# Patient Record
Sex: MALE | Race: WHITE | Employment: FULL TIME | URBAN - METROPOLITAN AREA
[De-identification: names, ages, dates, MRNs, and addresses within clinical notes are randomized per-mention and may not be internally consistent; named-entity substitution may affect disease eponyms.]

---

## 2018-10-14 ENCOUNTER — HOSPITAL ENCOUNTER (EMERGENCY)
Age: 38
Discharge: HOME | End: 2018-10-14
Attending: EMERGENCY MEDICINE
Payer: COMMERCIAL

## 2018-10-14 VITALS
SYSTOLIC BLOOD PRESSURE: 174 MMHG | OXYGEN SATURATION: 97 % | WEIGHT: 200 LBS | TEMPERATURE: 97.5 F | BODY MASS INDEX: 27.09 KG/M2 | RESPIRATION RATE: 16 BRPM | DIASTOLIC BLOOD PRESSURE: 118 MMHG | HEIGHT: 72 IN | HEART RATE: 92 BPM

## 2018-10-14 DIAGNOSIS — S61.213A LACERATION OF LEFT MIDDLE FINGER WITHOUT FOREIGN BODY WITHOUT DAMAGE TO NAIL, INITIAL ENCOUNTER: Primary | ICD-10-CM

## 2018-10-14 PROCEDURE — 99282 EMERGENCY DEPT VISIT SF MDM: CPT

## 2018-10-14 PROCEDURE — 2500000003 HC RX 250 WO HCPCS: Performed by: PHYSICIAN ASSISTANT

## 2018-10-14 PROCEDURE — 12002 RPR S/N/AX/GEN/TRNK2.6-7.5CM: CPT

## 2018-10-14 RX ORDER — LIDOCAINE HYDROCHLORIDE 10 MG/ML
20 INJECTION, SOLUTION INFILTRATION; PERINEURAL ONCE
Status: DISCONTINUED | OUTPATIENT
Start: 2018-10-14 | End: 2018-10-14

## 2018-10-14 RX ORDER — LIDOCAINE HYDROCHLORIDE 20 MG/ML
20 INJECTION, SOLUTION EPIDURAL; INFILTRATION; INTRACAUDAL; PERINEURAL ONCE
Status: DISCONTINUED | OUTPATIENT
Start: 2018-10-14 | End: 2018-10-14

## 2018-10-14 RX ORDER — LIDOCAINE HYDROCHLORIDE 10 MG/ML
20 INJECTION, SOLUTION EPIDURAL; INFILTRATION; INTRACAUDAL; PERINEURAL ONCE
Status: COMPLETED | OUTPATIENT
Start: 2018-10-14 | End: 2018-10-14

## 2018-10-14 RX ADMIN — LIDOCAINE HYDROCHLORIDE 20 ML: 10 INJECTION, SOLUTION EPIDURAL; INFILTRATION; INTRACAUDAL; PERINEURAL at 17:00

## 2018-10-14 NOTE — ED PROVIDER NOTES
(90.7 kg)    Height: 6' (1.829 m)            PROCEDURES:  Laceration Repair Procedure Note    Indication: Laceration    Procedure: The patient was placed in the appropriate position and anesthesia around the laceration was obtained by digit block on finger using 1% Lidocaine without epinephrine. The area was then cleansed with betadine and draped in a sterile fashion. The laceration was closed with 5-0 ethilon using interrupted sutures. There were no additional lacerations requiring repair. The wound area was then dressed with bacitracin and a sterile dressing. Total repaired wound length: 3 cm. Other Items: None    The patient tolerated the procedure well. Complications: None        Pt non compliant with bp meds. Will take when he gets home. Pt doesn't want workup or medication here for bp. Wound care instructions given. Pt instructed to have sutures removed in 7-10 days by pcp. In unable to f/u, return for suture removal. Pt agrees. Instructed to return for signs of infection including redness, swelling, fevers chills, increased pain, red streaking, pus drainage. ED MEDS:  Orders Placed This Encounter   Medications    DISCONTD: lidocaine 1 % injection 20 mL    DISCONTD: lidocaine PF 2 % injection 20 mL    lidocaine PF 1 % injection 20 mL         CONSULTS:  None          FINAL IMPRESSION      1.  Laceration of left middle finger without foreign body without damage to nail, initial encounter          DISPOSITION/PLAN   DISPOSITION Decision To Discharge    PATIENT REFERRED TO:  Brandon Spears [address]  [phone]    Schedule an appointment as soon as possible for a visit in 1 week  For suture removal, For wound re-check    [facility] ED  Critical access hospital 469 [phone]    For worsening symptoms, or any other concern      DISCHARGE MEDICATIONS:  New Prescriptions    No medications on file       (Please note that portions of this note were completed with a voice recognition program.  Efforts were made to edit the dictations but occasionally words are mis-transcribed.)    Clara Gaona, [address] [facility] [place]  10/14/18 8396

## 2023-01-26 ENCOUNTER — HOSPITAL ENCOUNTER (INPATIENT)
Age: 43
LOS: 7 days | Discharge: NURSING, REHAB OR LONG-TERM CARE | DRG: 065 | End: 2023-02-02
Attending: EMERGENCY MEDICINE | Admitting: PSYCHIATRY & NEUROLOGY
Payer: COMMERCIAL

## 2023-01-26 ENCOUNTER — APPOINTMENT (OUTPATIENT)
Dept: CT IMAGING | Age: 43
DRG: 065 | End: 2023-01-26
Payer: COMMERCIAL

## 2023-01-26 DIAGNOSIS — I62.9 INTRACRANIAL HEMORRHAGE (HCC): Primary | ICD-10-CM

## 2023-01-26 DIAGNOSIS — I61.3 PONTINE HEMORRHAGE (HCC): ICD-10-CM

## 2023-01-26 LAB
ABSOLUTE EOS #: 0.13 K/UL (ref 0–0.44)
ABSOLUTE EOS #: <0.03 K/UL (ref 0–0.44)
ABSOLUTE IMMATURE GRANULOCYTE: 0.05 K/UL (ref 0–0.3)
ABSOLUTE IMMATURE GRANULOCYTE: <0.03 K/UL (ref 0–0.3)
ABSOLUTE LYMPH #: 1.27 K/UL (ref 1.1–3.7)
ABSOLUTE LYMPH #: 1.96 K/UL (ref 1.1–3.7)
ABSOLUTE MONO #: 0.74 K/UL (ref 0.1–1.2)
ABSOLUTE MONO #: 0.8 K/UL (ref 0.1–1.2)
ALBUMIN SERPL-MCNC: 4.5 G/DL (ref 3.5–5.2)
ALT SERPL-CCNC: 60 U/L (ref 5–41)
ANION GAP SERPL CALCULATED.3IONS-SCNC: 13 MMOL/L (ref 9–17)
ANION GAP SERPL CALCULATED.3IONS-SCNC: ABNORMAL MMOL/L
ANION GAP: 9 MMOL/L (ref 7–16)
AST SERPL-CCNC: 54 U/L
BASOPHILS # BLD: 1 % (ref 0–2)
BASOPHILS # BLD: 1 % (ref 0–2)
BASOPHILS ABSOLUTE: 0.05 K/UL (ref 0–0.2)
BASOPHILS ABSOLUTE: 0.07 K/UL (ref 0–0.2)
BILIRUB SERPL-MCNC: 1.9 MG/DL (ref 0.3–1.2)
BUN BLDV-MCNC: 11 MG/DL (ref 6–20)
BUN BLDV-MCNC: 9 MG/DL (ref 6–20)
BUN BLDV-MCNC: ABNORMAL MG/DL
BUN/CREAT BLD: ABNORMAL (ref 9–20)
CALCIUM SERPL-MCNC: 10 MG/DL (ref 8.6–10.4)
CALCIUM SERPL-MCNC: 10.8 MG/DL (ref 8.6–10.4)
CALCIUM SERPL-MCNC: ABNORMAL MG/DL
CHLORIDE BLD-SCNC: 99 MMOL/L (ref 98–107)
CHLORIDE BLD-SCNC: ABNORMAL MMOL/L
CO2: 22 MMOL/L (ref 20–31)
CO2: ABNORMAL MMOL/L
CREAT SERPL-MCNC: 0.51 MG/DL (ref 0.7–1.2)
CREAT SERPL-MCNC: 0.53 MG/DL (ref 0.7–1.2)
CREAT SERPL-MCNC: ABNORMAL MG/DL
EGFR, POC: >60 ML/MIN/1.73M2
EOSINOPHILS RELATIVE PERCENT: 0 % (ref 1–4)
EOSINOPHILS RELATIVE PERCENT: 2 % (ref 1–4)
GFR SERPL CREATININE-BSD FRML MDRD: >60 ML/MIN/1.73M2
GFR SERPL CREATININE-BSD FRML MDRD: >60 ML/MIN/1.73M2
GFR SERPL CREATININE-BSD FRML MDRD: ABNORMAL ML/MIN/1.73M2
GLUCOSE BLD-MCNC: 114 MG/DL (ref 70–99)
GLUCOSE BLD-MCNC: 116 MG/DL (ref 74–100)
GLUCOSE BLD-MCNC: 136 MG/DL (ref 70–99)
GLUCOSE BLD-MCNC: ABNORMAL MG/DL
HCO3 VENOUS: 25.8 MMOL/L (ref 22–29)
HCT VFR BLD CALC: 50.9 % (ref 40.7–50.3)
HCT VFR BLD CALC: 52.3 % (ref 40.7–50.3)
HEMOGLOBIN: 18.4 G/DL (ref 13–17)
HEMOGLOBIN: 18.5 G/DL (ref 13–17)
IMMATURE GRANULOCYTES: 0 %
IMMATURE GRANULOCYTES: 1 %
INR BLD: 1.1
LACTIC ACID, WHOLE BLOOD: 1.5 MMOL/L (ref 0.7–2.1)
LYMPHOCYTES # BLD: 16 % (ref 24–43)
LYMPHOCYTES # BLD: 35 % (ref 24–43)
MCH RBC QN AUTO: 32.6 PG (ref 25.2–33.5)
MCH RBC QN AUTO: 32.9 PG (ref 25.2–33.5)
MCHC RBC AUTO-ENTMCNC: 35.4 G/DL (ref 28.4–34.8)
MCHC RBC AUTO-ENTMCNC: 36.1 G/DL (ref 28.4–34.8)
MCV RBC AUTO: 90.9 FL (ref 82.6–102.9)
MCV RBC AUTO: 92.2 FL (ref 82.6–102.9)
MONOCYTES # BLD: 14 % (ref 3–12)
MONOCYTES # BLD: 9 % (ref 3–12)
MYOGLOBIN: 54 NG/ML (ref 28–72)
NRBC AUTOMATED: 0 PER 100 WBC
NRBC AUTOMATED: 0 PER 100 WBC
O2 SAT, VEN: 76 % (ref 60–85)
PARTIAL THROMBOPLASTIN TIME: 22.4 SEC (ref 20.5–30.5)
PCO2, VEN: 40 MM HG (ref 41–51)
PDW BLD-RTO: 11.5 % (ref 11.8–14.4)
PDW BLD-RTO: 11.7 % (ref 11.8–14.4)
PH VENOUS: 7.42 (ref 7.32–7.43)
PLATELET # BLD: 214 K/UL (ref 138–453)
PLATELET # BLD: ABNORMAL K/UL (ref 138–453)
PLATELET, FLUORESCENCE: NORMAL K/UL (ref 138–453)
PMV BLD AUTO: 10.6 FL (ref 8.1–13.5)
PO2, VEN: 40.5 MM HG (ref 30–50)
POC BUN: 8 MG/DL (ref 8–26)
POC CHLORIDE: 105 MMOL/L (ref 98–107)
POC CREATININE: 0.73 MG/DL (ref 0.51–1.19)
POC HEMATOCRIT: 54 % (ref 41–53)
POC HEMOGLOBIN: 18.5 G/DL (ref 13.5–17.5)
POC IONIZED CALCIUM: 1.24 MMOL/L (ref 1.15–1.33)
POC LACTIC ACID: 1.91 MMOL/L (ref 0.56–1.39)
POC POTASSIUM: 4.9 MMOL/L (ref 3.5–4.5)
POC SODIUM: 138 MMOL/L (ref 138–146)
POC TCO2: 25 MMOL/L (ref 22–30)
POSITIVE BASE EXCESS, VEN: 1 (ref 0–3)
POTASSIUM SERPL-SCNC: 3.7 MMOL/L (ref 3.7–5.3)
POTASSIUM SERPL-SCNC: 4 MMOL/L (ref 3.7–5.3)
POTASSIUM SERPL-SCNC: ABNORMAL MMOL/L
PROTHROMBIN TIME: 12.1 SEC (ref 9.1–12.3)
RBC # BLD: 5.6 M/UL (ref 4.21–5.77)
RBC # BLD: 5.67 M/UL (ref 4.21–5.77)
REASON FOR REJECTION: NORMAL
SEG NEUTROPHILS: 47 % (ref 36–65)
SEG NEUTROPHILS: 73 % (ref 36–65)
SEGMENTED NEUTROPHILS ABSOLUTE COUNT: 2.69 K/UL (ref 1.5–8.1)
SEGMENTED NEUTROPHILS ABSOLUTE COUNT: 5.81 K/UL (ref 1.5–8.1)
SODIUM BLD-SCNC: 132 MMOL/L (ref 135–144)
SODIUM BLD-SCNC: 134 MMOL/L (ref 135–144)
SODIUM BLD-SCNC: ABNORMAL MMOL/L
TOTAL CK: 149 U/L (ref 39–308)
TROPONIN, HIGH SENSITIVITY: 17 NG/L (ref 0–22)
WBC # BLD: 5.7 K/UL (ref 3.5–11.3)
WBC # BLD: 7.9 K/UL (ref 3.5–11.3)
ZZ NTE CLEAN UP: ORDERED TEST: NORMAL
ZZ NTE WITH NAME CLEAN UP: SPECIMEN SOURCE: NORMAL

## 2023-01-26 PROCEDURE — 82803 BLOOD GASES ANY COMBINATION: CPT

## 2023-01-26 PROCEDURE — 4A03X5D MEASUREMENT OF ARTERIAL FLOW, INTRACRANIAL, EXTERNAL APPROACH: ICD-10-PCS | Performed by: RADIOLOGY

## 2023-01-26 PROCEDURE — 97535 SELF CARE MNGMENT TRAINING: CPT

## 2023-01-26 PROCEDURE — 6360000002 HC RX W HCPCS: Performed by: STUDENT IN AN ORGANIZED HEALTH CARE EDUCATION/TRAINING PROGRAM

## 2023-01-26 PROCEDURE — 36620 INSERTION CATHETER ARTERY: CPT

## 2023-01-26 PROCEDURE — 84484 ASSAY OF TROPONIN QUANT: CPT

## 2023-01-26 PROCEDURE — 82247 BILIRUBIN TOTAL: CPT

## 2023-01-26 PROCEDURE — 80051 ELECTROLYTE PANEL: CPT

## 2023-01-26 PROCEDURE — 93005 ELECTROCARDIOGRAM TRACING: CPT | Performed by: STUDENT IN AN ORGANIZED HEALTH CARE EDUCATION/TRAINING PROGRAM

## 2023-01-26 PROCEDURE — 2500000003 HC RX 250 WO HCPCS

## 2023-01-26 PROCEDURE — 82565 ASSAY OF CREATININE: CPT

## 2023-01-26 PROCEDURE — 2500000003 HC RX 250 WO HCPCS: Performed by: STUDENT IN AN ORGANIZED HEALTH CARE EDUCATION/TRAINING PROGRAM

## 2023-01-26 PROCEDURE — 85025 COMPLETE CBC W/AUTO DIFF WBC: CPT

## 2023-01-26 PROCEDURE — 85055 RETICULATED PLATELET ASSAY: CPT

## 2023-01-26 PROCEDURE — 82330 ASSAY OF CALCIUM: CPT

## 2023-01-26 PROCEDURE — 83874 ASSAY OF MYOGLOBIN: CPT

## 2023-01-26 PROCEDURE — 6360000002 HC RX W HCPCS

## 2023-01-26 PROCEDURE — 84520 ASSAY OF UREA NITROGEN: CPT

## 2023-01-26 PROCEDURE — 85014 HEMATOCRIT: CPT

## 2023-01-26 PROCEDURE — 85730 THROMBOPLASTIN TIME PARTIAL: CPT

## 2023-01-26 PROCEDURE — APPSS30 APP SPLIT SHARED TIME 16-30 MINUTES: Performed by: PHYSICIAN ASSISTANT

## 2023-01-26 PROCEDURE — 84460 ALANINE AMINO (ALT) (SGPT): CPT

## 2023-01-26 PROCEDURE — 82550 ASSAY OF CK (CPK): CPT

## 2023-01-26 PROCEDURE — 82310 ASSAY OF CALCIUM: CPT

## 2023-01-26 PROCEDURE — 97530 THERAPEUTIC ACTIVITIES: CPT

## 2023-01-26 PROCEDURE — 82947 ASSAY GLUCOSE BLOOD QUANT: CPT

## 2023-01-26 PROCEDURE — 83605 ASSAY OF LACTIC ACID: CPT

## 2023-01-26 PROCEDURE — 92523 SPEECH SOUND LANG COMPREHEN: CPT

## 2023-01-26 PROCEDURE — 80048 BASIC METABOLIC PNL TOTAL CA: CPT

## 2023-01-26 PROCEDURE — 6370000000 HC RX 637 (ALT 250 FOR IP): Performed by: STUDENT IN AN ORGANIZED HEALTH CARE EDUCATION/TRAINING PROGRAM

## 2023-01-26 PROCEDURE — 82040 ASSAY OF SERUM ALBUMIN: CPT

## 2023-01-26 PROCEDURE — 84295 ASSAY OF SERUM SODIUM: CPT

## 2023-01-26 PROCEDURE — 97166 OT EVAL MOD COMPLEX 45 MIN: CPT

## 2023-01-26 PROCEDURE — 70450 CT HEAD/BRAIN W/O DYE: CPT

## 2023-01-26 PROCEDURE — 51701 INSERT BLADDER CATHETER: CPT

## 2023-01-26 PROCEDURE — 85610 PROTHROMBIN TIME: CPT

## 2023-01-26 PROCEDURE — 51798 US URINE CAPACITY MEASURE: CPT

## 2023-01-26 PROCEDURE — 99285 EMERGENCY DEPT VISIT HI MDM: CPT

## 2023-01-26 PROCEDURE — 96374 THER/PROPH/DIAG INJ IV PUSH: CPT

## 2023-01-26 PROCEDURE — 70498 CT ANGIOGRAPHY NECK: CPT

## 2023-01-26 PROCEDURE — 97162 PT EVAL MOD COMPLEX 30 MIN: CPT

## 2023-01-26 PROCEDURE — 99291 CRITICAL CARE FIRST HOUR: CPT | Performed by: PSYCHIATRY & NEUROLOGY

## 2023-01-26 PROCEDURE — 6360000004 HC RX CONTRAST MEDICATION: Performed by: STUDENT IN AN ORGANIZED HEALTH CARE EDUCATION/TRAINING PROGRAM

## 2023-01-26 PROCEDURE — 84132 ASSAY OF SERUM POTASSIUM: CPT

## 2023-01-26 PROCEDURE — 82553 CREATINE MB FRACTION: CPT

## 2023-01-26 PROCEDURE — 96375 TX/PRO/DX INJ NEW DRUG ADDON: CPT

## 2023-01-26 PROCEDURE — 2000000000 HC ICU R&B

## 2023-01-26 PROCEDURE — 84450 TRANSFERASE (AST) (SGOT): CPT

## 2023-01-26 RX ORDER — ONDANSETRON 2 MG/ML
INJECTION INTRAMUSCULAR; INTRAVENOUS
Status: COMPLETED
Start: 2023-01-26 | End: 2023-01-26

## 2023-01-26 RX ORDER — NICARDIPINE HYDROCHLORIDE 0.1 MG/ML
INJECTION INTRAVENOUS
Status: COMPLETED
Start: 2023-01-26 | End: 2023-01-26

## 2023-01-26 RX ORDER — LIDOCAINE HYDROCHLORIDE 10 MG/ML
20 INJECTION, SOLUTION INFILTRATION; PERINEURAL ONCE
Status: COMPLETED | OUTPATIENT
Start: 2023-01-26 | End: 2023-01-26

## 2023-01-26 RX ORDER — ONDANSETRON 2 MG/ML
4 INJECTION INTRAMUSCULAR; INTRAVENOUS EVERY 6 HOURS PRN
Status: DISCONTINUED | OUTPATIENT
Start: 2023-01-26 | End: 2023-02-02 | Stop reason: HOSPADM

## 2023-01-26 RX ORDER — NICARDIPINE HYDROCHLORIDE 0.1 MG/ML
2.5-15 INJECTION INTRAVENOUS CONTINUOUS
Status: DISCONTINUED | OUTPATIENT
Start: 2023-01-26 | End: 2023-01-28

## 2023-01-26 RX ORDER — LIDOCAINE HYDROCHLORIDE 10 MG/ML
INJECTION, SOLUTION INFILTRATION; PERINEURAL
Status: COMPLETED
Start: 2023-01-26 | End: 2023-01-26

## 2023-01-26 RX ORDER — ACETAMINOPHEN 325 MG/1
650 TABLET ORAL EVERY 4 HOURS PRN
Status: DISCONTINUED | OUTPATIENT
Start: 2023-01-26 | End: 2023-02-02 | Stop reason: HOSPADM

## 2023-01-26 RX ORDER — ONDANSETRON 4 MG/1
4 TABLET, ORALLY DISINTEGRATING ORAL EVERY 8 HOURS PRN
Status: DISCONTINUED | OUTPATIENT
Start: 2023-01-26 | End: 2023-02-02 | Stop reason: HOSPADM

## 2023-01-26 RX ORDER — LABETALOL HYDROCHLORIDE 5 MG/ML
10 INJECTION, SOLUTION INTRAVENOUS EVERY 4 HOURS PRN
Status: DISCONTINUED | OUTPATIENT
Start: 2023-01-26 | End: 2023-01-29

## 2023-01-26 RX ORDER — DIPHENHYDRAMINE HYDROCHLORIDE 50 MG/ML
12.5 INJECTION INTRAMUSCULAR; INTRAVENOUS ONCE
Status: COMPLETED | OUTPATIENT
Start: 2023-01-26 | End: 2023-01-26

## 2023-01-26 RX ORDER — NICOTINE 21 MG/24HR
1 PATCH, TRANSDERMAL 24 HOURS TRANSDERMAL DAILY
Status: DISCONTINUED | OUTPATIENT
Start: 2023-01-26 | End: 2023-02-02 | Stop reason: HOSPADM

## 2023-01-26 RX ORDER — MAGNESIUM SULFATE 1 G/100ML
1000 INJECTION INTRAVENOUS ONCE
Status: COMPLETED | OUTPATIENT
Start: 2023-01-26 | End: 2023-01-27

## 2023-01-26 RX ORDER — FENTANYL CITRATE 50 UG/ML
50 INJECTION, SOLUTION INTRAMUSCULAR; INTRAVENOUS
Status: DISCONTINUED | OUTPATIENT
Start: 2023-01-26 | End: 2023-01-30

## 2023-01-26 RX ORDER — ONDANSETRON 2 MG/ML
4 INJECTION INTRAMUSCULAR; INTRAVENOUS ONCE
Status: COMPLETED | OUTPATIENT
Start: 2023-01-26 | End: 2023-01-26

## 2023-01-26 RX ADMIN — ONDANSETRON 4 MG: 2 INJECTION INTRAMUSCULAR; INTRAVENOUS at 06:36

## 2023-01-26 RX ADMIN — Medication 10 MG: at 15:03

## 2023-01-26 RX ADMIN — NICARDIPINE HYDROCHLORIDE 5 MG/HR: 0.1 INJECTION INTRAVENOUS at 06:07

## 2023-01-26 RX ADMIN — NICARDIPINE HYDROCHLORIDE 2.5 MG/HR: 0.1 INJECTION INTRAVENOUS at 21:00

## 2023-01-26 RX ADMIN — LIDOCAINE HYDROCHLORIDE: 10 INJECTION, SOLUTION INFILTRATION; PERINEURAL at 10:00

## 2023-01-26 RX ADMIN — ONDANSETRON 4 MG: 2 INJECTION INTRAMUSCULAR; INTRAVENOUS at 06:07

## 2023-01-26 RX ADMIN — ACETAMINOPHEN 650 MG: 325 TABLET ORAL at 16:59

## 2023-01-26 RX ADMIN — Medication 10 MG: at 20:33

## 2023-01-26 RX ADMIN — DIPHENHYDRAMINE HYDROCHLORIDE 12.5 MG: 50 INJECTION, SOLUTION INTRAMUSCULAR; INTRAVENOUS at 22:48

## 2023-01-26 RX ADMIN — ACETAMINOPHEN 650 MG: 325 TABLET ORAL at 11:58

## 2023-01-26 RX ADMIN — FENTANYL CITRATE 50 MCG: 50 INJECTION, SOLUTION INTRAMUSCULAR; INTRAVENOUS at 17:59

## 2023-01-26 RX ADMIN — NICARDIPINE HYDROCHLORIDE 5 MG/HR: 0.1 INJECTION INTRAVENOUS at 15:04

## 2023-01-26 RX ADMIN — NICARDIPINE HYDROCHLORIDE 15 MG/HR: 0.1 INJECTION INTRAVENOUS at 07:33

## 2023-01-26 RX ADMIN — ACETAMINOPHEN 650 MG: 325 TABLET ORAL at 20:47

## 2023-01-26 RX ADMIN — NICARDIPINE HYDROCHLORIDE 5 MG/HR: 0.1 INJECTION INTRAVENOUS at 12:00

## 2023-01-26 RX ADMIN — MAGNESIUM SULFATE HEPTAHYDRATE 1000 MG: 1 INJECTION, SOLUTION INTRAVENOUS at 22:45

## 2023-01-26 RX ADMIN — IOPAMIDOL 90 ML: 755 INJECTION, SOLUTION INTRAVENOUS at 06:23

## 2023-01-26 ASSESSMENT — PAIN DESCRIPTION - ORIENTATION: ORIENTATION: RIGHT

## 2023-01-26 ASSESSMENT — PAIN DESCRIPTION - LOCATION: LOCATION: HEAD

## 2023-01-26 ASSESSMENT — PAIN SCALES - GENERAL: PAINLEVEL_OUTOF10: 5

## 2023-01-26 ASSESSMENT — PAIN DESCRIPTION - DESCRIPTORS: DESCRIPTORS: ACHING

## 2023-01-26 NOTE — PROGRESS NOTES
Occupational Therapy  Facility/Department: [address] NEURO ICU  Occupational Therapy Initial Assessment    Name: Arsenio Cheung  : 1980  MRN: [id number]  Date of Service: 2023    Discharge Recommendations:   Further therapy recommended at discharge. Patient Diagnosis(es): The primary encounter diagnosis was Intracranial hemorrhage ([facility] [address] A diagnosis of Pontine hemorrhage ([facility] [address] was also pertinent to this visit. Past Medical History:  has a past medical history of History of heartburn, Neoplasm of unspecified nature of bone, soft tissue, and skin, and Research study patient. Past Surgical History:  has a past surgical history that includes Appendectomy (in 20's) and pre-malignant / benign skin lesion excision (Right, 2014). Assessment   Performance deficits / Impairments: Decreased functional mobility ; Decreased ADL status; Decreased endurance;Decreased high-level IADLs;Decreased fine motor control;Decreased strength;Decreased coordination;Decreased balance  Assessment: pt would benefit from further therapy at discharge in order to increase safety and independence with ADLS and functional transfers/functional mobility. Prognosis: Good  Decision Making: Medium Complexity  REQUIRES OT FOLLOW-UP: Yes  Activity Tolerance  Activity Tolerance: Patient Tolerated treatment well        Plan   Occupational Therapy Plan  Times Per Week: 3-4x/wk     Restrictions  Restrictions/Precautions  Restrictions/Precautions: General Precautions, Seizure (Simultaneous filing. User may not have seen previous data.)  Required Braces or Orthoses?: No (Simultaneous filing. User may not have seen previous data.)  Position Activity Restriction  Other position/activity restrictions: Up with assist, SBP <160 mmHg (Simultaneous filing.  User may not have seen previous data.)    Subjective   General  Patient assessed for rehabilitation services?: Yes  Family / Caregiver Present: Yes (pt wife present for duration of session)  Diagnosis: pontine hemorrhage  General Comment  Comments: RN ok'd for therapy this afternoon. pt agreeable to participate in session and  cooperative/pleasant throughout. pt reported discomfort to entire R side of body     Social/Functional History  Social/Functional History  Lives With: Spouse (13-8 year old children occasionally  Simultaneous filing. User may not have seen previous data.)  Type of Home: House  Home Layout: Two level, Bed/Bath upstairs, 1/2 bath on main level  Home Access: Stairs to enter with rails  Entrance Stairs - Number of Steps: 1-2  Entrance Stairs - Rails: Right  Bathroom Shower/Tub: Tub/Shower unit  Bathroom Toilet: Standard  Home Equipment: Cane (pt reported no use of DME at baseline)  ADL Assistance: [address]: Independent  Homemaking Responsibilities: Yes  Meal Prep Responsibility: Primary  Laundry Responsibility: Primary  Cleaning Responsibility: Primary  Ambulation Assistance: Independent  Transfer Assistance: Independent  Active : Yes  Mode of Transportation: Truck  Occupation: Full time employment  Type of Occupation: operations  Leisure & Hobbies: watch history channel, [employer]  Additional Comments: pt reported wife able to provide 24/7 assist       Objective                Safety Devices  Type of Devices: Call light within reach; Left in bed;Gait belt;Patient at risk for falls;Nurse notified; All fall risk precautions in place  Restraints  Restraints Initially in Place: No    Bed Mobility Training  Bed Mobility Training: Yes  Overall Level of Assistance: Stand-by assistance  Supine to Sit: Stand-by assistance  Sit to Supine: Stand-by assistance  Scooting: Stand-by assistance  Balance  Sitting: High guard (~8 minutes on EOB with SBA for static and CGA for dynamic sitting)  Standing: With support (~2 minutes.  pt completed static standing at bedside for sheet management and side steps toward [facility] with min A and min verbal cues for sequencing and continuation.)  Transfer Training  Transfer Training: Yes  Overall Level of Assistance: Contact-guard assistance (with RW)  Sit to Stand: Contact-guard assistance  Stand to Sit: Contact-guard assistance  Gait  Overall Level of Assistance: Contact-guard assistance;Minimum assistance       AROM: Within functional limits  Strength: Generally decreased, functional (R UE 4/5, L UE 4+/5)  Coordination: Generally decreased, functional (pt exhibited decreased coordination to R UE for gross and fine motor movements. pt required increased time to  complete finger to nose test and opposition with R hand and demonstrated decreased accuracy)  Tone: Normal  Sensation: Impaired (pt reported decreased sensation to R UE and R LE)    ADL  Feeding: Modified independent   Grooming: Stand by assistance  UE Bathing: Stand by assistance  LE Bathing: Contact guard assistance  UE Dressing: Stand by assistance  LE Dressing: Contact guard assistance  Toileting: Contact guard assistance  Additional Comments: OT facilitated pt in donning B socks while sitting on EOB. pt required increased time to complete d/t decreased coordination. pt provided with CGA d/t slight unsteadiness with dynamic sitting while bringing foot to knee             Vision  Vision: Impaired (pt reported having glasses but doesn't wear them. pt reported slightly blurry vision since this morning .)  Hearing  Hearing: Within functional limits    Cognition  Overall Cognitive Status: WFL  Orientation  Overall Orientation Status: Within Functional Limits                  Education Given To: Patient  Education Provided: Role of Therapy;Plan of Care;Transfer Training; Fall Prevention Strategies  Education Provided Comments: importance of incorporating R hand into functional tasks  Education Method: Verbal  Barriers to Learning: None  Education Outcome: Verbalized understanding    LUE AROM (degrees)  LUE AROM : WFL  Left Hand AROM (degrees)  Left Hand AROM: WFL  RUE AROM (degrees)  RUE AROM : WFL  Right Hand AROM (degrees)  Right Hand AROM: WFL        Hand Dominance  Hand Dominance: Right                                                        AM-PAC Score        AM-PAC Inpatient Daily Activity Raw Score: 20 (01/26/23 1515)  AM-PAC Inpatient ADL T-Scale Score : 42.03 (01/26/23 1515)  ADL Inpatient CMS 0-100% Score: 38.32 (01/26/23 1515)  ADL Inpatient CMS G-Code Modifier : CJ (01/26/23 1515)           Goals  Short Term Goals  Time Frame for Short Term Goals: pt will, by discharge  Short Term Goal 1: complete LB ADLs and toileting tasks with SBA and set up  Short Term Goal 2: complete UB ADLs with mod I  Short Term Goal 3: participate in meaningful activity for 15+ minutes in order to increase R UE coordination  Short Term Goal 4: dem SBA during functional transfers/functional mobility with LRD, as needed  Short Term Goal 5: dem ~8 minutes dynamic standing tolerance with SBA in order to complete functional tasks       Therapy Time   Individual Concurrent Group Co-treatment   Time In 1436         Time Out 1457         Minutes 21      Co-eval with PT    Timed Code Treatment Minutes: Lauren 0565, OTR/L

## 2023-01-26 NOTE — ED NOTES
Report given to [facility]; all questions addressed.       Christina Gage RN  01/26/23 7484

## 2023-01-26 NOTE — PROGRESS NOTES
Physical Therapy  Facility/Department: [address] NEURO ICU  Physical Therapy Initial Assessment    Name: Hipolito Smith  : 1980  MRN: [id number]  Date of Service: 2023  Chief Complaint   Patient presents with    Extremity Weakness     Left sided weakness    Facial Droop       Discharge Recommendations:    Further therapy recommended at discharge. The patient should be able to tolerate at least 3 hours of therapy per day over 5 days or 15 hours over 7 days. This patient may benefit from a Physical Medicine and Rehab consult. PT Equipment Recommendations  Equipment Needed: No      Patient Diagnosis(es): The primary encounter diagnosis was Intracranial hemorrhage ([facility] [address] A diagnosis of Pontine hemorrhage ([facility] [address] was also pertinent to this visit. Past Medical History:  has a past medical history of History of heartburn, Neoplasm of unspecified nature of bone, soft tissue, and skin, and Research study patient. Past Surgical History:  has a past surgical history that includes Appendectomy (in 20's) and pre-malignant / benign skin lesion excision (Right, 2014). Assessment   Body Structures, Functions, Activity Limitations Requiring Skilled Therapeutic Intervention: Decreased functional mobility ; Decreased ADL status; Decreased tolerance to work activity; Decreased strength;Decreased endurance;Decreased sensation;Decreased balance;Decreased coordination  Assessment: Pt amb 2' R lateral Scar for RW sequencing and verbal cueing of RLE placement in mobility. Pt would benefit from continued acute PT to address noted deficits. Therapy Prognosis: Good  Decision Making: Medium Complexity  Requires PT Follow-Up: Yes  Activity Tolerance  Activity Tolerance: Other (comment)  Activity Tolerance Comments: Greatest limitation to activity tolerance is fear of movement with recent loss of coordination and impaired sensation on R side of body.      Plan   Physcial Therapy Plan  General Plan:  (6-7x/wk)  Current Treatment Recommendations: Strengthening, Balance training, Functional mobility training, Transfer training, Endurance training, Gait training, Stair training, Neuromuscular re-education, Home exercise program, Patient/Caregiver education & training, Safety education & training, Equipment evaluation, education, & procurement, Therapeutic activities  Safety Devices  Type of Devices: Call light within reach, Left in bed, Gait belt, Patient at risk for falls, Nurse notified, All fall risk precautions in place  Restraints  Restraints Initially in Place: No     Restrictions  Restrictions/Precautions  Restrictions/Precautions: General Precautions, Seizure   Required Braces or Orthoses?: No   Position Activity Restriction  Other position/activity restrictions: Up with assist, SBP <160 mmHg     Subjective   General  Chart Reviewed: Yes  Patient assessed for rehabilitation services?: Yes  Response To Previous Treatment: Not applicable  Family / Caregiver Present: Yes (Wife)  Follows Commands: Within Functional Limits  General Comment  Comments: RN and pt agreeable to PT. pt alert in bed upon arrival. Co-Eval w/ OT  Subjective  Subjective: Pt did not verbalize pain, but stated that the RUE and RLE are a \"3-4/10 discomfort\". N/t in R side of body.          Social/Functional History  Social/Functional History  Lives With: Spouse (13-8 year old children occasionally)  Type of Home: House  Home Layout: Two level, Bed/Bath upstairs, 1/2 bath on main level  Home Access: Stairs to enter with rails  Entrance Stairs - Number of Steps: 1-2  Entrance Stairs - Rails: Right  Bathroom Shower/Tub: Tub/Shower unit  Bathroom Toilet: Standard  Home Equipment: Cane (pt reported no use of DME at baseline)  ADL Assistance: [address]: Independent  Homemaking Responsibilities: Yes  Meal Prep Responsibility: Primary  Laundry Responsibility: Primary  Cleaning Responsibility: Primary  Ambulation Assistance: Independent  Transfer Assistance: Independent  Active : Yes  Mode of Transportation: Truck  Occupation: Full time employment  Type of Occupation: operations  Leisure & Hobbies: watch history channel, fishing  Additional Comments: pt reported wife able to provide 24/7 assist  Vision/Hearing  Hearing  Hearing: Within functional limits    Cognition   Orientation  Overall Orientation Status: Within Functional Limits  Cognition  Overall Cognitive Status: WFL     Objective   AROM RLE (degrees)  RLE AROM: WFL  AROM LLE (degrees)  LLE AROM : WFL  AROM RUE (degrees)  RUE General AROM: See OT  AROM LUE (degrees)  LUE General AROM: See OT  Strength RLE  Strength RLE: WFL  Comment: 5/5 strength grossly  Strength LLE  Strength LLE: WFL  Comment: 5/5 strength grossly  Strength RUE  Comment: See OT  Strength LUE  Comment: See OT     Bed mobility  Supine to Sit: Contact guard assistance  Sit to Supine: Contact guard assistance  Bed Mobility Comments: performed to R side to make functional w/ home setup  Transfers  Sit to Stand: Contact guard assistance  Stand to Sit: Contact guard assistance  Comment: Performed w/ RW  Ambulation  Surface: Level tile  Device: Rolling Walker  Assistance: Minimal assistance  Quality of Gait: Pt ambulates with discoordination of RLE in foot placement secondary to dysmetria. Pt required verbal cues for lateral RW sequencing/navigation, as well as cueing for RLE foot placement. Pt ambulates with decreased tianna, gait speed, step width. Distance: 2' R lateral  More Ambulation?: No  Stairs/Curb  Stairs?: No     Balance  Posture: Good  Sitting - Static: Fair;+  Sitting - Dynamic: Fair;+  Standing - Static: Fair  Standing - Dynamic: Fair;-  Comments: Standing Balance assessed w/ RW  Exercise Treatment: Heel-Shin slide test performed on RLE reveales decreased coordination of movement.       AM-PAC Score  AM-PAC Inpatient Mobility Raw Score : 16 (01/26/23 1747)  AM-PAC Inpatient T-Scale Score : 40.78 (01/26/23 1517)  Mobility Inpatient CMS 0-100% Score: 54.16 (01/26/23 1517)  Mobility Inpatient CMS G-Code Modifier : CK (01/26/23 1517)     Goals  Short Term Goals  Time Frame for Short Term Goals: 14 visits  Short Term Goal 1: Pt amb 300' IND  Short Term Goal 2: Pt will be IND in all bed mobility tasks  Short Term Goal 3: Pt will be IND in all transfers  Short Term Goal 4: Pt will be IND 1 flight of stair navigation. Education  Patient Education  Education Given To: Patient; Family (Wife)  Education Provided: Role of Therapy;Plan of Care  Education Method: Demonstration  Barriers to Learning: None  Education Outcome: Verbalized understanding;Demonstrated understanding      Therapy Time   Individual Concurrent Group Co-treatment   Time In 1436         Time Out 1458         Minutes 22         Timed Code Treatment Minutes: 8 Minutes       Dyana Mchugh, SPT   This treatment/evaluation completed by signing SPT. Signing PT agrees with treatment and documentation.

## 2023-01-26 NOTE — ED NOTES
Pt report given to [facility], [facility].       Aroldo Good RN  01/26/23 7283

## 2023-01-26 NOTE — ED NOTES
Pt brought by EMS with c/o of left sided weakness and facial drooping. Pt woke up at 4 am, wife noticed the symptoms. Pt's last known well was 2200 01/25. Pt is a known hypertensive and has no history of stroke in the past.   Pt is alert and oriented x 4.       Donald Landry RN  01/26/23 9732

## 2023-01-26 NOTE — ED NOTES
Neuro critical care at bedside to evaluate pt. Pt to remain NPO per Neuro crit care.     Cam Rene, RN  01/26/23 7621 Roger Dinh, RN  01/26/23 1369

## 2023-01-26 NOTE — H&P
Neuro ICU History & Physical    Patient Name: Flako Demarco  Patient : 1980  Room/Bed: 0125/0125-01  Code Status: Full Code  Allergies: No Known Allergies    CHIEF COMPLAINT     Dizziness, Right sided weakness, numbness    HPI    History Obtained From: patient, wife, ED     The patient is a 43 y.o. male presented with dizziness, right-sided facial droop, right-sided weakness and right-sided numbness. Patient felt well when he went to bed last night, last known well 10 PM.  Medical history notable for hypertension, takes Coreg for this. Patient does not know his dose. EMS was called when patient woke up with these symptoms this morning. Systolic blood pressure was 200s on arrival to the ED. Stroke alert was called on arrival, CT of the head demonstrated 13 x 8 mm intraparenchymal hemorrhage of the posterior ramses. CTA of the head and neck showed focal mild narrowing of the right P1 PCA, no other significant stenosis, aneurysm, or arterial injury. Cardene was started for blood pressure control with goal blood pressure under 160. Patient's metabolic work-up unremarkable overall. Patient remains alert, oriented, able to provide history. He does describe double vision, sensation changes on the right and weakness. He was unable to walk when this happened. No other medical history. No known allergies. Patient does smoke. ICH score: 1    Admitted to ICU From: Emergency Department   Reason for ICU Admission: Pontine Hemorrhage       PATIENT HISTORY   Past Medical History:        Diagnosis Date    History of heartburn     takes omeprazole    Neoplasm of unspecified nature of bone, soft tissue, and skin -present    lesions of right cheek x 2       Past Surgical History:        Procedure Laterality Date    APPENDECTOMY  in     PRE-MALIGNANT / BENIGN SKIN LESION EXCISION Right 2014    Excision lesion of right cheek. Dr. Salazar Servsamantha.        Social History:   Social History Socioeconomic History    Marital status:      Spouse name: Not on file    Number of children: Not on file    Years of education: Not on file    Highest education level: Not on file   Occupational History    Not on file   Tobacco Use    Smoking status: Every Day     Packs/day: 1.00     Years: 10.00     Pack years: 10.00     Types: Cigarettes     Last attempt to quit: 2013     Years since quittin.5    Smokeless tobacco: Never   Substance and Sexual Activity    Alcohol use: Yes     Alcohol/week: 24.0 standard drinks     Types: 24 Cans of beer per week    Drug use: No    Sexual activity: Not on file   Other Topics Concern    Not on file   Social History Narrative    Not on file     Social Determinants of Health     Financial Resource Strain: Not on file   Food Insecurity: Not on file   Transportation Needs: Not on file   Physical Activity: Not on file   Stress: Not on file   Social Connections: Not on file   Intimate Partner Violence: Not on file   Housing Stability: Not on file       Family History:   No family history on file. Allergies:    Patient has no known allergies. Medications Prior to Admission:    Medications Prior to Admission: omeprazole (PRILOSEC) 10 MG capsule, Take 10 mg by mouth daily.     Current Medications:  Current Facility-Administered Medications: niCARdipine (CARDENE) 20 mg in 0.9 % sodium chloride 200 mL solution, 2.5-15 mg/hr, IntraVENous, Continuous  acetaminophen (TYLENOL) tablet 650 mg, 650 mg, Oral, Q4H PRN  ondansetron (ZOFRAN-ODT) disintegrating tablet 4 mg, 4 mg, Oral, Q8H PRN **OR** ondansetron (ZOFRAN) injection 4 mg, 4 mg, IntraVENous, Q6H PRN  labetalol (NORMODYNE;TRANDATE) injection 10 mg, 10 mg, IntraVENous, Q4H PRN  lidocaine 1 % injection 20 mL, 20 mL, IntraDERmal, Once  lidocaine 1 % injection, , ,   nicotine (NICODERM CQ) 14 MG/24HR 1 patch, 1 patch, TransDERmal, Daily    REVIEW OF SYSTEMS     CONSTITUTIONAL: negative for fatigue   EYES: Positive for double vision, blurry vision   HEENT: negative for tinnitus and sore throat   RESPIRATORY: negative for cough, shortness of breath   CARDIOVASCULAR: negative for chest pain, palpitations, or syncope   GASTROINTESTINAL: negative for abdominal pain, nausea, vomiting   GENITOURINARY: negative for incontinence or retention    MUSCULOSKELETAL: negative for neck or back pain, negative for extremity pain   NEUROLOGICAL: Negative for seizures, headaches, Positive for weakness, numbness, slurred speech, negative for confusion, aphasia, dysarthria    PSYCHIATRIC: negative for agitation, hallucination, SI/HI   SKIN Negative for spontaneous contusions, rashes, or lesions      PHYSICAL EXAM:     /87   Pulse 81   Temp 97.5 °F (36.4 °C)   Resp 18   Ht 6' (1.829 m)   Wt 200 lb (90.7 kg)   SpO2 94%   BMI 27.12 kg/m²     PHYSICAL EXAM:  CONSTITUTIONAL:  Well developed, well nourished, alert and oriented x 3, in no acute distress. GCS 15. Nontoxic. Mild  dysarthria. No aphasia.    HEAD:  normocephalic, atraumatic    EYES:  Right-sided ptosis, pupils equal and reactive, right gaze preference, unable to cross midline to the left Visual acuity intact, persistent nystagmus on the right   ENT:  moist mucous membranes   NECK:  supple, symmetric   LUNGS:  Equal air entry bilaterally   CARDIOVASCULAR:  normal s1 / s2, RRR, distal pulses intact   ABDOMEN:  Soft, no rigidity   NEUROLOGIC:  Mental Status:  A & O x3,awake             Cranial Nerves:    II: Visual acuity:  normal  II: Visual fields:  normal -   III: Pupils:  equal, round, reactive to light  III,IV,VI: Extra Ocular Movements: abnormal unable to cross midline when looking to the left, right eye with fixed lateral gaze  V: Facial sensation:  intact  VII: Facial strength: abnormal right facial droop  VIII: Hearing:  intact  IX: Palate:  intact  XI: Shoulder shrug:  intact  XII: Tongue movement:  normal    Motor Exam:    Drift:  absent  Tone:  normal    MOTOR:  RUE:   LUE:   RLE:   LLE:     Sensory:    Touch:    Right Upper Extremity:  abnormal - \"prickly sensation\"  Left Upper Extremity:  normal  Right Lower Extremity:  normal  Left Lower Extremity:  abnormal - absent sensation    Coordination/Dysmetria:    Finger to Nose:   Right:  abnormal - dysmetria  Left:  normal        NIH Stroke Scale Total (if not done complete detailed one below):    1a.  Level of consciousness:  0 - alert; keenly responsive  1b. Level of consciousness questions:  0 - answers both questions correctly  1c. Level of consciousness questions:  0 - performs both tasks correctly  2. Best Gaze:  2 - forced deviation, or total gaze paresis not overcome by oculocephalic maneuver  3. Visual:  0 - no visual loss  4. Facial Palsy:  1 - minor paralysis (flattened nasolabial fold, asymmetric on smiling)  5a. Motor left arm:  0 - no drift, limb holds 90 (or 45) degrees for full 10 seconds  5b. Motor right arm:  0 - no drift, limb holds 90 (or 45) degrees for full 10 seconds  6a. Motor left le - no drift; leg holds 30 degree position for full 5 seconds  6b. Motor right le - no drift; leg holds 30 degree position for full 5 seconds  7. Limb Ataxia:  1 - present in one limb  8. Sensory:  1 - mild to moderate sensory loss; patient feels pinprick is less sharp or is dull on the affected side; there is a loss of superficial pain with pinprick but patient is aware of being touched   9. Best Language:  0 - no aphasia, normal  10. Dysarthria:  0 - normal  11.   Extinction and Inattention:  0 - no abnormality    LABS AND IMAGING:     RECENT LABS:  CBC with Differential:    Lab Results   Component Value Date/Time    WBC 5.7 2023 06:18 AM    RBC 5.67 2023 06:18 AM    HGB 18.5 2023 06:18 AM    HCT 52.3 2023 06:18 AM    PLT See Reflexed IPF Result 2023 06:18 AM    MCV 92.2 2023 06:18 AM    MCH 32.6 2023 06:18 AM    MCHC 35.4 2023 06:18 AM    RDW 11.7 01/26/2023 06:18 AM    LYMPHOPCT 35 01/26/2023 06:18 AM    MONOPCT 14 01/26/2023 06:18 AM    BASOPCT 1 01/26/2023 06:18 AM    MONOSABS 0.80 01/26/2023 06:18 AM    LYMPHSABS 1.96 01/26/2023 06:18 AM    EOSABS 0.13 01/26/2023 06:18 AM    BASOSABS 0.07 01/26/2023 06:18 AM     BMP:    Lab Results   Component Value Date/Time     01/26/2023 07:24 AM    K 3.7 01/26/2023 07:24 AM    CL 99 01/26/2023 07:24 AM    CO2 22 01/26/2023 07:24 AM    BUN 9 01/26/2023 07:24 AM    CREATININE 0.53 01/26/2023 07:24 AM    CREATININE 0.73 01/26/2023 06:04 AM    CALCIUM 10.0 01/26/2023 07:24 AM    LABGLOM >60 01/26/2023 07:24 AM    GLUCOSE 136 01/26/2023 07:24 AM       RADIOLOGY:  CT HEAD WO CONTRAST   Final Result   13 x 8 mm intraparenchymal hemorrhage posterior ramses. Critical results were called by Dr. Porfirio Desai to Dr. Caty Cruz on 1/26/2023 at   06:37. CTA HEAD NECK W CONTRAST   Final Result   Focal mild narrowing of the right P1 PCA. Otherwise no significant stenosis,   aneurysm, or arterial injury in the CTA of the head and neck. Pontine intraparenchymal hemorrhage is redemonstrated. CT head without contrast    (Results Pending)             Labs and Images reviewed with:    [] Nyasia Noland MD    [x] Addison Hernandez MD  [] Alan Denis MD  --[] there are no new interval images to review. ASSESSMENT AND PLAN:         ASSESSMENT:     This is a 43 y.o. male presenting after waking up with right-sided weakness and numbness in addition to dizziness. 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 Past medical history notable for hypertension, on Coreg at home. Patient's blood pressure was around 335 systolic on arrival, Cardene initiated in the ER. No other metabolic abnormalities. Patient care will be discussed with attending, will reevaluate patient along with attending.      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   - Imaging: CT head with 13 x 8 mm posterior pontine hemorrhage, CTA of the head and neck with focal mild narrowing in the right P1 PTA  - AEDs not indicated   - Initial BP ~129 systolic  - Goal SBP <568   - Transition to lisinopril when weaning Cardene  - NSG, endovascular neuro following   - ICH score: 1  - Repeat CTH     CARDIOVASCULAR:  BP Range: Systolic (75IDX), TFY:627 , Min:88 , QGU:178     Diastolic (38BMC), JOSE E:789, Min:54, Max:159    Pulse Range: Pulse  Av.3  Min: 66  Max: 101  - Goal SBP <160  - History of HTN    PULMONARY:  Respiration Range: Resp  Av.2  Min: 11  Max: 27  Current Pulse Ox: SpO2: 94 %  24HR Pulse Ox Range: SpO2  Av.9 %  Min: 91 %  Max: 96 %  - No active problems    RENAL/FLUID/ELECTROLYTE:  - BUN 9/ Creatinine 0.53  - I/O: No intake/output data recorded. - IVF: N/A, normal diet ordered    GI/NUTRITION:  NUTRITION:  ADULT DIET; Regular  - Bowel regimen: Advance as needed  - GI prophylaxis: Not indicated    ID:  Tmax: Temp (24hrs), Av.5 °F (36.4 °C), Min:97.5 °F (36.4 °C), Max:97.5 °F (36.4 °C)    Temperature Range: Temp: 97.5 °F (36.4 °C) Temp  Av.5 °F (36.4 °C)  Min: 97.5 °F (36.4 °C)  Max: 97.5 °F (36.4 °C)  - WBC   Lab Results   Component Value Date    WBC 5.7 2023     - Antimicrobials:  not indicated     HEME:   Recent Labs     23  0618   HGB 18.5*    stable  - Platelets adequate count    ENDOCRINE:  - Continue to monitor blood glucose, goal <180  - glucose controlled - most recent BGL is   Recent Labs     23  0618 23  0724   GLUCOSE Unable to perform testing: Specimen hemolyzed.  136*       OTHER:  - PT/OT/ST   - Code Status: Full Code    PROPHYLAXIS:  Stress ulcer: not indicated     DVT PROPHYLAXIS:  - SCD sleeves - Thigh High   - Hold AC/AP d/t ICH      Fadumo Armstrong, DO  Neuro Critical Care Service   2023     11:13 AM

## 2023-01-26 NOTE — PROGRESS NOTES
[facility]     Emergency/Trauma Note    PATIENT NAME: Erlin Rincon    Shift date: 1/25/2023  Shift day: Wednesday   Shift # 3    Room # 24/24   Name: Erlin Rincon            Age: 43 y.o. Gender: male          Taoist: None   Place of Sikh: Unknown    Trauma/Incident type: Stroke Alert  Admit Date & Time: 1/26/2023  5:56 AM  TRAUMA NAME: N/A    ADVANCE DIRECTIVES IN CHART? No    NAME OF DECISION MAKER: Unknown    RELATIONSHIP OF DECISION MAKER TO PATIENT: Unknown    PATIENT/EVENT DESCRIPTION:  Erlin Rincon is a 43 y.o. male who arrived via [facility] to [phone] and was paged out as a \"Stroke Alert. \" Per report, patient was \"last known well at 2200. \" Patient \"woke up experiencing dizziness and weakness, along with facial droop. \" Patient arrived to ED24 and was taken to CT Scan soon after. Pt to be admitted to 24/24. SPIRITUAL ASSESSMENT-INTERVENTION-OUTCOME:   responded to page and was present when patient arrived to ED24.  gathered patient's information from transport team and learned that patient's spouse was en route to the hospital. [facility] Guardian met with patient's spouse in the waiting room and informed her that patient had been taken to the CT Scan. ED Triage escorted spouse back to room when he returned to room.  provided support and hospitality to patient's spouse. PATIENT BELONGINGS:  No belongings noted    ANY BELONGINGS OF SIGNIFICANT VALUE NOTED:  N/A    REGISTRATION STAFF NOTIFIED? Yes      WHAT IS YOUR SPIRITUAL CARE PLAN FOR THIS PATIENT?:  [facility] can make follow-up visit, per request. Carleene Osgood can be reached 24/7 via [email or web address].      01/26/23 0600   Encounter Summary   Service Provided For: Patient and family together   Referral/Consult From: Multi-disciplinary team  (Stroke Alert)   Support System Spouse   Last Encounter  01/25/23   Complexity of Encounter High   Begin Time 0600   End Time  0632   Total Time Calculated 32 min   Encounter    Type Initial Screen/Assessment   Crisis   Type Code Stroke   Spiritual/Emotional needs   Type Spiritual Support   Assessment/Intervention/Outcome   Assessment Anxious; Fearful;Coping   Intervention Active listening;Discussed illness injury and its impact; Explored Coping Skills/Resources;Sustaining Presence/Ministry of presence   Outcome Comfort;Coping;Receptive   Plan and Referrals   Plan/Referrals Continue to visit, (comment)  (as needed)     Electronically signed by Bimal Montoya on 1/26/2023 at 6:34 AM.  [facility]  [phone]

## 2023-01-26 NOTE — ED PROVIDER NOTES
[address] [facility]  ED  Emergency Department Encounter  Emergency Medicine Resident     Pt Name:Jus Sol  MRN: [id number]  [place] 1980  Date of evaluation: 23  PCP:  Rajani Peraza DO  Note Started: 6:14 AM EST    CHIEF COMPLAINT       Dizziness, L facial droop    HISTORY OF PRESENT ILLNESS  (Location/Symptom, Timing/Onset, Context/Setting, Quality, Duration, Modifying Factors, Severity.)      Myranda Camejo is a 43 y.o. male who presents with last known well of 10 PM at bedtime, patient woke up this morning with dizziness, left facial droop and left-sided weakness, woke his wife up who called EMS. On EMS arrival, patient systolic blood pressure was in the 200s. Did not receive any medications in route to the emergency department, however on arrival patient vomited. Patient is alert and oriented x4, however intermittently drowsy. No chemical AP or AC. Patient is Coreg for hypertension. No history of CVA. PAST MEDICAL / SURGICAL / SOCIAL / FAMILY HISTORY      has a past medical history of History of heartburn and Neoplasm of unspecified nature of bone, soft tissue, and skin. has a past surgical history that includes Appendectomy (in 20's) and pre-malignant / benign skin lesion excision (Right, 2014). Social History     Socioeconomic History    Marital status:      Spouse name: Not on file    Number of children: Not on file    Years of education: Not on file    Highest education level: Not on file   Occupational History    Not on file   Tobacco Use    Smoking status: Every Day     Packs/day: 1.00     Years: 10.00     Pack years: 10.00     Types: Cigarettes     Last attempt to quit: 2013     Years since quittin.5    Smokeless tobacco: Never   Substance and Sexual Activity    Alcohol use:  Yes     Alcohol/week: 24.0 standard drinks     Types: 24 Cans of beer per week    Drug use: No    Sexual activity: Not on file   Other Topics Concern    Not on file   Social History Narrative    Not on file     Social Determinants of Health     Financial Resource Strain: Not on file   Food Insecurity: Not on file   Transportation Needs: Not on file   Physical Activity: Not on file   Stress: Not on file   Social Connections: Not on file   Intimate Partner Violence: Not on file   Housing Stability: Not on file       No family history on file. Allergies:  Patient has no known allergies. Home Medications:  Prior to Admission medications    Medication Sig Start Date End Date Taking? Authorizing Provider   omeprazole (PRILOSEC) 10 MG capsule Take 10 mg by mouth daily. Historical Provider, MD     REVIEW OF SYSTEMS       Review of Systems   Unable to perform ROS: Acuity of condition     PHYSICAL EXAM      INITIAL VITALS:   BP (!) 161/109   Pulse 77   Temp 97.5 °F (36.4 °C)   Resp 15   Ht 6' (1.829 m)   Wt 200 lb (90.7 kg)   SpO2 94%   BMI 27.12 kg/m²     Physical Exam  HENT:      Head: Normocephalic. Nose: Nose normal.      Mouth/Throat:      Mouth: Mucous membranes are moist.      Pharynx: Oropharynx is clear. Eyes:      Extraocular Movements: Extraocular movements intact. Pupils: Pupils are equal, round, and reactive to light. Comments: Left facial droop, left periorbital muscle weakness  Able to antigravity bilateral upper and lower extremities, however weakness on left upper and lower  Drift to L extremities without drift  Mild dysarthria  Alert with mild stimulation  NIH 6     Cardiovascular:      Rate and Rhythm: Normal rate and regular rhythm. Pulses: Normal pulses. Heart sounds: Normal heart sounds. Pulmonary:      Effort: Pulmonary effort is normal.      Breath sounds: Normal breath sounds. Abdominal:      Palpations: Abdomen is soft. Tenderness: There is no abdominal tenderness. There is no right CVA tenderness or left CVA tenderness. Musculoskeletal:      Cervical back: Neck supple. Skin:     General: Skin is warm. Capillary Refill: Capillary refill takes less than 2 seconds. Neurological:      Mental Status: He is alert. DDX/DIAGNOSTIC RESULTS / EMERGENCY DEPARTMENT COURSE / MDM     Medical Decision Making  Intracranial bleed, hypertensive bleed, ischemic stroke, vertigo, hypoglycemia    49-year-old gentleman presents to the emergency department with left facial droop, left upper and lower extremity weakness and dizziness, last known well at bedtime yesterday 10 PM.  On arrival, patient had an episode of emesis. GCS is 14, eyes opening to voice. Following commands otherwise, ANO x4 and interactive when aroused. Systolic blood pressure 876, afebrile, nontachycardic. Physical exam does show an NIH of 6 with left facial deficits, mild dysarthria, left upper and lower extremity weakness with drift and for alertness. Cardene drip initiated. Stroke team at bedside. CT head showing hypertensive intracranial bleed. Neurosurgery and neuro critical care contacted. Systolic blood pressure 941 on a Cardene drip at this time. Neuro critical care at bedside, patient will be admitted to their service for further care and management. Amount and/or Complexity of Data Reviewed  Labs: ordered. Radiology: ordered. ECG/medicine tests: ordered. Risk  Prescription drug management. Decision regarding hospitalization. EMERGENCY DEPARTMENT COURSE:  ED Course as of 01/26/23 0700   Thu Jan 26, 2023   0642 CTH  13 x 8 mm intraparenchymal hemorrhage posterior ramses. [EM]   [id number] CTA h/n  Focal mild narrowing of the right P1 PCA. Otherwise no significant stenosis,  aneurysm, or arterial injury in the CTA of the head and neck. Pontine intraparenchymal hemorrhage is redemonstrated. [EM]      ED Course User Index  [EM] Harry Pereira MD     PROCEDURES:  None    CONSULTS:  IP CONSULT TO NEUROSURGERY  IP CONSULT TO NEUROCRITICAL CARE    FINAL IMPRESSION      1.  Intracranial hemorrhage ([facility] [address]        DISPOSITION / PLAN DISPOSITION Decision To Admit 01/26/2023 06:13:33 AM      PATIENT REFERRED TO:  No follow-up provider specified.     DISCHARGE MEDICATIONS:  New Prescriptions    No medications on file       Aishwarya Jaramillo MD  Emergency Medicine Resident    (Please note that portions of thisnote were completed with a voice recognition program.  Efforts were made to edit the dictations but occasionally words are mis-transcribed.)        Aishwarya Jaramillo MD  Resident  01/26/23 0700

## 2023-01-26 NOTE — CONSULTS
Department of Neurosurgery                                            Nurse Practitioner Consult Note      Reason for Consult:  Brainstem bleed   Requesting Physician:  Jessica Donaldson  Neurosurgeon:   [] Dr. Sarah Laird  [x] Dr. Elzbieta Troy  [] Dr. Stormy Salazar  [] Dr. Tracy Natarajan      History Obtained From:  patient, spouse    CHIEF COMPLAINT:         Chief Complaint   Patient presents with    Extremity Weakness     Left sided weakness    Facial Droop       HISTORY OF PRESENT ILLNESS:       The patient is a 43 y.o. male who presents via EMS with complaints of left sided numbness and double vision. History of HTN. Per wife, patient complaining of new onset dizziness, nausea, and vomiting. CTH revealed posterior pontine hemorrhage. PAST MEDICAL HISTORY :       Past Medical History:        Diagnosis Date    History of heartburn     takes omeprazole    Neoplasm of unspecified nature of bone, soft tissue, and skin -present    lesions of right cheek x 2       Past Surgical History:        Procedure Laterality Date    APPENDECTOMY  in     PRE-MALIGNANT / BENIGN SKIN LESION EXCISION Right 2014    Excision lesion of right cheek. Dr. Jeremy Patel. Social History:   Social History     Socioeconomic History    Marital status:      Spouse name: Not on file    Number of children: Not on file    Years of education: Not on file    Highest education level: Not on file   Occupational History    Not on file   Tobacco Use    Smoking status: Every Day     Packs/day: 1.00     Years: 10.00     Pack years: 10.00     Types: Cigarettes     Last attempt to quit: 2013     Years since quittin.5    Smokeless tobacco: Never   Substance and Sexual Activity    Alcohol use:  Yes     Alcohol/week: 24.0 standard drinks     Types: 24 Cans of beer per week    Drug use: No    Sexual activity: Not on file   Other Topics Concern    Not on file   Social History Narrative    Not on file     Social Determinants of Health     Financial Resource Strain: Not on file   Food Insecurity: Not on file   Transportation Needs: Not on file   Physical Activity: Not on file   Stress: Not on file   Social Connections: Not on file   Intimate Partner Violence: Not on file   Housing Stability: Not on file       Family History:   No family history on file. Allergies:  Patient has no known allergies. Home Medications:  Prior to Admission medications    Medication Sig Start Date End Date Taking? Authorizing Provider   omeprazole (PRILOSEC) 10 MG capsule Take 10 mg by mouth daily. Historical Provider, MD       Current Medications:   Current Facility-Administered Medications: niCARdipine (CARDENE) 20 mg in 0.9 % sodium chloride 200 mL solution, 2.5-15 mg/hr, IntraVENous, Continuous  acetaminophen (TYLENOL) tablet 650 mg, 650 mg, Oral, Q4H PRN  ondansetron (ZOFRAN-ODT) disintegrating tablet 4 mg, 4 mg, Oral, Q8H PRN **OR** ondansetron (ZOFRAN) injection 4 mg, 4 mg, IntraVENous, Q6H PRN  labetalol (NORMODYNE;TRANDATE) injection 10 mg, 10 mg, IntraVENous, Q4H PRN    REVIEW OF SYSTEMS:       CONSTITUTIONAL: negative for fatigue and malaise   EYES: Positive for double vision and photophobia    HEENT: negative for tinnitus and sore throat   RESPIRATORY: negative for cough, shortness of breath   CARDIOVASCULAR: negative for chest pain, palpitations   GASTROINTESTINAL: positive for nausea, vomiting   GENITOURINARY: negative for incontinence   MUSCULOSKELETAL: negative for neck or back pain   NEUROLOGICAL: negative for seizures   PSYCHIATRIC: negative for agitated     Review of systems otherwise negative.     PHYSICAL EXAM:       BP (!) 145/89   Pulse 77   Temp 97.5 °F (36.4 °C)   Resp 13   Ht 6' (1.829 m)   Wt 200 lb (90.7 kg)   SpO2 94%   BMI 27.12 kg/m²       CONSTITUTIONAL: no apparent distress, appears stated age   HEAD: normocephalic, atraumatic   ENT: moist mucous membranes, uvula midline   NECK: supple, symmetric, no midline tenderness to palpation   BACK: without midline tenderness, step-offs or deformities   NEUROLOGIC:  EYE OPENING     Spontaneous - 4 [x]       To voice - 3 []       To pain - 2 []       None - 1 []    VERBAL RESPONSE     Appropriate, oriented - 5 [x]       Dazed or confused - 4 []       Syllables, expletives - 3 []       Grunts - 2 []       None - 1 []    MOTOR RESPONSE     Spontaneous, command - 6 [x]       Localizes pain - 5 []       Withdraws pain - 4 []       Abnormal flexion - 3 []       Abnormal extension - 2 []       None - 1 []            Total GCS: 15    Right eye exotropia     Motor Exam:    Motor exam is symmetrical 5 out of 5 all extremities bilaterally    Sensory:    Right Upper Extremity:  abnormal - altered throughout   Left Upper Extremity:  normal  Right Lower Extremity:  abnormal - altered throughout   Left Lower Extremity:  normal    Deep Tendon Reflexes:    Right Bicep:  2+  Left Bicep:  2+  Right Knee:  2+  Left Knee:  2+         LABS AND IMAGING:     CBC with Differential:    Lab Results   Component Value Date/Time    WBC 5.7 01/26/2023 06:18 AM    RBC 5.67 01/26/2023 06:18 AM    HGB 18.5 01/26/2023 06:18 AM    HCT 52.3 01/26/2023 06:18 AM    PLT See Reflexed IPF Result 01/26/2023 06:18 AM    MCV 92.2 01/26/2023 06:18 AM    MCH 32.6 01/26/2023 06:18 AM    MCHC 35.4 01/26/2023 06:18 AM    RDW 11.7 01/26/2023 06:18 AM    LYMPHOPCT 35 01/26/2023 06:18 AM    MONOPCT 14 01/26/2023 06:18 AM    BASOPCT 1 01/26/2023 06:18 AM    MONOSABS 0.80 01/26/2023 06:18 AM    LYMPHSABS 1.96 01/26/2023 06:18 AM    EOSABS 0.13 01/26/2023 06:18 AM    BASOSABS 0.07 01/26/2023 06:18 AM     BMP:    Lab Results   Component Value Date/Time    NA Unable to perform testing: Specimen hemolyzed. 01/26/2023 06:18 AM    K Unable to perform testing: Specimen hemolyzed. 01/26/2023 06:18 AM    CL Unable to perform testing: Specimen hemolyzed. 01/26/2023 06:18 AM    CO2 Unable to perform testing: Specimen hemolyzed.  01/26/2023 06:18 AM    BUN Unable to perform testing: Specimen hemolyzed. 01/26/2023 06:18 AM    CREATININE Unable to perform testing: Specimen hemolyzed. 01/26/2023 06:18 AM    CREATININE 0.73 01/26/2023 06:04 AM    CALCIUM Unable to perform testing: Specimen hemolyzed. 01/26/2023 06:18 AM    LABGLOM Unable to perform testing: Specimen hemolyzed. 01/26/2023 06:18 AM    GLUCOSE Unable to perform testing: Specimen hemolyzed. 01/26/2023 06:18 AM       Radiology Review:  CT HEAD WO CONTRAST    Result Date: 1/26/2023  EXAMINATION: CT OF THE HEAD WITHOUT CONTRAST  1/26/2023 6:09 am TECHNIQUE: CT of the head was performed without the administration of intravenous contrast. Automated exposure control, iterative reconstruction, and/or weight based adjustment of the mA/kV was utilized to reduce the radiation dose to as low as reasonably achievable. COMPARISON: None. HISTORY: ORDERING SYSTEM PROVIDED HISTORY: L facial droop, L weakness, N/V, htn TECHNOLOGIST PROVIDED HISTORY: L facial droop, L weakness, N/V, htn Decision Support Exception - unselect if not a suspected or confirmed emergency medical condition->Emergency Medical Condition (MA) Reason for Exam: L facial droop, L weakness, N/V, htn FINDINGS: BRAIN/VENTRICLES: 13 x 8 mm intraparenchymal hemorrhage posterior ramses (2:23). There is no midline shift. No abnormal extra-axial fluid collection. The gray-white differentiation is maintained without evidence of an acute infarct. There is no evidence of hydrocephalus. ORBITS: The visualized portion of the orbits demonstrate no acute abnormality. SINUSES: The visualized paranasal sinuses and mastoid air cells demonstrate no acute abnormality. SOFT TISSUES/SKULL:  No acute abnormality of the visualized skull or soft tissues. 13 x 8 mm intraparenchymal hemorrhage posterior ramses. Critical results were called by Dr. Mary Lou Mcgowan to Dr. Candee Romberg on 1/26/2023 at 06:37.      CTA HEAD NECK W CONTRAST    Result Date: 1/26/2023  EXAMINATION: CTA OF THE HEAD AND NECK WITH CONTRAST 1/26/2023 6:09 am: TECHNIQUE: CTA of the head and neck was performed with the administration of intravenous contrast. Multiplanar reformatted images are provided for review. MIP images are provided for review. Stenosis of the internal carotid arteries measured using NASCET criteria. Automated exposure control, iterative reconstruction, and/or weight based adjustment of the mA/kV was utilized to reduce the radiation dose to as low as reasonably achievable. COMPARISON: None. HISTORY: ORDERING SYSTEM PROVIDED HISTORY: L facial droop, L weakness, N/V, htn TECHNOLOGIST PROVIDED HISTORY: L facial droop, L weakness, N/V, htn Decision Support Exception - unselect if not a suspected or confirmed emergency medical condition->Emergency Medical Condition (MA) Reason for Exam: L facial droop, L weakness, N/V, htn FINDINGS: CTA NECK: AORTIC ARCH/ARCH VESSELS: No dissection or arterial injury. No significant stenosis of the brachiocephalic or subclavian arteries. CAROTID ARTERIES: No dissection, arterial injury, or hemodynamically significant stenosis by NASCET criteria. VERTEBRAL ARTERIES: No dissection, arterial injury, or significant stenosis. Left vertebral artery dominant. SOFT TISSUES: The lung apices are clear. No cervical or superior mediastinal lymphadenopathy. The larynx and pharynx are unremarkable. No acute abnormality of the salivary and thyroid glands. BONES: No acute osseous abnormality. CTA HEAD: ANTERIOR CIRCULATION: No significant stenosis of the intracranial internal carotid, anterior cerebral, or middle cerebral arteries. No aneurysm. POSTERIOR CIRCULATION: Focal mild narrowing right P1 (series 4, image 182) no significant stenosis of the vertebral, basilar, or posterior cerebral arteries. No aneurysm. The right vertebral artery terminates in the right PICA, a normal variant. OTHER: No dural venous sinus thrombosis on this non-dedicated study.  BRAIN: Pontine intraparenchymal hemorrhage is redemonstrated. No mass effect or midline shift. No extra-axial fluid collection. The gray-white differentiation is maintained. Focal mild narrowing of the right P1 PCA. Otherwise no significant stenosis, aneurysm, or arterial injury in the CTA of the head and neck. Pontine intraparenchymal hemorrhage is redemonstrated. ASSESSMENT AND PLAN:       Patient Active Problem List   Diagnosis    Neoplasm of unspecified nature of bone, soft tissue, and skin    Hemangioma of skin    Pontine hemorrhage (HCC)         A/P:  This is a 43 y.o. male with posterior pontine hemorrhage     Patient care discussed with attending, patient evaluated along with attending.      - Obtain MRI brain   - No NSG intervention needed at this time       Please contact neurosurgery with any changes in patients neurologic status. Thank you for your consult.        ANDRE Miles pager [phone]  1/26/2023  7:52 AM

## 2023-01-26 NOTE — CONSULTS
Department of Endovascular Neurosurgery                                                                                                                      Resident Consult Note  Stroke Alert paged @ 5:46 AM (patient arrived around 6:10 AM)  ER Room # 24  Arrival to patient bedside @ 6:15 AM         Reason for Consult:    Requesting Physician:    Endovascular Neurosurgeon:   []Dr. Brianna Faith  []Dr. Clover Corley  []Dr. Kami Campbell   [x] Dr. Yan Hollins     History Obtained From:  patient, electronic medical record    CHIEF COMPLAINT:       Dizziness     HISTORY OF PRESENT ILLNESS:       The patient is a 43 y.o. male with Hx of HTN on Coreg presenting for acute onset dizziness. He also had nausea and vomited once upon arrival to ED. Patient also stated he had double vision, and sense of imbalance. He was unable to exit the bed when he woke up. He stated the room was spinning. Wife called EMS. CT head w/o contrast revealed posterior pontine hemorrhage. Cardene started with goal SBP less than 160. Neurosurgery and Neurocritical care consulted. Last know well: 4 AM on 1/26/23    On presentation:  BP: 237/159  BSL: 116    Prior to arrival patient was on  Antiplatelets/anticoagulants: None   Statins: None      Smoking history: smoker  (1 ppd x 20 yrs)       PAST MEDICAL HISTORY :       Past Medical History:        Diagnosis Date    History of heartburn     takes omeprazole    Neoplasm of unspecified nature of bone, soft tissue, and skin 2009-present    lesions of right cheek x 2       Past Surgical History:        Procedure Laterality Date    APPENDECTOMY  in 20's    PRE-MALIGNANT / BENIGN SKIN LESION EXCISION Right 9/29/2014    Excision lesion of right cheek. Dr. Blaise Osler.        Social History:   Social History     Socioeconomic History    Marital status:      Spouse name: Not on file    Number of children: Not on file Years of education: Not on file    Highest education level: Not on file   Occupational History    Not on file   Tobacco Use    Smoking status: Every Day     Packs/day: 1.00     Years: 10.00     Pack years: 10.00     Types: Cigarettes     Last attempt to quit: 2013     Years since quittin.5    Smokeless tobacco: Never   Substance and Sexual Activity    Alcohol use: Yes     Alcohol/week: 24.0 standard drinks     Types: 24 Cans of beer per week    Drug use: No    Sexual activity: Not on file   Other Topics Concern    Not on file   Social History Narrative    Not on file     Social Determinants of Health     Financial Resource Strain: Not on file   Food Insecurity: Not on file   Transportation Needs: Not on file   Physical Activity: Not on file   Stress: Not on file   Social Connections: Not on file   Intimate Partner Violence: Not on file   Housing Stability: Not on file       Family History:   No family history on file. Allergies:  Patient has no known allergies. Home Medications:  Prior to Admission medications    Medication Sig Start Date End Date Taking? Authorizing Provider   omeprazole (PRILOSEC) 10 MG capsule Take 10 mg by mouth daily. Historical Provider, MD       Current Medications:   Current Facility-Administered Medications: niCARdipine (CARDENE) 20 mg in 0.9 % sodium chloride 200 mL solution, 2.5-15 mg/hr, IntraVENous, Continuous    REVIEW OF SYSTEMS:       CONSTITUTIONAL: negative for fatigue and malaise. Nausea and Vomiting. EYES: negative for double vision and photophobia    HEENT: negative for tinnitus and sore throat   RESPIRATORY: negative for cough, shortness of breath   CARDIOVASCULAR: negative for chest pain, palpitations   GASTROINTESTINAL: negative for nausea, vomiting   GENITOURINARY: negative for incontinence   MUSCULOSKELETAL: negative for neck or back pain   NEUROLOGICAL: Paresthesia of right arm and leg. Double vision. Ataxia. Vertigo.     PSYCHIATRIC: negative for fatigue     Review of systems otherwise negative. PHYSICAL EXAM:       BP (!) 161/109   Pulse 77   Temp 97.5 °F (36.4 °C)   Resp 15   Ht 6' (1.829 m)   Wt 200 lb (90.7 kg)   SpO2 94%   BMI 27.12 kg/m²     CONSTITUTIONAL:  Well developed, well nourished, alert and oriented x 3, in mild distress. GCS 15, nontoxic. No dysarthria, no aphasia. HEAD:  normocephalic, atraumatic    EYES:  PERRLA, EOMI.   ENT:  moist mucous membranes   NECK:  supple, symmetric, no midline tenderness to palpation    BACK:  without midline tenderness, step-offs or deformities    LUNGS:  Equal air entry bilaterally   CARDIOVASCULAR:  normal s1 / s2   ABDOMEN:  Soft, no rigidity   NEUROLOGIC:  Mental Status:  A & O x3,awake. No Dysarthria or Aphasia. Cranial Nerves:    PERRL. Right Gaze deviation. Intact visual field testing. Rightward nystagmus (fast beat)  Left hemifacial weakness. Loss of left nasolabial fold. No changes in voice. Remaining cranial nerves intact. Motor Exam:    Drift:  absent  Tone:  normal    Motor exam is symmetrical 5 out of 5 all extremities bilaterally    Sensory:    Touch:    Right Upper Extremity:  abnormal - numbness, pins and needles. Left Upper Extremity:  normal  Right Lower Extremity:  abnormal - Numbness, Pins and Needles  Left Lower Extremity:  normal    Deep Tendon Reflexes:    Right Bicep:  2+  Left Bicep:  2+  Right Knee:  2+  Left Knee:  2+    Plantar Response:  Right:  downgoing  Left:  downgoing    Clonus:  absent  Kong's:  absent    Coordination/Dysmetria:    Finger to Nose:   Right:  abnormal - ataxic. Misses finger or overshoots. Left:  normal     Gait:  Deferred. INITIAL NIH STROKE SCALE:    Time Performed:      1a.  Level of consciousness:  0 - alert; keenly responsive  1b. Level of consciousness questions:  0 - answers both questions correctly  1c. Level of consciousness questions:  0 - performs both tasks correctly  2.     Best Gaze:  2 - forced deviation, or total gaze paresis not overcome by oculocephalic maneuver  3. Visual:  0 - no visual loss  4. Facial Palsy:  1 - minor paralysis (flattened nasolabial fold, asymmetric on smiling)  5a. Motor left arm:  0 - no drift, limb holds 90 (or 45) degrees for full 10 seconds  5b. Motor right arm:  0 - no drift, limb holds 90 (or 45) degrees for full 10 seconds  6a. Motor left le - no drift; leg holds 30 degree position for full 5 seconds  6b. Motor right le - no drift; leg holds 30 degree position for full 5 seconds  7. Limb Ataxia:  1 - present in one limb  8. Sensory:  1 - mild to moderate sensory loss; patient feels pinprick is less sharp or is dull on the affected side; there is a loss of superficial pain with pinprick but patient is aware of being touched   9. Best Language:  0 - no aphasia, normal  10. Dysarthria:  0 - normal  11.   Extinction and Inattention:  0 - no abnormality    TOTAL:  4     SKIN:  no rash      Modified [place] Score Scale:     [x] Zero: No symptoms at all   [] 1: No significant disability despite symptoms; able to carry out all usual duties and activities   [] 2: Slight disability; unable to carry out all previous activities, but able to look after own affairs without assistance   [] 3:Moderate disability; requiring some help, but able to walk without assistance   [] 4: Moderately severe disability; unable to walk and attend to bodily needs without assistance   [] 5:Severe disability; bedridden, incontinent and requiring constant nursing care and attention    LABS AND IMAGING:     CBC with Differential:    Lab Results   Component Value Date/Time    WBC 5.7 2023 06:18 AM    RBC 5.67 2023 06:18 AM    HGB 18.5 2023 06:18 AM    HCT 52.3 2023 06:18 AM    PLT See Reflexed IPF Result 2023 06:18 AM    MCV 92.2 2023 06:18 AM    MCH 32.6 2023 06:18 AM    MCHC 35.4 2023 06:18 AM    RDW 11.7 2023 06:18 AM LYMPHOPCT 35 01/26/2023 06:18 AM    MONOPCT 14 01/26/2023 06:18 AM    BASOPCT 1 01/26/2023 06:18 AM    MONOSABS 0.80 01/26/2023 06:18 AM    LYMPHSABS 1.96 01/26/2023 06:18 AM    EOSABS 0.13 01/26/2023 06:18 AM    BASOSABS 0.07 01/26/2023 06:18 AM         BMP:    Lab Results   Component Value Date/Time    NA PENDING 01/26/2023 06:18 AM    K PENDING 01/26/2023 06:18 AM    CL PENDING 01/26/2023 06:18 AM    CO2 PENDING 01/26/2023 06:18 AM    BUN PENDING 01/26/2023 06:18 AM    CREATININE PENDING 01/26/2023 06:18 AM    CREATININE 0.73 01/26/2023 06:04 AM    CALCIUM PENDING 01/26/2023 06:18 AM    LABGLOM PENDING 01/26/2023 06:18 AM    GLUCOSE PENDING 01/26/2023 06:18 AM       Radiology Review:    CT HEAD WO CONTRAST    Result Date: 1/26/2023  13 x 8 mm intraparenchymal hemorrhage posterior ramses. Critical results were called by Dr. Ceferino Gonzales to Dr. Young Michel on 1/26/2023 at 06:37. ASSESSMENT AND PLAN:       Patient Active Problem List   Diagnosis    Neoplasm of unspecified nature of bone, soft tissue, and skin    Hemangioma of skin       Assessment                 43 y.o. male with past medical history of HTN on Coreg presenting for acute onset dizziness. Vomited once on arrival to [facility] ED. Patient was found to have left pontine hemorrhage on CT head w/o contrast. CTA done to evaluate vascular stenosis, aneurysm. NIH 4 as documented above. Last Known Well (date and time): 4 AM on 1/26/23    2. Candidate for IV Tenecteplase therapy     Yes []     No  [x] due to the following exclusion criteria: Hemorrhage    3.  Candidate for Thrombectomy    Yes []      No [x] due to the following exclusion criteria:     - Discussed with Dr. Young Michel     Recommendations:    [] General Neurology Care Status - prefer 5th floor (5A/5C)   [] Internal Medicine General Care Status   [x] NICU Status - (5B)     [] MICU Status   [] Observation Status    Please use the following admission order set for stroke admission:   [x] [id number] - JOON Intercerebral Hemorrhage Admission   [] [id number] - JOON Sub Arachnoid Hemorrhage Admission   [] [id number] - JOON Ischemic Stroke TPA Treatment Focused   [] [id number] - IP Ischemic Stroke ICU Post Alteplase (TPA) Admission    [] [id number] - GEN Ischemic Stroke Non-Thrombolytic Focussed      Imaging   - CT Head  WO : Posterior Pontine Hemorrhage measuring 13 x 8 mm. - CTA Head and Neck : done     Medications   -  Hold Antithrombotics. - Cardene for SBP goal less than 140-160.   - Continue Coreg.   - Can add Labetalol q 4 hours PRN if cardene is maxed. Labs  - Fasting Lipid panel  - HgbA1c lab  - PT, OT, Speech eval   - Telemetry   - Neuro checks per protocol  - We recommend SBP less than 160. - Blood glucose goal less than 180  - Please avoid dextrose containing solutions      Additional recommendations may follow    Please contact EV NSG with any changes in patients neurologic status. Thank you for your consult.        Rina Leon,    PGY 2 Neurology Resident  1/26/2023 at 6:47 AM

## 2023-01-26 NOTE — ED NOTES
Lab calls writer RN and orders redraw for green tube.      The following labs were labeled with appropriate pt sticker and tubed to lab:     [] Blue     [] Lavender   [] on ice  [x] Green/yellow  [] Green/black [] on ice  [] Berneda Olivia  [] on ice  [] Yellow  [] Red  [] Type/ Screen  [] ABG  [] VBG    [] COVID-19 swab    [] Rapid  [] PCR  [] Flu swab  [] Peds Viral Panel     [] Urine Sample  [] Fecal Sample  [] Pelvic Cultures  [] Blood Cultures  [] X 2  [] STREP Cultures         Mamadou Stanley RN  01/26/23 0399

## 2023-01-26 NOTE — RESEARCH
Clinical Research Services  January 26, 2023  12:19 PM    Asked by Dr. Manpreet Zapata to evaluate the patient for protocol [employer] [id number]: A Multicenter, Prospective, Emlvpbyxv-zbvgjf-jpddtoc Observational Study Evaluating Immunoassay Measurements of Pancreatic Stone Protein Performed on [employer] abioSCOPE Device with the PSP Assay on ICU Patients at Risk of Sepsis as an Aid in Identifying Sepsis. IRB #  [id number]                     NCT # [id number]  [x] Patient met inclusion/exclusion criteria  [x] Patient/family given consent for review  [x] Patient/family read study consent. Questions answered. Consent signed by Pt's Spouse  [x] A copy of the signed consents given to the patient/family  [x] Patient number:   [x] Baseline labs obtained  [x] Patient/ family educated on purpose of the abioSCOPE device and the PSP assay for use as an aid in identifying sepsis in those patients at risk for sepsis. Patient/family verbalizes understanding. Additional Comments: Spouse signed consent. Labs drawn from art line. Avani Osborne RN  Clinical Research Nurse     Dania Sutherland MD    Patient's wife provided informed consent for inclusion into study. Blood draw obtained for testing. Patient did not experience any local adverse events secondary to blood draw:  No hematoma or ecchymosis  No phlebitis  No cellulitis  No local bleeding.     Dania Sutherland MD

## 2023-01-26 NOTE — ED PROVIDER NOTES
[facility] ED  eMERGENCY dEPARTMENT eNCOUnter   Attending Attestation     Pt Name: Junior Dean  MRN: [id number]  [place] 1980  Date of evaluation: 1/26/23       Junior Dean is a 43 y.o. male who presents with No chief complaint on file. History: Patient presents from home via EMS. Patient has left facial droop. Patient started vomiting on arrival.  Patient is diaphoretic. Patient is hypertensive. Patient did get some nitro on the way here. That did improve pressure. Initial his pressure was over 114 systolic. Apparently patient awoken at 4 AM complaining of dizziness. Patient's wife called EMS and EMS arrived and transported the patient here. Exam: Patient is diaphoretic, uncomfortable appearing. Lungs are clear. Patient does have some left-sided facial droop. Difficult to get neuro exam based on patient's discomfort and vomiting. Patient is moving all extremities. Concern for intracranial hemorrhage. Patient will go to the scanner immediately. Patient hypertensive. Cardene drip started in concert with getting patient to the CT. Stroke alert had been called prior to arrival.  Stroke team saw the patient in the scanner. Intracranial hemorrhage noted on CT, neurosurgery consulted, neuro critical care consulted. Patient kept head of bed 30 degrees after CT. Plan for admission. Critical care time 30 minutes due to use of vasoactive medicine, risk of loss of life or limb, stroke alert. I performed a history and physical examination of the patient and discussed management with the resident. I reviewed the residents note and agree with the documented findings and plan of care. Any areas of disagreement are noted on the chart. I was personally present for the key portions of any procedures. I have documented in the chart those procedures where I was not present during the key portions.  I have personally reviewed all images and agree with the resident's interpretation. I have reviewed the emergency nurses triage note. I agree with the chief complaint, past medical history, past surgical history, allergies, medications, social and family history as documented unless otherwise noted below. Documentation of the HPI, Physical Exam and Medical Decision Making performed by medical students or scribes is based on my personal performance of the HPI, PE and MDM. For Phys Assistant/ Nurse Practitioner cases/documentation I have had a face to face evaluation of this patient and have completed at least one if not all key elements of the E/M (history, physical exam, and MDM). Additional findings are as noted. For APC cases I have personally evaluated and examined the patient in conjunction with the APC and agree with the treatment plan and disposition of the patient as recorded by the APC.     Johan Andrade MD  Attending Emergency  Physician       Fannie Squires MD  01/26/23 2266

## 2023-01-26 NOTE — PROCEDURES
Insert Arterial Line    Date/Time: 1/26/2023 10:45 AM  Performed by: ABNER Vickers CNP  Authorized by: ABNER Vickers CNP   Consent: Verbal consent obtained. Risks and benefits: risks, benefits and alternatives were discussed  Consent given by: patient  Patient understanding: patient states understanding of the procedure being performed  Patient identity confirmed: verbally with patient  Preparation: Patient was prepped and draped in the usual sterile fashion.   Indications: hemodynamic monitoring  Location: left radial    Anesthesia:  Local Anesthetic: lidocaine 1% without epinephrine    Sedation:  Patient sedated: no    Fernando's test normal: yes  Needle gauge: 18  Number of attempts: 1  Post-procedure: line sutured and dressing applied  Post-procedure CMS: normal       ABNER Vickers CNP

## 2023-01-27 ENCOUNTER — APPOINTMENT (OUTPATIENT)
Dept: CT IMAGING | Age: 43
DRG: 065 | End: 2023-01-27
Payer: COMMERCIAL

## 2023-01-27 LAB
ANION GAP SERPL CALCULATED.3IONS-SCNC: 12 MMOL/L (ref 9–17)
BILIRUBIN URINE: NEGATIVE
BUN BLDV-MCNC: 7 MG/DL (ref 6–20)
CALCIUM SERPL-MCNC: 10 MG/DL (ref 8.6–10.4)
CASTS UA: NORMAL /LPF (ref 0–8)
CHLORIDE BLD-SCNC: 100 MMOL/L (ref 98–107)
CO2: 22 MMOL/L (ref 20–31)
COLOR: YELLOW
CREAT SERPL-MCNC: 0.5 MG/DL (ref 0.7–1.2)
EKG ATRIAL RATE: 81 BPM
EKG P AXIS: 45 DEGREES
EKG P-R INTERVAL: 154 MS
EKG Q-T INTERVAL: 422 MS
EKG QRS DURATION: 88 MS
EKG QTC CALCULATION (BAZETT): 490 MS
EKG R AXIS: -13 DEGREES
EKG T AXIS: 15 DEGREES
EKG VENTRICULAR RATE: 81 BPM
EPITHELIAL CELLS UA: NORMAL /HPF (ref 0–5)
ESTIMATED AVERAGE GLUCOSE: 94 MG/DL
GFR SERPL CREATININE-BSD FRML MDRD: >60 ML/MIN/1.73M2
GLUCOSE BLD-MCNC: 99 MG/DL (ref 70–99)
GLUCOSE URINE: NEGATIVE
HBA1C MFR BLD: 4.9 % (ref 4–6)
HCT VFR BLD CALC: 47.5 % (ref 40.7–50.3)
HEMOGLOBIN: 17 G/DL (ref 13–17)
KETONES, URINE: ABNORMAL
LEUKOCYTE ESTERASE, URINE: ABNORMAL
MAGNESIUM: 1.6 MG/DL (ref 1.6–2.6)
MCH RBC QN AUTO: 32.7 PG (ref 25.2–33.5)
MCHC RBC AUTO-ENTMCNC: 35.8 G/DL (ref 28.4–34.8)
MCV RBC AUTO: 91.3 FL (ref 82.6–102.9)
NITRITE, URINE: NEGATIVE
NRBC AUTOMATED: 0 PER 100 WBC
PDW BLD-RTO: 11.9 % (ref 11.8–14.4)
PH UA: 7 (ref 5–8)
PLATELET # BLD: 183 K/UL (ref 138–453)
PMV BLD AUTO: 10.2 FL (ref 8.1–13.5)
POTASSIUM SERPL-SCNC: 3.5 MMOL/L (ref 3.7–5.3)
PROTEIN UA: NEGATIVE
RBC # BLD: 5.2 M/UL (ref 4.21–5.77)
RBC UA: NORMAL /HPF (ref 0–4)
SODIUM BLD-SCNC: 134 MMOL/L (ref 135–144)
SPECIFIC GRAVITY UA: 1.01 (ref 1–1.03)
TURBIDITY: CLEAR
URINE HGB: ABNORMAL
UROBILINOGEN, URINE: NORMAL
WBC # BLD: 8.8 K/UL (ref 3.5–11.3)
WBC UA: NORMAL /HPF (ref 0–5)

## 2023-01-27 PROCEDURE — 6360000002 HC RX W HCPCS: Performed by: PSYCHIATRY & NEUROLOGY

## 2023-01-27 PROCEDURE — 2500000003 HC RX 250 WO HCPCS: Performed by: STUDENT IN AN ORGANIZED HEALTH CARE EDUCATION/TRAINING PROGRAM

## 2023-01-27 PROCEDURE — 81001 URINALYSIS AUTO W/SCOPE: CPT

## 2023-01-27 PROCEDURE — 97530 THERAPEUTIC ACTIVITIES: CPT

## 2023-01-27 PROCEDURE — APPSS30 APP SPLIT SHARED TIME 16-30 MINUTES: Performed by: REGISTERED NURSE

## 2023-01-27 PROCEDURE — 80048 BASIC METABOLIC PNL TOTAL CA: CPT

## 2023-01-27 PROCEDURE — 36415 COLL VENOUS BLD VENIPUNCTURE: CPT

## 2023-01-27 PROCEDURE — 83036 HEMOGLOBIN GLYCOSYLATED A1C: CPT

## 2023-01-27 PROCEDURE — 6370000000 HC RX 637 (ALT 250 FOR IP): Performed by: STUDENT IN AN ORGANIZED HEALTH CARE EDUCATION/TRAINING PROGRAM

## 2023-01-27 PROCEDURE — 93010 ELECTROCARDIOGRAM REPORT: CPT | Performed by: INTERNAL MEDICINE

## 2023-01-27 PROCEDURE — 2000000000 HC ICU R&B

## 2023-01-27 PROCEDURE — 51798 US URINE CAPACITY MEASURE: CPT

## 2023-01-27 PROCEDURE — 97129 THER IVNTJ 1ST 15 MIN: CPT

## 2023-01-27 PROCEDURE — 6370000000 HC RX 637 (ALT 250 FOR IP): Performed by: PSYCHIATRY & NEUROLOGY

## 2023-01-27 PROCEDURE — 51701 INSERT BLADDER CATHETER: CPT

## 2023-01-27 PROCEDURE — 97110 THERAPEUTIC EXERCISES: CPT

## 2023-01-27 PROCEDURE — 70450 CT HEAD/BRAIN W/O DYE: CPT

## 2023-01-27 PROCEDURE — 85027 COMPLETE CBC AUTOMATED: CPT

## 2023-01-27 PROCEDURE — 4A03X5D MEASUREMENT OF ARTERIAL FLOW, INTRACRANIAL, EXTERNAL APPROACH: ICD-10-PCS | Performed by: RADIOLOGY

## 2023-01-27 PROCEDURE — 99291 CRITICAL CARE FIRST HOUR: CPT | Performed by: PSYCHIATRY & NEUROLOGY

## 2023-01-27 PROCEDURE — 83735 ASSAY OF MAGNESIUM: CPT

## 2023-01-27 PROCEDURE — 99232 SBSQ HOSP IP/OBS MODERATE 35: CPT | Performed by: NEUROLOGICAL SURGERY

## 2023-01-27 RX ORDER — HYDRALAZINE HYDROCHLORIDE 20 MG/ML
10 INJECTION INTRAMUSCULAR; INTRAVENOUS
Status: DISCONTINUED | OUTPATIENT
Start: 2023-01-27 | End: 2023-02-02 | Stop reason: HOSPADM

## 2023-01-27 RX ORDER — LISINOPRIL 10 MG/1
10 TABLET ORAL DAILY
Status: DISCONTINUED | OUTPATIENT
Start: 2023-01-27 | End: 2023-01-28

## 2023-01-27 RX ORDER — ENOXAPARIN SODIUM 100 MG/ML
40 INJECTION SUBCUTANEOUS DAILY
Status: DISCONTINUED | OUTPATIENT
Start: 2023-01-27 | End: 2023-02-02 | Stop reason: HOSPADM

## 2023-01-27 RX ADMIN — NICARDIPINE HYDROCHLORIDE 5 MG/HR: 0.1 INJECTION INTRAVENOUS at 00:37

## 2023-01-27 RX ADMIN — NICARDIPINE HYDROCHLORIDE 2.5 MG/HR: 0.1 INJECTION INTRAVENOUS at 06:04

## 2023-01-27 RX ADMIN — ENOXAPARIN SODIUM 40 MG: 100 INJECTION SUBCUTANEOUS at 08:11

## 2023-01-27 RX ADMIN — ACETAMINOPHEN 650 MG: 325 TABLET ORAL at 06:42

## 2023-01-27 RX ADMIN — POTASSIUM BICARBONATE 40 MEQ: 782 TABLET, EFFERVESCENT ORAL at 11:15

## 2023-01-27 RX ADMIN — LISINOPRIL 10 MG: 10 TABLET ORAL at 08:10

## 2023-01-27 ASSESSMENT — PAIN SCALES - GENERAL: PAINLEVEL_OUTOF10: 4

## 2023-01-27 ASSESSMENT — PAIN DESCRIPTION - DESCRIPTORS: DESCRIPTORS: ACHING

## 2023-01-27 ASSESSMENT — PAIN DESCRIPTION - ORIENTATION: ORIENTATION: RIGHT

## 2023-01-27 ASSESSMENT — PAIN DESCRIPTION - LOCATION: LOCATION: HEAD

## 2023-01-27 NOTE — PLAN OF CARE
ICH score: 1   - Ovoid Posterior Pontine Hemorrhage   - ICH volume: 0.8 mL   - Conferring 13% mortality

## 2023-01-27 NOTE — PROGRESS NOTES
Neurosurgery KAREN/Resident    Daily Progress Note   Chief Complaint   Patient presents with    Extremity Weakness     Left sided weakness    Facial Droop     1/27/2023  10:16 AM    Chart reviewed. No acute events overnight. No new complaints. Vitals:    01/27/23 0900 01/27/23 0915 01/27/23 0930 01/27/23 0945   BP: 120/67      Pulse: 81 91 88 89   Resp: 15 14 22 14   Temp:       TempSrc:       SpO2: 97% 98% 96% 97%   Weight:       Height:             PE: AOx3   PERRL, right exotropia   Motor   L deltoid 5/5; R deltoid 5/5  L biceps 5/5; R biceps 5/5  L triceps 5/5; R triceps 5/5  L intrinsics 5/5; R intrinsics 5/5      L iliopsoas 5/5 , R iliopsoas 5/5  L quadriceps 5/5; R quadriceps 5/5  L Dorsiflexion 5/5; R dorsiflexion 5/5  L Plantarflexion 5/5; R plantarflexion 5/5    Sensation decreased to light touch throughout RUE and RLE        Lab Results   Component Value Date    WBC 8.8 01/27/2023    HGB 17.0 01/27/2023    HCT 47.5 01/27/2023     01/27/2023    ALT 60 (H) 01/26/2023    AST 54 (H) 01/26/2023     (L) 01/27/2023    K 3.5 (L) 01/27/2023     01/27/2023    CREATININE 0.50 (L) 01/27/2023    BUN 7 01/27/2023    CO2 22 01/27/2023    INR 1.1 01/26/2023    LABA1C 4.9 01/27/2023       Radiology   CT head without contrast    Result Date: 1/27/2023  EXAMINATION: CT OF THE HEAD WITHOUT CONTRAST  1/27/2023 5:37 am TECHNIQUE: CT of the head was performed without the administration of intravenous contrast. Automated exposure control, iterative reconstruction, and/or weight based adjustment of the mA/kV was utilized to reduce the radiation dose to as low as reasonably achievable.  COMPARISON: CT head, CTA head 01/26/2023 HISTORY: ORDERING SYSTEM PROVIDED HISTORY: Intracerebral Hemorrhage TECHNOLOGIST PROVIDED HISTORY: Intracerebral Hemorrhage Decision Support Exception - unselect if not a suspected or confirmed emergency medical condition->Emergency Medical Condition (MA) Reason for Exam: Intracerebral Hemorrhage FINDINGS: BRAIN/VENTRICLES: Stable intraparenchymal hemorrhage centered in the ramses, 13 x 8 mm (2:18). The gray-white differentiation is maintained without evidence of an acute infarct. There is no evidence of hydrocephalus. ORBITS: The visualized portion of the orbits demonstrate no acute abnormality. SINUSES: The visualized paranasal sinuses and mastoid air cells demonstrate no acute abnormality. SOFT TISSUES/SKULL:  No acute abnormality of the visualized skull or soft tissues. Stable intraparenchymal hemorrhage. No midline shift. CT HEAD WO CONTRAST    Result Date: 1/26/2023  EXAMINATION: CT OF THE HEAD WITHOUT CONTRAST  1/26/2023 6:09 am TECHNIQUE: CT of the head was performed without the administration of intravenous contrast. Automated exposure control, iterative reconstruction, and/or weight based adjustment of the mA/kV was utilized to reduce the radiation dose to as low as reasonably achievable. COMPARISON: None. HISTORY: ORDERING SYSTEM PROVIDED HISTORY: L facial droop, L weakness, N/V, htn TECHNOLOGIST PROVIDED HISTORY: L facial droop, L weakness, N/V, htn Decision Support Exception - unselect if not a suspected or confirmed emergency medical condition->Emergency Medical Condition (MA) Reason for Exam: L facial droop, L weakness, N/V, htn FINDINGS: BRAIN/VENTRICLES: 13 x 8 mm intraparenchymal hemorrhage posterior ramses (2:23). There is no midline shift. No abnormal extra-axial fluid collection. The gray-white differentiation is maintained without evidence of an acute infarct. There is no evidence of hydrocephalus. ORBITS: The visualized portion of the orbits demonstrate no acute abnormality. SINUSES: The visualized paranasal sinuses and mastoid air cells demonstrate no acute abnormality. SOFT TISSUES/SKULL:  No acute abnormality of the visualized skull or soft tissues. 13 x 8 mm intraparenchymal hemorrhage posterior ramses.  Critical results were called by Dr. Angela Duckworth Clint Coreas to Dr. Khoa Medina on 1/26/2023 at 06:37. CTA HEAD NECK W CONTRAST    Result Date: 1/26/2023  EXAMINATION: CTA OF THE HEAD AND NECK WITH CONTRAST 1/26/2023 6:09 am: TECHNIQUE: CTA of the head and neck was performed with the administration of intravenous contrast. Multiplanar reformatted images are provided for review. MIP images are provided for review. Stenosis of the internal carotid arteries measured using NASCET criteria. Automated exposure control, iterative reconstruction, and/or weight based adjustment of the mA/kV was utilized to reduce the radiation dose to as low as reasonably achievable. COMPARISON: None. HISTORY: ORDERING SYSTEM PROVIDED HISTORY: L facial droop, L weakness, N/V, htn TECHNOLOGIST PROVIDED HISTORY: L facial droop, L weakness, N/V, htn Decision Support Exception - unselect if not a suspected or confirmed emergency medical condition->Emergency Medical Condition (MA) Reason for Exam: L facial droop, L weakness, N/V, htn FINDINGS: CTA NECK: AORTIC ARCH/ARCH VESSELS: No dissection or arterial injury. No significant stenosis of the brachiocephalic or subclavian arteries. CAROTID ARTERIES: No dissection, arterial injury, or hemodynamically significant stenosis by NASCET criteria. VERTEBRAL ARTERIES: No dissection, arterial injury, or significant stenosis. Left vertebral artery dominant. SOFT TISSUES: The lung apices are clear. No cervical or superior mediastinal lymphadenopathy. The larynx and pharynx are unremarkable. No acute abnormality of the salivary and thyroid glands. BONES: No acute osseous abnormality. CTA HEAD: ANTERIOR CIRCULATION: No significant stenosis of the intracranial internal carotid, anterior cerebral, or middle cerebral arteries. No aneurysm. POSTERIOR CIRCULATION: Focal mild narrowing right P1 (series 4, image 182) no significant stenosis of the vertebral, basilar, or posterior cerebral arteries. No aneurysm.   The right vertebral artery terminates in the right PICA, a normal variant. OTHER: No dural venous sinus thrombosis on this non-dedicated study. BRAIN: Pontine intraparenchymal hemorrhage is redemonstrated. No mass effect or midline shift. No extra-axial fluid collection. The gray-white differentiation is maintained. Focal mild narrowing of the right P1 PCA. Otherwise no significant stenosis, aneurysm, or arterial injury in the CTA of the head and neck. Pontine intraparenchymal hemorrhage is redemonstrated. A/P  43 y.o. male who presents with posterior pontine hemorrhage      - No neurosurgical interventions needed   - recommend MRI brain eval for cavernoma   - will sign off, please call with any questions       Please contact neurosurgery with any changes in patients neurologic status.        Long Andrews CNP  1/27/23  10:16 AM

## 2023-01-27 NOTE — RESEARCH
Clinical Research Services  1/27/2023  8:06 AM    Subject: 01A- [address] evaluated via chart review follow up for Avanzit AB-PSP-002 A Multicenter, Prospective, Lhqbpkrvy-hkyjev-vcvyraq Observational Study Evaluating Immunoassay Measurements of Pancreatic Stone Protein Performed on [employer]'s abioSCOPE Device with the PSP Assay on ICU Patients at Risk of Sepsis as an Aid in Identifying Sepsis. IRB #  [id number]                     NCT # [id number]    Day 1 & Day 2 assessments completed as applicable for SB-DUQ-415    SOFA score: 3 for date 1/26/23     Protocol appendix 1 Marthenia Moritz 2016) clinical definition met? No .      Protocol defined events to report? No  will continue to monitor. Protocol defined concomitant medications reviewed and updated as appropriate. Applicable laboratory results available for this timepoint transmitted to PI for review, clinical research will follow up as directed by PI. Please reach out to the primary investigator Dr. Glenna Soto via perfect serve or clinical research at [phone] with any questions or concerns regarding this research or [address] [place] [address] participation.         Deniz Shin RN  Clinical Research Nurse  Infectious Disease Research  [address] [place] [address], [facility] Elliot Hinson  P: [phone]  F: [phone]

## 2023-01-27 NOTE — PROGRESS NOTES
Daily Progress Note  Neuro Critical Care    Patient Name: Corrie Leal  Patient : 1980  Room/Bed: 0125/0125-01  Code Status: Full Code  Allergies: No Known Allergies    CHIEF COMPLAINT:      Dizziness, right sided weakness, numbness     INTERVAL HISTORY    Initial Presentation (Admitted 23): The patient is a 43 y.o. male presented with dizziness, right-sided facial droop, right-sided weakness and right-sided numbness. Patient felt well when he went to bed last night, last known well 10 PM.  Medical history notable for hypertension, takes Coreg for this. Patient does not know his dose. EMS was called when patient woke up with these symptoms this morning. Systolic blood pressure was 200s on arrival to the ED. Stroke alert was called on arrival, CT of the head demonstrated 13 x 8 mm intraparenchymal hemorrhage of the posterior ramess. CTA of the head and neck showed focal mild narrowing of the right P1 PCA, no other significant stenosis, aneurysm, or arterial injury. Cardene was started for blood pressure control with goal blood pressure under 160. Patient's metabolic work-up unremarkable overall. Patient remains alert, oriented, able to provide history. He does describe double vision, sensation changes on the right and weakness. He was unable to walk when this happened. No other medical history. No known allergies. Patient does smoke. Hospital Course:   : Remained stable, requiring lower amounts of cardene    Last 24h:   Patient had headache overnight. Given 1 g of mag and Benadryl. Also had urinary retention and straight cath once x3. Mcmahan was placed. Remains on 2.5 mg/h of nicardipine this morning. Systolic pressures have been in the 130s to 150s, few in the 160s. Repeat CT this morning showed stable intraparenchymal hemorrhage without midline shift.   Plan to discontinue nicardipine today, continue lisinopril, use as needed labetalol and hydralazine as needed.     CURRENT MEDICATIONS:  SCHEDULED MEDICATIONS:   lisinopril  10 mg Oral Daily    enoxaparin  40 mg SubCUTAneous Daily    nicotine  1 patch TransDERmal Daily     CONTINUOUS INFUSIONS:   niCARdipine Stopped (23)     PRN MEDICATIONS:   hydrALAZINE, acetaminophen, ondansetron **OR** ondansetron, labetalol, fentanNYL    VITALS:  Temperature Range: Temp: 97.8 °F (36.6 °C) Temp  Av °F (36.7 °C)  Min: 97.3 °F (36.3 °C)  Max: 98.3 °F (36.8 °C)  BP Range: Systolic (46EWO), YXU:532 , Min:120 , FMT:400     Diastolic (51PHF), KQL:15, Min:60, Max:97    Pulse Range: Pulse  Av.7  Min: 72  Max: 99  Respiration Range: Resp  Avg: 15.6  Min: 11  Max: 24  Current Pulse Ox: SpO2: 97 %  24HR Pulse Ox Range: SpO2  Av.4 %  Min: 93 %  Max: 98 %  Patient Vitals for the past 12 hrs:   BP Temp Temp src Pulse Resp SpO2   23 0945 -- -- -- 89 14 97 %   23 0930 -- -- -- 88 22 96 %   23 0915 -- -- -- 91 14 98 %   23 0900 120/67 -- -- 81 15 97 %   23 0845 -- -- -- 83 14 95 %   23 0830 -- -- -- 82 14 94 %   23 0815 -- -- -- 90 12 95 %   23 0800 (!) 145/97 97.8 °F (36.6 °C) Oral 89 12 96 %   23 0700 128/72 -- -- 75 14 96 %   23 0600 (!) 155/95 -- -- 81 22 97 %   23 0500 (!) 164/90 -- -- 78 13 96 %   23 0400 (!) 165/93 98.2 °F (36.8 °C) -- 79 16 95 %   23 0300 (!) 154/87 -- -- 77 11 94 %   23 0200 (!) (P) 149/81 -- -- 74 14 95 %   23 0100 (P) 139/88 -- -- 74 12 --   23 0000 (!) 152/86 98.1 °F (36.7 °C) -- 78 14 95 %   23 2300 (!) 149/84 -- -- 80 19 94 %         RECENT LABS:   Lab Results   Component Value Date    WBC 8.8 2023    HGB 17.0 2023    HCT 47.5 2023     2023    ALT 60 (H) 2023    AST 54 (H) 2023     (L) 2023    K 3.5 (L) 2023     2023    CREATININE 0.50 (L) 2023    BUN 7 2023    CO2 22 2023    INR 1.1 2023 LABA1C 4.9 01/27/2023     24 HOUR INTAKE/OUTPUT:  Intake/Output Summary (Last 24 hours) at 1/27/2023 1012  Last data filed at 1/27/2023 0943  Gross per 24 hour   Intake 1523.21 ml   Output 1800 ml   Net -276.79 ml       Labs and Images reviewed with:  [] Dr. Marvin Pandya. Kimmie    [x] Dr. Romina Davis  [] Dr. Nella Pisano  [] There are no new interval images to review. PHYSICAL EXAM       CONSTITUTIONAL:  Well developed, well nourished, alert and oriented x 3, in no acute distress. GCS 15. Nontoxic. Mild dysarthria. No aphasia. HEAD:  normocephalic, atraumatic    EYES:  Right-sided ptosis, pupils equal and reactive, right gaze preference, unable to cross midline to the left Visual acuity intact, persistent nystagmus on the right   ENT:  moist mucous membranes   NECK:  supple, symmetric   LUNGS:  Equal air entry bilaterally   CARDIOVASCULAR:  normal s1 / s2, RRR, distal pulses intact   ABDOMEN:  Soft, no rigidity   NEUROLOGIC:  Mental Status:  A & O x3,awake             Cranial Nerves:    II: Visual acuity:  normal  II: Visual fields:  normal -   III: Pupils:  equal, round, reactive to light  III,IV,VI: Extra Ocular Movements: abnormal unable to cross midline when looking to the left, right eye with fixed lateral gaze  V: Facial sensation:  intact  VII: Facial strength: abnormal right facial droop  VIII: Hearing:  intact  IX: Palate:  intact  XI: Shoulder shrug:  intact  XII: Tongue movement:  normal     Motor Exam:    Drift:  absent  Tone:  Spastic intermittently RUE     MOTOR:  RUE: 5/5  LUE: 5/5  RLE: 5/5  LLE: 5/5     Sensory:    Touch:    Right Upper Extremity:  abnormal - \"prickly sensation\"  Left Upper Extremity:  normal  Right Lower Extremity:  normal  Left Lower Extremity:  abnormal - absent sensation     Coordination/Dysmetria:     Finger to Nose:   Right:  abnormal - dysmetria  Left:  normal        DRAINS:  [x] There are no drains for Neuro Critical Care to monitor at this time.      ASSESSMENT AND PLAN: ASSESSMENT:      This is a 43 y.o. male presenting after waking up with right-sided weakness and numbness in addition to dizziness. 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 Past medical history notable for hypertension, on Coreg at home. Patient's blood pressure was around 121 systolic on arrival, Cardene initiated in the ER. No other metabolic abnormalities. Patient care will be discussed with attending, will reevaluate patient along with attending. PLAN/MEDICAL DECISION MAKING:     NEUROLOGIC:  Posterior pontine hemorrhage, likely hypertensive   - Imaging: CT head with 13 x 8 mm posterior pontine hemorrhage, CTA of the head and neck with focal mild narrowing in the right P1 PTA  - AEDs not indicated   - Initial BP ~381 systolic  - Goal SBP <812   - Transition to lisinopril  - Use PRN labetalol and hydralazine  - NSG, endovascular neuro following   - ICH score: 1  - Stability CT stable. CARDIOVASCULAR:  BP Range: Systolic (82FZN), ICT:183 , Min:120 , LCD:496     Diastolic (12QEK), FZF:16, Min:60, Max:97    Pulse Range: Pulse  Av.7  Min: 72  Max: 99  - Goal SBP <160  - History of HTN  - Wean cardene  - Lisinopril started, increase dose as needed  - use PRN labetalol and hydralazine to bridge from cardene    PULMONARY:  Respiration Range: Resp  Avg: 15.6  Min: 11  Max: 24  Current Pulse Ox: SpO2: 97 %  24HR Pulse Ox Range: SpO2  Av.4 %  Min: 93 %  Max: 98 %  - No active problems    RENAL/FLUID/ELECTROLYTE:  - I/O: In: 1523.2 [I.V.:1523.2]  Out: 2600 [Urine:2600]  - BUN 7/ Creatinine 0.50  - IVF: N/A, normal diet ordered    GI/NUTRITION:  NUTRITION:  ADULT DIET;  Regular  - Bowel regimen: Advance as needed  - GI prophylaxis: Not indicated    ID:  Tmax: Temp (24hrs), Av °F (36.7 °C), Min:97.3 °F (36.3 °C), Max:98.3 °F (36.8 °C)    Temperature Range: Temp: 97.8 °F (36.6 °C) Temp  Av °F (36.7 °C)  Min: 97.3 °F (36.3 °C)  Max: 98.3 °F (36.8 °C)  - WBC   Lab Results   Component Value Date    WBC 8.8 01/27/2023     - Antimicrobials:  not indicated     HEME:   Recent Labs     01/26/23  0618 01/26/23  1300 01/27/23  0611   HGB 18.5* 18.4* 17.0   - Hgb stable  - Platelets 572    ENDOCRINE:  - Continue to monitor blood glucose, goal <180  - glucose controlled - most recent BGL is   Recent Labs     01/26/23  0724 01/26/23  1300 01/27/23  0611   GLUCOSE 136* 114* 99       OTHER:  - PT/OT/ST   - Code Status: Full Code    PROPHYLAXIS:  Stress ulcer: not indicated     DVT PROPHYLAXIS:  - SCD sleeves - Thigh High   - Hold AC/AP d/t ICH    DISPOSITION:  [x] To remain ICU: yes  [] OK for out of ICU from Neuro Critical Care standpoint    We will continue to follow along. For any changes in exam or patient status please contact Neuro Critical Care.       Aurora Davenport DO  Neuro Critical Care  Pager [phone]  1/27/2023     10:12 AM

## 2023-01-27 NOTE — PROGRESS NOTES
Physical Therapy  Facility/Department: [address] NEURO ICU  Daily Treatment Note  NAME: Ry Miller  : 1980  MRN: [id number]    Date of Service: 2023    Discharge Recommendations: In my professional opinion, this patient could tolerate a total of 3 hours of combined therapies post-acute care. PT Equipment Recommendations  Equipment Needed: No (defer equipment recommendations to rehab facility)    Patient Diagnosis(es): The primary encounter diagnosis was Intracranial hemorrhage ([facility] [address] A diagnosis of Pontine hemorrhage ([facility] [address] was also pertinent to this visit. Assessment   Pt cooperative, motivated, discouraged with level of disability but wants to overcome! Ataxic movement RU/LEs, decreased sensation/proprioception/kinesthesia RU/LEs; he needs to look at his R hand/foot to know for sure where they are and what they are doing--his hand slipped off the rwalker x 1 without his realizing it. Diplopia, R gaze preference, L visual field cut--able to turn his head to the L to see to his L, unable to look beyond midline to the L with either eye. Gait 40' gray lift walker, 4 pound wt RLE to increase feedback, pt still did better looking at his foot to improve control during swing. Activity Tolerance: Treatment limited secondary to medical complications (BP elevated after ambulating (within acceptable parameters); RN notified and reassessed BP via art line--it was within acceptable parameters)  Equipment Needed: No (defer equipment recommendations to rehab facility)     Plan    Physcial Therapy Plan  General Plan:  (5-6 visits weekly)  Current Treatment Recommendations: Strengthening;Balance training;Functional mobility training;Transfer training; Endurance training;Gait training;Stair training;Neuromuscular re-education;Home exercise program;Patient/Caregiver education & training; Safety education & training;Equipment evaluation, education, & procurement; Therapeutic activities;ROM;Wheelchair mobility training;Positioning  PT Plan of Care: Daily     Restrictions  Restrictions/Precautions  Restrictions/Precautions: General Precautions, Seizure  Required Braces or Orthoses?: No  Position Activity Restriction  Other position/activity restrictions: Up with assist, SBP <160 mmHg     Subjective    Subjective  Pain: no c/o pain throughout PT session  Orientation  Overall Orientation Status: Within Functional Limits  Cognition  Overall Cognitive Status: WFL     Objective   Vitals  Hypertensive with increased effort requiring rest breaks; RN aware; deferred 2nd walk with gray lift walker d/t increasing BP (still within recommended parameters)   O2 Device: None (Room air)  Bed Mobility Training  Bed Mobility Training: Yes  Overall Level of Assistance: Stand-by assistance (verbal cues)  Interventions: Verbal cues;Demonstration;Visual cues; Safety awareness training; Tactile cues (pt has visual deficits)  Rolling: Stand-by assistance (RLE tends to drag/delay)  Supine to Sit: Stand-by assistance (RLE delays)  Scooting: Stand-by assistance  Balance  Sitting: Without support  Standing: With support  Transfer Training  Transfer Training: Yes  Overall Level of Assistance: Minimum assistance  Interventions: Demonstration;Manual cues; Visual cues; Verbal cues; Safety awareness training;Weight shifting training/pressure relief  Sit to Stand: Minimum assistance  Stand to Sit: Minimum assistance  Stand Pivot Transfers: Moderate assistance  Bed to Chair: Moderate assistance  Gait Training  Gait Training: Yes  Gait  Overall Level of Assistance: Moderate assistance  Interventions: Demonstration;Manual cues; Safety awareness training; Tactile cues; Verbal cues; Visual cues; Weight shifting training/pressure relief  Base of Support: Center of gravity altered; Widened  Speed/Tess: Fluctuations  Step Length: Right lengthened  Swing Pattern: Right asymmetrical  Stance: Left increased;Right decreased  Gait Abnormalities: Ataxic;Lurching;Path deviations;Scissoring  Distance (ft): 20 Feet (20'x1 with rwalker; 40'x1 with gray lift walker)  Assistive Device: Gait belt; Other (comment) (gray lift walker, 4 pound wt on R ankle d/t decreased sensation, increased ataxia)     PT Exercises  A/AROM Exercises: AROM RLE seated: 15 reps, 4 pounds: LAQs, KTC; 1 rest break d/t increasing BP  Dynamic Standing Balance Exercises: standing ex BUE support via PT (no device) mod to max A+1: up on toes, partial squats; seated rest break; KTC with RLE only x 5 reps, max A+1 no wt for any standing ex  Breathing Techniques: pursed lip blow out while lifting 4 pound weight RLE     Safety Devices  Type of Devices: Call light within reach;Gait belt;Patient at risk for falls; Left in chair;Nurse notified (discussed use of walker and A+2 or use of rupert stedy for return to bed)  Restraints  Restraints Initially in Place: No       Goals  Short Term Goals  Time Frame for Short Term Goals: 14 visits  Short Term Goal 1: Pt amb 300' IND  Short Term Goal 2: Pt will be IND in all bed mobility tasks  Short Term Goal 3: Pt will be IND in all transfers  Short Term Goal 4: Pt will be IND 1 flight of stair navigation. Education  Patient Education  Education Given To: Patient; Family  Education Provided: Role of Therapy;Plan of Care;Home Exercise Program;Precautions;Transfer Training;Energy Conservation; Family Education;Equipment; Low Vision Education; Fall Prevention Strategies  Education Method: Demonstration;Verbal;Teach Back  Barriers to Learning: None  Education Outcome: Continued education needed;Verbalized understanding;Demonstrated understanding    Therapy Time   Individual Concurrent Group Co-treatment   Time In 0938         Time Out 1039         Minutes 61         Timed Code Treatment Minutes: 62 Minutes       Stacie Last, PT

## 2023-01-27 NOTE — PROGRESS NOTES
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    Intracranial hemorrhage ([address] I62.9       Pain: 0/10    Cognitive Treatment    Treatment time: 9:27 - 9:42      Subjective: [x] Alert [x] Cooperative     [] Confused     [] Agitated    [] Lethargic      Objective/Assessment:    Orientation: Oriented x3: answered \"9174\" for place 2/3 times    Recall: Word List Retention: Category Inclusion: 4/7 increased to 7/7 with 1 repetition  Mental Manipulation: Word Progression: 7/7    Problem Solving/Reasoning: Opposites: 14/16 increased to 15/16 with self correction  Word Deduction: 15/16 increased to 16/16 with min verbal cues    Other:     Pt. Seen for O/M treatment program for dysarthria. Pt. Completed O/M exercises X 10 X 1 sets with min verbal cues. Education provided re: compensatory strategies to increase speech intelligibility/clarity. Pt. Verbalized understanding. Exercise program left at bedside. Plan:  [x] Continue ST services    [] Discharge from ST:      Discharge recommendations: []  Further therapy recommended at discharge. The patient should be able to tolerate at least 3 hours of therapy per day over 5 days or 15 hours over 7 days. [x] Further therapy recommended at discharge. [] No therapy recommended at discharge. Treatment completed by: Completed by:  Deb Greenwood  Clinician    Cosigned By: Cruz Godwin S.CCC/SLP

## 2023-01-27 NOTE — CARE COORDINATION
Case Management Assessment  Initial Evaluation    Date/Time of Evaluation: 1/27/2023 11:06 AM  Assessment Completed by: Yassine Escobedo RN    If patient is discharged prior to next notation, then this note serves as note for discharge by case management. Patient Name: Myranda Camejo                   YOB: 1980  Diagnosis: Pontine hemorrhage ([facility] [address] [I61.3]  Intracranial hemorrhage ([facility] [address] [I62.9]                   Date / Time: 1/26/2023  5:56 AM    Patient Admission Status: Inpatient   Readmission Risk (Low < 19, Mod (19-27), High > 27): Readmission Risk Score: 8.5    Current PCP: Rajani Peraza, DO  PCP verified by CM? (P) Yes (Dr. Naldo Dubose)    Chart Reviewed: Yes      History Provided by: (P) Patient  Patient Orientation: (P) Alert and Oriented, Person, Place    Patient Cognition: (P) Alert    Hospitalization in the last 30 days (Readmission):  No    If yes, Readmission Assessment in  Navigator will be completed.     Advance Directives:      Code Status: Full Code   Patient's Primary Decision Maker is: (P) Legal Next of Kin      Discharge Planning:    Patient lives with: (P) Spouse/Significant Other Type of Home: (P) House  Primary Care Giver: (P) Self  Patient Support Systems include: (P) Spouse/Significant Other, Parent, Family Members   Current Financial resources: (P) Other (Comment) ([employer])  Current community resources:    Current services prior to admission: (P) None            Current DME:              Type of Home Care services:  (P) None    ADLS  Prior functional level: (P) Independent in ADLs/IADLs  Current functional level: (P) Assistance with the following:, Bathing, Dressing, Toileting, Mobility    PT AM-PAC: 16 /24  OT AM-PAC: 20 /24    Family can provide assistance at DC: (P) Yes  Would you like Case Management to discuss the discharge plan with any other family members/significant others, and if so, who? (P) No  Plans to Return to Present Housing: (P) Unknown at present  Other Identified Issues/Barriers to RETURNING to current housing: na  Potential Assistance needed at discharge: (P) Other (Comment) (TBD)            Potential DME:    Patient expects to discharge to: (P) Rehabilitation facility  Plan for transportation at discharge: (P) Family    Financial    Payor: Mickey Mcdaniel / Plan: Mickey JEFFRIES CHOICE POS II / Product Type: *No Product type* /     Does insurance require precert for SNF: Yes    Potential assistance Purchasing Medications: (P) No  Meds-to-Beds request: Yes      [facility] [id number] [place] [address] [address] [facility] [phone] [address] [address]  Phone: [phone] Fax: [address] [address]  [address] [place] [address]  Phone: [phone] Fax: [phone]      Notes:    Factors facilitating achievement of predicted outcomes: Family support, Motivated, Cooperative, and Pleasant    Barriers to discharge: Upper extremity weakness and Lower extremity weakness    Additional Case Management Notes: Spoke with patient and spouse at bedside, role explained. Goal is to return home, PT/OT to work with patient to assist with plan. Address, PCP, telephone numbers, ER contacts and insurance reviewed. The Plan for Transition of Care is related to the following treatment goals of Pontine hemorrhage ([facility] [address] [I61.3]  Intracranial hemorrhage ([facility] [address] [Z63.1]    IF APPLICABLE: The Patient and/or patient representative Alec Daniels and his family were provided with a choice of provider and agrees with the discharge plan. Freedom of choice list with basic dialogue that supports the patient's individualized plan of care/goals and shares the quality data associated with the providers was provided to: (P) Patient   Patient Representative Name:       The Patient and/or Patient Representative Agree with the Discharge Plan?       Nyasia Veliz RN  Case Management Department                     Post Acute Facility/Agency List     Provided patient with the following list, the list includes the overall star ratings obtained from CMS per the Medicare Web site (www.Medicare.gov):     [] [address]  [x] Acute Inpatient Rehabilitation Facilities  [] Skilled Nursing Facilities  [] Home Care    Provided verbal instructions on how to utilize the QR Code to obtain additional detailed star ratings from www. Medicare. gov     offered to print and provide the detailed list:    []Accepted   [x]Declined    The following printed detailed lists were provided: choices to follow

## 2023-01-28 ENCOUNTER — APPOINTMENT (OUTPATIENT)
Dept: MRI IMAGING | Age: 43
DRG: 065 | End: 2023-01-28
Payer: COMMERCIAL

## 2023-01-28 PROCEDURE — 6360000002 HC RX W HCPCS: Performed by: STUDENT IN AN ORGANIZED HEALTH CARE EDUCATION/TRAINING PROGRAM

## 2023-01-28 PROCEDURE — 6370000000 HC RX 637 (ALT 250 FOR IP): Performed by: FAMILY MEDICINE

## 2023-01-28 PROCEDURE — 70553 MRI BRAIN STEM W/O & W/DYE: CPT

## 2023-01-28 PROCEDURE — 6360000004 HC RX CONTRAST MEDICATION: Performed by: PHYSICIAN ASSISTANT

## 2023-01-28 PROCEDURE — 6370000000 HC RX 637 (ALT 250 FOR IP): Performed by: STUDENT IN AN ORGANIZED HEALTH CARE EDUCATION/TRAINING PROGRAM

## 2023-01-28 PROCEDURE — 6370000000 HC RX 637 (ALT 250 FOR IP): Performed by: PSYCHIATRY & NEUROLOGY

## 2023-01-28 PROCEDURE — 2500000003 HC RX 250 WO HCPCS

## 2023-01-28 PROCEDURE — 2580000003 HC RX 258: Performed by: PHYSICIAN ASSISTANT

## 2023-01-28 PROCEDURE — 2060000000 HC ICU INTERMEDIATE R&B

## 2023-01-28 PROCEDURE — A9576 INJ PROHANCE MULTIPACK: HCPCS | Performed by: PHYSICIAN ASSISTANT

## 2023-01-28 PROCEDURE — 6360000002 HC RX W HCPCS: Performed by: PSYCHIATRY & NEUROLOGY

## 2023-01-28 PROCEDURE — 99231 SBSQ HOSP IP/OBS SF/LOW 25: CPT | Performed by: PSYCHIATRY & NEUROLOGY

## 2023-01-28 RX ORDER — SODIUM CHLORIDE 0.9 % (FLUSH) 0.9 %
10 SYRINGE (ML) INJECTION PRN
Status: DISCONTINUED | OUTPATIENT
Start: 2023-01-28 | End: 2023-02-02 | Stop reason: HOSPADM

## 2023-01-28 RX ORDER — LISINOPRIL 20 MG/1
20 TABLET ORAL DAILY
Status: DISCONTINUED | OUTPATIENT
Start: 2023-01-28 | End: 2023-01-29

## 2023-01-28 RX ORDER — TAMSULOSIN HYDROCHLORIDE 0.4 MG/1
0.4 CAPSULE ORAL DAILY
Status: DISCONTINUED | OUTPATIENT
Start: 2023-01-28 | End: 2023-02-02 | Stop reason: HOSPADM

## 2023-01-28 RX ADMIN — SODIUM CHLORIDE, PRESERVATIVE FREE 10 ML: 5 INJECTION INTRAVENOUS at 12:18

## 2023-01-28 RX ADMIN — Medication 10 MG: at 20:17

## 2023-01-28 RX ADMIN — HYDRALAZINE HYDROCHLORIDE 10 MG: 20 INJECTION INTRAMUSCULAR; INTRAVENOUS at 07:17

## 2023-01-28 RX ADMIN — ENOXAPARIN SODIUM 40 MG: 100 INJECTION SUBCUTANEOUS at 08:34

## 2023-01-28 RX ADMIN — TAMSULOSIN HYDROCHLORIDE 0.4 MG: 0.4 CAPSULE ORAL at 14:19

## 2023-01-28 RX ADMIN — LISINOPRIL 20 MG: 20 TABLET ORAL at 08:33

## 2023-01-28 RX ADMIN — GADOTERIDOL 18 ML: 279.3 INJECTION, SOLUTION INTRAVENOUS at 12:17

## 2023-01-28 NOTE — PROGRESS NOTES
Daily Progress Note  Neuro Critical Care    Patient Name: Lorene Heck  Patient : 1980  Room/Bed: 0125/0125-01  Code Status: Full Code  Allergies: No Known Allergies    CHIEF COMPLAINT:      Dizziness, right sided weakness, numbness     INTERVAL HISTORY    Initial Presentation (Admitted 23): The patient is a 43 y.o. male presented with dizziness, right-sided facial droop, right-sided weakness and right-sided numbness. Patient felt well when he went to bed last night, last known well 10 PM.  Medical history notable for hypertension, takes Coreg for this. Patient does not know his dose. EMS was called when patient woke up with these symptoms this morning. Systolic blood pressure was 200s on arrival to the ED. Stroke alert was called on arrival, CT of the head demonstrated 13 x 8 mm intraparenchymal hemorrhage of the posterior ramses. CTA of the head and neck showed focal mild narrowing of the right P1 PCA, no other significant stenosis, aneurysm, or arterial injury. Cardene was started for blood pressure control with goal blood pressure under 160. Patient's metabolic work-up unremarkable overall. Patient remains alert, oriented, able to provide history. He does describe double vision, sensation changes on the right and weakness. He was unable to walk when this happened. No other medical history. No known allergies. Patient does smoke. Hospital Course:   : Remained stable, requiring lower amounts of cardene    : Patient had headache overnight. Given 1 g of mag and Benadryl. Also had urinary retention and straight cath once x3. Mcmahan was placed. Remains on 2.5 mg/h of nicardipine this morning. Systolic pressures have been in the 130s to 150s, few in the 160s. Repeat CT this morning showed stable intraparenchymal hemorrhage without midline shift.   Plan to discontinue nicardipine today, continue lisinopril, use as needed labetalol and hydralazine as needed. Last 24h:   Had urinary retention and straight cath once. On lisinopril 10mg but BP still elevated, will increase. Pending MRI. Still unsteady without support.     CURRENT MEDICATIONS:  SCHEDULED MEDICATIONS:   lisinopril  10 mg Oral Daily    enoxaparin  40 mg SubCUTAneous Daily    nicotine  1 patch TransDERmal Daily     CONTINUOUS INFUSIONS:   niCARdipine Stopped (23 09)     PRN MEDICATIONS:   hydrALAZINE, acetaminophen, ondansetron **OR** ondansetron, labetalol, fentanNYL    VITALS:  Temperature Range: Temp: 97.7 °F (36.5 °C) Temp  Av °F (36.7 °C)  Min: 97.5 °F (36.4 °C)  Max: 98.7 °F (37.1 °C)  BP Range: Systolic (55URI), KXU:629 , Min:105 , BRO:253     Diastolic (74YML), OWI:95, Min:67, Max:128    Pulse Range: Pulse  Av.4  Min: 60  Max: 94  Respiration Range: Resp  Avg: 15.8  Min: 12  Max: 27  Current Pulse Ox: SpO2: 96 %  24HR Pulse Ox Range: SpO2  Av.8 %  Min: 91 %  Max: 99 %  Patient Vitals for the past 12 hrs:   BP Temp Pulse Resp SpO2   23 0717 (!) 163/101 -- -- -- --   23 0600 (!) 158/98 -- 61 15 96 %   23 0500 -- -- -- -- 97 %   23 0400 (!) 142/102 97.7 °F (36.5 °C) 64 -- 96 %   23 0300 (!) 157/91 -- 74 -- 96 %   23 0200 (!) 154/96 -- 64 -- 97 %   23 0100 138/86 -- 66 -- 98 %   23 0000 (!) 148/128 (P) 97.8 °F (36.6 °C) 67 27 --   23 2300 136/77 -- 60 15 --   23 2200 (!) 141/105 -- 66 13 97 %   23 2100 138/84 -- 65 15 97 %   23 (!) 144/93 97.5 °F (36.4 °C) 67 14 92 %           RECENT LABS:   Lab Results   Component Value Date    WBC 8.8 2023    HGB 17.0 2023    HCT 47.5 2023     2023    ALT 60 (H) 2023    AST 54 (H) 2023     (L) 2023    K 3.5 (L) 2023     2023    CREATININE 0.50 (L) 2023    BUN 7 2023    CO2 22 2023    INR 1.1 2023    LABA1C 4.9 2023     24 HOUR INTAKE/OUTPUT:  Intake/Output Summary (Last 24 hours) at 1/28/2023 0718  Last data filed at 1/28/2023 0510  Gross per 24 hour   Intake 189.83 ml   Output 500 ml   Net -310.17 ml         Labs and Images reviewed with:  [] Dr. So Perera. Kimmie    [x] Dr. Maria C Cornelius  [] Dr. Clayton Bryant  [] There are no new interval images to review. PHYSICAL EXAM       CONSTITUTIONAL:  Well developed, well nourished, alert and oriented x 3, in no acute distress. GCS 15. Nontoxic. Mild dysarthria. No aphasia. HEAD:  normocephalic, atraumatic    EYES:  Right-sided ptosis, pupils equal and reactive, right gaze preference, unable to cross midline to the left Visual acuity intact, persistent nystagmus on the right   ENT:  moist mucous membranes   NECK:  supple, symmetric   LUNGS:  Equal air entry bilaterally   CARDIOVASCULAR:  normal s1 / s2, RRR, distal pulses intact   ABDOMEN:  Soft, no rigidity   NEUROLOGIC:  Mental Status:  A & O x3,awake             Cranial Nerves:    II: Visual acuity:  normal  II: Visual fields:  normal -   III: Pupils:  equal, round, reactive to light  III,IV,VI: Extra Ocular Movements: abnormal unable to cross midline when looking to the left, right eye with fixed lateral gaze  V: Facial sensation:  intact  VII: Facial strength: abnormal right facial droop  VIII: Hearing:  intact  IX: Palate:  intact  XI: Shoulder shrug:  intact  XII: Tongue movement:  normal     Motor Exam:    Drift:  absent  Tone:  Spastic intermittently RUE     MOTOR:  RUE: 5/5  LUE: 5/5  RLE: 5/5  LLE: 5/5     Sensory:    Touch:    Right Upper Extremity:  abnormal - \"prickly sensation\"  Left Upper Extremity:  normal  Right Lower Extremity:  normal  Left Lower Extremity:  normal     Coordination/Dysmetria:     Finger to Nose:   Right:  abnormal - dysmetria  Left:  normal        DRAINS:  [x] There are no drains for Neuro Critical Care to monitor at this time.      ASSESSMENT AND PLAN:       ASSESSMENT:      This is a 43 y.o. male presenting after waking up with right-sided weakness and numbness in addition to dizziness. 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 Past medical history notable for hypertension, on Coreg at home. Patient's blood pressure was around 650 systolic on arrival, Cardene initiated in the ER. No other metabolic abnormalities. Patient care will be discussed with attending, will reevaluate patient along with attending. PLAN/MEDICAL DECISION MAKING:     NEUROLOGIC:  Posterior pontine hemorrhage, likely hypertensive   - Imaging: CT head with 13 x 8 mm posterior pontine hemorrhage, CTA of the head and neck with focal mild narrowing in the right P1 PTA  - AEDs not indicated   - Initial BP ~286 systolic  - Goal SBP <196   - Lisinopril increased from 10mg to 20mg  - Use PRN labetalol and hydralazine  - NSG, endovascular neuro following   - ICH score: 1  - Stability CT stable. - MRI brain pending    CARDIOVASCULAR:  BP Range: Systolic (37BAH), YTP:583 , Min:105 , WWC:899     Diastolic (85GQU), NRR:20, Min:67, Max:128    Pulse Range: Pulse  Av.4  Min: 60  Max: 94  - Goal SBP <160  - History of HTN  - Wean cardene  - Lisinopril started at 10, increased to 20mg today  - use PRN labetalol and hydralazine    PULMONARY:  Respiration Range: Resp  Avg: 15.8  Min: 12  Max: 27  Current Pulse Ox: SpO2: 96 %  24HR Pulse Ox Range: SpO2  Av.8 %  Min: 91 %  Max: 99 %  - No active problems    RENAL/FLUID/ELECTROLYTE:  - I/O: In: 189.8 [I.V.:189.8]  Out: 500 [Urine:500]  - IVF: N/A, normal diet ordered  - start flomax 0.4mg daily    GI/NUTRITION:  NUTRITION:  ADULT DIET;  Regular  - Bowel regimen: Advance as needed  - GI prophylaxis: Not indicated    ID:  Tmax: Temp (24hrs), Av °F (36.7 °C), Min:97.5 °F (36.4 °C), Max:98.7 °F (37.1 °C)    Temperature Range: Temp: 97.7 °F (36.5 °C) Temp  Av °F (36.7 °C)  Min: 97.5 °F (36.4 °C)  Max: 98.7 °F (37.1 °C)  - WBC   Lab Results   Component Value Date    WBC 8.8 2023     - Antimicrobials:  not indicated     HEME:   Recent Labs     01/26/23  0618 01/26/23  1300 01/27/23  0611   HGB 18.5* 18.4* 17.0     - Hgb stable  - No CBC today    ENDOCRINE:  - Continue to monitor blood glucose, goal <180  - glucose controlled - most recent BGL is   Recent Labs     01/26/23  0724 01/26/23  1300 01/27/23  0611   GLUCOSE 136* 114* 99         OTHER:  - PT/OT/ST   - Code Status: Full Code  - PMR consult for rehab needs    PROPHYLAXIS:  Stress ulcer: not indicated     DVT PROPHYLAXIS:  - SCD sleeves - Thigh High   - Hold AC/AP d/t ICH    DISPOSITION:  [] To remain ICU:  [x] OK for out of ICU from Neuro Critical Care standpoint: stepdown status    We will continue to follow along. For any changes in exam or patient status please contact Neuro Critical Care.       Seema Ledesma MD  Neurology PGY-4  Neuro Critical Care  Pager [phone]  1/28/2023     7:18 AM

## 2023-01-28 NOTE — PLAN OF CARE
Problem: Discharge Planning  Goal: Discharge to home or other facility with appropriate resources  1/28/2023 2204 by Taylor Mir RN  Outcome: Progressing  1/27/2023 1746 by Scot Bateman RN  Outcome: Progressing     Problem: Safety - Adult  Goal: Free from fall injury  1/28/2023 0652 by Taylor Mir RN  Outcome: Progressing  1/27/2023 1746 by Scot Bateman RN  Outcome: Progressing

## 2023-01-29 ENCOUNTER — APPOINTMENT (OUTPATIENT)
Dept: CT IMAGING | Age: 43
DRG: 065 | End: 2023-01-29
Payer: COMMERCIAL

## 2023-01-29 LAB
ANION GAP SERPL CALCULATED.3IONS-SCNC: 13 MMOL/L (ref 9–17)
BUN BLDV-MCNC: 12 MG/DL (ref 6–20)
CALCIUM SERPL-MCNC: 11.6 MG/DL (ref 8.6–10.4)
CHLORIDE BLD-SCNC: 102 MMOL/L (ref 98–107)
CO2: 21 MMOL/L (ref 20–31)
CREAT SERPL-MCNC: 0.57 MG/DL (ref 0.7–1.2)
GFR SERPL CREATININE-BSD FRML MDRD: >60 ML/MIN/1.73M2
GLUCOSE BLD-MCNC: 109 MG/DL (ref 70–99)
HCT VFR BLD CALC: 48.6 % (ref 40.7–50.3)
HEMOGLOBIN: 16.3 G/DL (ref 13–17)
MCH RBC QN AUTO: 32.1 PG (ref 25.2–33.5)
MCHC RBC AUTO-ENTMCNC: 33.5 G/DL (ref 28.4–34.8)
MCV RBC AUTO: 95.9 FL (ref 82.6–102.9)
NRBC AUTOMATED: 0 PER 100 WBC
PDW BLD-RTO: 12.1 % (ref 11.8–14.4)
PLATELET # BLD: 155 K/UL (ref 138–453)
PMV BLD AUTO: 10.8 FL (ref 8.1–13.5)
POTASSIUM SERPL-SCNC: 3.5 MMOL/L (ref 3.7–5.3)
RBC # BLD: 5.07 M/UL (ref 4.21–5.77)
SODIUM BLD-SCNC: 136 MMOL/L (ref 135–144)
WBC # BLD: 7.9 K/UL (ref 3.5–11.3)

## 2023-01-29 PROCEDURE — 6370000000 HC RX 637 (ALT 250 FOR IP): Performed by: FAMILY MEDICINE

## 2023-01-29 PROCEDURE — 6360000002 HC RX W HCPCS

## 2023-01-29 PROCEDURE — 6370000000 HC RX 637 (ALT 250 FOR IP): Performed by: STUDENT IN AN ORGANIZED HEALTH CARE EDUCATION/TRAINING PROGRAM

## 2023-01-29 PROCEDURE — 80048 BASIC METABOLIC PNL TOTAL CA: CPT

## 2023-01-29 PROCEDURE — 6360000002 HC RX W HCPCS: Performed by: PSYCHIATRY & NEUROLOGY

## 2023-01-29 PROCEDURE — 6360000002 HC RX W HCPCS: Performed by: STUDENT IN AN ORGANIZED HEALTH CARE EDUCATION/TRAINING PROGRAM

## 2023-01-29 PROCEDURE — 2000000000 HC ICU R&B

## 2023-01-29 PROCEDURE — 2580000003 HC RX 258

## 2023-01-29 PROCEDURE — 99232 SBSQ HOSP IP/OBS MODERATE 35: CPT | Performed by: PSYCHIATRY & NEUROLOGY

## 2023-01-29 PROCEDURE — 6370000000 HC RX 637 (ALT 250 FOR IP)

## 2023-01-29 PROCEDURE — 6370000000 HC RX 637 (ALT 250 FOR IP): Performed by: PSYCHIATRY & NEUROLOGY

## 2023-01-29 PROCEDURE — 2500000003 HC RX 250 WO HCPCS

## 2023-01-29 PROCEDURE — 36415 COLL VENOUS BLD VENIPUNCTURE: CPT

## 2023-01-29 PROCEDURE — 2580000003 HC RX 258: Performed by: PSYCHIATRY & NEUROLOGY

## 2023-01-29 PROCEDURE — 51701 INSERT BLADDER CATHETER: CPT

## 2023-01-29 PROCEDURE — 51798 US URINE CAPACITY MEASURE: CPT

## 2023-01-29 PROCEDURE — 70450 CT HEAD/BRAIN W/O DYE: CPT

## 2023-01-29 PROCEDURE — 2500000003 HC RX 250 WO HCPCS: Performed by: PSYCHIATRY & NEUROLOGY

## 2023-01-29 PROCEDURE — 85027 COMPLETE CBC AUTOMATED: CPT

## 2023-01-29 RX ORDER — LANOLIN ALCOHOL/MO/W.PET/CERES
100 CREAM (GRAM) TOPICAL DAILY
Status: DISCONTINUED | OUTPATIENT
Start: 2023-02-02 | End: 2023-02-02 | Stop reason: HOSPADM

## 2023-01-29 RX ORDER — AMLODIPINE BESYLATE 5 MG/1
5 TABLET ORAL DAILY
Status: DISCONTINUED | OUTPATIENT
Start: 2023-01-29 | End: 2023-01-29

## 2023-01-29 RX ORDER — LANOLIN ALCOHOL/MO/W.PET/CERES
100 CREAM (GRAM) TOPICAL DAILY
Status: DISCONTINUED | OUTPATIENT
Start: 2023-02-02 | End: 2023-01-29

## 2023-01-29 RX ORDER — THIAMINE HYDROCHLORIDE 100 MG/ML
500 INJECTION, SOLUTION INTRAMUSCULAR; INTRAVENOUS 3 TIMES DAILY
Status: DISCONTINUED | OUTPATIENT
Start: 2023-01-29 | End: 2023-01-29

## 2023-01-29 RX ORDER — LORAZEPAM 2 MG/ML
2 INJECTION INTRAMUSCULAR
Status: DISCONTINUED | OUTPATIENT
Start: 2023-01-29 | End: 2023-02-01

## 2023-01-29 RX ORDER — LORAZEPAM 2 MG/ML
4 INJECTION INTRAMUSCULAR
Status: DISCONTINUED | OUTPATIENT
Start: 2023-01-29 | End: 2023-02-01

## 2023-01-29 RX ORDER — LORAZEPAM 2 MG/1
2 TABLET ORAL
Status: DISCONTINUED | OUTPATIENT
Start: 2023-01-29 | End: 2023-02-01

## 2023-01-29 RX ORDER — LORAZEPAM 2 MG/ML
3 INJECTION INTRAMUSCULAR
Status: DISCONTINUED | OUTPATIENT
Start: 2023-01-29 | End: 2023-02-01

## 2023-01-29 RX ORDER — FOLIC ACID 1 MG/1
1 TABLET ORAL DAILY
Status: DISCONTINUED | OUTPATIENT
Start: 2023-01-29 | End: 2023-02-02 | Stop reason: HOSPADM

## 2023-01-29 RX ORDER — PHENOBARBITAL SODIUM 130 MG/ML
260 INJECTION INTRAMUSCULAR ONCE
Status: COMPLETED | OUTPATIENT
Start: 2023-01-29 | End: 2023-01-29

## 2023-01-29 RX ORDER — LANOLIN ALCOHOL/MO/W.PET/CERES
100 CREAM (GRAM) TOPICAL DAILY
Status: DISCONTINUED | OUTPATIENT
Start: 2023-01-29 | End: 2023-01-29

## 2023-01-29 RX ORDER — QUETIAPINE FUMARATE 25 MG/1
25 TABLET, FILM COATED ORAL NIGHTLY PRN
Status: DISCONTINUED | OUTPATIENT
Start: 2023-01-29 | End: 2023-02-02 | Stop reason: HOSPADM

## 2023-01-29 RX ORDER — CHOLECALCIFEROL (VITAMIN D3) 125 MCG
5 CAPSULE ORAL NIGHTLY
Status: DISCONTINUED | OUTPATIENT
Start: 2023-01-29 | End: 2023-02-02 | Stop reason: HOSPADM

## 2023-01-29 RX ORDER — LISINOPRIL 20 MG/1
40 TABLET ORAL DAILY
Status: DISCONTINUED | OUTPATIENT
Start: 2023-01-30 | End: 2023-02-02 | Stop reason: HOSPADM

## 2023-01-29 RX ORDER — LORAZEPAM 2 MG/1
4 TABLET ORAL
Status: DISCONTINUED | OUTPATIENT
Start: 2023-01-29 | End: 2023-02-02 | Stop reason: HOSPADM

## 2023-01-29 RX ORDER — PHENOBARBITAL SODIUM 130 MG/ML
130 INJECTION INTRAMUSCULAR 2 TIMES DAILY
Status: DISCONTINUED | OUTPATIENT
Start: 2023-01-29 | End: 2023-01-31

## 2023-01-29 RX ORDER — DIPHENHYDRAMINE HYDROCHLORIDE 50 MG/ML
25 INJECTION INTRAMUSCULAR; INTRAVENOUS NIGHTLY PRN
Status: DISCONTINUED | OUTPATIENT
Start: 2023-01-29 | End: 2023-01-30

## 2023-01-29 RX ORDER — LISINOPRIL 20 MG/1
20 TABLET ORAL ONCE
Status: DISCONTINUED | OUTPATIENT
Start: 2023-01-29 | End: 2023-01-31

## 2023-01-29 RX ORDER — LABETALOL HYDROCHLORIDE 5 MG/ML
20 INJECTION, SOLUTION INTRAVENOUS EVERY 4 HOURS PRN
Status: DISCONTINUED | OUTPATIENT
Start: 2023-01-29 | End: 2023-02-02 | Stop reason: HOSPADM

## 2023-01-29 RX ORDER — SODIUM CHLORIDE 9 MG/ML
INJECTION, SOLUTION INTRAVENOUS PRN
Status: DISCONTINUED | OUTPATIENT
Start: 2023-01-29 | End: 2023-02-02 | Stop reason: HOSPADM

## 2023-01-29 RX ORDER — CALCIUM CARBONATE 200(500)MG
500 TABLET,CHEWABLE ORAL 3 TIMES DAILY PRN
Status: DISCONTINUED | OUTPATIENT
Start: 2023-01-29 | End: 2023-02-02 | Stop reason: HOSPADM

## 2023-01-29 RX ORDER — LORAZEPAM 1 MG/1
1 TABLET ORAL
Status: DISCONTINUED | OUTPATIENT
Start: 2023-01-29 | End: 2023-02-01

## 2023-01-29 RX ORDER — SODIUM CHLORIDE 0.9 % (FLUSH) 0.9 %
5-40 SYRINGE (ML) INJECTION EVERY 12 HOURS SCHEDULED
Status: DISCONTINUED | OUTPATIENT
Start: 2023-01-29 | End: 2023-02-02 | Stop reason: HOSPADM

## 2023-01-29 RX ORDER — CHLORDIAZEPOXIDE HYDROCHLORIDE 25 MG/1
50 CAPSULE, GELATIN COATED ORAL 4 TIMES DAILY
Status: DISCONTINUED | OUTPATIENT
Start: 2023-01-29 | End: 2023-01-29

## 2023-01-29 RX ORDER — LORAZEPAM 2 MG/ML
1 INJECTION INTRAMUSCULAR
Status: DISCONTINUED | OUTPATIENT
Start: 2023-01-29 | End: 2023-02-01

## 2023-01-29 RX ORDER — SODIUM CHLORIDE 0.9 % (FLUSH) 0.9 %
5-40 SYRINGE (ML) INJECTION PRN
Status: DISCONTINUED | OUTPATIENT
Start: 2023-01-29 | End: 2023-02-02 | Stop reason: HOSPADM

## 2023-01-29 RX ORDER — DEXMEDETOMIDINE HYDROCHLORIDE 4 UG/ML
.1-.6 INJECTION, SOLUTION INTRAVENOUS CONTINUOUS
Status: DISCONTINUED | OUTPATIENT
Start: 2023-01-29 | End: 2023-01-30

## 2023-01-29 RX ADMIN — SODIUM CHLORIDE, PRESERVATIVE FREE 10 ML: 5 INJECTION INTRAVENOUS at 08:36

## 2023-01-29 RX ADMIN — THIAMINE HYDROCHLORIDE 500 MG: 100 INJECTION, SOLUTION INTRAMUSCULAR; INTRAVENOUS at 17:15

## 2023-01-29 RX ADMIN — Medication 100 MG: at 09:31

## 2023-01-29 RX ADMIN — LORAZEPAM 1 MG: 2 INJECTION INTRAMUSCULAR; INTRAVENOUS at 23:46

## 2023-01-29 RX ADMIN — FOLIC ACID 1 MG: 1 TABLET ORAL at 11:34

## 2023-01-29 RX ADMIN — ENOXAPARIN SODIUM 40 MG: 100 INJECTION SUBCUTANEOUS at 07:46

## 2023-01-29 RX ADMIN — HYDRALAZINE HYDROCHLORIDE 10 MG: 20 INJECTION INTRAMUSCULAR; INTRAVENOUS at 07:47

## 2023-01-29 RX ADMIN — LORAZEPAM 3 MG: 2 TABLET ORAL at 09:33

## 2023-01-29 RX ADMIN — DEXMEDETOMIDINE HYDROCHLORIDE 0.6 MCG/KG/HR: 4 INJECTION, SOLUTION INTRAVENOUS at 20:50

## 2023-01-29 RX ADMIN — TAMSULOSIN HYDROCHLORIDE 0.4 MG: 0.4 CAPSULE ORAL at 07:46

## 2023-01-29 RX ADMIN — HYDRALAZINE HYDROCHLORIDE 10 MG: 20 INJECTION INTRAMUSCULAR; INTRAVENOUS at 11:39

## 2023-01-29 RX ADMIN — LORAZEPAM 1 MG: 1 TABLET ORAL at 04:44

## 2023-01-29 RX ADMIN — PHENOBARBITAL SODIUM 260 MG: 130 INJECTION INTRAMUSCULAR; INTRAVENOUS at 13:46

## 2023-01-29 RX ADMIN — PHENOBARBITAL SODIUM 130 MG: 130 INJECTION INTRAMUSCULAR; INTRAVENOUS at 19:20

## 2023-01-29 RX ADMIN — ANTACID TABLETS 500 MG: 500 TABLET, CHEWABLE ORAL at 10:03

## 2023-01-29 RX ADMIN — CHLORDIAZEPOXIDE HYDROCHLORIDE 50 MG: 25 CAPSULE ORAL at 10:03

## 2023-01-29 RX ADMIN — Medication 10 MG: at 02:18

## 2023-01-29 RX ADMIN — LORAZEPAM 4 MG: 2 INJECTION INTRAMUSCULAR at 13:54

## 2023-01-29 RX ADMIN — DEXMEDETOMIDINE HYDROCHLORIDE 0.4 MCG/KG/HR: 4 INJECTION, SOLUTION INTRAVENOUS at 13:27

## 2023-01-29 RX ADMIN — LISINOPRIL 20 MG: 20 TABLET ORAL at 07:46

## 2023-01-29 RX ADMIN — LORAZEPAM 4 MG: 2 INJECTION INTRAMUSCULAR at 12:44

## 2023-01-29 RX ADMIN — THIAMINE HYDROCHLORIDE 500 MG: 100 INJECTION, SOLUTION INTRAMUSCULAR; INTRAVENOUS at 20:45

## 2023-01-29 RX ADMIN — LABETALOL HYDROCHLORIDE 20 MG: 5 INJECTION, SOLUTION INTRAVENOUS at 11:39

## 2023-01-29 RX ADMIN — HYDRALAZINE HYDROCHLORIDE 10 MG: 20 INJECTION INTRAMUSCULAR; INTRAVENOUS at 00:32

## 2023-01-29 RX ADMIN — SODIUM CHLORIDE, PRESERVATIVE FREE 10 ML: 5 INJECTION INTRAVENOUS at 20:42

## 2023-01-29 RX ADMIN — LORAZEPAM 2 MG: 2 TABLET ORAL at 07:46

## 2023-01-29 ASSESSMENT — PAIN SCALES - GENERAL
PAINLEVEL_OUTOF10: 0

## 2023-01-29 NOTE — PROGRESS NOTES
Pt agitation increasing, pt also more confused. Stating he was in senior living, unaware of date, place, person. Calling his wife mother, etc. Dr Jim Ye notified, in room pt ordered to icu for precedex drip. And ct ordered. Charge nurse notified, pt transferred to icu.

## 2023-01-29 NOTE — PLAN OF CARE
-- MRI brain reviewed with Dr Raya Guaman and patient and family   NO ACUTE NEUROSURGICAL INTERVENTION AT THIS TIME    NEUROSURGERY TO SIGN OFF     Please contact Neurosurgery with any questions. PATIENT TO FOLLOW UP IN CLINIC: 2 weeks repeat [facility]  ---  Follow-up with Neurosurgery  [address]/[facility] (Medical Office Building [address])  [address]  Call [phone] for an appointment.   --

## 2023-01-29 NOTE — FLOWSHEET NOTE
01/29/23 0344   Treatment Team Notification   Reason for Communication Abnormal vitals   Team Member Name Dr Maria Isabel Yepez Team Role Resident   Method of Communication Secure Message   Response See orders   Notification Time 9408

## 2023-01-29 NOTE — ED NOTES
Patient is agitated and confused. Patient unable to complete PHQ-9 at this time. Social work following.      Daksha , [facility]  01/29/23 9718

## 2023-01-29 NOTE — DISCHARGE INSTRUCTIONS
Head Injury Discharge Instructions     Thank you for choosing [facility] and [facility] for your recovery needs. The following instructions will help to ensure your comfort and that you are well prepared for your recovery. Follow-up Visit:   The office is located at:    [address], [address]    [facility] [place] [facility] [address] [facility], main floor    [facility], [address] [place] [address]    [phone]      [x]  Please have a CT scan of your head done prior to this appointment. Please also call your primary care physician to schedule an appointment for further evaluation and care. Diet:   You may resume your regular diet as tolerated. Activity:   You should not be left alone. Have a relative or friend stay with you until they think you are back to normal.   Do not drive or operate machinery until you are seen in the office. Avoid strenuous activities. No lifting or straining. Pain Management:   Do not take aspirin, NSAID medications (Ibuprofen, Naprosyn, etc.) or Finnegan-2 inhibitors (Celebrex, etc.). You may be given a prescription for pain medication. Try not to take the pain medicine unless you need to. If you feel that you do not need something that strong, you may use regular or extra-strength Tylenol instead. DO NOT drink alcohol, drive or operate heavy machinery while taking your pain medications. YOU SHOULD CALL THE OFFICE -[phone] IF YOU HAVE ANY OF THE FOLLOWING:   Worsening headaches or headaches that feel different. Persistent nausea and/or vomiting. Changes in mental status such as confusion, slurred speech, increased sleepiness. Any new neurologic sensory or motor deficits (weakness, numbness)   Seizures   Loss of memory. Dizziness or fainting. Trouble walking or staggering. Blurry vision, double vision or other problems with your eyesight. Bleeding or clear liquid drainage from your ears or nose.    Very sleepy (more than expected) or hard to wake up. Unusual sounds in the ear. Any new or increased symptoms. *If you are unable to contact someone at the office and your symptoms persist or increase, call 911 or go to the emergency department.

## 2023-01-29 NOTE — PROGRESS NOTES
Patients wife approached writer at nurses station, reporting that patient called her. Wife disclosed that the patient reported to her that he was \"hearing and seeing\" people in his room, pts wife went on to disclose that patient may have underlying PTSD [facility] [place] was also informed that the patient is a daily drinker , pts wife described him as \" functioning \" with his last drink on 1/25/23. Writer to bedside to assess patient. Patient appears diaphoretic and restless. Neuro assessment is unchanged at this assessment. Patient reports to writer that he is receiving to many medications and is unable to sleep because of the frequent vital sign checks. Writer explained to patient that due to his uncontrolled hypertension PRN medications are required followed by BP rechecks to evaluate the effectiveness of the medications given. Patient verbalizes understanding. Writer spoke with Dr Lawayne Kanner regarding new information. Orders received and writer informed that Dr Lawayne Kanner will be down to evaluate patient. Will continue to monitor.

## 2023-01-29 NOTE — PROGRESS NOTES
Daily Progress Note  Neuro Critical Care    Patient Name: Marie Montes De Oca  Patient : 1980  Room/Bed: 0130/0130-01  Code Status: Full Code  Allergies: No Known Allergies    CHIEF COMPLAINT:      Dizziness, right sided weakness, numbness     INTERVAL HISTORY    Initial Presentation (Admitted 23): The patient is a 43 y.o. male presented with dizziness, right-sided facial droop, right-sided weakness and right-sided numbness. Patient felt well when he went to bed last night, last known well 10 PM.  Medical history notable for hypertension, takes Coreg for this. Patient does not know his dose. EMS was called when patient woke up with these symptoms this morning. Systolic blood pressure was 200s on arrival to the ED. Stroke alert was called on arrival, CT of the head demonstrated 13 x 8 mm intraparenchymal hemorrhage of the posterior ramses. CTA of the head and neck showed focal mild narrowing of the right P1 PCA, no other significant stenosis, aneurysm, or arterial injury. Cardene was started for blood pressure control with goal blood pressure under 160. Patient's metabolic work-up unremarkable overall. Patient remains alert, oriented, able to provide history. He does describe double vision, sensation changes on the right and weakness. He was unable to walk when this happened. No other medical history. No known allergies. Patient does smoke. Hospital Course:   : Remained stable, requiring lower amounts of cardene    : Patient had headache overnight. Given 1 g of mag and Benadryl. Also had urinary retention and straight cath once x3. Mcmahan was placed. Remains on 2.5 mg/h of nicardipine this morning. Systolic pressures have been in the 130s to 150s, few in the 160s. Repeat CT this morning showed stable intraparenchymal hemorrhage without midline shift.   Plan to discontinue nicardipine today, continue lisinopril, use as needed labetalol and hydralazine as needed. : Had urinary retention and straight cath once. On lisinopril 10mg but BP still elevated, will increase. Pending MRI. Still unsteady without support. Last 24 hours:   Patient continues to be agitated, confused/disoriented, and hypertensive due to alcohol withdrawal. Lisinopril increased from 20 to 40 mg. Librium stopped and patient started on Luminal 260 mg once, then 130 mg bid, IV Thiamine 597 mg tid, folic acid, and Seroquel 25 mg nightly PRN. Transferred back to ICU for precedex drip.      CURRENT MEDICATIONS:  SCHEDULED MEDICATIONS:   sodium chloride flush  5-40 mL IntraVENous 2 times per day    [START ON 2023] lisinopril  40 mg Oral Daily    lisinopril  20 mg Oral Once    folic acid  1 mg Oral Daily    melatonin  5 mg Oral Nightly    thiamine (VITAMIN B1) IVPB  500 mg IntraVENous TID    Followed by    Abbie Amaya ON 2023] thiamine  100 mg Oral Daily    PHENobarbital  130 mg IntraVENous BID    tamsulosin  0.4 mg Oral Daily    enoxaparin  40 mg SubCUTAneous Daily    nicotine  1 patch TransDERmal Daily     CONTINUOUS INFUSIONS:   sodium chloride      dexmedetomidine 0.4 mcg/kg/hr (23 1327)     PRN MEDICATIONS:   labetalol, sodium chloride flush, sodium chloride, LORazepam **OR** LORazepam **OR** LORazepam **OR** LORazepam **OR** LORazepam **OR** LORazepam **OR** LORazepam **OR** LORazepam, diphenhydrAMINE, QUEtiapine, calcium carbonate, sodium chloride flush, hydrALAZINE, acetaminophen, ondansetron **OR** ondansetron, fentanNYL    VITALS:  Temperature Range: Temp: 97.9 °F (36.6 °C) Temp  Av.7 °F (36.5 °C)  Min: 97.4 °F (36.3 °C)  Max: 98.3 °F (36.8 °C)  BP Range: Systolic (01VYK), UXZ:009 , Min:132 , FXX:361     Diastolic (29HTS), EZB:412, Min:96, Max:208    Pulse Range: Pulse  Av.1  Min: 75  Max: 119  Respiration Range: Resp  Av.9  Min: 12  Max: 21  Current Pulse Ox: SpO2: 98 %  24HR Pulse Ox Range: SpO2  Av.5 %  Min: 95 %  Max: 98 %  Patient Vitals for the past 12 hrs:   BP Temp Temp src Pulse Resp SpO2   01/29/23 1316 (!) 132/96 97.9 °F (36.6 °C) Axillary (!) 108 21 --   01/29/23 1248 (!) 181/97 -- -- (!) 112 16 --   01/29/23 1218 (!) 151/107 -- -- (!) 101 18 --   01/29/23 1216 (!) 150/100 -- -- (!) 101 15 --   01/29/23 1215 (!) 237/208 -- -- (!) 105 18 --   01/29/23 1200 (!) 217/203 -- -- (!) 112 16 --   01/29/23 1145 (!) 171/110 97.6 °F (36.4 °C) Axillary (!) 115 17 98 %   01/29/23 1100 -- -- -- (!) 119 21 --   01/29/23 0933 -- -- -- (!) 113 -- --   01/29/23 0746 (!) 172/106 -- -- -- -- --   01/29/23 0705 (!) 158/101 98.3 °F (36.8 °C) Oral 83 16 98 %   01/29/23 0440 (!) 180/127 -- -- -- -- --   01/29/23 0309 (!) 182/123 97.6 °F (36.4 °C) Oral 94 18 95 %           RECENT LABS:   Lab Results   Component Value Date    WBC 7.9 01/29/2023    HGB 16.3 01/29/2023    HCT 48.6 01/29/2023     01/29/2023    ALT 60 (H) 01/26/2023    AST 54 (H) 01/26/2023     01/29/2023    K 3.5 (L) 01/29/2023     01/29/2023    CREATININE 0.57 (L) 01/29/2023    BUN 12 01/29/2023    CO2 21 01/29/2023    INR 1.1 01/26/2023    LABA1C 4.9 01/27/2023     24 HOUR INTAKE/OUTPUT:No intake or output data in the 24 hours ending 01/29/23 1453      Labs and Images reviewed with:  [] Dr. Juan Theodore. Kimmie    [x] Dr. Filippo Weems  [] Dr. Leon Zacarias  [] There are no new interval images to review. PHYSICAL EXAM       CONSTITUTIONAL:  Well developed, well nourished, alert and oriented x 3, in no acute distress. GCS 15. Nontoxic. Mild dysarthria. No aphasia.    HEAD:  normocephalic, atraumatic    EYES:  Right-sided ptosis, pupils equal and reactive, right gaze preference, unable to cross midline to the left Visual acuity intact, persistent nystagmus on the right   ENT:  moist mucous membranes   NECK:  supple, symmetric   LUNGS:  Equal air entry bilaterally   CARDIOVASCULAR:  normal s1 / s2, RRR, distal pulses intact   ABDOMEN:  Soft, no rigidity   NEUROLOGIC:  Mental Status:  A & O x3,awake Cranial Nerves:    II: Visual acuity:  normal  II: Visual fields:  normal -   III: Pupils:  equal, round, reactive to light  III,IV,VI: Extra Ocular Movements: abnormal unable to cross midline when looking to the left, right eye with fixed lateral gaze  V: Facial sensation:  intact  VII: Facial strength: abnormal right facial droop  VIII: Hearing:  intact  IX: Palate:  intact  XI: Shoulder shrug:  intact  XII: Tongue movement:  normal     Motor Exam:    Drift:  absent  Tone:  Spastic intermittently RUE     MOTOR:  RUE: 5/5  LUE: 5/5  RLE: 5/5  LLE: 5/5     Sensory:    Touch:    Right Upper Extremity:  abnormal - \"prickly sensation\"  Left Upper Extremity:  normal  Right Lower Extremity:  normal  Left Lower Extremity:  normal     Coordination/Dysmetria:     Finger to Nose:   Right:  abnormal - dysmetria  Left:  normal        DRAINS:  [x] There are no drains for Neuro Critical Care to monitor at this time. ASSESSMENT AND PLAN:       ASSESSMENT:      This is a 43 y.o. male presenting after waking up with right-sided weakness and numbness in addition to dizziness. 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 Past medical history notable for hypertension, on Coreg at home. Patient's blood pressure was around 393 systolic on arrival, Cardene initiated in the ER. No other metabolic abnormalities. Patient care will be discussed with attending, will reevaluate patient along with attending. 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   - Lisinopril increased from 20 to 40 mg.   - Librium stopped and patient started on Luminal 260 mg once, then 130 mg bid for alcohol withdrawal.   - Precedex drip for agitation   - start  IV Thiamine 500 mg tid for 9 doses, then 763 mg daily, folic acid 1 mg daily  - Seroquel 25 mg nightly PRN      CARDIOVASCULAR:  BP Range: Systolic (99LYT), TUL:749 , Min:132 , HSQ:889     Diastolic (41ONM), BEP:628, Min:96, Max:208    Pulse Range: Pulse  Av.1  Min: 75  Max: 119  - Goal SBP <160  - History of HTN  - Lisinopril started at 10, increased to 40 mg today   - use PRN labetalol and hydralazine    PULMONARY:  Respiration Range: Resp  Av.9  Min: 12  Max: 21  Current Pulse Ox: SpO2: 98 %  24HR Pulse Ox Range: SpO2  Av.5 %  Min: 95 %  Max: 98 %  - No active problems    RENAL/FLUID/ELECTROLYTE:  - I/O: In: -   Out: 1 [Urine:1]  - IVF: N/A, normal diet ordered  - start flomax 0.4mg daily    GI/NUTRITION:  NUTRITION:  ADULT DIET;  Regular  - Bowel regimen: Advance as needed  - GI prophylaxis: Not indicated    ID:  Tmax: Temp (24hrs), Av.7 °F (36.5 °C), Min:97.4 °F (36.3 °C), Max:98.3 °F (36.8 °C)    Temperature Range: Temp: 97.9 °F (36.6 °C) Temp  Av.7 °F (36.5 °C)  Min: 97.4 °F (36.3 °C)  Max: 98.3 °F (36.8 °C)  - WBC   Lab Results   Component Value Date    WBC 7.9 2023     - Antimicrobials:  not indicated     HEME:   Recent Labs     23  0611 23  0909   HGB 17.0 16.3     - Hgb stable  - No CBC today    ENDOCRINE:  - Continue to monitor blood glucose, goal <180  - glucose controlled - most recent BGL is   Recent Labs     23  0611 23  0909   GLUCOSE 99 109*         OTHER:  - PT/OT/ST   - Code Status: Full Code  - PMR consult for rehab needs    PROPHYLAXIS:  Stress ulcer: not indicated     DVT PROPHYLAXIS:  - SCD sleeves - Thigh High   - Hold AC/AP d/t ICH    DISPOSITION:  [] To remain ICU:  [x] OK for out of ICU from Neuro Critical Care standpoint: stepdown status    We will continue to follow along.     For any changes in exam or patient status please contact Neuro Critical Care.       Valente Khan MD  Neurology PGY-4  Neuro Critical Care  Pager [phone]  1/29/2023     2:53 PM

## 2023-01-30 LAB
ABSOLUTE EOS #: 0.06 K/UL (ref 0–0.44)
ABSOLUTE IMMATURE GRANULOCYTE: 0.06 K/UL (ref 0–0.3)
ABSOLUTE LYMPH #: 1.51 K/UL (ref 1.1–3.7)
ABSOLUTE MONO #: 0.97 K/UL (ref 0.1–1.2)
ANION GAP SERPL CALCULATED.3IONS-SCNC: 10 MMOL/L (ref 9–17)
BASOPHILS # BLD: 0 % (ref 0–2)
BASOPHILS ABSOLUTE: 0.05 K/UL (ref 0–0.2)
BUN BLDV-MCNC: 13 MG/DL (ref 6–20)
CALCIUM SERPL-MCNC: 10.8 MG/DL (ref 8.6–10.4)
CHLORIDE BLD-SCNC: 107 MMOL/L (ref 98–107)
CO2: 20 MMOL/L (ref 20–31)
CREAT SERPL-MCNC: 0.66 MG/DL (ref 0.7–1.2)
EOSINOPHILS RELATIVE PERCENT: 1 % (ref 1–4)
GFR SERPL CREATININE-BSD FRML MDRD: >60 ML/MIN/1.73M2
GLUCOSE BLD-MCNC: 91 MG/DL (ref 70–99)
HCT VFR BLD CALC: 46.2 % (ref 40.7–50.3)
HEMOGLOBIN: 15.9 G/DL (ref 13–17)
IMMATURE GRANULOCYTES: 1 %
LYMPHOCYTES # BLD: 13 % (ref 24–43)
MAGNESIUM: 1.8 MG/DL (ref 1.6–2.6)
MCH RBC QN AUTO: 32.3 PG (ref 25.2–33.5)
MCHC RBC AUTO-ENTMCNC: 34.4 G/DL (ref 28.4–34.8)
MCV RBC AUTO: 93.9 FL (ref 82.6–102.9)
MONOCYTES # BLD: 8 % (ref 3–12)
NRBC AUTOMATED: 0 PER 100 WBC
PDW BLD-RTO: 12 % (ref 11.8–14.4)
PLATELET # BLD: ABNORMAL K/UL (ref 138–453)
PLATELET, FLUORESCENCE: NORMAL K/UL (ref 138–453)
POTASSIUM SERPL-SCNC: 4 MMOL/L (ref 3.7–5.3)
RBC # BLD: 4.92 M/UL (ref 4.21–5.77)
REASON FOR REJECTION: NORMAL
SEG NEUTROPHILS: 77 % (ref 36–65)
SEGMENTED NEUTROPHILS ABSOLUTE COUNT: 8.89 K/UL (ref 1.5–8.1)
SODIUM BLD-SCNC: 137 MMOL/L (ref 135–144)
WBC # BLD: 11.5 K/UL (ref 3.5–11.3)
ZZ NTE CLEAN UP: ORDERED TEST: NORMAL
ZZ NTE WITH NAME CLEAN UP: SPECIMEN SOURCE: NORMAL

## 2023-01-30 PROCEDURE — 2580000003 HC RX 258: Performed by: PSYCHIATRY & NEUROLOGY

## 2023-01-30 PROCEDURE — 97116 GAIT TRAINING THERAPY: CPT

## 2023-01-30 PROCEDURE — 83735 ASSAY OF MAGNESIUM: CPT

## 2023-01-30 PROCEDURE — 2580000003 HC RX 258

## 2023-01-30 PROCEDURE — 6370000000 HC RX 637 (ALT 250 FOR IP): Performed by: STUDENT IN AN ORGANIZED HEALTH CARE EDUCATION/TRAINING PROGRAM

## 2023-01-30 PROCEDURE — 99254 IP/OBS CNSLTJ NEW/EST MOD 60: CPT | Performed by: PHYSICAL MEDICINE & REHABILITATION

## 2023-01-30 PROCEDURE — 85025 COMPLETE CBC W/AUTO DIFF WBC: CPT

## 2023-01-30 PROCEDURE — 6360000002 HC RX W HCPCS: Performed by: PSYCHIATRY & NEUROLOGY

## 2023-01-30 PROCEDURE — 99232 SBSQ HOSP IP/OBS MODERATE 35: CPT | Performed by: PSYCHIATRY & NEUROLOGY

## 2023-01-30 PROCEDURE — 92507 TX SP LANG VOICE COMM INDIV: CPT

## 2023-01-30 PROCEDURE — 2500000003 HC RX 250 WO HCPCS

## 2023-01-30 PROCEDURE — 2580000003 HC RX 258: Performed by: NURSE PRACTITIONER

## 2023-01-30 PROCEDURE — 97535 SELF CARE MNGMENT TRAINING: CPT

## 2023-01-30 PROCEDURE — 2500000003 HC RX 250 WO HCPCS: Performed by: PSYCHIATRY & NEUROLOGY

## 2023-01-30 PROCEDURE — 6360000002 HC RX W HCPCS: Performed by: STUDENT IN AN ORGANIZED HEALTH CARE EDUCATION/TRAINING PROGRAM

## 2023-01-30 PROCEDURE — 97530 THERAPEUTIC ACTIVITIES: CPT

## 2023-01-30 PROCEDURE — 36415 COLL VENOUS BLD VENIPUNCTURE: CPT

## 2023-01-30 PROCEDURE — 93005 ELECTROCARDIOGRAM TRACING: CPT | Performed by: NURSE PRACTITIONER

## 2023-01-30 PROCEDURE — 2000000000 HC ICU R&B

## 2023-01-30 PROCEDURE — 97129 THER IVNTJ 1ST 15 MIN: CPT

## 2023-01-30 PROCEDURE — 6370000000 HC RX 637 (ALT 250 FOR IP): Performed by: FAMILY MEDICINE

## 2023-01-30 PROCEDURE — 6370000000 HC RX 637 (ALT 250 FOR IP): Performed by: PSYCHIATRY & NEUROLOGY

## 2023-01-30 PROCEDURE — 80048 BASIC METABOLIC PNL TOTAL CA: CPT

## 2023-01-30 PROCEDURE — 85055 RETICULATED PLATELET ASSAY: CPT

## 2023-01-30 PROCEDURE — APPNB60 APP NON BILLABLE TIME 46-60 MINS: Performed by: NURSE PRACTITIONER

## 2023-01-30 PROCEDURE — 51798 US URINE CAPACITY MEASURE: CPT

## 2023-01-30 RX ORDER — 0.9 % SODIUM CHLORIDE 0.9 %
250 INTRAVENOUS SOLUTION INTRAVENOUS ONCE
Status: COMPLETED | OUTPATIENT
Start: 2023-01-30 | End: 2023-01-30

## 2023-01-30 RX ADMIN — ENOXAPARIN SODIUM 40 MG: 100 INJECTION SUBCUTANEOUS at 08:11

## 2023-01-30 RX ADMIN — LABETALOL HYDROCHLORIDE 20 MG: 5 INJECTION, SOLUTION INTRAVENOUS at 04:05

## 2023-01-30 RX ADMIN — THIAMINE HYDROCHLORIDE 500 MG: 100 INJECTION, SOLUTION INTRAMUSCULAR; INTRAVENOUS at 21:09

## 2023-01-30 RX ADMIN — SODIUM CHLORIDE: 9 INJECTION, SOLUTION INTRAVENOUS at 10:55

## 2023-01-30 RX ADMIN — DEXMEDETOMIDINE HYDROCHLORIDE 0.8 MCG/KG/HR: 4 INJECTION, SOLUTION INTRAVENOUS at 03:35

## 2023-01-30 RX ADMIN — THIAMINE HYDROCHLORIDE 500 MG: 100 INJECTION, SOLUTION INTRAMUSCULAR; INTRAVENOUS at 08:02

## 2023-01-30 RX ADMIN — HYDRALAZINE HYDROCHLORIDE 10 MG: 20 INJECTION INTRAMUSCULAR; INTRAVENOUS at 07:09

## 2023-01-30 RX ADMIN — TAMSULOSIN HYDROCHLORIDE 0.4 MG: 0.4 CAPSULE ORAL at 09:24

## 2023-01-30 RX ADMIN — FOLIC ACID 1 MG: 1 TABLET ORAL at 09:24

## 2023-01-30 RX ADMIN — THIAMINE HYDROCHLORIDE 500 MG: 100 INJECTION, SOLUTION INTRAMUSCULAR; INTRAVENOUS at 17:22

## 2023-01-30 RX ADMIN — ANTACID TABLETS 500 MG: 500 TABLET, CHEWABLE ORAL at 19:26

## 2023-01-30 RX ADMIN — ANTACID TABLETS 500 MG: 500 TABLET, CHEWABLE ORAL at 22:02

## 2023-01-30 RX ADMIN — HYDRALAZINE HYDROCHLORIDE 10 MG: 20 INJECTION INTRAMUSCULAR; INTRAVENOUS at 05:03

## 2023-01-30 RX ADMIN — LISINOPRIL 40 MG: 20 TABLET ORAL at 09:24

## 2023-01-30 RX ADMIN — SODIUM CHLORIDE 250 ML: 0.9 INJECTION, SOLUTION INTRAVENOUS at 18:30

## 2023-01-30 RX ADMIN — SODIUM CHLORIDE, PRESERVATIVE FREE 10 ML: 5 INJECTION INTRAVENOUS at 21:06

## 2023-01-30 RX ADMIN — SODIUM CHLORIDE, PRESERVATIVE FREE 10 ML: 5 INJECTION INTRAVENOUS at 08:06

## 2023-01-30 RX ADMIN — PHENOBARBITAL SODIUM 130 MG: 130 INJECTION INTRAMUSCULAR; INTRAVENOUS at 21:06

## 2023-01-30 RX ADMIN — Medication 5 MG: at 21:06

## 2023-01-30 RX ADMIN — HYDRALAZINE HYDROCHLORIDE 10 MG: 20 INJECTION INTRAMUSCULAR; INTRAVENOUS at 22:01

## 2023-01-30 RX ADMIN — PHENOBARBITAL SODIUM 130 MG: 130 INJECTION INTRAMUSCULAR; INTRAVENOUS at 08:06

## 2023-01-30 ASSESSMENT — PAIN SCALES - GENERAL: PAINLEVEL_OUTOF10: 0

## 2023-01-30 NOTE — PLAN OF CARE
Problem: Discharge Planning  Goal: Discharge to home or other facility with appropriate resources  1/30/2023 1703 by Papi Hamm RN  Outcome: Progressing  1/30/2023 1703 by Papi Hamm RN  Outcome: Progressing  1/30/2023 0328 by Nereida Toure RN  Outcome: Progressing  Flowsheets (Taken 1/29/2023 1600 by Idalia Alvarez RN)  Discharge to home or other facility with appropriate resources:   Identify barriers to discharge with patient and caregiver   Arrange for needed discharge resources and transportation as appropriate   Identify discharge learning needs (meds, wound care, etc)     Problem: Safety - Adult  Goal: Free from fall injury  1/30/2023 1703 by Papi Hamm RN  Outcome: Progressing  1/30/2023 1703 by Papi Hamm RN  Outcome: Progressing  1/30/2023 0328 by Nereida Toure RN  Outcome: Progressing     Problem: ABCDS Injury Assessment  Goal: Absence of physical injury  1/30/2023 1703 by Papi Hamm RN  Outcome: Progressing  1/30/2023 1703 by Papi Hamm RN  Outcome: Progressing  1/30/2023 0328 by Nereida Toure RN  Outcome: Progressing

## 2023-01-30 NOTE — PROGRESS NOTES
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 Seen for O/M treatment program for dysarthria. Pt. Completed O/M exercises X 10 X 3 sets with min verbal and visual cues. Education provided re: compensatory strategies to increase speech intelligibility/clarity. Pt. Verbalized understanding. Exercise program left at bedside. Plan:  [x] Continue  services    [] Discharge from :      Discharge recommendations: []  Further therapy recommended at discharge. The patient should be able to tolerate at least 3 hours of therapy per day over 5 days or 15 hours over 7 days. [x] Further therapy recommended at discharge. [] No therapy recommended at discharge. Treatment completed by: Completed by:  Lea Cardona  Clinician    Cosigned By: Pato NICHOLSON CCC/SLP

## 2023-01-30 NOTE — CARE COORDINATION
JUAN M spoke with patient and his wife Ryan Chavira to discuss transitional planning. IPR and SNF options discussed. Patient is requesting that IPR referral be sent to [facility]. Referral sent and CM left message requesting call back for [facility] with [facility].

## 2023-01-30 NOTE — PLAN OF CARE
Problem: Discharge Planning  Goal: Discharge to home or other facility with appropriate resources  Outcome: Progressing  Flowsheets (Taken 1/29/2023 1600 by Bushra Cardenas RN)  Discharge to home or other facility with appropriate resources:   Identify barriers to discharge with patient and caregiver   Arrange for needed discharge resources and transportation as appropriate   Identify discharge learning needs (meds, wound care, etc)     Problem: Safety - Adult  Goal: Free from fall injury  Outcome: Progressing     Problem: ABCDS Injury Assessment  Goal: Absence of physical injury  Outcome: Progressing

## 2023-01-30 NOTE — CARE COORDINATION
Consult for consideration of rehab/PHQ-9  Spoke with RN, pt is still agitated and confused. When pt is appropriate SW will complete assessments. Following.

## 2023-01-30 NOTE — CONSULTS
Physical Medicine & Rehabilitation  Consult Note      Admitting Physician:   Ghada Rios MD    Primary Care Provider:   Mindy Redman DO     Reason for Consult:  Acute Inpatient Rehabilitation    Chief Complaint: Double vision, R sided weakness    History of Present Illness:  Referring Provider is requesting an evaluation for appropriate placement upon discharge from acute care. History from chart review and patient's wife. Saintclair Michaels is a 43 y.o. RHD male admitted to [facility] on 1/26/2023. Patient with R weakness, numbness and facial droop who fell when he went to bed then awoke with the symptoms. SBP was in 200s upon arrival to ED. CT confirmed hemorrhagic CVA posterior ramses and narrowing R P1 PCA. He was treated with Cardene for BP control. Neurosurgery managing conservatively. Neuro ICU starting CIWA protocol with Precedex and phenobarbital. Has prn seroquel for agitation/sleep. Review of Systems:  NICOLE due to drowsiness       Premorbid function:  Independent    Current function:  Restrictions/ Precautions-  Restrictions/Precautions  Restrictions/Precautions: General Precautions, Seizure  Required Braces or Orthoses?: No    PT:    Bed mobility  Bed Mobility Comments: Pt began today's session seated, retired sitting at the EOB at the conclusion of today's session. Transfers  Sit to Stand: Moderate Assistance  Stand to Sit: Minimal Assistance  Comment: Transfers performed 4x this date. verbal cues for hand placement. Ambulation  Surface: Level tile  Device: Rolling Walker  Assistance: Moderate assistance  Quality of Gait: Very unsteady, R lateral lean, inaccurate step placement, 5x LOB requiring mod-A to prevent a fall. Gait Deviations: Slow Tess;Deviated path;Staggers  Distance: 8 feet, seated rest break, 3 feet R/L at the EOB. 5 feet forward/retro.   More Ambulation?: No  Stairs/Curb  Stairs?: No  Balance  Posture: Fair  Sitting - Static: Fair;+  Sitting - Dynamic: Fair  Standing - Static: Poor  Standing - Dynamic: Poor  Comments: Standing Balance assessed w/ RW    OT:   ADLS-   ADL  Feeding: Modified independent   Grooming: Stand by assistance  UE Bathing: Stand by assistance  LE Bathing: Contact guard assistance  UE Dressing: Stand by assistance  LE Dressing: Contact guard assistance  Toileting: Contact guard assistance  Additional Comments: OT facilitated pt in donning B socks while sitting on EOB. pt required increased time to complete d/t decreased coordination. pt provided with CGA d/t slight unsteadiness with dynamic sitting while bringing foot to knee     SLP:  Subjective: [x] Alert     [x] Cooperative     [] Confused     [] Agitated    [] Lethargic        Objective/Assessment:     Recall: Functional Memory: Paragraph Facts:    Mental Manipulation: Rankin/6 increased to 6/6 with 2 repetitions and min verbal cues  Word List Retention: Recall by Attribute: 3/5 increased to 4/5 with two repetitions   Chaining Word Lists: Varied: 2/3 increased to 3/3 with min verbal cues        Problem Solving/Reasoning: Add to the Category: Abstract:  increased to  with min to mod verbal cues  Word Category: [place]: 6/7  Word Deduction: 2/6 increased to 4/6 with min verbal cues     Other:      Pt. Seen for O/M treatment program for dysarthria. Pt. Completed O/M exercises X 10 X 3 sets with min visual and verbal cues. Education provided re: compensatory strategies to increase speech intelligibility/clarity. Pt. Verbalized understanding. Exercise program left at bedside. Past Medical History:        Diagnosis Date    History of heartburn     takes omeprazole    Neoplasm of unspecified nature of bone, soft tissue, and skin -present    lesions of right cheek x 2    Research study patient 2023    [id number]-002.  Date of completion 23       Past Surgical History:        Procedure Laterality Date    APPENDECTOMY  in 's    PRE-MALIGNANT / BENIGN SKIN LESION EXCISION Right 9/29/2014    Excision lesion of right cheek. Dr. Magalis Frias.        Allergies:    No Known Allergies     Current Medications:   Current Facility-Administered Medications: labetalol (NORMODYNE;TRANDATE) injection 20 mg, 20 mg, IntraVENous, Q4H PRN  sodium chloride flush 0.9 % injection 5-40 mL, 5-40 mL, IntraVENous, 2 times per day  sodium chloride flush 0.9 % injection 5-40 mL, 5-40 mL, IntraVENous, PRN  0.9 % sodium chloride infusion, , IntraVENous, PRN  LORazepam (ATIVAN) tablet 1 mg, 1 mg, Oral, Q1H PRN **OR** LORazepam (ATIVAN) injection 1 mg, 1 mg, IntraVENous, Q1H PRN **OR** LORazepam (ATIVAN) tablet 2 mg, 2 mg, Oral, Q1H PRN **OR** LORazepam (ATIVAN) injection 2 mg, 2 mg, IntraVENous, Q1H PRN **OR** LORazepam (ATIVAN) tablet 3 mg, 3 mg, Oral, Q1H PRN **OR** LORazepam (ATIVAN) injection 3 mg, 3 mg, IntraVENous, Q1H PRN **OR** LORazepam (ATIVAN) tablet 4 mg, 4 mg, Oral, Q1H PRN **OR** LORazepam (ATIVAN) injection 4 mg, 4 mg, IntraVENous, Q1H PRN  lisinopril (PRINIVIL;ZESTRIL) tablet 40 mg, 40 mg, Oral, Daily  lisinopril (PRINIVIL;ZESTRIL) tablet 20 mg, 20 mg, Oral, Once  QUEtiapine (SEROQUEL) tablet 25 mg, 25 mg, Oral, Nightly PRN  folic acid (FOLVITE) tablet 1 mg, 1 mg, Oral, Daily  calcium carbonate (TUMS) chewable tablet 500 mg, 500 mg, Oral, TID PRN  dexmedetomidine (PRECEDEX) 400 mcg in sodium chloride 0.9 % 100 mL infusion, 0.1-0.6 mcg/kg/hr, IntraVENous, Continuous  melatonin tablet 5 mg, 5 mg, Oral, Nightly  thiamine (B-1) 500 mg in sodium chloride 0.9 % 100 mL IVPB, 500 mg, IntraVENous, TID **FOLLOWED BY** [START ON 2/2/2023] thiamine tablet 100 mg, 100 mg, Oral, Daily  [COMPLETED] PHENobarbital (LUMINAL) injection 260 mg, 260 mg, IntraVENous, Once **FOLLOWED BY** PHENobarbital (LUMINAL) injection 130 mg, 130 mg, IntraVENous, BID  tamsulosin (FLOMAX) capsule 0.4 mg, 0.4 mg, Oral, Daily  sodium chloride flush 0.9 % injection 10 mL, 10 mL, IntraVENous, PRN  enoxaparin (LOVENOX) injection 40 mg, 40 mg, SubCUTAneous, Daily  hydrALAZINE (APRESOLINE) injection 10 mg, 10 mg, IntraVENous, Q1H PRN  acetaminophen (TYLENOL) tablet 650 mg, 650 mg, Oral, Q4H PRN  ondansetron (ZOFRAN-ODT) disintegrating tablet 4 mg, 4 mg, Oral, Q8H PRN **OR** ondansetron (ZOFRAN) injection 4 mg, 4 mg, IntraVENous, Q6H PRN  nicotine (NICODERM CQ) 14 MG/24HR 1 patch, 1 patch, TransDERmal, Daily    Social History:  Lives With: Spouse (14-10 year old children occasionally  Simultaneous filing. User may not have seen previous data.)  Type of Home: House  Home Layout: Two level, Bed/Bath upstairs, 1/2 bath on main level  Home Access: Stairs to enter with rails  Entrance Stairs - Number of Steps: 1-2  Entrance Stairs - Rails: Right  Bathroom Shower/Tub: Tub/Shower unit  Bathroom Toilet: Standard  Home Equipment: Cane (pt reported no use of DME at baseline)  ADL Assistance: [address] [place] [address] Avenue: Independent  Homemaking Responsibilities: Yes  Meal Prep Responsibility: Primary  Laundry Responsibility: Primary  Cleaning Responsibility: Primary  Ambulation Assistance: Independent  Transfer Assistance: Independent  Active : Yes  Mode of Transportation: Truck  Occupation: Full time employment  Type of Occupation: operations  Leisure & Hobbies: watch history channel, fishing  Additional Comments: pt reported wife able to provide 24/ assist  Social History     Socioeconomic History    Marital status:    Tobacco Use    Smoking status: Every Day     Packs/day: 1.00     Years: 10.00     Pack years: 10.00     Types: Cigarettes     Last attempt to quit: 2013     Years since quittin.5    Smokeless tobacco: Never   Substance and Sexual Activity    Alcohol use: Yes     Alcohol/week: 24.0 standard drinks     Types: 24 Cans of beer per week    Drug use: No       Family History:   No family history on file.     Diagnostics:    CBC:   Recent Labs     23  0909 23  0736   WBC 7.9 11.5*   RBC 5.07 4.92   HGB 16.3 15.9 HCT 48.6 46.2   MCV 95.9 93.9   RDW 12.1 12.0    See Reflexed IPF Result     BMP:    Recent Labs     01/29/23  0909 01/30/23  0908   GLUCOSE 109* 91   BUN 12 13   CREATININE 0.57* 0.66*   CALCIUM 11.6* 10.8*    137   K 3.5* 4.0    107   CO2 21 20   ANIONGAP 13 10   LABGLOM >60 >60     HbA1c:   Lab Results   Component Value Date    LABA1C 4.9 01/27/2023     BNP: No results for input(s): BNP in the last 72 hours. PT/INR: No results for input(s): PROTIME, INR in the last 72 hours. APTT: No results for input(s): APTT in the last 72 hours. CARDIAC ENZYMES: No results for input(s): CKMB, CKMBINDEX, TROPONINT, TROPHS, TROPII in the last 72 hours. Invalid input(s): CKTOTAL;3   FASTING LIPID PANEL:No results found for: CHOL, HDL, TRIG  LIVER PROFILE: No results for input(s): AST, ALT, ALB, BILIDIR, BILITOT, ALKPHOS in the last 72 hours. Radiology:    CT HEAD WO CONTRAST    Result Date: 1/30/2023  EXAMINATION: CT OF THE HEAD WITHOUT CONTRAST  1/29/2023 3:02 pm TECHNIQUE: CT of the head was performed without the administration of intravenous contrast. Automated exposure control, iterative reconstruction, and/or weight based adjustment of the mA/kV was utilized to reduce the radiation dose to as low as reasonably achievable. COMPARISON: 1/27/2023 HISTORY: ORDERING SYSTEM PROVIDED HISTORY: Assess for extension of bleed TECHNOLOGIST PROVIDED HISTORY: Assess for extension of bleed Reason for Exam: Assess for extension of bleed FINDINGS: BRAIN/VENTRICLES: Acute left dorsal pontine intraparenchymal hemorrhage is unchanged. No new hemorrhage is identified. There is no herniation or hydrocephalus. ORBITS: The visualized portion of the orbits demonstrate no acute abnormality. SINUSES: The visualized paranasal sinuses and mastoid air cells demonstrate no acute abnormality. SOFT TISSUES/SKULL:  No acute abnormality of the visualized skull or soft tissues. Unchanged pontine hemorrhage.  No intraventricular extension. CT head without contrast    Result Date: 1/27/2023  EXAMINATION: CT OF THE HEAD WITHOUT CONTRAST  1/27/2023 5:37 am TECHNIQUE: CT of the head was performed without the administration of intravenous contrast. Automated exposure control, iterative reconstruction, and/or weight based adjustment of the mA/kV was utilized to reduce the radiation dose to as low as reasonably achievable. COMPARISON: CT head, CTA head 01/26/2023 HISTORY: ORDERING SYSTEM PROVIDED HISTORY: Intracerebral Hemorrhage TECHNOLOGIST PROVIDED HISTORY: Intracerebral Hemorrhage Decision Support Exception - unselect if not a suspected or confirmed emergency medical condition->Emergency Medical Condition (MA) Reason for Exam: Intracerebral Hemorrhage FINDINGS: BRAIN/VENTRICLES: Stable intraparenchymal hemorrhage centered in the ramses, 13 x 8 mm (2:18). The gray-white differentiation is maintained without evidence of an acute infarct. There is no evidence of hydrocephalus. ORBITS: The visualized portion of the orbits demonstrate no acute abnormality. SINUSES: The visualized paranasal sinuses and mastoid air cells demonstrate no acute abnormality. SOFT TISSUES/SKULL:  No acute abnormality of the visualized skull or soft tissues. Stable intraparenchymal hemorrhage. No midline shift. CT HEAD WO CONTRAST    Result Date: 1/26/2023  EXAMINATION: CT OF THE HEAD WITHOUT CONTRAST  1/26/2023 6:09 am TECHNIQUE: CT of the head was performed without the administration of intravenous contrast. Automated exposure control, iterative reconstruction, and/or weight based adjustment of the mA/kV was utilized to reduce the radiation dose to as low as reasonably achievable. COMPARISON: None.  HISTORY: ORDERING SYSTEM PROVIDED HISTORY: L facial droop, L weakness, N/V, htn TECHNOLOGIST PROVIDED HISTORY: L facial droop, L weakness, N/V, htn Decision Support Exception - unselect if not a suspected or confirmed emergency medical condition->Emergency Medical Condition (MA) Reason for Exam: L facial droop, L weakness, N/V, htn FINDINGS: BRAIN/VENTRICLES: 13 x 8 mm intraparenchymal hemorrhage posterior ramses (2:23). There is no midline shift. No abnormal extra-axial fluid collection. The gray-white differentiation is maintained without evidence of an acute infarct. There is no evidence of hydrocephalus. ORBITS: The visualized portion of the orbits demonstrate no acute abnormality. SINUSES: The visualized paranasal sinuses and mastoid air cells demonstrate no acute abnormality. SOFT TISSUES/SKULL:  No acute abnormality of the visualized skull or soft tissues. 13 x 8 mm intraparenchymal hemorrhage posterior ramses. Critical results were called by Dr. Jayme Camacho to Dr. Jeremiah Figueroa on 1/26/2023 at 06:37. CTA HEAD NECK W CONTRAST    Result Date: 1/26/2023  EXAMINATION: CTA OF THE HEAD AND NECK WITH CONTRAST 1/26/2023 6:09 am: TECHNIQUE: CTA of the head and neck was performed with the administration of intravenous contrast. Multiplanar reformatted images are provided for review. MIP images are provided for review. Stenosis of the internal carotid arteries measured using NASCET criteria. Automated exposure control, iterative reconstruction, and/or weight based adjustment of the mA/kV was utilized to reduce the radiation dose to as low as reasonably achievable. COMPARISON: None. HISTORY: ORDERING SYSTEM PROVIDED HISTORY: L facial droop, L weakness, N/V, htn TECHNOLOGIST PROVIDED HISTORY: L facial droop, L weakness, N/V, htn Decision Support Exception - unselect if not a suspected or confirmed emergency medical condition->Emergency Medical Condition (MA) Reason for Exam: L facial droop, L weakness, N/V, htn FINDINGS: CTA NECK: AORTIC ARCH/ARCH VESSELS: No dissection or arterial injury. No significant stenosis of the brachiocephalic or subclavian arteries.  CAROTID ARTERIES: No dissection, arterial injury, or hemodynamically significant stenosis by NASCET criteria. VERTEBRAL ARTERIES: No dissection, arterial injury, or significant stenosis. Left vertebral artery dominant. SOFT TISSUES: The lung apices are clear. No cervical or superior mediastinal lymphadenopathy. The larynx and pharynx are unremarkable. No acute abnormality of the salivary and thyroid glands. BONES: No acute osseous abnormality. CTA HEAD: ANTERIOR CIRCULATION: No significant stenosis of the intracranial internal carotid, anterior cerebral, or middle cerebral arteries. No aneurysm. POSTERIOR CIRCULATION: Focal mild narrowing right P1 (series 4, image 182) no significant stenosis of the vertebral, basilar, or posterior cerebral arteries. No aneurysm. The right vertebral artery terminates in the right PICA, a normal variant. OTHER: No dural venous sinus thrombosis on this non-dedicated study. BRAIN: Pontine intraparenchymal hemorrhage is redemonstrated. No mass effect or midline shift. No extra-axial fluid collection. The gray-white differentiation is maintained. Focal mild narrowing of the right P1 PCA. Otherwise no significant stenosis, aneurysm, or arterial injury in the CTA of the head and neck. Pontine intraparenchymal hemorrhage is redemonstrated. MRI BRAIN W WO CONTRAST    Result Date: 1/30/2023  EXAMINATION: MRI OF THE BRAIN WITHOUT AND WITH CONTRAST  1/28/2023 11:42 am TECHNIQUE: Multiplanar multisequence MRI of the head/brain was performed without and with the administration of intravenous contrast. COMPARISON: CT head from 01/27/2023 HISTORY: ORDERING SYSTEM PROVIDED HISTORY: r/o underlying mass TECHNOLOGIST PROVIDED HISTORY: r/o underlying mass What is the sedation requirement?->None Reason for Exam: r/o underlying mass Additional signs and symptoms: Pontine hemorrhage FINDINGS: INTRACRANIAL STRUCTURES/VENTRICLES:  13 mm left dorsal pontine intraparenchymal hematoma is unchanged in size. There is mild mass effect on the fourth ventricle.   There is mild surrounding edema. No new acute hemorrhage is identified. No herniation or hydrocephalus. There are approximately 10 additional punctate foci of susceptibility artifact in the ventral ramses which may represent chronic hemosiderin deposition. There is a single punctate focus in the left putamen. There are no cortical or cerebral hemisphere foci. There are minimal periventricular T2 hyperintense white matter lesions. There is a lacune in the right frontal periventricular white matter and a possible lacune in the left thalamus. No abnormal intracranial enhancement. No brain mass. ORBITS: The visualized portion of the orbits demonstrate no acute abnormality. SINUSES: The visualized paranasal sinuses and mastoid air cells demonstrate no acute abnormality. BONES/SOFT TISSUES: The bone marrow signal intensity appears normal. The soft tissues demonstrate no acute abnormality. Chronic pontine microhemorrhages in addition to the acute hematoma suggest hypertensive hemorrhage. No brain mass. Physical Exam:  BP (!) 140/83   Pulse 88   Temp 98.7 °F (37.1 °C) (Axillary)   Resp 17   Ht 6' (1.829 m)   Wt 200 lb (90.7 kg)   SpO2 93%   BMI 27.12 kg/m²     GEN: Well developed, well nourished, no acute distress. HEENT: NCAT, PERRL, EOMI, mucous membranes pink and moist.  RESP: Lungs clear to auscultation. No rales or rhonchi. Respirations WNL and unlabored. CV: Regular rate rhythm. No murmurs, rubs, or gallops. ABD: soft, non-distended, non-tender. BS+ and equal.  NEURO: Drowsy but interactive and oriented to person. Sensation intact to light touch. R CN III palsy and L CN VI palsy. MSK: Functional ROM. Muscle tone and bulk are normal bilaterally. Strength  5/5 key muscles LUE and LLE. 4/5 key muscles RUE and RLE.   EXT: No calf tenderness to palpation or edema BLEs. SKIN: Warm dry and intact with good turgor. PSYCH: Mood WNL. Affect WNL. Appropriately interactive.     Impression:  Hemorrhagic CVA with R dominant hemiparesis  Alcohol withdrawal : on CIWA protocol (precedex, lorazepam, phenobarbital). Thiamine, folate  HTN  Insomnia  Nicotine dependence    Recommendations:    Diagnosis:  Hemorrhagic CVA with R dominant hemiparesis  Therapy: Has PT/OT/SLP needs  Medical Necessity: As above  Support: Supportive spouse  Rehab Recommendation: The patient will benefit from acute inpatient rehabilitation once medically stable per primary and consulting services. Anticipate he will be able to tolerate 3 hours of therapy per day or 900 minutes per week in rehabilitation. The patient requires multidisciplinary rehabilitation treatment including medical management by a PM&R physician, 24 hour rehabilitation nursing, Physical/Occupational therapy, speech therapy, rehabilitation social work, and nutrition services. Patient and family also require education in post-hospital precautions and home exercise routine, adaptive techniques and deficit compensation strategies, strengthening and conditioning, equipment prescription and instructions in use. Would need to be medically stable after CIWA protocol prior to admitting to acute rehab. DVT Prophylaxis: on Lovenox    It was my pleasure to evaluate [employer] today. Please call with questions. Elias Arguelles MD        This note is created with the assistance of a speech recognition program.  While intending to generate a document that actually reflects the content of the visit, the document can still have some errors including those of syntax and sound a like substitutions which may escape proof reading. In such instances, actual meaning can be extrapolated by contextual diversion.

## 2023-01-30 NOTE — RESEARCH
Clinical Research Services  1/28/2022    Subject: 01A- [address] evaluated via chart review follow up for [employer] AB-PSP-002 A Multicenter, Prospective, Tbmhrwflf-llszxz-desusye Observational Study Evaluating Immunoassay Measurements of Pancreatic Stone Protein Performed on [employer]'s abioSCOPE Device with the PSP Assay on ICU Patients at Risk of Sepsis as an Aid in Identifying Sepsis. IRB #  [id number]                     NCT # [id number]    Day 3 assessments completed as applicable for FT-FBD-750    SOFA score: 6     Protocol appendix 1 Chad Veliz 2016) clinical definition met? No .      Protocol defined events to report? No      Protocol defined concomitant medications reviewed and updated as appropriate. Applicable laboratory results available for this timepoint transmitted to PI for review, clinical research will follow up as directed by PI. Please reach out to the primary investigator Dr. Cristina Easley via perfect serve or clinical research at [phone] with any questions or concerns regarding this research or [facility] participation.         Michael Ruiz, RN  Clinical Research Nurse  Infectious Disease Research  [address] [place] [address], [facility] Elliot Hinson  P: [phone]  F: [phone]

## 2023-01-30 NOTE — PROGRESS NOTES
Daily Progress Note  Neuro Critical Care    Patient Name: Saintclair Michaels  Patient : 1980  Room/Bed: 0130/0130-01  Code Status: Full Code  Allergies: No Known Allergies    CHIEF COMPLAINT:     Confusion     INTERVAL HISTORY    Initial Presentation (Admitted 23): The patient is a 43 y.o. male presented with dizziness, right-sided facial droop, right-sided weakness and right-sided numbness. Patient felt well when he went to bed last night, last known well 10 PM.  Medical history notable for hypertension, takes Coreg for this. Patient does not know his dose. EMS was called when patient woke up with these symptoms this morning. Systolic blood pressure was 200s on arrival to the ED. Stroke alert was called on arrival, CT of the head demonstrated 13 x 8 mm intraparenchymal hemorrhage of the posterior ramses. CTA of the head and neck showed focal mild narrowing of the right P1 PCA, no other significant stenosis, aneurysm, or arterial injury. Cardene was started for blood pressure control with goal blood pressure under 160. Patient's metabolic work-up unremarkable overall. Patient remains alert, oriented, able to provide history. He does describe double vision, sensation changes on the right and weakness. He was unable to walk when this happened. No other medical history. No known allergies. Patient does smoke. Hospital Course:   : Remained stable, requiring lower amounts of cardene  : Patient had headache overnight. Given 1 g of mag and Benadryl. Also had urinary retention and straight cath once x3. Mcmahan was placed. Remains on 2.5 mg/h of nicardipine this morning. Systolic pressures have been in the 130s to 150s, few in the 160s. Repeat CT this morning showed stable intraparenchymal hemorrhage without midline shift. Plan to discontinue nicardipine today, continue lisinopril, use as needed labetalol and hydralazine as needed. :   Had urinary retention and straight cath once. On lisinopril 10mg but BP still elevated, will increase. Pending MRI. Still unsteady without support. 1/29: Patient became agitated, confused and started having hallucinations. His wife reported daily ETOH use; ~2 vodka drinks per night. Repeat CT Head stable. Started on Luminal 260 mg once, then 130 mg bid, IV Thiamine 094 mg tid, folic acid, and Seroquel 25 mg nightly PRN. Hypertensive; Lisinopril increased to 40mg daily. Transferred back to ICU for precedex drip. Last 24h:  Recurrent urinary retention overnight requiring straight cath x2. Patient is already on Flomax daily. Continues to be intermittently hypertensive, required a couple of doses of PRN Hydralazine/Labetalol overnight for SBP>160. Patient did not receive the additional dose of Lisinopril yesterday, will re-evaluated BP trend post 40mg daily this AM.  Consider adding Nifedipine in BP continues to be elevated. Patient on Precedex 0.8mg/kg/min this AM on initial exam this AM.  Precedex was able to be weaned off later this morning. Continue schedule Phenobarbital and PRN Ativan via CIWA scale. Tentative plan to start Phenobarb wean 1/31.       CURRENT MEDICATIONS:  SCHEDULED MEDICATIONS:   sodium chloride flush  5-40 mL IntraVENous 2 times per day    lisinopril  40 mg Oral Daily    lisinopril  20 mg Oral Once    folic acid  1 mg Oral Daily    melatonin  5 mg Oral Nightly    thiamine (VITAMIN B1) IVPB  500 mg IntraVENous TID    Followed by    Phil Bernal ON 2/2/2023] thiamine  100 mg Oral Daily    PHENobarbital  130 mg IntraVENous BID    tamsulosin  0.4 mg Oral Daily    enoxaparin  40 mg SubCUTAneous Daily    nicotine  1 patch TransDERmal Daily     CONTINUOUS INFUSIONS:   sodium chloride      dexmedetomidine 0.6 mcg/kg/hr (01/30/23 0654)     PRN MEDICATIONS:   labetalol, sodium chloride flush, sodium chloride, LORazepam **OR** LORazepam **OR** LORazepam **OR** LORazepam **OR** LORazepam **OR** LORazepam **OR** LORazepam **OR** LORazepam, QUEtiapine, calcium carbonate, sodium chloride flush, hydrALAZINE, acetaminophen, ondansetron **OR** ondansetron    VITALS:  Temperature Range: Temp: 98.4 °F (36.9 °C) Temp  Av.1 °F (36.7 °C)  Min: 97.6 °F (36.4 °C)  Max: 98.5 °F (36.9 °C)  BP Range: Systolic (76WTI), PHE:473 , Min:103 , HZB:703     Diastolic (34HPG), PAS:328, Min:69, Max:208    Pulse Range: Pulse  Av.4  Min: 79  Max: 119  Respiration Range: Resp  Av.2  Min: 15  Max: 21  Current Pulse Ox: SpO2: 92 %  24HR Pulse Ox Range: SpO2  Av.5 %  Min: 91 %  Max: 98 %  Patient Vitals for the past 12 hrs:   BP Temp Pulse Resp SpO2   23 0700 (!) 167/107 -- 89 17 92 %   23 0634 (!) 157/111 -- -- -- --   23 0600 (!) 163/104 98.4 °F (36.9 °C) 88 17 95 %   23 0550 (!) 156/118 -- 88 17 95 %   23 0503 (!) 177/117 -- -- -- --   23 0500 (!) 177/117 -- 88 17 94 %   23 0400 (!) 165/113 98.5 °F (36.9 °C) 79 16 96 %   23 0300 (!) 153/117 -- 80 17 96 %   23 0200 (!) 151/104 98.3 °F (36.8 °C) 84 18 91 %   23 0100 (!) 131/98 -- 81 16 94 %   23 0000 (!) 137/97 98.4 °F (36.9 °C) 80 18 94 %   23 2341 (!) 163/114 -- -- -- --   23 2300 (!) 143/106 -- 79 18 95 %   23 (!) 153/121 -- 82 16 95 %   230 (!) 153/121 98.3 °F (36.8 °C) 83 16 95 %   23 (!) 156/105 -- 82 17 96 %   23 (!) 148/102 98.1 °F (36.7 °C) 82 17 95 %       RECENT LABS:   Lab Results   Component Value Date    WBC 7.9 2023    HGB 16.3 2023    HCT 48.6 2023     2023    ALT 60 (H) 2023    AST 54 (H) 2023     2023    K 3.5 (L) 2023     2023    CREATININE 0.57 (L) 2023    BUN 12 2023    CO2 21 2023    INR 1.1 2023    LABA1C 4.9 2023     24 HOUR INTAKE/OUTPUT:  Intake/Output Summary (Last 24 hours) at 2023 0725  Last data filed at 2023 0654  Gross per 24 hour Intake 431.71 ml   Output 960 ml   Net -528.29 ml     CT HEAD WO CONTRAST  Result Date: 1/30/2023  Unchanged pontine hemorrhage. No intraventricular extension. CT head without contrast  Result Date: 1/27/2023  Stable intraparenchymal hemorrhage. No midline shift. CT HEAD WO CONTRAST  Result Date: 1/26/2023  13 x 8 mm intraparenchymal hemorrhage posterior ramses. Critical results were called by Dr. Michaelle Whitten to Dr. Star Dumont on 1/26/2023 at 06:37. CTA HEAD NECK W CONTRAST  Result Date: 1/26/2023  Focal mild narrowing of the right P1 PCA. Otherwise no significant stenosis, aneurysm, or arterial injury in the CTA of the head and neck. Pontine intraparenchymal hemorrhage is redemonstrated. MRI BRAIN W WO CONTRAST  Result Date: 1/30/2023  Chronic pontine microhemorrhages in addition to the acute hematoma suggest hypertensive hemorrhage. No brain mass. Labs and Images reviewed with:  [] Dr. Marvin Pandya. Kimmie    [x] Dr. Romina Davis  [] Dr. Nella Pisano  [] There are no new interval images to review. PHYSICAL EXAM       CONSTITUTIONAL:  On low dose Precedex infusion. Alert, oriented x3. Forgetful. In no acute distress. Mild dysarthria. No aphasia. HEAD:  normocephalic, atraumatic    EYES:  Left eyelid ptosis. PERRL. ? 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 ENT:  moist mucous membranes   NECK:  supple, symmetric   LUNGS:  Equal air entry bilaterally, clear   CARDIOVASCULAR:  normal s1 / s2, RRR, distal pulses intact   ABDOMEN:  Soft, no rigidity, normal bowel sounds   NEUROLOGIC:  Mental Status:  A & O x3 but forgetful. Following commands.              Cranial Nerves:    II: Visual acuity:  reports blurred vision  II: Visual fields:  intact  III: Pupils:  equal, round, reactive to light  III,IV,VI: Extra Ocular Movements: abnormal- as above  V: Facial sensation:  intact  VII: Facial strength: abnormal- left facial droop  VIII: Hearing: Venetie IRA at baseline     Motor Exam:       5/5 strength in all extremities. Sensory:    Touch:    Grossly intact. Denies numbness/tingling     Coordination/Dysmetria:     Finger to Nose:   Right:  abnormal - dysmetria  Left:  normal      NIH Stroke Scale Total (if not done complete detailed one below):    1a.  Level of consciousness:  0 - alert; keenly responsive  1b. Level of consciousness questions:  0 - answers both questions correctly  1c. Level of consciousness questions:  0 - performs both tasks correctly  2. Best Gaze:  1 - partial gaze palsy  3. Visual:  0 - no visual loss  4. Facial Palsy:  1 - minor paralysis (flattened nasolabial fold, asymmetric on smiling)  5a. Motor left arm:  0 - no drift, limb holds 90 (or 45) degrees for full 10 seconds  5b. Motor right arm:  0 - no drift, limb holds 90 (or 45) degrees for full 10 seconds  6a. Motor left le - no drift; leg holds 30 degree position for full 5 seconds  6b. Motor right le - no drift; leg holds 30 degree position for full 5 seconds  7. Limb Ataxia:  1 - present in one limb  8. Sensory:  0 - normal; no sensory loss  9. Best Language:  0 - no aphasia, normal  10. Dysarthria:  1 - mild to moderate, patient slurs at least some words and at worst, can be understood with some difficulty  11. Extinction and Inattention:  1 - visual, tactile, auditory, spatial or personal inattention or extinction to bilateral simultaneous stimulation in one of the sensory modalities     DRAINS:  [x] There are no drains for Neuro Critical Care to monitor at this time. ASSESSMENT AND PLAN:       The patient is a 43 y.o. male with a history of HTN and daily ETOH use who presented after waking up with right-sided weakness and numbness in addition to dizziness. 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 Hypertensive on arrival with initial SBP~ 200. Admitted to the Neuro ICU for further management.   Course complicated by presumed ETOH withdraw. Patient care will be discussed with attending, will reevaluate patient along with attending. 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  - CTA Head/Neck with no vascular abnormality to explain ICH  - MRI Brain with/without contrast 1/28 demonstrated stable pontine hemorrhage, chronic microhemorrhages to suggest hypertension  - Repeat CT Head 1/29 stable  - Neurosurgery signed off; F/U in clinic in 2 week with repeat CT Head without contrast  - Goal SBP <160  - ETOH withdraw  - Continue high dose Thiamine and folic acid supplementation  - Phenobarbital 160mg IV Q12h, consider starting to wean 1/31  - Weaned off Precedex infusion 1/30 in AM, Seroquel 25mg QHS PRN  - Neuro checks per protocol    CARDIOVASCULAR:  - Hypertensive emergency, resolved  - Goal SBP <160  - PRN Hydralazine/Labetalol  - Essential HTN; continue Lisinopril 40mg QD  - Consider adding PO Nifedipine if BP continues to be elevated  - Troponin 17, EKG NSR with prolonged QT  - Check Echocardiogram  - Continue telemetry     PULMONARY:  - Maintaining O2 sats on room air  - Incentive spirometry  - Smoking cessation encouraged; continue Nicotine patch    RENAL/FLUID/ELECTROLYTE:  - Normal renal functioning  - BUN 13/ Creatinine 0.66  - Monitor I&O; 431/960+ 3 occurrences  - Recurrent acute urinary retention requiring intermittent straight cath  - Continue Flomax 0.4mg daily  - Bladder scan Q6h and PRN, straigth cath for volume >400cc  - Consider Urology consult if persists  - No maintenance IVF, tolerating diet  - Replace electrolytes as needed  - BMP daily    GI/NUTRITION:  NUTRITION:  ADULT DIET;  Regular  - OK to resume diet this morning, patient more appropriate  - Last BM 1/27  - Bowel regimen: Glycolax PRN  - GI prophylaxis: Not indicated    ID:  - Afebrile, Tmax 36.9C  - Leukocytosis noted, WBC 11.35; unclear etiology  - UA 1/27 negative to date  - Monitor off antibiotics - Daily CBC    HEME:   - H&H 15.9/46.2  - Platelet count appears adequate  - CBC daily    ENDOCRINE:  - Continue to monitor blood glucose, goal <180  - Hemoglobin A1C 4.9    OTHER:  - PT/OT/ST   - PM&R following    PROPHYLAXIS:  Stress ulcer: N/A    DVT PROPHYLAXIS:  - SCD sleeves - Thigh High   - Lovenox 40mg subQ daily    DISPOSITION:  [x] To remain ICU for close neurological monitoring, off Precedex. We will continue to follow along. For any changes in exam or patient status please contact Neuro Critical Care.       ABNER Carrillo - CNP  Neurology PGY-4  Neuro Critical Care  Pager [phone]  1/30/2023     7:25 AM

## 2023-01-30 NOTE — PROGRESS NOTES
Physical Therapy  Facility/Department: [address] NEURO ICU  Physical Therapy Daily Treatment Note    Name: Ry Miller  : 1980  MRN: [id number]  Date of Service: 2023    Discharge Recommendations:    Further therapy recommended at discharge. The patient should be able to tolerate at least 3 hours of therapy per day over 5 days or 15 hours over 7 days. This patient may benefit from a Physical Medicine and Rehab consult. PT Equipment Recommendations  Equipment Needed: No (unsafe to ambulate without skilled assistance)      Patient Diagnosis(es): The primary encounter diagnosis was Intracranial hemorrhage ([facility] [address] A diagnosis of Pontine hemorrhage ([facility] [address] was also pertinent to this visit. Past Medical History:  has a past medical history of History of heartburn, Neoplasm of unspecified nature of bone, soft tissue, and skin, and Research study patient. Past Surgical History:  has a past surgical history that includes Appendectomy (in 20's) and pre-malignant / benign skin lesion excision (Right, 2014). Assessment   Body Structures, Functions, Activity Limitations Requiring Skilled Therapeutic Intervention: Decreased functional mobility ; Decreased ADL status; Decreased tolerance to work activity; Decreased strength;Decreased endurance;Decreased balance;Decreased coordination;Decreased cognition;Decreased safe awareness  Assessment: Pt with mobility deficits requiring mod-A for most aspects of mobility including to perform sit<>stand transfer and to ambulate 8 feet with a RW. Pt is very unsteady with ambulation this date, demonstrates poor trunk control, discoordinated gait pattern, and poor safety awareness which places the pt at a high fall risk. Pt would be unsafe to return to prior living arrangements upon discharge due to high fall risk. Pt would benefit from additional intense PT upon discharge to assist in return to independent PLOF.   Therapy Prognosis: Good  Requires PT Follow-Up: Yes  Activity Tolerance  Activity Tolerance: Patient limited by fatigue;Treatment limited secondary to decreased cognition     Plan   Physcial Therapy Plan  General Plan:  (5-6x/week)  Current Treatment Recommendations: Strengthening, Balance training, Functional mobility training, Transfer training, Endurance training, Gait training, Stair training, Neuromuscular re-education, Home exercise program, Patient/Caregiver education & training, Safety education & training, Equipment evaluation, education, & procurement, Therapeutic activities, ROM, Positioning  Safety Devices  Type of Devices: Call light within reach, Gait belt, Patient at risk for falls, Nurse notified, All fall risk precautions in place, Left in bed, Bed alarm in place  Restraints  Restraints Initially in Place: No     Restrictions  Restrictions/Precautions  Restrictions/Precautions: Up as Tolerated  Required Braces or Orthoses?: No  Position Activity Restriction  Other position/activity restrictions: Up with assist, SBP <160 mmHg     Subjective   General  Patient assessed for rehabilitation services?: Yes  Response To Previous Treatment: Patient with no complaints from previous session. Family / Caregiver Present: Yes (spouse)  Follows Commands: Impaired (simple commands with repetition.)  Subjective  Subjective: Pt seated on the toilet and agreeable to therapy, RN agreeable to therapy. Pt confused but pleasant and cooperative throughout. Pt denies pain at rest.           Cognition   Cognition  Overall Cognitive Status: Exceptions  Arousal/Alertness: Inconsistent responses to stimuli  Following Commands: Follows one step commands with increased time; Follows one step commands with repetition  Attention Span: Attends with cues to redirect  Safety Judgement: Decreased awareness of need for assistance;Decreased awareness of need for safety  Problem Solving: Decreased awareness of errors  Insights: Decreased awareness of deficits  Initiation: Requires cues for some  Sequencing: Requires cues for some     Objective                             Balance  Sitting: With support (seated EOB w/ SBA)  Standing: With support (Mod A w/ R lateral lean and narrow [place])  Gait  Overall Level of Assistance: Assist X1;Additional time; Moderate assistance (used RW and Mod A with R lateral lean and v/c to increase [place])  Interventions: Verbal cues; Weight shifting training/pressure relief; Safety awareness training  Bed mobility  Bed Mobility Comments: Pt began today's session seated, retired sitting at the EOB at the conclusion of today's session. Transfers  Sit to Stand: Moderate Assistance  Stand to Sit: Minimal Assistance  Comment: Transfers performed 4x this date. verbal cues for hand placement. Ambulation  Surface: Level tile  Device: Rolling Walker  Assistance: Moderate assistance  Quality of Gait: Very unsteady, R lateral lean, inaccurate step placement, 5x LOB requiring mod-A to prevent a fall. Gait Deviations: Slow Tess;Deviated path;Staggers  Distance: 8 feet, seated rest break, 3 feet R/L at the EOB. 5 feet forward/retro. More Ambulation?: No  Stairs/Curb  Stairs?: No     Balance  Posture: Fair  Sitting - Static: Fair;+  Sitting - Dynamic: Fair  Standing - Static: Poor  Standing - Dynamic: Poor  Comments: Standing Balance assessed w/ RW  Exercise Treatment: x8 in standing: hip flexion, heel raises, partial squats.                                                      AM-PAC Score  -PAC Inpatient Mobility Raw Score : 13 (01/30/23 1622)  AM-PAC Inpatient T-Scale Score : 36.74 (01/30/23 1622)  Mobility Inpatient CMS 0-100% Score: 64.91 (01/30/23 1622)  Mobility Inpatient CMS G-Code Modifier : CL (01/30/23 1622)            Goals  Short Term Goals  Time Frame for Short Term Goals: 14 visits  Short Term Goal 1: Pt amb 300' IND  Short Term Goal 2: Pt will be IND in all bed mobility tasks  Short Term Goal 3: Pt will be IND in all transfers  Short Term Goal 4: Pt will be IND 1 flight of stair navigation. Education  Patient Education  Education Given To: Patient; Family  Education Provided: Role of Therapy;Transfer Training;Equipment;Plan of Care; Fall Prevention Strategies  Education Method: Verbal  Barriers to Learning: Cognition  Education Outcome: Continued education needed      Therapy Time   Individual Concurrent Group Co-treatment   Time In 2277         Time Out 1406         Minutes 23         Timed Code Treatment Minutes: R Tho Calvo 20, PT

## 2023-01-30 NOTE — PROGRESS NOTES
Occupational Therapy  Facility/Department: [address] NEURO ICU  Occupational DailyTreatment Note    Name: Beryle Schimke  : 1980  MRN: [id number]  Date of Service: 2023    Discharge Recommendations: Further therapy recommended at discharge. Patient Diagnosis(es): The primary encounter diagnosis was Intracranial hemorrhage ([facility] [address] A diagnosis of Pontine hemorrhage ([facility] [address] was also pertinent to this visit. Past Medical History:  has a past medical history of History of heartburn, Neoplasm of unspecified nature of bone, soft tissue, and skin, and Research study patient. Past Surgical History:  has a past surgical history that includes Appendectomy (in 20's) and pre-malignant / benign skin lesion excision (Right, 2014). Assessment   Performance deficits / Impairments: Decreased functional mobility ; Decreased ADL status; Decreased endurance;Decreased high-level IADLs;Decreased fine motor control;Decreased strength;Decreased coordination;Decreased balance;Decreased vision/visual deficit; Decreased safe awareness  Prognosis: Good  Activity Tolerance  Activity Tolerance: Patient Tolerated treatment well        Plan   Occupational Therapy Plan  Times Per Week: 3-4x/wk     Restrictions  Restrictions/Precautions  Restrictions/Precautions: Up as Tolerated  Required Braces or Orthoses?: No  Position Activity Restriction  Other position/activity restrictions: Up with assist, SBP <160 mmHg  Pain assessment: Pt reported no pain this day    Subjective   General  Patient assessed for rehabilitation services?: Yes  Family / Caregiver Present: Yes (wife)  Vision:  R gaze preference, L visual field cut--able to turn his head to the L to see to his L, unable to look beyond midline to the L with either eye. Multiple verbal cue with poor return. Safety Devices  Type of Devices: Call light within reach;Gait belt;Patient at risk for falls; Left in chair;Nurse notified; Chair alarm in place; All fall risk precautions in place  Balance  Sitting: With support (seated EOB w/ SBA)  Standing: With support (Mod A w/ R lateral lean and narrow [place]. Total time x10 minutes)  Gait  Overall Level of Assistance: Assist X1;Additional time; Moderate assistance (used RW and Mod A with R lateral lean and v/c to increase [place] with fair return)  Assistive device: RW, gait belt  Shower Transfers  Shower - Transfer From: [place] Tse - Transfer Type: To and From  Shower - Transfer To: Shower seat with back  Shower - Technique: Ambulating  Shower Transfers: mod A ;Verbal cues (required max v/c for positioning in shower with fair return)     ADL  Grooming: Stand by assistance; Increased time to complete (oral care standing at sink using RW)  UE Bathing: Stand by assistance; Increased time to complete;Setup (seated in shower)  LE Bathing: Setup;Stand by assistance; Increased time to complete (seated in shower)  UE Dressing: Modified independent ; Increased time to complete;Setup;Minimal assistance (doffed gown w/ min A, d/t R lateral lean, seated in shower. Donned tshirt Mod I seated in chair.)  LE Dressing: Stand by assistance;Verbal cueing;Setup; Increased time to complete (Doffed socks seated in shower. Donned shorts seated/standing in shower, using grab bars)  Additional Comments: Pt supine in bed at beginning of session. Pt transferred supine/sit EOB, then EOB to standing using RW. Pt completed functional mobility to transfer stand/sit in shower. Pt doffed socks and doffed gown. Pt completed UE and LE bathing seated in shower w/ supervision. Pt able to don shorts to knees seated in shower, pull up over hips in standing using grab bars for support. Pt displayed right lateral lean in standing when pulling shorts up, leaning against wall. Pt exited shower to stand and sink to brush teeth. Pt able to support/balance self in standing at sink with one hand on edge of sink only requiring mod A with occassional min A for support.  Pt exited bathroom using RW to sit in chair. Pt donned shirt seated in chair. Pt displayed R lateral lean intermittently throughout session w/ v/c to correct with fair return. Pt left in chair at end of session. Bed mobility  Supine to Sit: Minimal assistance (supine to sit EOB, trunk support)  Transfers  Sit to stand: minimal assistance (EOB to stand using RW)  Stand to sit: Contact guard assistance (CGA to sitting in shower/chair)  Comment: verbal cues for hand placement with fair/good return. Cognition  Overall Cognitive Status: Exceptions  Arousal/Alertness: Inconsistent responses to stimuli  Following Commands: Follows one step commands with increased time; Follows one step commands with repetition  Attention Span: Attends with cues to redirect  Memory: Appears intact  Safety Judgement: Decreased awareness of need for assistance;Decreased awareness of need for safety  Problem Solving: Decreased awareness of errors  Insights: Decreased awareness of deficits  Initiation: Requires cues for some  Sequencing: Requires cues for some  Orientation  Overall Orientation Status: Within Functional Limits      Education Given To: Patient; Family (wife present for tx)  Education Provided Comments: OT POC, importance of participation in therapy, transfer/walker training, fall prevention with fair return  Education Method: Verbal  Barriers to Learning: Cognition;Vision  Education Outcome: Verbalized understanding     AM-PAC Score      AM-[facility] Inpatient Daily Activity Raw Score: 19 (01/30/23 1615)  AM-PAC Inpatient ADL T-Scale Score : 40.22 (01/30/23 1615)  ADL Inpatient CMS 0-100% Score: 42.8 (01/30/23 1615)  ADL Inpatient CMS G-Code Modifier : CK (01/30/23 1615)    Goals  Short Term Goals  Time Frame for Short Term Goals: pt will, by discharge  Short Term Goal 1: complete LB ADLs and toileting tasks with SBA and set up  Short Term Goal 2: complete UB ADLs with mod I  Short Term Goal 3: participate in meaningful activity for 15+ minutes in order to increase R UE coordination  Short Term Goal 4: dem SBA during functional transfers/functional mobility with LRD, as needed  Short Term Goal 5: dem ~8 minutes dynamic standing tolerance with SBA in order to complete functional tasks       Therapy Time   Individual Concurrent Group Co-treatment   Time In 1420         Time Out 1529         Minutes 69         Timed Code Treatment Minutes: 69 Minutes    Will collaborate with OTR to update L visual deficit goals.       Madelaine Fleischer S/EDGARDO ALONSO/L

## 2023-01-30 NOTE — PROGRESS NOTES
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    Intracranial hemorrhage ([address] I62.9       Pain: 0/10    Cognitive Treatment    Treatment time: 1:27 - 1:33 & 2:02 - 2:21      Subjective: [x] Alert [x] Cooperative     [] Confused     [] Agitated    [] Lethargic      Objective/Assessment:    Recall: Functional Memory: Paragraph Facts:    Mental Manipulation: Rankin/6 increased to 6/6 with 2 repetitions and min verbal cues  Word List Retention: Recall by Attribute: 3/5 increased to 4/5 with two repetitions   Chaining Word Lists: Varied: 2/3 increased to 3/3 with min verbal cues      Problem Solving/Reasoning: Add to the Category: Abstract: 13 increased to 13/13 with min to mod verbal cues  Word Category: [place]: 6/7  Word Deduction: 2/6 increased to 4/6 with min verbal cues    Other:     Pt. Seen for O/M treatment program for dysarthria. Pt. Completed O/M exercises X 10 X 3 sets with min visual and verbal cues. Education provided re: compensatory strategies to increase speech intelligibility/clarity. Pt. Verbalized understanding. Exercise program left at bedside. Plan:  [x] Continue ST services    [] Discharge from ST:      Discharge recommendations: []  Further therapy recommended at discharge. The patient should be able to tolerate at least 3 hours of therapy per day over 5 days or 15 hours over 7 days. [x] Further therapy recommended at discharge. [] No therapy recommended at discharge. Treatment completed by: Completed by:  Completed by:  Jordon Rivera  Clinician    Cosigned By: Ramiro Blue S.CCC/SLP

## 2023-01-31 LAB
ABSOLUTE EOS #: 0.11 K/UL (ref 0–0.44)
ABSOLUTE IMMATURE GRANULOCYTE: 0.05 K/UL (ref 0–0.3)
ABSOLUTE LYMPH #: 1.51 K/UL (ref 1.1–3.7)
ABSOLUTE MONO #: 0.84 K/UL (ref 0.1–1.2)
ANION GAP SERPL CALCULATED.3IONS-SCNC: 11 MMOL/L (ref 9–17)
BASOPHILS # BLD: 1 % (ref 0–2)
BASOPHILS ABSOLUTE: 0.05 K/UL (ref 0–0.2)
BUN BLDV-MCNC: 16 MG/DL (ref 6–20)
CALCIUM SERPL-MCNC: 11.2 MG/DL (ref 8.6–10.4)
CHLORIDE BLD-SCNC: 104 MMOL/L (ref 98–107)
CO2: 21 MMOL/L (ref 20–31)
CREAT SERPL-MCNC: 0.73 MG/DL (ref 0.7–1.2)
EKG ATRIAL RATE: 104 BPM
EKG P AXIS: 48 DEGREES
EKG P-R INTERVAL: 154 MS
EKG Q-T INTERVAL: 334 MS
EKG QRS DURATION: 86 MS
EKG QTC CALCULATION (BAZETT): 439 MS
EKG R AXIS: -6 DEGREES
EKG T AXIS: 22 DEGREES
EKG VENTRICULAR RATE: 104 BPM
EOSINOPHILS RELATIVE PERCENT: 2 % (ref 1–4)
GFR SERPL CREATININE-BSD FRML MDRD: >60 ML/MIN/1.73M2
GLUCOSE BLD-MCNC: 92 MG/DL (ref 70–99)
HCT VFR BLD CALC: 44.8 % (ref 40.7–50.3)
HEMOGLOBIN: 14.7 G/DL (ref 13–17)
IMMATURE GRANULOCYTES: 1 %
LV EF: 65 %
LVEF MODALITY: NORMAL
LYMPHOCYTES # BLD: 20 % (ref 24–43)
MCH RBC QN AUTO: 32.2 PG (ref 25.2–33.5)
MCHC RBC AUTO-ENTMCNC: 32.8 G/DL (ref 28.4–34.8)
MCV RBC AUTO: 98.2 FL (ref 82.6–102.9)
MONOCYTES # BLD: 11 % (ref 3–12)
NRBC AUTOMATED: 0 PER 100 WBC
PDW BLD-RTO: 12.1 % (ref 11.8–14.4)
PLATELET # BLD: ABNORMAL K/UL (ref 138–453)
PLATELET, FLUORESCENCE: 139 K/UL (ref 138–453)
PLATELET, IMMATURE FRACTION: 8.5 % (ref 1.1–10.3)
POTASSIUM SERPL-SCNC: 3.9 MMOL/L (ref 3.7–5.3)
RBC # BLD: 4.56 M/UL (ref 4.21–5.77)
SEG NEUTROPHILS: 66 % (ref 36–65)
SEGMENTED NEUTROPHILS ABSOLUTE COUNT: 5 K/UL (ref 1.5–8.1)
SODIUM BLD-SCNC: 136 MMOL/L (ref 135–144)
WBC # BLD: 7.6 K/UL (ref 3.5–11.3)

## 2023-01-31 PROCEDURE — 93306 TTE W/DOPPLER COMPLETE: CPT

## 2023-01-31 PROCEDURE — 97130 THER IVNTJ EA ADDL 15 MIN: CPT

## 2023-01-31 PROCEDURE — 85055 RETICULATED PLATELET ASSAY: CPT

## 2023-01-31 PROCEDURE — 6370000000 HC RX 637 (ALT 250 FOR IP): Performed by: STUDENT IN AN ORGANIZED HEALTH CARE EDUCATION/TRAINING PROGRAM

## 2023-01-31 PROCEDURE — 97530 THERAPEUTIC ACTIVITIES: CPT

## 2023-01-31 PROCEDURE — 2580000003 HC RX 258: Performed by: PSYCHIATRY & NEUROLOGY

## 2023-01-31 PROCEDURE — 6360000002 HC RX W HCPCS: Performed by: STUDENT IN AN ORGANIZED HEALTH CARE EDUCATION/TRAINING PROGRAM

## 2023-01-31 PROCEDURE — 80048 BASIC METABOLIC PNL TOTAL CA: CPT

## 2023-01-31 PROCEDURE — 6370000000 HC RX 637 (ALT 250 FOR IP): Performed by: FAMILY MEDICINE

## 2023-01-31 PROCEDURE — 93010 ELECTROCARDIOGRAM REPORT: CPT | Performed by: INTERNAL MEDICINE

## 2023-01-31 PROCEDURE — 6360000002 HC RX W HCPCS

## 2023-01-31 PROCEDURE — 6360000002 HC RX W HCPCS: Performed by: PSYCHIATRY & NEUROLOGY

## 2023-01-31 PROCEDURE — 92507 TX SP LANG VOICE COMM INDIV: CPT

## 2023-01-31 PROCEDURE — 6360000002 HC RX W HCPCS: Performed by: NURSE PRACTITIONER

## 2023-01-31 PROCEDURE — 2060000000 HC ICU INTERMEDIATE R&B

## 2023-01-31 PROCEDURE — 6370000000 HC RX 637 (ALT 250 FOR IP)

## 2023-01-31 PROCEDURE — 97110 THERAPEUTIC EXERCISES: CPT

## 2023-01-31 PROCEDURE — 2580000003 HC RX 258

## 2023-01-31 PROCEDURE — 36415 COLL VENOUS BLD VENIPUNCTURE: CPT

## 2023-01-31 PROCEDURE — 99232 SBSQ HOSP IP/OBS MODERATE 35: CPT | Performed by: PSYCHIATRY & NEUROLOGY

## 2023-01-31 PROCEDURE — 6370000000 HC RX 637 (ALT 250 FOR IP): Performed by: PSYCHIATRY & NEUROLOGY

## 2023-01-31 PROCEDURE — 99231 SBSQ HOSP IP/OBS SF/LOW 25: CPT | Performed by: PHYSICAL MEDICINE & REHABILITATION

## 2023-01-31 PROCEDURE — 97129 THER IVNTJ 1ST 15 MIN: CPT

## 2023-01-31 PROCEDURE — 85025 COMPLETE CBC W/AUTO DIFF WBC: CPT

## 2023-01-31 RX ORDER — CARVEDILOL 12.5 MG/1
12.5 TABLET ORAL 2 TIMES DAILY WITH MEALS
Status: DISCONTINUED | OUTPATIENT
Start: 2023-01-31 | End: 2023-02-02 | Stop reason: HOSPADM

## 2023-01-31 RX ORDER — FLUTICASONE PROPIONATE 50 MCG
2 SPRAY, SUSPENSION (ML) NASAL 2 TIMES DAILY PRN
Status: DISCONTINUED | OUTPATIENT
Start: 2023-01-31 | End: 2023-02-02 | Stop reason: HOSPADM

## 2023-01-31 RX ORDER — PANTOPRAZOLE SODIUM 40 MG/1
40 TABLET, DELAYED RELEASE ORAL
Status: DISCONTINUED | OUTPATIENT
Start: 2023-01-31 | End: 2023-02-02 | Stop reason: HOSPADM

## 2023-01-31 RX ORDER — PHENOBARBITAL SODIUM 130 MG/ML
65 INJECTION INTRAMUSCULAR 2 TIMES DAILY
Status: DISCONTINUED | OUTPATIENT
Start: 2023-01-31 | End: 2023-01-31

## 2023-01-31 RX ORDER — FLUTICASONE PROPIONATE 50 MCG
2 SPRAY, SUSPENSION (ML) NASAL 2 TIMES DAILY
Status: DISCONTINUED | OUTPATIENT
Start: 2023-01-31 | End: 2023-01-31

## 2023-01-31 RX ADMIN — TAMSULOSIN HYDROCHLORIDE 0.4 MG: 0.4 CAPSULE ORAL at 09:49

## 2023-01-31 RX ADMIN — LORAZEPAM 2 MG: 2 INJECTION INTRAMUSCULAR at 10:33

## 2023-01-31 RX ADMIN — LORAZEPAM 1 MG: 1 TABLET ORAL at 06:27

## 2023-01-31 RX ADMIN — ANTACID TABLETS 500 MG: 500 TABLET, CHEWABLE ORAL at 15:13

## 2023-01-31 RX ADMIN — SODIUM CHLORIDE, PRESERVATIVE FREE 10 ML: 5 INJECTION INTRAVENOUS at 21:05

## 2023-01-31 RX ADMIN — LISINOPRIL 40 MG: 20 TABLET ORAL at 09:49

## 2023-01-31 RX ADMIN — PHENOBARBITAL SODIUM 65 MG: 130 INJECTION INTRAMUSCULAR; INTRAVENOUS at 09:49

## 2023-01-31 RX ADMIN — HYDRALAZINE HYDROCHLORIDE 10 MG: 20 INJECTION INTRAMUSCULAR; INTRAVENOUS at 17:26

## 2023-01-31 RX ADMIN — ENOXAPARIN SODIUM 40 MG: 100 INJECTION SUBCUTANEOUS at 09:50

## 2023-01-31 RX ADMIN — HYDRALAZINE HYDROCHLORIDE 10 MG: 20 INJECTION INTRAMUSCULAR; INTRAVENOUS at 06:34

## 2023-01-31 RX ADMIN — PANTOPRAZOLE SODIUM 40 MG: 40 TABLET, DELAYED RELEASE ORAL at 12:52

## 2023-01-31 RX ADMIN — THIAMINE HYDROCHLORIDE 500 MG: 100 INJECTION, SOLUTION INTRAMUSCULAR; INTRAVENOUS at 21:04

## 2023-01-31 RX ADMIN — CARVEDILOL 12.5 MG: 12.5 TABLET, FILM COATED ORAL at 12:52

## 2023-01-31 RX ADMIN — CARVEDILOL 12.5 MG: 12.5 TABLET, FILM COATED ORAL at 17:08

## 2023-01-31 RX ADMIN — THIAMINE HYDROCHLORIDE 500 MG: 100 INJECTION, SOLUTION INTRAMUSCULAR; INTRAVENOUS at 09:50

## 2023-01-31 RX ADMIN — FOLIC ACID 1 MG: 1 TABLET ORAL at 09:49

## 2023-01-31 RX ADMIN — LORAZEPAM 2 MG: 2 INJECTION INTRAMUSCULAR at 17:05

## 2023-01-31 NOTE — CARE COORDINATION
Consult for consideration of rehab/PHQ-9  Case dicussed in IDR. Pt is more alert. Met with pt and wife this a.m with consent . Pt lives with wife . Has 6 boys. Family is supportive. Pt denies having any feeling of depression or suicidal thoughts. Pt states he has been feeling more tired. No problems with appetite,sleep or concentration. Pt admits to having 1-2 drinks practically everyday. Drinks of choice are: vodka and tonic, rum and coke or beer. No previous rehab only [place] [address] meetings in [facility] a few years ago. Pt declines rehab at this time, states he will probably start going back to [place] [address] meetings again. Treatment resources offered . Pt initially declined and later agreed. List of programs given to his wife. Patient Health Questionnaire-9 (PHQ-9)    Over the last 2 weeks, how often have you been bothered by any of the following problems? 1. Little Interest or pleasure in doing things? [x] Not at all  [] Several Days  [] More than half the day  []  Nearly every day    2. Feeling down, depressed or hopeless? [x] Not at all  [] Several Days  [] More than half the day  []  Nearly every day    3. Trouble falling or staying asleep, or sleeping too much? [x] Not at all  [] Several Days  [] More than half the day  []  Nearly every day    4. Feeling tired or having little energy? [] Not at all  [x] Several Days  [] More than half the day  []  Nearly every day    5. Poor apettite or overeating? [x] Not at all  [] Several Days  [] More than half the day  []  Nearly every day    6. Feeling bad about yourself-or that you are a failure or have let yourself or your family down? [x] Not at all  [] Several Days  [] More than half the day  []  Nearly every day    7. Trouble concentrating on things, such as reading the newspaper or watching television? [x] Not at all  [] Several Days  [] More than half the day  []  Nearly every day    8.  Moving or speaking so slowly that other people could have noticed? Or the opposite-being so fidgety or restless that you have been moving around a lot more than usual?   [x] Not at all  [] Several Days  [] More than half the day  []  Nearly every day    9. Thoughts that you would be better off dead or of hurting yourself in some way? [x] Not at all  [] Several Days  [] More than half the day  []  Nearly every day    Total Score: 1    If you checked off any problems, how difficult have these problems made it for you to do your work, take care of things at home, or get along with other people?    [x] Not difficult at all  [] Somewhat Difficult  [] Very Difficult  []  Extremely Difficult

## 2023-01-31 NOTE — PLAN OF CARE
Problem: Discharge Planning  Goal: Discharge to home or other facility with appropriate resources  1/31/2023 0656 by Ayesha Bernheim, RN  Outcome: Progressing  1/31/2023 0353 by Festus Brambila RN  Outcome: Progressing  1/30/2023 1703 by Marcela Torres RN  Outcome: Progressing  1/30/2023 1703 by Marcela Torres RN  Outcome: Progressing     Problem: Safety - Adult  Goal: Free from fall injury  1/31/2023 0656 by Ayesha Bernheim, RN  Outcome: Progressing  1/31/2023 0353 by Festus Brambila RN  Outcome: Progressing  1/30/2023 1703 by Marcela Torres RN  Outcome: Progressing  1/30/2023 1703 by Marcela Torres RN  Outcome: Progressing     Problem: ABCDS Injury Assessment  Goal: Absence of physical injury  1/31/2023 0656 by Ayesha Bernheim, RN  Outcome: Progressing  1/31/2023 0353 by Festus Brambila RN  Outcome: Progressing  1/30/2023 1703 by Marcela Torres RN  Outcome: Progressing  1/30/2023 1703 by Marcela Torres RN  Outcome: Progressing

## 2023-01-31 NOTE — CARE COORDINATION
ACUTE INPATIENT REHABILITATION  Financial Disclosure Statement: Eligibility and Benefit Verification    Patient Name: Meron Krause MRN: [id number]     Disclosure [facility] Acute Inpatient Rehabilitation at [facility] provides 24 hour individualized service to patients with functional limitations due to, but not limited to stroke, brain injury, spinal cord injury, major multiple trauma, hip fracture, amputation, and neurological disorders. Acute Inpatient Rehabilitation provides rehabilitative nursing, physician coverage, as well as physical therapy, occupational therapy, speech therapy, recreational therapy and other services as deemed necessary by our Board Certified Physical Medicine and [address] [facility], Dr. Jeremy Plasencia and Dr. Saurabh Sams and Dr. Nicki Raymundo. [facility] Acute Inpatient Rehabilitation at [facility] is fully accredited by the Commission on Accreditation of Rehabilitation Facilities (CARF) and The Joint Commission (TJC). At a minimum, you will receive 5 days of therapy services totaling at least 15 hours per week. Your treatment program will consist of physical therapy at least 7.5 hours per week; occupational therapy 7.5 hours per week; and speech therapy 1.5 hours per week, as applicable. Your estimated length of stay is currently:  7-10 days. Your insurance coverage has been verified as follows:   Date Verified: 1/31/2023   Method:  Phone Call: Call Ref# [id number], Representative: Latasha James Insurance: [employer]  Coverage: Per Benefit Period  Plan Effective Date: 9/30/2015 Status: Active  Deductible: $3000 (Amount Currently Met: $749.58)  Co-Pay: Not applicable  Co-Insurance: 80%  Maximum Out of Pocket: $6000 (Amount Currently Met: $749.58)      Secondary Insurance: Not Applicable  Secondary insurance policies often cover co-payments amounts. Please contact your insurance company to verify benefits.     [facility]: [phone] for all billing questions. Thank You for choosing Critical access hospital[facility] at [facility] Term Definitions   Deductible Amount you pay before insurance starts paying for your covered services. Co-Pay Flat fee for a service determined by your plan. Co-Insurance Partial out of pocket amount you owe once your deductible is fully met. Maximum Out of Pocket The most you have to pay for covered services in a benefit period. After you spend this amount on deductibles, copayments, and coinsurance for in-network care and services, your health plan pays 100% of the costs of covered benefits.                    Revised 01/25/2023

## 2023-01-31 NOTE — PROGRESS NOTES
Daily Progress Note  Neuro Critical Care    Patient Name: Courtney Florez  Patient : 1980  Room/Bed: 0130/0130-01  Code Status: Full Code  Allergies: No Known Allergies    CHIEF COMPLAINT:     Confusion     INTERVAL HISTORY    Initial Presentation (Admitted 23): The patient is a 43 y.o. male presented with dizziness, right-sided facial droop, right-sided weakness and right-sided numbness. Patient felt well when he went to bed , last known well 10 PM.  Medical history notable for hypertension, takes Coreg for this. Patient does not know his dose. EMS was called when patient woke up with these symptoms this morning. Systolic blood pressure was 200s on arrival to the ED. Stroke alert was called on arrival, CT of the head demonstrated 13 x 8 mm intraparenchymal hemorrhage of the posterior ramses. CTA of the head and neck showed focal mild narrowing of the right P1 PCA, no other significant stenosis, aneurysm, or arterial injury. Cardene was started for blood pressure control with goal blood pressure under 160. Patient's metabolic work-up unremarkable overall. Patient remains alert, oriented, able to provide history. He does describe double vision, sensation changes on the right and weakness. He was unable to walk when this happened. No other medical history. No known allergies. Patient does smoke. Hospital Course:   : Remained stable, requiring lower amounts of cardene  : Patient had headache overnight. Given 1 g of mag and Benadryl. Also had urinary retention and straight cath once x3. Mcmahan was placed. Remains on 2.5 mg/h of nicardipine this morning. Systolic pressures have been in the 130s to 150s, few in the 160s. Repeat CT this morning showed stable intraparenchymal hemorrhage without midline shift. Plan to discontinue nicardipine today, continue lisinopril, use as needed labetalol and hydralazine as needed. :   Had urinary retention and straight cath once.  On lisinopril 10mg but BP still elevated, will increase. Pending MRI. Still unsteady without support. 1/29: Patient became agitated, confused and started having hallucinations. His wife reported daily ETOH use; ~2 vodka drinks per night. Repeat CT Head stable. Started on Luminal 260 mg once, then 130 mg bid, IV Thiamine 795 mg tid, folic acid, and Seroquel 25 mg nightly PRN. Hypertensive; Lisinopril increased to 40mg daily. Transferred back to ICU for precedex drip. 1/30: urinary retention requiring straight cath x2, refusing moore. Remains hypertensive, Weaned off precedex. Receiving phenobarbital and ativan via CIWA. Last 24h:  Reportedly had a fall out of bed overnight. Patients states he moves around a lot in his sleep and rolled over and accidentally rolled out of bed. Denies striking his head, denies LOC. States he landed on his knees, denying any injuries. Will have PT/OT evaluate this morning to make sure patient is safe in and out of bed. Patient very eager to go home, very focused on this. Plan to wean phenobarbital today. Received 1mg oral ativan in past 24 hours. Still remains hypertensive. Has received 3 doses of hydralazine in last 24 hours. Will start coreg. States urinary retention is improving, states it is very difficult for him to urinate unless he is sitting on the toilet.      CURRENT MEDICATIONS:  SCHEDULED MEDICATIONS:   carvedilol  12.5 mg Oral BID WC    pantoprazole  40 mg Oral QAM AC    sodium chloride flush  5-40 mL IntraVENous 2 times per day    lisinopril  40 mg Oral Daily    folic acid  1 mg Oral Daily    melatonin  5 mg Oral Nightly    thiamine (VITAMIN B1) IVPB  500 mg IntraVENous TID    Followed by    Robert Helling ON 2/2/2023] thiamine  100 mg Oral Daily    tamsulosin  0.4 mg Oral Daily    enoxaparin  40 mg SubCUTAneous Daily    nicotine  1 patch TransDERmal Daily     CONTINUOUS INFUSIONS:   sodium chloride 50 mL/hr at 01/30/23 1055     PRN MEDICATIONS:   labetalol, sodium chloride flush, sodium chloride, LORazepam **OR** LORazepam **OR** LORazepam **OR** LORazepam **OR** LORazepam **OR** LORazepam **OR** LORazepam **OR** LORazepam, QUEtiapine, calcium carbonate, sodium chloride flush, hydrALAZINE, acetaminophen, ondansetron **OR** ondansetron    VITALS:  Temperature Range: Temp: 98.9 °F (37.2 °C) Temp  Av.7 °F (37.1 °C)  Min: 98.4 °F (36.9 °C)  Max: 98.9 °F (37.2 °C)  BP Range: Systolic (84YYL), LNL:801 , Min:106 , QDE:501     Diastolic (21RVO), QAU:56, Min:67, Max:136    Pulse Range: Pulse  Av.2  Min: 80  Max: 128  Respiration Range: Resp  Av.4  Min: 14  Max: 27  Current Pulse Ox: SpO2: 97 %  24HR Pulse Ox Range: SpO2  Av.4 %  Min: 90 %  Max: 99 %  Patient Vitals for the past 12 hrs:   BP Temp Temp src Pulse Resp SpO2   23 1000 137/67 -- -- (!) 113 19 97 %   23 0900 (!) 157/98 -- -- (!) 106 27 96 %   23 0800 (!) 150/105 -- -- 88 15 97 %   23 0710 (!) 162/108 -- -- 98 14 99 %   23 0608 (!) 174/118 -- -- 81 16 99 %   23 0500 (!) 156/97 -- -- 80 14 --   23 0410 (!) 139/110 98.9 °F (37.2 °C) Oral 83 19 --   23 0300 (!) 149/94 -- -- 96 14 97 %   23 0220 (!) 152/96 -- -- (!) 106 22 98 %   23 0200 (!) 139/97 98.8 °F (37.1 °C) -- 91 14 95 %   23 0100 (!) 151/103 -- -- 98 16 96 %   23 0000 (!) 140/104 98.9 °F (37.2 °C) -- (!) 110 26 95 %   23 2300 (!) 150/98 -- -- (!) 106 16 97 %     RECENT LABS:   Lab Results   Component Value Date    WBC 7.6 2023    HGB 14.7 2023    HCT 44.8 2023    PLT See Reflexed IPF Result 2023    ALT 60 (H) 2023    AST 54 (H) 2023     2023    K 3.9 2023     2023    CREATININE 0.73 2023    BUN 16 2023    CO2 21 2023    INR 1.1 2023    LABA1C 4.9 2023     24 HOUR INTAKE/OUTPUT:  Intake/Output Summary (Last 24 hours) at 2023 1056  Last data filed at 2023 0000  Gross per 24 hour   Intake --   Output 1000 ml   Net -1000 ml   CT HEAD WO CONTRAST  Result Date: 1/30/2023  Unchanged pontine hemorrhage. No intraventricular extension. CT head without contrast  Result Date: 1/27/2023  Stable intraparenchymal hemorrhage. No midline shift. CT HEAD WO CONTRAST  Result Date: 1/26/2023  13 x 8 mm intraparenchymal hemorrhage posterior ramses. Critical results were called by Dr. Jase Cox to Dr. Katie Tao on 1/26/2023 at 06:37. CTA HEAD NECK W CONTRAST  Result Date: 1/26/2023  Focal mild narrowing of the right P1 PCA. Otherwise no significant stenosis, aneurysm, or arterial injury in the CTA of the head and neck. Pontine intraparenchymal hemorrhage is redemonstrated. MRI BRAIN W WO CONTRAST  Result Date: 1/30/2023  Chronic pontine microhemorrhages in addition to the acute hematoma suggest hypertensive hemorrhage. No brain mass. Labs and Images reviewed with:  [] Dr. German Ahumada. Kimmie    [x] Dr. Varsha Tovar  [] Dr. Yenny Head  [] There are no new interval images to review. PHYSICAL EXAM       CONSTITUTIONAL:  Alert, oriented x3. Forgetful. In no acute distress. Mild dysarthria. No aphasia. HEAD:  normocephalic, atraumatic    EYES:  Left eyelid ptosis. PERRL. ? 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 ENT:  moist mucous membranes   NECK:  supple, symmetric   LUNGS:  Equal air entry bilaterally, clear   CARDIOVASCULAR:  normal s1 / s2, tachycardic, regular rhythm, distal pulses intact   ABDOMEN:  Soft, no rigidity, normal bowel sounds   NEUROLOGIC:  Mental Status:  A & O x3 but forgetful. Following commands.              Cranial Nerves:    II: Visual acuity:  reports blurred vision  II: Visual fields:  intact  III: Pupils:  equal, round, reactive to light  III,IV,VI: Extra Ocular Movements: abnormal- as above  V: Facial sensation:  intact  VII: Facial strength: abnormal- left facial droop  VIII: Hearing: Kiana at baseline     Motor Exam:       5/5 strength in all extremities. Sensory:    Touch:    Grossly intact. Denies numbness/tingling     Coordination/Dysmetria:     Finger to Nose:   Right:  abnormal - dysmetria  Left:  normal      NIH Stroke Scale Total (if not done complete detailed one below):    1a.  Level of consciousness:  0 - alert; keenly responsive  1b. Level of consciousness questions:  0 - answers both questions correctly  1c. Level of consciousness questions:  0 - performs both tasks correctly  2. Best Gaze:  1 - partial gaze palsy  3. Visual:  0 - no visual loss  4. Facial Palsy:  1 - minor paralysis (flattened nasolabial fold, asymmetric on smiling)  5a. Motor left arm:  0 - no drift, limb holds 90 (or 45) degrees for full 10 seconds  5b. Motor right arm:  0 - no drift, limb holds 90 (or 45) degrees for full 10 seconds  6a. Motor left le - no drift; leg holds 30 degree position for full 5 seconds  6b. Motor right le - no drift; leg holds 30 degree position for full 5 seconds  7. Limb Ataxia:  1 - present in one limb  8. Sensory:  0 - normal; no sensory loss  9. Best Language:  0 - no aphasia, normal  10. Dysarthria:  1 - mild to moderate, patient slurs at least some words and at worst, can be understood with some difficulty  11. Extinction and Inattention:  1 - visual, tactile, auditory, spatial or personal inattention or extinction to bilateral simultaneous stimulation in one of the sensory modalities     DRAINS:  [x] There are no drains for Neuro Critical Care to monitor at this time. ASSESSMENT AND PLAN:       The patient is a 43 y.o. male with a history of HTN and daily ETOH use who presented after waking up with right-sided weakness and numbness in addition to dizziness. 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 Hypertensive on arrival with initial SBP~ 200. Admitted to the Neuro ICU for further management.   Course complicated by presumed ETOH withdraw. Patient care will be discussed with attending, will reevaluate patient along with attending. 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  - CTA Head/Neck with no vascular abnormality to explain ICH  - MRI Brain with/without contrast 1/28 demonstrated stable pontine hemorrhage, chronic microhemorrhages to suggest hypertension  - Repeat CT Head 1/29 stable  - Neurosurgery signed off; F/U in clinic in 2 week with repeat CT Head without contrast  - Goal SBP <160  - ETOH withdraw  - Continue high dose Thiamine and folic acid supplementation  - Phenobarbital 160mg IV Q12h, weaned to 65mg this morning, d/c'ed for evening dose  - Weaned off Precedex infusion 1/30 in AM, Seroquel 25mg QHS PRN  - Neuro checks per protocol    CARDIOVASCULAR:  - Hypertensive emergency, resolved  - Goal SBP <160  - PRN Hydralazine/Labetalol  - Essential HTN; continue Lisinopril 40mg QD  - Added 12.5mg coreg  - Troponin 17, EKG NSR with prolonged QT  - Check Echocardiogram  - Continue telemetry   - Tachycardia, monitor, evaluate response to coreg    PULMONARY:  - Maintaining O2 sats on room air  - Incentive spirometry  - Smoking cessation encouraged; continue Nicotine patch    RENAL/FLUID/ELECTROLYTE:  - Normal renal functioning  - BUN 16/ Creatinine 0.73  - Monitor I&O  - Recurrent acute urinary retention, no straight caths required overnight, patient has refused moore, states he needs to be sitting on the toilet to adequately void  - Continue Flomax 0.4mg daily  - Bladder scan Q6h and PRN, straigth cath for volume >400cc  - Consider Urology consult if persists  - No maintenance IVF, tolerating diet  - Replace electrolytes as needed  - BMP daily    GI/NUTRITION:  NUTRITION:  ADULT DIET;  Regular  - Tolerating regular diet  - Last BM 1/31  - Bowel regimen: Glycolax PRN  - Complaining of heart burn, restart home protonix  - GI prophylaxis: protonix, home med    ID:  - Afebrile  - Leukocytosis resolved, 7.6 (11.5)  - UA 1/27 negative to date  - Monitor off antibiotics   - Daily CBC    HEME:   - H&H 14.7/44.8  - Platelets 838  - CBC daily    ENDOCRINE:  - Continue to monitor blood glucose, goal <180  - Hemoglobin A1C 4.9    OTHER:  - PT/OT/ST   - PM&R following, recommending ARU when medically stable    PROPHYLAXIS:  Stress ulcer: N/A    DVT PROPHYLAXIS:  - SCD sleeves - Thigh High   - Lovenox 40mg subQ daily    DISPOSITION:  [x] Transfer to stepdown    We will continue to follow along. For any changes in exam or patient status please contact Neuro Critical Care.       Jagdeep Mcrae DO  Neuro Critical Care  Pager [phone]  1/31/2023     10:56 AM

## 2023-01-31 NOTE — PROGRESS NOTES
Speech Language Pathology  Speech Language Pathology  [facility]    Cognitive Treatment Note    Date: 1/31/2023  Patients Name: Mumtaz Álvarez  MRN: [id number]  Diagnosis:   Patient Active Problem List   Diagnosis Code    Neoplasm of unspecified nature of bone, soft tissue, and skin D49.2    Hemangioma of skin D18.01    Pontine hemorrhage (HCC) I61.3    Intracranial hemorrhage ([address] I62.9       Pain: 0/10    Cognitive Treatment    Treatment time: 12:57 - 1:24      Subjective: [x] Alert [x] Cooperative     [] Confused     [] Agitated    [] Lethargic      Objective/Assessment:    Recall: Delayed Recall: 3/3, 3/3  Coding and Grouping Items for Recall: Pairing Items: 1/4 increased to 2/4 with 1-3 repetitions  Memory and Mental Manipulation: Scrambled Sentences: 4 Words: 8/8    Problem Solving/Reasoning: Analogies: 7/9 increased to 9/9 with min visual and min verbal cues  Multiple Definitions: 5/5    Other:     Pt. Seen for O/M treatment program for dysarthria. Pt. Completed O/M exercises X 10 X 2 sets with min verbal cues. Education provided re: compensatory strategies to increase speech intelligibility/clarity. Pt. Verbalized understanding. Plan:  [x] Continue ST services    [] Discharge from ST:      Discharge recommendations: []  Further therapy recommended at discharge. The patient should be able to tolerate at least 3 hours of therapy per day over 5 days or 15 hours over 7 days. [x] Further therapy recommended at discharge. [] No therapy recommended at discharge. Treatment completed by: Completed by:  Rakesh Hussein  Clinician    Cosigned By: Cam Rene S.CCC/SLP

## 2023-01-31 NOTE — PROGRESS NOTES
Physical Medicine & Rehabilitation  Progress Note        Admitting Physician: Arcelia Dakins, MD    Primary Care Provider: Yelena Smallwood DO     Chief Complaint: R sided weakness    Brief History: This is a follow up to the initial consult on MrElder Cook is a 43 y.o. right handed male who was admitted to [facility] on 1/26/2023 with Extremity Weakness (Left sided weakness) and Facial Droop    Patient with R weakness, numbness and facial droop who fell when he went to bed then awoke with the symptoms. SBP was in 200s upon arrival to ED. CT confirmed hemorrhagic CVA posterior ramses and narrowing R P1 PCA. He was treated with Cardene for BP control. Neurosurgery managing conservatively. Neuro ICU started CIWA protocol with Precedex and phenobarbital. Has prn seroquel for agitation/sleep. Subjective: The patient is resting comfortably. The patient's mobility is improved. Cognition and agitation improved today. ROS:  Constitutional: negative for anorexia, chills, fatigue, fevers, sweats and weight loss  Eyes: negative for redness and visual disturbance  Ears, nose, mouth, throat, and face: negative for earaches, epistaxis, sore throat and tinnitus  Respiratory: negative for cough and shortness of breath  Cardiovascular: negative for chest pain, dyspnea and palpitations  Gastrointestinal: negative for abdominal pain, change in bowel habits, constipation, nausea and vomiting  Genitourinary:negative for dysuria, frequency, hesitancy and urinary incontinence  Integument/breast: negative for pruritus and rash  Musculoskeletal:negative for stiff joints  Neurological: negative for dizziness, headaches   Behavioral/Psych: negative for decreased appetite, depression     Rehabilitation:   Progressing in therapies.     PT:      Treatment  Bed Mobility Training  Bed Mobility Training: Yes  Overall Level of Assistance: Stand-by assistance (verbal cues)  Interventions: Verbal cues, Demonstration, Visual cues, Safety awareness training, Tactile cues (pt has visual deficits)  Rolling: Stand-by assistance (RLE tends to drag/delay)  Supine to Sit: Stand-by assistance (RLE delays)  Sit to Supine: Stand-by assistance  Scooting: Stand-by assistance     Transfers-  Transfers  Sit to Stand: Moderate Assistance  Stand to Sit: Minimal Assistance  Comment: Transfers performed 4x this date. verbal cues for hand placement. Transfer Training  Transfer Training: Yes  Overall Level of Assistance: Minimum assistance  Interventions: Demonstration, Manual cues, Visual cues, Verbal cues, Safety awareness training, Weight shifting training/pressure relief  Sit to Stand: Minimum assistance  Stand to Sit: Minimum assistance  Stand Pivot Transfers: Moderate assistance  Bed to Chair: Moderate assistance     Ambulation-  Ambulation  Surface: Level tile  Device: Rolling Walker  Assistance: Moderate assistance  Quality of Gait: Very unsteady, R lateral lean, inaccurate step placement, 5x LOB requiring mod-A to prevent a fall. Gait Deviations: Slow Tess;Deviated path;Staggers  Distance: 8 feet, seated rest break, 3 feet R/L at the EOB. 5 feet forward/retro. More Ambulation?: No   Gait Training  Gait Training: Yes       OT:   ADLS-   ADL  Feeding: Modified independent   Grooming: Stand by assistance, Increased time to complete (oral care standing at sink using RW)  UE Bathing: Stand by assistance, Increased time to complete, Setup (seated in shower)  LE Bathing: Setup, Stand by assistance, Increased time to complete (seated in shower)  UE Dressing: Modified independent , Increased time to complete, Setup, Minimal assistance (doffed gown w/ min A seated in shower. Donned tshirt Mod I seated in chair.)  LE Dressing: Stand by assistance, Verbal cueing, Setup, Increased time to complete (Doffed socks seated in shower.  Donned shorts standing in shower, using grab bars)  Toileting: Contact guard assistance  Additional Comments: Pt supine in bed at beginning of session. Pt transferred supine/sit EOB, then EOB to standing using RW. Pt completed functional mobility to transfer stand/sit in shower. Pt doffed socks and doffed gown. Pt completed UE and LE bathing seated in shower w/ supervision. Pt able to don shorts to knees seated in shower, pull up over hips in standing using grab bars for support. Pt displayed right lateral lean in standing when pulling shorts up, leaning against wall. Pt completed functional mobility from shower to chair. Pt donned shirt seated in chair. Pt displayed R lateral lean intermittently throughout session w/ v/c to correct with fair return. Pt left in chair at end of session. SLP:      Objective:  BP (!) 150/105   Pulse 88   Temp 98.9 °F (37.2 °C) (Oral)   Resp 15   Ht 6' (1.829 m)   Wt 200 lb (90.7 kg)   SpO2 99%   BMI 27.12 kg/m²       GEN: well developed, well nourished, in NAD  HEENT: NCAT, PERRL, EOMI, mucous membranes pink and moist  CV: RRR, no murmurs, rubs or gallops  PULM: CTAB, no rales or rhonchi. Respirations WNL and unlabored  ABD: soft, NT, ND, BS+ and equal  NEURO: A&O x3 but with some intermittent confusion. Sensation intact to light touch. R CN III palsy, L CN VI palsy  MSK: Functional ROM 5/5 key muscles LUE and LLE . Strength 4/5 key muscles RUE and RLE. EXTREMITIES: No calf tenderness to palpation bilaterally. No edema BLEs  SKIN: warm dry and intact with good turgor  PSYCH: appropriately interactive.  Affect flat     Current Medications:   Current Facility-Administered Medications: [COMPLETED] PHENobarbital (LUMINAL) injection 260 mg, 260 mg, IntraVENous, Once **FOLLOWED BY** PHENobarbital (LUMINAL) injection 65 mg, 65 mg, IntraVENous, BID  labetalol (NORMODYNE;TRANDATE) injection 20 mg, 20 mg, IntraVENous, Q4H PRN  sodium chloride flush 0.9 % injection 5-40 mL, 5-40 mL, IntraVENous, 2 times per day  sodium chloride flush 0.9 % injection 5-40 mL, 5-40 mL, IntraVENous, PRN  0.9 % sodium chloride infusion, , IntraVENous, PRN  LORazepam (ATIVAN) tablet 1 mg, 1 mg, Oral, Q1H PRN **OR** LORazepam (ATIVAN) injection 1 mg, 1 mg, IntraVENous, Q1H PRN **OR** LORazepam (ATIVAN) tablet 2 mg, 2 mg, Oral, Q1H PRN **OR** LORazepam (ATIVAN) injection 2 mg, 2 mg, IntraVENous, Q1H PRN **OR** LORazepam (ATIVAN) tablet 3 mg, 3 mg, Oral, Q1H PRN **OR** LORazepam (ATIVAN) injection 3 mg, 3 mg, IntraVENous, Q1H PRN **OR** LORazepam (ATIVAN) tablet 4 mg, 4 mg, Oral, Q1H PRN **OR** LORazepam (ATIVAN) injection 4 mg, 4 mg, IntraVENous, Q1H PRN  lisinopril (PRINIVIL;ZESTRIL) tablet 40 mg, 40 mg, Oral, Daily  QUEtiapine (SEROQUEL) tablet 25 mg, 25 mg, Oral, Nightly PRN  folic acid (FOLVITE) tablet 1 mg, 1 mg, Oral, Daily  calcium carbonate (TUMS) chewable tablet 500 mg, 500 mg, Oral, TID PRN  melatonin tablet 5 mg, 5 mg, Oral, Nightly  thiamine (B-1) 500 mg in sodium chloride 0.9 % 100 mL IVPB, 500 mg, IntraVENous, TID **FOLLOWED BY** [START ON 2/2/2023] thiamine tablet 100 mg, 100 mg, Oral, Daily  tamsulosin (FLOMAX) capsule 0.4 mg, 0.4 mg, Oral, Daily  sodium chloride flush 0.9 % injection 10 mL, 10 mL, IntraVENous, PRN  enoxaparin (LOVENOX) injection 40 mg, 40 mg, SubCUTAneous, Daily  hydrALAZINE (APRESOLINE) injection 10 mg, 10 mg, IntraVENous, Q1H PRN  acetaminophen (TYLENOL) tablet 650 mg, 650 mg, Oral, Q4H PRN  ondansetron (ZOFRAN-ODT) disintegrating tablet 4 mg, 4 mg, Oral, Q8H PRN **OR** ondansetron (ZOFRAN) injection 4 mg, 4 mg, IntraVENous, Q6H PRN  nicotine (NICODERM CQ) 14 MG/24HR 1 patch, 1 patch, TransDERmal, Daily      Diagnostics:     CBC:   Recent Labs     01/29/23  0909 01/30/23  0736 01/31/23  0402   WBC 7.9 11.5* 7.6   RBC 5.07 4.92 4.56   HGB 16.3 15.9 14.7   HCT 48.6 46.2 44.8   MCV 95.9 93.9 98.2   RDW 12.1 12.0 12.1    See Reflexed IPF Result See Reflexed IPF Result     BMP:    Recent Labs     01/29/23  0909 01/30/23  0908 01/31/23  0402   GLUCOSE 109* 91 92   BUN 12 13 16   CREATININE 0.57* 0.66* 0. 73   CALCIUM 11.6* 10.8* 11.2*    137 136   K 3.5* 4.0 3.9    107 104   CO2 21 20 21   ANIONGAP 13 10 11   LABGLOM >60 >60 >60     HbA1c:   Lab Results   Component Value Date    LABA1C 4.9 01/27/2023     BNP: No results for input(s): BNP in the last 72 hours. PT/INR: No results for input(s): PROTIME, INR in the last 72 hours. APTT: No results for input(s): APTT in the last 72 hours. CARDIAC ENZYMES: No results for input(s): CKMB, CKMBINDEX, TROPONINT, TROPHS, TROPII in the last 72 hours. Invalid input(s): CKTOTAL;3   FASTING LIPID PANEL:No results found for: CHOL, HDL, TRIG  LIVER PROFILE: No results for input(s): AST, ALT, ALB, BILIDIR, BILITOT, ALKPHOS in the last 72 hours. Radiology:    TTE 1/31/2023  CONCLUSIONS     Summary   The Left ventricle appears normal in size. Mildly increased wall thickness. No obvious wall motion abnormality noted. Normal LV systolic function, Ejection Fraction > 65%   Normal RV size and function. The LA and RA appears normal in size. No obvious significant structural valvular abnormality noted. No significant valvular stenosis or regurgitation noted. Normal aortic root dimensions. No significant pericardial infusion seen. The IVC appears normal in size, normal respiratory variation suggestive of   normal right atrial filling pressure.      Impression/Plan:    Hemorrhagic CVA with R dominant hemiparesis  Alcohol withdrawal : on CIWA protocol , Thiamine, folate  HTN  Insomnia  Nicotine dependence    Recommendation:  Recommend acute inpatient rehabilitation  DVT Prophylaxis: on Lovenox        Electronically signed by Carlos Alberto Cruz MD on 1/31/2023 at 9:11 AM       This note is created with the assistance of a speech recognition program.  While intending to generate a document that actually reflects the content of the visit, the document can still have some errors including those of syntax and sound a like substitutions which may escape proof reading. In such instances, actual meaning can be extrapolated by contextual diversion.

## 2023-01-31 NOTE — PROGRESS NOTES
Physical Therapy  Facility/Department: [address] NEURO ICU  Daily Treatment Note  NAME: Suzi Dill  : 1980  MRN: [id number]    Date of Service: 2023    Discharge Recommendations: In my professional opinion, this patient could tolerate a total of 3 hours of combined therapies post-acute care. PT Equipment Recommendations  Equipment Needed: No (defer equipment recommendations to rehab facility)    Patient Diagnosis(es): The primary encounter diagnosis was Intracranial hemorrhage ([facility] [address] A diagnosis of Pontine hemorrhage ([facility] [address] was also pertinent to this visit. Assessment   Pt cooperative, R gaze preference but able to turn his head to look to his L; ataxia RU/LEs; able to ambulate 80' óscar (lift) walker 4 pound cuff wt on RLE with min A for straight, mod A for turns--pt does better when allowed to look at his RLE during gait. Improved tolerance to activity today--/98 upon PT arrival, 141/86 at end of PT session. Activity Tolerance: Patient tolerated treatment well  Equipment Needed: No (defer equipment recommendations to rehab facility)     Plan    Physcial Therapy Plan  General Plan: 6-7 times per week  Current Treatment Recommendations: Strengthening;Balance training;Functional mobility training;Transfer training; Endurance training;Gait training;Stair training;Neuromuscular re-education;Home exercise program;Patient/Caregiver education & training; Safety education & training;Equipment evaluation, education, & procurement; Therapeutic activities;ROM;Positioning; Wheelchair mobility training  PT Plan of Care: Daily     Restrictions  Restrictions/Precautions  Restrictions/Precautions: Up as Tolerated  Required Braces or Orthoses?: No  Position Activity Restriction  Other position/activity restrictions:  Up with assist, SBP <160 mmHg     Subjective    Subjective  Pain: no c/o pain throughout PT session  Orientation  Overall Orientation Status: Within Functional Limits  Cognition  Overall Cognitive Status: Exceptions  Arousal/Alertness: Appropriate responses to stimuli  Following Commands: Follows multistep commands with increased time; Follows multistep commands with repitition  Attention Span: Attends with cues to redirect  Safety Judgement: Decreased awareness of need for assistance;Decreased awareness of need for safety  Problem Solving: Assistance required to generate solutions;Assistance required to identify errors made;Assistance required to correct errors made;Decreased awareness of errors  Insights: Decreased awareness of deficits  Initiation: Requires cues for some  Sequencing: Requires cues for some     Objective   Vitals  BP: (!) 141/86 (at end of PT session; initial 139/94; RN notified)  BP Location: Left upper arm  BP Method: Automatic  MAP (Calculated): 104  O2 Device: None (Room air)  Bed Mobility Training  Bed Mobility Training: No (pt up in chair upon PT arrival and retired to chair at end of PT session)  Balance  Sitting: With support (pt has a tendency to lean towards his R in both sitting and standing)  Standing: With support  Transfer Training  Transfer Training: Yes  Overall Level of Assistance: Minimum assistance  Interventions: Demonstration;Manual cues; Visual cues; Verbal cues; Safety awareness training;Weight shifting training/pressure relief  Sit to Stand: Minimum assistance  Stand to Sit: Minimum assistance  Stand Pivot Transfers: Moderate assistance  Gait Training  Gait Training: Yes  Gait  Overall Level of Assistance: Minimum assistance; Moderate assistance (min for straight once he got his sequencing; mod A for turns; pt has to look at his R foot to control step length)  Interventions: Demonstration;Manual cues; Safety awareness training; Tactile cues; Verbal cues; Visual cues; Weight shifting training/pressure relief  Base of Support: Center of gravity altered;Shift to right;Widened (decreased control of RLE)  Speed/Tess: Pace decreased (< 100 feet/min); Slow (cues to decrease speed to increase RLE control)  Step Length: Right lengthened (able to correct with frequent verbal cues)  Gait Abnormalities: Ataxic;Decreased step clearance;Lurching;Path deviations  Distance (ft): 80 Feet (x2 with seated rest break in between)  Assistive Device: Gait belt; Other (comment) (gray lift walker; 4 pound wt on RLE to increase feedback)     PT Exercises  A/AROM Exercises: AROM RLE: LAQs x 20 reps with 4 pound cuff wt, verbal cues for control/decreased speed of movement  Dynamic Standing Balance Exercises: standing ex: up on toes and partial squats with BUE support/PT, mod A to correct LOB to R; single KTC with PT support RLE; pt unable to maintain balance while attempting to stabilize RLE and lift LLE, but able to do so with gray lift walker support     Safety Devices  Type of Devices: Call light within reach;Gait belt;Patient at risk for falls;Nurse notified; Chair alarm in place; Left in chair  Restraints  Restraints Initially in Place: No       Goals  Short Term Goals  Time Frame for Short Term Goals: 14 visits  Short Term Goal 1: Pt amb 300' IND  Short Term Goal 2: Pt will be IND in all bed mobility tasks  Short Term Goal 3: Pt will be IND in all transfers  Short Term Goal 4: Pt will be IND 1 flight of stair navigation. Education  Patient Education  Education Given To: Patient; Family  Education Provided: Role of Therapy;Transfer Training;Equipment;Plan of Care; Fall Prevention Strategies;Precautions  Education Method: Verbal;Demonstration; Teach Back  Barriers to Learning: Cognition  Education Outcome: Continued education needed    Therapy Time   Individual Concurrent Group Co-treatment   Time In 1115         Time Out 1215         Minutes Vic Alvarez [place], [place]

## 2023-01-31 NOTE — PROGRESS NOTES
Pt got out of bed and writer found patient on his knees at the bedside after leads disconnected. Writer assessed the pt and recorded stable vitals within Epic. Patient is alert and oriented x 4 and told writer that he \"swung his legs too far\" and that he didn't hit his head. Neuro crit care notified and came to bedside to assess the patient.

## 2023-01-31 NOTE — PLAN OF CARE
Problem: Discharge Planning  Goal: Discharge to home or other facility with appropriate resources  1/31/2023 0353 by Angy Gan RN  Outcome: Progressing     Problem: ABCDS Injury Assessment  Goal: Absence of physical injury  1/31/2023 0353 by Angy Gan, RN  Outcome: Progressing

## 2023-02-01 LAB
ABSOLUTE EOS #: 0.12 K/UL (ref 0–0.44)
ABSOLUTE IMMATURE GRANULOCYTE: 0.04 K/UL (ref 0–0.3)
ABSOLUTE LYMPH #: 1.89 K/UL (ref 1.1–3.7)
ABSOLUTE MONO #: 1.03 K/UL (ref 0.1–1.2)
ANION GAP SERPL CALCULATED.3IONS-SCNC: 11 MMOL/L (ref 9–17)
BASOPHILS # BLD: 1 % (ref 0–2)
BASOPHILS ABSOLUTE: 0.05 K/UL (ref 0–0.2)
BUN SERPL-MCNC: 12 MG/DL (ref 6–20)
CALCIUM SERPL-MCNC: 10.7 MG/DL (ref 8.6–10.4)
CHLORIDE SERPL-SCNC: 104 MMOL/L (ref 98–107)
CO2 SERPL-SCNC: 22 MMOL/L (ref 20–31)
CREAT SERPL-MCNC: 0.66 MG/DL (ref 0.7–1.2)
EOSINOPHILS RELATIVE PERCENT: 2 % (ref 1–4)
GFR SERPL CREATININE-BSD FRML MDRD: >60 ML/MIN/1.73M2
GLUCOSE SERPL-MCNC: 93 MG/DL (ref 70–99)
HCT VFR BLD AUTO: 45.6 % (ref 40.7–50.3)
HGB BLD-MCNC: 15.1 G/DL (ref 13–17)
IMMATURE GRANULOCYTES: 1 %
LYMPHOCYTES # BLD: 24 % (ref 24–43)
MCH RBC QN AUTO: 32.2 PG (ref 25.2–33.5)
MCHC RBC AUTO-ENTMCNC: 33.1 G/DL (ref 28.4–34.8)
MCV RBC AUTO: 97.2 FL (ref 82.6–102.9)
MONOCYTES # BLD: 13 % (ref 3–12)
NRBC AUTOMATED: 0 PER 100 WBC
PDW BLD-RTO: 12.1 % (ref 11.8–14.4)
PLATELET # BLD AUTO: 182 K/UL (ref 138–453)
PMV BLD AUTO: 11.3 FL (ref 8.1–13.5)
POTASSIUM SERPL-SCNC: 4 MMOL/L (ref 3.7–5.3)
RBC # BLD: 4.69 M/UL (ref 4.21–5.77)
SEG NEUTROPHILS: 59 % (ref 36–65)
SEGMENTED NEUTROPHILS ABSOLUTE COUNT: 4.89 K/UL (ref 1.5–8.1)
SODIUM SERPL-SCNC: 137 MMOL/L (ref 135–144)
WBC # BLD AUTO: 8 K/UL (ref 3.5–11.3)

## 2023-02-01 PROCEDURE — 6370000000 HC RX 637 (ALT 250 FOR IP)

## 2023-02-01 PROCEDURE — 6360000002 HC RX W HCPCS

## 2023-02-01 PROCEDURE — 6370000000 HC RX 637 (ALT 250 FOR IP): Performed by: PSYCHIATRY & NEUROLOGY

## 2023-02-01 PROCEDURE — 2580000003 HC RX 258

## 2023-02-01 PROCEDURE — 6370000000 HC RX 637 (ALT 250 FOR IP): Performed by: STUDENT IN AN ORGANIZED HEALTH CARE EDUCATION/TRAINING PROGRAM

## 2023-02-01 PROCEDURE — 97129 THER IVNTJ 1ST 15 MIN: CPT

## 2023-02-01 PROCEDURE — 85025 COMPLETE CBC W/AUTO DIFF WBC: CPT

## 2023-02-01 PROCEDURE — 97530 THERAPEUTIC ACTIVITIES: CPT

## 2023-02-01 PROCEDURE — 6370000000 HC RX 637 (ALT 250 FOR IP): Performed by: FAMILY MEDICINE

## 2023-02-01 PROCEDURE — 2060000000 HC ICU INTERMEDIATE R&B

## 2023-02-01 PROCEDURE — 97535 SELF CARE MNGMENT TRAINING: CPT

## 2023-02-01 PROCEDURE — 6360000002 HC RX W HCPCS: Performed by: PSYCHIATRY & NEUROLOGY

## 2023-02-01 PROCEDURE — 36415 COLL VENOUS BLD VENIPUNCTURE: CPT

## 2023-02-01 PROCEDURE — 99232 SBSQ HOSP IP/OBS MODERATE 35: CPT | Performed by: PSYCHIATRY & NEUROLOGY

## 2023-02-01 PROCEDURE — 97130 THER IVNTJ EA ADDL 15 MIN: CPT

## 2023-02-01 PROCEDURE — 80048 BASIC METABOLIC PNL TOTAL CA: CPT

## 2023-02-01 PROCEDURE — 2580000003 HC RX 258: Performed by: PSYCHIATRY & NEUROLOGY

## 2023-02-01 RX ADMIN — FOLIC ACID 1 MG: 1 TABLET ORAL at 08:45

## 2023-02-01 RX ADMIN — PANTOPRAZOLE SODIUM 40 MG: 40 TABLET, DELAYED RELEASE ORAL at 08:45

## 2023-02-01 RX ADMIN — CARVEDILOL 12.5 MG: 12.5 TABLET, FILM COATED ORAL at 08:45

## 2023-02-01 RX ADMIN — SODIUM CHLORIDE, PRESERVATIVE FREE 10 ML: 5 INJECTION INTRAVENOUS at 20:11

## 2023-02-01 RX ADMIN — LISINOPRIL 40 MG: 20 TABLET ORAL at 08:45

## 2023-02-01 RX ADMIN — SODIUM CHLORIDE, PRESERVATIVE FREE 10 ML: 5 INJECTION INTRAVENOUS at 09:55

## 2023-02-01 RX ADMIN — PHENOBARBITAL SODIUM 130 MG: 130 INJECTION INTRAMUSCULAR; INTRAVENOUS at 14:15

## 2023-02-01 RX ADMIN — PHENOBARBITAL SODIUM 130 MG: 130 INJECTION INTRAMUSCULAR; INTRAVENOUS at 20:11

## 2023-02-01 RX ADMIN — CARVEDILOL 12.5 MG: 12.5 TABLET, FILM COATED ORAL at 16:50

## 2023-02-01 RX ADMIN — ENOXAPARIN SODIUM 40 MG: 100 INJECTION SUBCUTANEOUS at 08:45

## 2023-02-01 RX ADMIN — TAMSULOSIN HYDROCHLORIDE 0.4 MG: 0.4 CAPSULE ORAL at 08:45

## 2023-02-01 RX ADMIN — LORAZEPAM 2 MG: 2 INJECTION INTRAMUSCULAR at 00:27

## 2023-02-01 ASSESSMENT — PAIN SCALES - GENERAL: PAINLEVEL_OUTOF10: 0

## 2023-02-01 NOTE — PROGRESS NOTES
Physical Therapy Cancel Note      DATE: 2023    NAME: Corrie Leal  MRN: [id number]   : 1980      Patient not seen this date for Physical Therapy due to: Other: second attempt to see for ambulation, per RN pt just finished with shower, RN working with pt; likely check back 2/2 for gait.        Electronically signed by Mirian Manzanares PT on 2023 at 12:30 PM

## 2023-02-01 NOTE — PROGRESS NOTES
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    Intracranial hemorrhage ([address] I62.9       Pain: 0/10    Cognitive Treatment    Treatment time: 9:43 - 10:10      Subjective: [x] Alert [x] Cooperative     [] Confused     [] Agitated    [] Lethargic      Objective/Assessment:    Recall: Memory and Mental Manipulation: Reverse Order: 3 Words: 8/10 increased to 10/10 with 1 repetition  Coding and Grouping Items for Recall: Pairing Items: 7/8 increased to 8/8 with 1 repetition  Chaining Word Lists: Varied Lists: 3/3 with mod verbal cues, 3/3 with min verbal cues  Memory for Shapes and Pictures: 5/10    Problem Solving/Reasoning: Multiple Sentence Formulation: 8/8    Other: Performance on \"Memory for Shapes and Pictures\" was effected by Pt's vision. Pt states that his vision is blurry; when asked to describe his visual field he stated that he could see the top of an object then the rest of the object a foot away. Pt typically wears eyeglasses, he used his phone camera to magnetize the image. Plan:  [x] Continue ST services    [] Discharge from ST:      Discharge recommendations: []  Further therapy recommended at discharge. The patient should be able to tolerate at least 3 hours of therapy per day over 5 days or 15 hours over 7 days. [x] Further therapy recommended at discharge. [] No therapy recommended at discharge. Treatment completed by: Completed by:  Leobardo Beasley  Clinician    Cosigned By: Prudence PACCC/SLP

## 2023-02-01 NOTE — PROGRESS NOTES
Physical Therapy  Facility/Department: [address] STEPDOWN  Daily Treatment Note  NAME: Magda Simmons  : 1980  MRN: [id number]    Date of Service: 2023    Discharge Recommendations: In my professional opinion, this patient could tolerate a total of 3 hours of combined therapies post-acute care. PT Equipment Recommendations  Equipment Needed: No    Patient Diagnosis(es): The primary encounter diagnosis was Intracranial hemorrhage ([facility] [address] A diagnosis of Pontine hemorrhage ([facility] [address] was also pertinent to this visit. Assessment   Pt cooperative, motivated, decreased awareness of R sided deficits; pt denies double vision, still unable to look beyond midline to his L but able to turn his head to the L to see. Continued impulsive, tends to lose his balance to his R. Pt would benefit from aggressive therapies prior to returning home with spouse. Activity Tolerance: Treatment limited secondary to medical complications (pt hypertensive and BP tends to elevate when he's working on ambulation, so deferred gait at this time; RN notified/aware)  Equipment Needed: No     Plan    Physcial Therapy Plan  General Plan: 6-7 times per week  Current Treatment Recommendations: Strengthening;Balance training;Functional mobility training;Transfer training; Endurance training;Gait training;Stair training;Neuromuscular re-education;Home exercise program;Patient/Caregiver education & training; Safety education & training;Equipment evaluation, education, & procurement; Therapeutic activities;ROM;Positioning; Wheelchair mobility training  PT Plan of Care: Daily     Restrictions  Restrictions/Precautions  Restrictions/Precautions: Up as Tolerated  Required Braces or Orthoses?: No  Position Activity Restriction  Other position/activity restrictions:  Up with assist, SBP <160 mmHg     Subjective    Subjective  Pain: no c/o pain throughout PT session  Orientation  Overall Orientation Status: Within Functional Limits  Cognition  Overall Cognitive Status: Exceptions  Arousal/Alertness: Appropriate responses to stimuli  Following Commands: Follows multistep commands with increased time; Follows multistep commands with repitition  Attention Span: Attends with cues to redirect  Safety Judgement: Decreased awareness of need for assistance;Decreased awareness of need for safety  Insights: Decreased awareness of deficits  Initiation: Requires cues for some  Sequencing: Requires cues for some     Objective   Vitals  BP: (!) 159/105  BP Location: Left upper arm  BP Method: Automatic  Patient Position: Sitting  MAP (Calculated): 123  O2 Device: None (Room air)  Bed Mobility Training  Bed Mobility Training: No (pt dangling at EOB upon PT arrival, getting ready to get into [facility] and \"go for a ride\" with his wife; agreeable to participate with PT first) RN aware and allowed pt to go off the unit after PT session completed. Balance  Sitting: Without support (mild lean to R intermittently)  Standing: Impaired  Standing - Static: Constant support  Standing - Dynamic: Constant support  Transfer Training  Transfer Training: Yes  Overall Level of Assistance: Moderate assistance  Interventions: Manual cues; Safety awareness training; Tactile cues; Verbal cues; Visual cues; Weight shifting training/pressure relief  Sit to Stand: Minimum assistance  Stand to Sit: Minimum assistance  Stand Pivot Transfers: Moderate assistance  Bed to Chair: Minimum assistance; Moderate assistance  Gait Training  Gait Training: No (pt's /105-RN notified; will wait for BP to be in better range prior to mobilizing with ósacr walker)  Wheelchair Management  Wheelchair Management: Yes  Overall Level of Assistance: Stand-by assistance  Interventions: Demonstration;Manual cues; Safety awareness training; Tactile cues; Verbal cues; Visual cues (multiple attempts to pull along side bed for transfer from [facility] back to bed)  Distance (ft): 220 Feet (pt tends to veer to the L, occasional verbal cues needed to correct); occasionally loses his grasp of the R wheel with his R hand while propelling WC. Safety Devices  Type of Devices:  (pt in [employer] and he and wife going off the unit at end of PT session)  Restraints  Restraints Initially in Place: No       Goals  Short Term Goals  Time Frame for Short Term Goals: 14 visits  Short Term Goal 1: Pt amb 300' IND  Short Term Goal 2: Pt will be IND in all bed mobility tasks  Short Term Goal 3: Pt will be IND in all transfers  Short Term Goal 4: Pt will be IND 1 flight of stair navigation. Education  Patient Education  Education Given To: Patient; Family  Education Provided: Role of Therapy;Transfer Training;Equipment;Plan of Care; Fall Prevention Strategies;Precautions; Family Education; Low Vision Education  Education Method: Verbal;Demonstration; Teach Back  Barriers to Learning: Cognition  Education Outcome: Continued education needed    Therapy Time   Individual Concurrent Group Co-treatment   Time In 2100         Time Out 5160         Minutes 25         Timed Code Treatment Minutes: 25 Minutes       Venkata Carver, PT

## 2023-02-01 NOTE — CARE COORDINATION
Per PM&R physiatrist Dr. Imtiaz Brooks, patient appropriate for Acute Inpatient Rehabilitation level of care.     Eligibility & Benefits Verified - See Note    Prior authorization request for admission initiated with primary insurance [employer] via: Phone Call: Call Ref# [id number], Representative: Emi     Pending Case Reference/Authorization # [id number]    Clinical documentation submitted via Fax to [phone]    Updated Adwoa CM: Via Telephone Conversation at this time at phone/extension: [phone]

## 2023-02-01 NOTE — PLAN OF CARE
Problem: Discharge Planning  Goal: Discharge to home or other facility with appropriate resources  Outcome: Progressing  Flowsheets (Taken 1/31/2023 1500 by Ric Wood RN)  Discharge to home or other facility with appropriate resources: Identify barriers to discharge with patient and caregiver     Problem: Safety - Adult  Goal: Free from fall injury  Outcome: Progressing     Problem: ABCDS Injury Assessment  Goal: Absence of physical injury  Outcome: Progressing

## 2023-02-01 NOTE — CARE COORDINATION
Met with patient and family member at bedside - discussed ARU and insurance coverages as estimated in \"Financial Disclosure Statement: Eligibility and Benefit Verification\"from 1/31 in patient chart.  Verablized understanding and agrees to pursue admission to [address] [place] [address], call to Song at [address] [place] [address] to update

## 2023-02-01 NOTE — PROGRESS NOTES
Daily Progress Note  Neuro Critical Care    Patient Name: Courtney Florez  Patient : 1980  Room/Bed: 3120/8181-86  Code Status: Full Code  Allergies: No Known Allergies    CHIEF COMPLAINT:     Confusion     INTERVAL HISTORY    Initial Presentation (Admitted 23): The patient is a 43 y.o. male presented with dizziness, right-sided facial droop, right-sided weakness and right-sided numbness. Patient felt well when he went to bed , last known well 10 PM.  Medical history notable for hypertension, takes Coreg for this. Patient does not know his dose. EMS was called when patient woke up with these symptoms this morning. Systolic blood pressure was 200s on arrival to the ED. Stroke alert was called on arrival, CT of the head demonstrated 13 x 8 mm intraparenchymal hemorrhage of the posterior ramses. CTA of the head and neck showed focal mild narrowing of the right P1 PCA, no other significant stenosis, aneurysm, or arterial injury. Cardene was started for blood pressure control with goal blood pressure under 160. Patient's metabolic work-up unremarkable overall. Patient remains alert, oriented, able to provide history. He does describe double vision, sensation changes on the right and weakness. He was unable to walk when this happened. No other medical history. No known allergies. Patient does smoke. Hospital Course:   : Remained stable, requiring lower amounts of cardene  : Patient had headache overnight. Given 1 g of mag and Benadryl. Also had urinary retention and straight cath once x3. Mcmahan was placed. Remains on 2.5 mg/h of nicardipine this morning. Systolic pressures have been in the 130s to 150s, few in the 160s. Repeat CT this morning showed stable intraparenchymal hemorrhage without midline shift. Plan to discontinue nicardipine today, continue lisinopril, use as needed labetalol and hydralazine as needed. :   Had urinary retention and straight cath once.  On lisinopril 10mg but BP still elevated, will increase. Pending MRI. Still unsteady without support. 1/29: Patient became agitated, confused and started having hallucinations. His wife reported daily ETOH use; ~2 vodka drinks per night. Repeat CT Head stable. Started on Luminal 260 mg once, then 130 mg bid, IV Thiamine 043 mg tid, folic acid, and Seroquel 25 mg nightly PRN. Hypertensive; Lisinopril increased to 40mg daily. Transferred back to ICU for precedex drip. 1/30: urinary retention requiring straight cath x2, refusing moore. Remains hypertensive, Weaned off precedex. Receiving phenobarbital and ativan via CIWA.   1/31:Reportedly had a fall out of bed overnight. Patients states he moves around a lot in his sleep and rolled over and accidentally rolled out of bed. Denies striking his head, denies LOC. States he landed on his knees, denying any injuries. Will have PT/OT evaluate this morning to make sure patient is safe in and out of bed. Patient very eager to go home, very focused on this. Plan to wean phenobarbital today. Received 1mg oral ativan in past 24 hours. Still remains hypertensive. Has received 3 doses of hydralazine in last 24 hours. Will start coreg. States urinary retention is improving, states it is very difficult for him to urinate unless he is sitting on the toilet    Last 24h:  Patient has interval improvement on physical exam.  Full strength right upper and lower extremity, patient able to walk go to the bathroom unassisted and shower. Restarted on scheduled phenobarbital 130 mg twice daily for 3 days followed by 65 mg for 2 days for withdrawal symptoms. Pending ARU placement to [facility].     CURRENT MEDICATIONS:  SCHEDULED MEDICATIONS:   carvedilol  12.5 mg Oral BID WC    pantoprazole  40 mg Oral QAM AC    sodium chloride flush  5-40 mL IntraVENous 2 times per day    lisinopril  40 mg Oral Daily    folic acid  1 mg Oral Daily    melatonin  5 mg Oral Nightly    thiamine (VITAMIN B1) IVPB  500 mg IntraVENous TID    Followed by    Phil Bernal ON 2023] thiamine  100 mg Oral Daily    tamsulosin  0.4 mg Oral Daily    enoxaparin  40 mg SubCUTAneous Daily    nicotine  1 patch TransDERmal Daily     CONTINUOUS INFUSIONS:   sodium chloride 10 mL/hr at 23 1722     PRN MEDICATIONS:   fluticasone, labetalol, sodium chloride flush, sodium chloride, LORazepam **OR** LORazepam **OR** LORazepam **OR** LORazepam **OR** LORazepam **OR** LORazepam **OR** LORazepam **OR** LORazepam, QUEtiapine, calcium carbonate, sodium chloride flush, hydrALAZINE, acetaminophen, ondansetron **OR** ondansetron    VITALS:  Temperature Range: Temp: 97.7 °F (36.5 °C) Temp  Av.9 °F (36.6 °C)  Min: 97.5 °F (36.4 °C)  Max: 98.6 °F (37 °C)  BP Range: Systolic (27DHC), TSI:695 , Min:132 , UIA:551     Diastolic (84YHS), PWT:686, Min:67, Max:153    Pulse Range: Pulse  Av.6  Min: 84  Max: 117  Respiration Range: Resp  Av.3  Min: 14  Max: 27  Current Pulse Ox: SpO2: 98 %  24HR Pulse Ox Range: SpO2  Av.5 %  Min: 96 %  Max: 99 %  Patient Vitals for the past 12 hrs:   BP Temp Temp src Pulse Resp SpO2   23 0415 (!) 139/97 97.7 °F (36.5 °C) Oral 84 20 --   23 2330 (!) 157/97 98 °F (36.7 °C) Oral 93 19 --   23 2000 (!) 151/87 97.7 °F (36.5 °C) Oral 90 22 98 %     RECENT LABS:   Lab Results   Component Value Date    WBC 8.0 2023    HGB 15.1 2023    HCT 45.6 2023     2023    ALT 60 (H) 2023    AST 54 (H) 2023     2023    K 4.0 2023     2023    CREATININE 0.66 (L) 2023    BUN 12 2023    CO2 22 2023    INR 1.1 2023    LABA1C 4.9 2023     24 HOUR INTAKE/OUTPUT:  Intake/Output Summary (Last 24 hours) at 2023 0657  Last data filed at 2023 1720  Gross per 24 hour   Intake 600.59 ml   Output --   Net 600.59 ml   CT HEAD WO CONTRAST  Result Date: 2023  Unchanged pontine hemorrhage.  No intraventricular extension. CT head without contrast  Result Date: 1/27/2023  Stable intraparenchymal hemorrhage. No midline shift. CT HEAD WO CONTRAST  Result Date: 1/26/2023  13 x 8 mm intraparenchymal hemorrhage posterior ramses. Critical results were called by Dr. Denver Chamberlain to Dr. Alondra Tinajero on 1/26/2023 at 06:37. CTA HEAD NECK W CONTRAST  Result Date: 1/26/2023  Focal mild narrowing of the right P1 PCA. Otherwise no significant stenosis, aneurysm, or arterial injury in the CTA of the head and neck. Pontine intraparenchymal hemorrhage is redemonstrated. MRI BRAIN W WO CONTRAST  Result Date: 1/30/2023  Chronic pontine microhemorrhages in addition to the acute hematoma suggest hypertensive hemorrhage. No brain mass. Labs and Images reviewed with:  [] Dr. Alphonso Sumner. Kimmie    [x] Dr. Tania Bhakta  [] Dr. Ozzie Salinas  [] There are no new interval images to review. PHYSICAL EXAM       CONSTITUTIONAL:  Alert, oriented x3. Forgetful. In no acute distress. Mild dysarthria. No aphasia. HEAD:  normocephalic, atraumatic    EYES:  Left eyelid ptosis. PERRL. 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 ENT:  moist mucous membranes   NECK:  supple, symmetric   LUNGS:  Equal air entry bilaterally, clear   CARDIOVASCULAR:  normal s1 / s2, tachycardic, regular rhythm, distal pulses intact   ABDOMEN:  Soft, no rigidity, normal bowel sounds   NEUROLOGIC:  Mental Status:  A & O x3 but forgetful. Following commands. Cranial Nerves:    II: Visual acuity:  reports blurred vision  II: Visual fields:  intact  III: Pupils:  equal, round, reactive to light  III,IV,VI: Extra Ocular Movements: abnormal- as above  V: Facial sensation:  intact  VII: Facial strength: abnormal- left facial droop  VIII: Hearing: [place] at baseline     Motor Exam:       5/5 strength in all extremities.       Sensory: Touch:    Grossly intact. Denies numbness/tingling     Coordination/Dysmetria:     Finger to Nose:   Right:  abnormal - dysmetria  Left:  normal      NIH Stroke Scale Total (if not done complete detailed one below):    1a.  Level of consciousness:  0 - alert; keenly responsive  1b. Level of consciousness questions:  0 - answers both questions correctly  1c. Level of consciousness questions:  0 - performs both tasks correctly  2. Best Gaze:  1 - partial gaze palsy  3. Visual:  0 - no visual loss  4. Facial Palsy:  1 - minor paralysis (flattened nasolabial fold, asymmetric on smiling)  5a. Motor left arm:  0 - no drift, limb holds 90 (or 45) degrees for full 10 seconds  5b. Motor right arm:  0 - no drift, limb holds 90 (or 45) degrees for full 10 seconds  6a. Motor left le - no drift; leg holds 30 degree position for full 5 seconds  6b. Motor right le - no drift; leg holds 30 degree position for full 5 seconds  7. Limb Ataxia:  1 - present in one limb  8. Sensory:  0 - normal; no sensory loss  9. Best Language:  0 - no aphasia, normal  10. Dysarthria:  1 - mild to moderate, patient slurs at least some words and at worst, can be understood with some difficulty  11. Extinction and Inattention:  1 - visual, tactile, auditory, spatial or personal inattention or extinction to bilateral simultaneous stimulation in one of the sensory modalities     DRAINS:  [x] There are no drains for Neuro Critical Care to monitor at this time. ASSESSMENT AND PLAN:       The patient is a 43 y.o. male with a history of HTN and daily ETOH use who presented after waking up with right-sided weakness and numbness in addition to dizziness. 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 Hypertensive on arrival with initial SBP~ 200. Admitted to the Neuro ICU for further management. Course complicated by presumed ETOH withdraw.        Patient care will be discussed with attending, will reevaluate patient along with attending. 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  - CTA Head/Neck with no vascular abnormality to explain ICH  - MRI Brain with/without contrast 1/28 demonstrated stable pontine hemorrhage, chronic microhemorrhages to suggest hypertension  - Repeat CT Head 1/29 stable  - Neurosurgery signed off; F/U in clinic in 2 week with repeat CT Head without contrast  - Goal SBP <160  - ETOH withdraw  - Continue high dose Thiamine and folic acid supplementation  -Taken off CIWA Ativan protocol, restarted on phenobarbital 130 mg twice daily for 3 days followed by 2 days of 65 mg twice daily for withdrawal  - Weaned off Precedex infusion 1/30 in AM, Seroquel 25mg QHS PRN  - Neuro checks per protocol    CARDIOVASCULAR:  - Hypertensive emergency, resolved  - Goal SBP <160  - PRN Hydralazine/Labetalol  - Essential HTN; continue Lisinopril 40mg QD  - Added 12.5mg coreg  - Troponin 17, EKG NSR with prolonged QT  -Echo unremarkable  - Continue telemetry   - Tachycardia, monitor, evaluate response to coreg    PULMONARY:  - Maintaining O2 sats on room air  - Incentive spirometry  - Smoking cessation encouraged; continue Nicotine patch    RENAL/FLUID/ELECTROLYTE:  - Normal renal functioning  - BUN 16/ Creatinine 0.73  - Monitor I&O  - Recurrent acute urinary retention, no straight caths required overnight, patient has refused moore, states he needs to be sitting on the toilet to adequately void  - Continue Flomax 0.4mg daily  - Bladder scan Q6h and PRN, straigth cath for volume >400cc  - Consider Urology consult if persists  - No maintenance IVF, tolerating diet  - Replace electrolytes as needed  - BMP daily    GI/NUTRITION:  NUTRITION:  ADULT DIET;  Regular  - Tolerating regular diet  - Last BM 1/31  - Bowel regimen: Glycolax PRN  - Complaining of heart burn, restart home protonix  - GI prophylaxis: protonix, home med    ID:  - Afebrile  - Leukocytosis resolved, 7.6 (11.5)  - UA 1/27 negative to date  - Monitor off antibiotics   - Daily CBC    HEME:   - H&H 14.7/44.8  - Platelets 414  - CBC daily    ENDOCRINE:  - Continue to monitor blood glucose, goal <180  - Hemoglobin A1C 4.9    OTHER:  - PT/OT/ST   - PM&R following, recommending ARU when medically stable    PROPHYLAXIS:  Stress ulcer: N/A    DVT PROPHYLAXIS:  - SCD sleeves - Thigh High   - Lovenox 40mg subQ daily    DISPOSITION:  [x] Transfer to stepdown    We will continue to follow along. For any changes in exam or patient status please contact Neuro Critical Care.       Khadijah Nguyen MD  Neuro Critical Care  Pager [phone]  2/1/2023     6:57 AM

## 2023-02-01 NOTE — PROGRESS NOTES
Occupational Therapy  Facility/Department: [address] STEPDOWN  Occupational Daily Treatment Note    Name: He Ivan  : 1980  MRN: [id number]  Date of Service: 2023    Discharge Recommendations:  Patient would benefit from continued therapy after discharge Further therapy recommended at discharge. The patient should be able to tolerate at least 3 hours of therapy per day over 5 days or 15 hours over 7 days. This patient may benefit from a Physical Medicine and Rehab consult. Patient Diagnosis(es): The primary encounter diagnosis was Intracranial hemorrhage ([facility] [address] A diagnosis of Pontine hemorrhage ([facility] [address] was also pertinent to this visit. Past Medical History:  has a past medical history of History of heartburn, Neoplasm of unspecified nature of bone, soft tissue, and skin, and Research study patient. Past Surgical History:  has a past surgical history that includes Appendectomy (in 20's) and pre-malignant / benign skin lesion excision (Right, 2014). Assessment   Performance deficits / Impairments: Decreased functional mobility ; Decreased ADL status; Decreased endurance;Decreased high-level IADLs;Decreased fine motor control;Decreased strength;Decreased coordination;Decreased balance;Decreased vision/visual deficit; Decreased safe awareness  Prognosis: Good  Activity Tolerance  Activity Tolerance: Patient Tolerated treatment well        Plan   Occupational Therapy Plan  Times Per Week: 3-4x/wk     Restrictions  Restrictions/Precautions  Restrictions/Precautions: Up as Tolerated  Required Braces or Orthoses?: No  Position Activity Restriction  Other position/activity restrictions: Up with assist, SBP <160 mmHg  Pain assessment: Pt denies pain this day. Subjective   General  Patient assessed for rehabilitation services?: Yes  Family / Caregiver Present: Yes (spouse)  Diagnosis: pontine hemorrhage     Safety Devices  Type of Devices:  All fall risk precautions in place;Call light within reach;Chair alarm in place;Gait belt;Nurse notified; Left in chair;Telesitter in use    Balance  Sitting: Without support (Supervision seated EOB d/t impulsivity.)  Standing: With support (CGA static standing EOB using RW, min/mod A for functional mobility to/from bathroom using RW, CGA ADL care standing at sink, mod A functional mobility household distance using RW. Total time 24 minutes.)  Gait  Overall Level of Assistance: Minimum assistance; Moderate assistance; Additional time (Improved control with RLE and [place]. Occassional R lateral lean. Verbal cues for walker placement with good return.)  Assistive Device: Walker, rolling     ADL  Feeding: Adaptive utensils (comment); Setup; Increased time to complete;Stand by assistance  Grooming: Setup; Increased time to complete;Stand by assistance (Shave face standing at sink.)  UE Bathing: Setup; Increased time to complete;Supervision (wash face standing at sink.)  LE Dressing: Setup; Increased time to complete;Supervision (Don socks seated in bed.)  Additional Comments: Pt supine in bed upon therapist arrival. Pt donned socks seated in bed then transferred to seated EOB. Pt continued to display difficulty with looking to left with eyes, pt turns head to left but gaze continues to right. With increased time and verbal cues pt able to look with eyes to left (better with R eye, L partially looking left). Occassional double vision but pt states not a hindrance. Sit/stand transfer using RW for static standing EOB. Verbal cues to maintain wide [place] d/t weakness in RLE with good return. Functional mobility to bathroom for dynamic standing activity at sink. Pt stated showering earlier this day with RN. Pt stood at sink to shave face. Pt able to manage razor in R hand, dropping razor x3. Increased time needed to complete. Pt determined to get better. Pt able to support self with L hand on counter of sink.  Pt exited bathroom using RW for functional mobility household distance manuevering obstacles with verbal cues for walker placement with good return. Pt returned to seated EOB. Sit/stand transfer using RW to transfer EOB/chair. Pt issued red foam built up handle to aid in independence with ADLs d/t fluctuating  strength R hand. Pt able to eat yogurt with AE with good return. BP at end of session 142/94. Bed mobility  Supine to Sit: Modified independent  Scooting: Independent  Transfers  Sit to stand: Contact guard assistance  Stand to sit: Contact guard assistance (To control descent.)  Transfer Comments: Verbal cues for hand placement with good return. Mildly impulsive with verbal cues to control it with good return. Cognition  Overall Cognitive Status: Exceptions  Arousal/Alertness: Appropriate responses to stimuli  Following Commands: Follows multistep commands with increased time  Attention Span: Appears intact; Attends with cues to redirect  Memory: Appears intact  Safety Judgement: Decreased awareness of need for assistance;Decreased awareness of need for safety  Problem Solving: Assistance required to identify errors made;Assistance required to correct errors made  Insights: Decreased awareness of deficits  Initiation: Requires cues for some  Sequencing: Requires cues for some  Orientation  Overall Orientation Status: Within Functional Limits     Education Given To: Patient; Family  Education Provided Comments: OT POC, importance of participation in therapy, transfer/walker training, fall prevention, AE (red foam built up handle) with good return.   AM-PAC Score     AM-[facility] Inpatient Daily Activity Raw Score: 19 (02/01/23 1614)  AM-PAC Inpatient ADL T-Scale Score : 40.22 (02/01/23 1614)  ADL Inpatient CMS 0-100% Score: 42.8 (02/01/23 1614)  ADL Inpatient CMS G-Code Modifier : CK (02/01/23 1614)    Goals  Short Term Goals  Time Frame for Short Term Goals: pt will, by discharge  Short Term Goal 1: complete LB ADLs and toileting tasks with SBA and set up  Short Term Goal 2: complete UB ADLs with mod I  Short Term Goal 3: participate in meaningful activity for 15+ minutes in order to increase R UE coordination  Short Term Goal 4: dem SBA during functional transfers/functional mobility with LRD, as needed  Short Term Goal 5: dem ~8 minutes dynamic standing tolerance with SBA in order to complete functional tasks  Short Term Goal 6: Pt will attend to L field of vision 3/5 trials with mod visual or VC's for increased awareness of visual field for participation in ADL tasks (Goal added 1/31/2023 by MASON Leal/L in collaboration with GAVIN Chung)       Therapy Time   Individual Concurrent Group Co-treatment   Time In 1440         Time Out 1534         Minutes 54         Timed Code Treatment Minutes: 54 Minutes    Pt supine in bed upon therapist arrival. Pleasant and agreeable to therapy. See above for LOF for all tasks. Pt retired to seated in chair at end of session with chair alarm activated, telesitter present in room and call light within reach.       GAVIN Chung/MINDY

## 2023-02-02 ENCOUNTER — HOSPITAL ENCOUNTER (INPATIENT)
Age: 43
LOS: 13 days | Discharge: HOME | DRG: 057 | End: 2023-02-15
Attending: STUDENT IN AN ORGANIZED HEALTH CARE EDUCATION/TRAINING PROGRAM | Admitting: STUDENT IN AN ORGANIZED HEALTH CARE EDUCATION/TRAINING PROGRAM
Payer: COMMERCIAL

## 2023-02-02 VITALS
SYSTOLIC BLOOD PRESSURE: 159 MMHG | RESPIRATION RATE: 19 BRPM | BODY MASS INDEX: 27.09 KG/M2 | DIASTOLIC BLOOD PRESSURE: 98 MMHG | WEIGHT: 200 LBS | HEIGHT: 72 IN | OXYGEN SATURATION: 97 % | TEMPERATURE: 98 F | HEART RATE: 82 BPM

## 2023-02-02 DIAGNOSIS — I61.3 PONTINE HEMORRHAGE (HCC): ICD-10-CM

## 2023-02-02 DIAGNOSIS — I62.9 INTRACRANIAL HEMORRHAGE (HCC): Primary | ICD-10-CM

## 2023-02-02 PROBLEM — I63.9 IMPENDING CEREBROVASCULAR ACCIDENT (HCC): Status: ACTIVE | Noted: 2023-02-02

## 2023-02-02 PROCEDURE — 6360000002 HC RX W HCPCS: Performed by: PSYCHIATRY & NEUROLOGY

## 2023-02-02 PROCEDURE — 97530 THERAPEUTIC ACTIVITIES: CPT

## 2023-02-02 PROCEDURE — 2580000003 HC RX 258

## 2023-02-02 PROCEDURE — 97110 THERAPEUTIC EXERCISES: CPT

## 2023-02-02 PROCEDURE — 6370000000 HC RX 637 (ALT 250 FOR IP)

## 2023-02-02 PROCEDURE — 97116 GAIT TRAINING THERAPY: CPT

## 2023-02-02 PROCEDURE — 6370000000 HC RX 637 (ALT 250 FOR IP): Performed by: STUDENT IN AN ORGANIZED HEALTH CARE EDUCATION/TRAINING PROGRAM

## 2023-02-02 PROCEDURE — 6370000000 HC RX 637 (ALT 250 FOR IP): Performed by: FAMILY MEDICINE

## 2023-02-02 PROCEDURE — 99239 HOSP IP/OBS DSCHRG MGMT >30: CPT | Performed by: PSYCHIATRY & NEUROLOGY

## 2023-02-02 PROCEDURE — 97130 THER IVNTJ EA ADDL 15 MIN: CPT

## 2023-02-02 PROCEDURE — 6360000002 HC RX W HCPCS

## 2023-02-02 PROCEDURE — 97129 THER IVNTJ 1ST 15 MIN: CPT

## 2023-02-02 PROCEDURE — 99238 HOSP IP/OBS DSCHRG MGMT 30/<: CPT | Performed by: PSYCHIATRY & NEUROLOGY

## 2023-02-02 PROCEDURE — 6370000000 HC RX 637 (ALT 250 FOR IP): Performed by: PSYCHIATRY & NEUROLOGY

## 2023-02-02 PROCEDURE — 1180000000 HC REHAB R&B

## 2023-02-02 RX ORDER — LABETALOL HYDROCHLORIDE 5 MG/ML
20 INJECTION, SOLUTION INTRAVENOUS EVERY 4 HOURS PRN
Status: CANCELLED | OUTPATIENT
Start: 2023-02-02

## 2023-02-02 RX ORDER — LISINOPRIL 20 MG/1
40 TABLET ORAL DAILY
Status: DISCONTINUED | OUTPATIENT
Start: 2023-02-03 | End: 2023-02-15 | Stop reason: HOSPADM

## 2023-02-02 RX ORDER — TAMSULOSIN HYDROCHLORIDE 0.4 MG/1
0.4 CAPSULE ORAL DAILY
Status: DISCONTINUED | OUTPATIENT
Start: 2023-02-03 | End: 2023-02-15 | Stop reason: HOSPADM

## 2023-02-02 RX ORDER — CALCIUM CARBONATE 200(500)MG
500 TABLET,CHEWABLE ORAL 3 TIMES DAILY PRN
Status: DISCONTINUED | OUTPATIENT
Start: 2023-02-02 | End: 2023-02-15 | Stop reason: HOSPADM

## 2023-02-02 RX ORDER — PHENOBARBITAL 64.8 MG/1
129.6 TABLET ORAL 2 TIMES DAILY
Status: CANCELLED | OUTPATIENT
Start: 2023-02-02 | End: 2023-02-04

## 2023-02-02 RX ORDER — ACETAMINOPHEN 325 MG/1
650 TABLET ORAL EVERY 4 HOURS PRN
Status: CANCELLED | OUTPATIENT
Start: 2023-02-02

## 2023-02-02 RX ORDER — LANOLIN ALCOHOL/MO/W.PET/CERES
3 CREAM (GRAM) TOPICAL NIGHTLY
Status: DISCONTINUED | OUTPATIENT
Start: 2023-02-03 | End: 2023-02-15 | Stop reason: HOSPADM

## 2023-02-02 RX ORDER — NICOTINE 21 MG/24HR
1 PATCH, TRANSDERMAL 24 HOURS TRANSDERMAL DAILY
Status: CANCELLED | OUTPATIENT
Start: 2023-02-03

## 2023-02-02 RX ORDER — QUETIAPINE FUMARATE 25 MG/1
25 TABLET, FILM COATED ORAL NIGHTLY PRN
Status: DISCONTINUED | OUTPATIENT
Start: 2023-02-02 | End: 2023-02-09

## 2023-02-02 RX ORDER — ONDANSETRON 4 MG/1
4 TABLET, ORALLY DISINTEGRATING ORAL EVERY 8 HOURS PRN
Status: CANCELLED | OUTPATIENT
Start: 2023-02-02

## 2023-02-02 RX ORDER — NICOTINE 21 MG/24HR
1 PATCH, TRANSDERMAL 24 HOURS TRANSDERMAL DAILY
Status: DISCONTINUED | OUTPATIENT
Start: 2023-02-03 | End: 2023-02-08

## 2023-02-02 RX ORDER — POLYETHYLENE GLYCOL 3350 17 G/17G
17 POWDER, FOR SOLUTION ORAL DAILY
Status: DISCONTINUED | OUTPATIENT
Start: 2023-02-03 | End: 2023-02-15 | Stop reason: HOSPADM

## 2023-02-02 RX ORDER — SODIUM CHLORIDE 9 MG/ML
INJECTION, SOLUTION INTRAVENOUS PRN
OUTPATIENT
Start: 2023-02-02

## 2023-02-02 RX ORDER — CHOLECALCIFEROL (VITAMIN D3) 125 MCG
5 CAPSULE ORAL NIGHTLY
Status: CANCELLED | OUTPATIENT
Start: 2023-02-02

## 2023-02-02 RX ORDER — CARVEDILOL 12.5 MG/1
12.5 TABLET ORAL 2 TIMES DAILY WITH MEALS
Status: DISCONTINUED | OUTPATIENT
Start: 2023-02-03 | End: 2023-02-15 | Stop reason: HOSPADM

## 2023-02-02 RX ORDER — TAMSULOSIN HYDROCHLORIDE 0.4 MG/1
0.4 CAPSULE ORAL DAILY
Status: CANCELLED | OUTPATIENT
Start: 2023-02-03

## 2023-02-02 RX ORDER — CARVEDILOL 12.5 MG/1
12.5 TABLET ORAL 2 TIMES DAILY WITH MEALS
Status: CANCELLED | OUTPATIENT
Start: 2023-02-02

## 2023-02-02 RX ORDER — SODIUM CHLORIDE 0.9 % (FLUSH) 0.9 %
5-40 SYRINGE (ML) INJECTION EVERY 12 HOURS SCHEDULED
Status: CANCELLED | OUTPATIENT
Start: 2023-02-02

## 2023-02-02 RX ORDER — ONDANSETRON 2 MG/ML
4 INJECTION INTRAMUSCULAR; INTRAVENOUS EVERY 6 HOURS PRN
Status: CANCELLED | OUTPATIENT
Start: 2023-02-02

## 2023-02-02 RX ORDER — HYDRALAZINE HYDROCHLORIDE 20 MG/ML
10 INJECTION INTRAMUSCULAR; INTRAVENOUS
Status: CANCELLED | OUTPATIENT
Start: 2023-02-02

## 2023-02-02 RX ORDER — SODIUM CHLORIDE 0.9 % (FLUSH) 0.9 %
10 SYRINGE (ML) INJECTION PRN
Status: CANCELLED | OUTPATIENT
Start: 2023-02-02

## 2023-02-02 RX ORDER — PHENOBARBITAL 64.8 MG/1
64.8 TABLET ORAL 2 TIMES DAILY
Status: CANCELLED | OUTPATIENT
Start: 2023-02-04 | End: 2023-02-06

## 2023-02-02 RX ORDER — CALCIUM CARBONATE 200(500)MG
500 TABLET,CHEWABLE ORAL 3 TIMES DAILY PRN
Status: CANCELLED | OUTPATIENT
Start: 2023-02-02

## 2023-02-02 RX ORDER — FOLIC ACID 1 MG/1
1 TABLET ORAL DAILY
Status: DISCONTINUED | OUTPATIENT
Start: 2023-02-03 | End: 2023-02-15 | Stop reason: HOSPADM

## 2023-02-02 RX ORDER — ENOXAPARIN SODIUM 100 MG/ML
40 INJECTION SUBCUTANEOUS DAILY
Status: CANCELLED | OUTPATIENT
Start: 2023-02-03

## 2023-02-02 RX ORDER — PANTOPRAZOLE SODIUM 40 MG/1
40 TABLET, DELAYED RELEASE ORAL
Status: DISCONTINUED | OUTPATIENT
Start: 2023-02-03 | End: 2023-02-15 | Stop reason: HOSPADM

## 2023-02-02 RX ORDER — SENNA PLUS 8.6 MG/1
2 TABLET ORAL DAILY PRN
Status: DISCONTINUED | OUTPATIENT
Start: 2023-02-02 | End: 2023-02-15 | Stop reason: HOSPADM

## 2023-02-02 RX ORDER — FLUTICASONE PROPIONATE 50 MCG
2 SPRAY, SUSPENSION (ML) NASAL 2 TIMES DAILY PRN
Status: DISCONTINUED | OUTPATIENT
Start: 2023-02-02 | End: 2023-02-15 | Stop reason: HOSPADM

## 2023-02-02 RX ORDER — PHENOBARBITAL 32.4 MG/1
64.8 TABLET ORAL 2 TIMES DAILY
Status: COMPLETED | OUTPATIENT
Start: 2023-02-04 | End: 2023-02-06

## 2023-02-02 RX ORDER — BISACODYL 10 MG
10 SUPPOSITORY, RECTAL RECTAL DAILY PRN
Status: DISCONTINUED | OUTPATIENT
Start: 2023-02-02 | End: 2023-02-15 | Stop reason: HOSPADM

## 2023-02-02 RX ORDER — ENOXAPARIN SODIUM 100 MG/ML
40 INJECTION SUBCUTANEOUS DAILY
Status: DISCONTINUED | OUTPATIENT
Start: 2023-02-03 | End: 2023-02-15 | Stop reason: HOSPADM

## 2023-02-02 RX ORDER — FLUTICASONE PROPIONATE 50 MCG
2 SPRAY, SUSPENSION (ML) NASAL 2 TIMES DAILY PRN
Status: CANCELLED | OUTPATIENT
Start: 2023-02-02

## 2023-02-02 RX ORDER — QUETIAPINE FUMARATE 25 MG/1
25 TABLET, FILM COATED ORAL NIGHTLY PRN
Status: CANCELLED | OUTPATIENT
Start: 2023-02-02

## 2023-02-02 RX ORDER — SODIUM CHLORIDE 0.9 % (FLUSH) 0.9 %
5-40 SYRINGE (ML) INJECTION PRN
Status: CANCELLED | OUTPATIENT
Start: 2023-02-02

## 2023-02-02 RX ORDER — GAUZE BANDAGE 2" X 2"
100 BANDAGE TOPICAL DAILY
Status: DISCONTINUED | OUTPATIENT
Start: 2023-02-03 | End: 2023-02-15 | Stop reason: HOSPADM

## 2023-02-02 RX ORDER — LISINOPRIL 20 MG/1
40 TABLET ORAL DAILY
Status: CANCELLED | OUTPATIENT
Start: 2023-02-03

## 2023-02-02 RX ORDER — PANTOPRAZOLE SODIUM 40 MG/1
40 TABLET, DELAYED RELEASE ORAL
Status: CANCELLED | OUTPATIENT
Start: 2023-02-03

## 2023-02-02 RX ORDER — LANOLIN ALCOHOL/MO/W.PET/CERES
100 CREAM (GRAM) TOPICAL DAILY
Status: CANCELLED | OUTPATIENT
Start: 2023-02-03

## 2023-02-02 RX ORDER — ACETAMINOPHEN 325 MG/1
650 TABLET ORAL EVERY 4 HOURS PRN
Status: DISCONTINUED | OUTPATIENT
Start: 2023-02-02 | End: 2023-02-15 | Stop reason: HOSPADM

## 2023-02-02 RX ORDER — PHENOBARBITAL 32.4 MG/1
129.6 TABLET ORAL 2 TIMES DAILY
Status: COMPLETED | OUTPATIENT
Start: 2023-02-02 | End: 2023-02-04

## 2023-02-02 RX ORDER — FOLIC ACID 1 MG/1
1 TABLET ORAL DAILY
Status: CANCELLED | OUTPATIENT
Start: 2023-02-03

## 2023-02-02 RX ADMIN — PHENOBARBITAL SODIUM 130 MG: 130 INJECTION INTRAMUSCULAR; INTRAVENOUS at 08:22

## 2023-02-02 RX ADMIN — SODIUM CHLORIDE, PRESERVATIVE FREE 10 ML: 5 INJECTION INTRAVENOUS at 08:22

## 2023-02-02 RX ADMIN — LISINOPRIL 40 MG: 20 TABLET ORAL at 07:59

## 2023-02-02 RX ADMIN — CARVEDILOL 12.5 MG: 12.5 TABLET, FILM COATED ORAL at 17:07

## 2023-02-02 RX ADMIN — PHENOBARBITAL 129.6 MG: 32.4 TABLET ORAL at 22:55

## 2023-02-02 RX ADMIN — PANTOPRAZOLE SODIUM 40 MG: 40 TABLET, DELAYED RELEASE ORAL at 07:59

## 2023-02-02 RX ADMIN — ENOXAPARIN SODIUM 40 MG: 100 INJECTION SUBCUTANEOUS at 08:00

## 2023-02-02 RX ADMIN — Medication 100 MG: at 08:00

## 2023-02-02 RX ADMIN — TAMSULOSIN HYDROCHLORIDE 0.4 MG: 0.4 CAPSULE ORAL at 07:59

## 2023-02-02 RX ADMIN — CARVEDILOL 12.5 MG: 12.5 TABLET, FILM COATED ORAL at 07:59

## 2023-02-02 RX ADMIN — FOLIC ACID 1 MG: 1 TABLET ORAL at 07:58

## 2023-02-02 ASSESSMENT — PAIN SCALES - GENERAL
PAINLEVEL_OUTOF10: 0

## 2023-02-02 NOTE — PROGRESS NOTES
Nutrition Assessment     Type and Reason for Visit: Initial, RD Nutrition Re-Screen/LOS    Nutrition Recommendations/Plan:   Continue current diet. Encourage intakes as tolerated. Monitor plan of care. Malnutrition Assessment:  Malnutrition Status: No malnutrition    Nutrition Assessment:  Chart reviewed/pt seen for length of stay. Admitted for dizziness and right-sided facial droop, right-sided weakness and right-sided numbness. Pt with posterior pontine hemorrhage. Pt tolerating an oral diet well with a good appetite. Consuming % of meals. Stable weight history noted per chart review. Labs/Meds reviewed. Estimated Daily Nutrient Needs:  Energy (kcal):  2000 kcals/day Weight Used for Energy Requirements: Admission     Protein (g):   gm pro/day Weight Used for Protein Requirements: Ideal        Fluid (ml/day):  2000 mL/day or per MD Method Used for Fluid Requirements: 1 ml/kcal    Nutrition Related Findings:   Labs/Meds reviewed. Last BM 2/1. Wound Type: None    Current Nutrition Therapies:    ADULT DIET;  Regular    Anthropometric Measures:  Height: 6' (182.9 cm)  Current Body Wt: 200 lb (90.7 kg)   BMI: 27.1    Nutrition Diagnosis:   No nutrition diagnosis at this time    Nutrition Interventions:   Food and/or Nutrient Delivery: Continue Current Diet  Nutrition Education/Counseling: No recommendation at this time  Coordination of Nutrition Care: Continue to monitor while inpatient  Plan of Care discussed with: Patient    Nutrition Monitoring and Evaluation:   Behavioral-Environmental Outcomes: None Identified  Food/Nutrient Intake Outcomes: Food and Nutrient Intake  Physical Signs/Symptoms Outcomes: Biochemical Data, GI Status, Fluid Status or Edema, Skin, Weight    Discharge Planning:    No discharge needs at this time     Christa Fleming RD, LIZ  Contact: [phone]

## 2023-02-02 NOTE — PLAN OF CARE
Problem: Discharge Planning  Goal: Discharge to home or other facility with appropriate resources  2/2/2023 1829 by Geremias Castano RN  Outcome: Progressing  Flowsheets (Taken 2/2/2023 0830)  Discharge to home or other facility with appropriate resources: Identify barriers to discharge with patient and caregiver  2/2/2023 0539 by Anny Block RN  Outcome: Progressing     Problem: Safety - Adult  Goal: Free from fall injury  2/2/2023 1829 by Geremias Castnao RN  Outcome: Progressing  2/2/2023 0539 by Anny Block RN  Outcome: Progressing     Problem: ABCDS Injury Assessment  Goal: Absence of physical injury  2/2/2023 1829 by Geremias Castano RN  Outcome: Progressing  2/2/2023 [address] [place] by Anny Block RN  Outcome: Progressing

## 2023-02-02 NOTE — PROGRESS NOTES
Physical Therapy  Facility/Department: [address] STEPDOWN  Physical Therapy daily treatment note    Name: Corrie Leal  : 1980  MRN: [id number]  Date of Service: 2023    Discharge Recommendations:    Further therapy recommended at discharge. The patient should be able to tolerate at least 3 hours of therapy per day over 5 days or 15 hours over 7 days. This patient may benefit from a Physical Medicine and Rehab consult. PT Equipment Recommendations  Equipment Needed: No      Patient Diagnosis(es): The primary encounter diagnosis was Intracranial hemorrhage ([facility] [address] A diagnosis of Pontine hemorrhage ([facility] [address] was also pertinent to this visit. Past Medical History:  has a past medical history of History of heartburn, Neoplasm of unspecified nature of bone, soft tissue, and skin, and Research study patient. Past Surgical History:  has a past surgical history that includes Appendectomy (in 20's) and pre-malignant / benign skin lesion excision (Right, 2014). Assessment   Body Structures, Functions, Activity Limitations Requiring Skilled Therapeutic Intervention: Decreased functional mobility ; Decreased ADL status; Decreased tolerance to work activity; Decreased strength;Decreased endurance;Decreased balance;Decreased coordination;Decreased cognition;Decreased safe awareness  Assessment: Pt amb 30 ft x 2 with RW and MOD A. Pt ataxic with decreased motor control, R LE. Pt high fall risk and unsafe to return to prior living arrangments.  Would benefit from aggressive PT after d/c to address deficits  Therapy Prognosis: Good  Activity Tolerance  Activity Tolerance: Patient tolerated treatment well;Patient limited by endurance     Plan   Physcial Therapy Plan  General Plan: 6-7 times per week  Current Treatment Recommendations: Strengthening, Balance training, Functional mobility training, Transfer training, Endurance training, Gait training, Stair training, Neuromuscular re-education, Home exercise program, Patient/Caregiver education & training, Safety education & training, Equipment evaluation, education, & procurement, Therapeutic activities, ROM, Positioning, Wheelchair mobility training  Safety Devices  Type of Devices: All fall risk precautions in place, Call light within reach, Chair alarm in place, Gait belt, Nurse notified, Left in chair, Telesitter in use  Restraints  Restraints Initially in Place: No     Restrictions  Restrictions/Precautions  Restrictions/Precautions: Up as Tolerated  Required Braces or Orthoses?: No  Position Activity Restriction  Other position/activity restrictions: Up with assist, SBP <160 mmHg     Subjective   General  Patient assessed for rehabilitation services?: Yes  Response To Previous Treatment: Patient with no complaints from previous session. Family / Caregiver Present: Yes  Follows Commands: Within Functional Limits  Subjective  Subjective: Pt resting  in bed upon arrival, agreeable to PT, very motivated to improve, slightly decreased insite into deficits           Cognition   Orientation  Overall Orientation Status: Within Functional Limits     Objective      Bed mobility  Rolling to Right: Modified independent  Supine to Sit: Modified independent  Scooting: Modified independent  Transfers  Sit to Stand: Contact guard assistance  Stand to Sit: Contact guard assistance  Comment: sit to stand x 3 trials from bed and recliner, CGA with cues for hand placement  Ambulation  Surface: Level tile  Device: Rolling Walker  Assistance: Moderate assistance  Quality of Gait: unsteady, R lateral lean with path deviation, intermittent wide and norrow [place], ataxic, decreased motor control R LE, MOD A for  turns  Distance: 30 ft x 2, 20 ft x 1  Comments: Increased time to complete with very slow pace.  Requires MIN A throughout for RW negotiation  More Ambulation?: No     Balance  Posture: Fair  Sitting - Static: Fair;+  Sitting - Dynamic: Fair  Standing - Static: Fair;-  Standing - Dynamic: Poor;+  Comments: Standing Balance assessed w/ RW        Exercise  Standing heel/toe raise, marching in place, R LE heel taps, toe taps x 10  Standing with B UE support, narrow [place]EL and wide [place] x 2 min each, multiple LOB in narrow ISRAEL  Standing lateral weight shift x 1 min    AM-PAC Score  AM-PAC Inpatient Mobility Raw Score : 15 (02/02/23 1541)  AM-PAC Inpatient T-Scale Score : 39.45 (02/02/23 1541)  Mobility Inpatient CMS 0-100% Score: 57.7 (02/02/23 1541)  Mobility Inpatient CMS G-Code Modifier : CK (02/02/23 1541)     Goals  Short Term Goals  Time Frame for Short Term Goals: 14 visits  Short Term Goal 1: Pt amb 300' IND  Short Term Goal 2: Pt will be IND in all bed mobility tasks  Short Term Goal 3: Pt will be IND in all transfers  Short Term Goal 4: Pt will be IND 1 flight of stair navigation.        Therapy Time   Individual Concurrent Group Co-treatment   Time In 1104         Time Out 1142         Minutes 38         Timed Code Treatment Minutes: Gianluca Maria [address] [place] [address]

## 2023-02-02 NOTE — PROGRESS NOTES
Daily Progress Note  Neuro Critical Care    Patient Name: Caity Martinez  Patient : 1980  Room/Bed: 7728/3904-59  Code Status: Full Code  Allergies: No Known Allergies    CHIEF COMPLAINT:     Confusion     INTERVAL HISTORY    Initial Presentation (Admitted 23): The patient is a 43 y.o. male presented with dizziness, right-sided facial droop, right-sided weakness and right-sided numbness. Patient felt well when he went to bed , last known well 10 PM.  Medical history notable for hypertension, takes Coreg for this. Patient does not know his dose. EMS was called when patient woke up with these symptoms this morning. Systolic blood pressure was 200s on arrival to the ED. Stroke alert was called on arrival, CT of the head demonstrated 13 x 8 mm intraparenchymal hemorrhage of the posterior ramses. CTA of the head and neck showed focal mild narrowing of the right P1 PCA, no other significant stenosis, aneurysm, or arterial injury. Cardene was started for blood pressure control with goal blood pressure under 160. Patient's metabolic work-up unremarkable overall. Patient remains alert, oriented, able to provide history. He does describe double vision, sensation changes on the right and weakness. He was unable to walk when this happened. No other medical history. No known allergies. Patient does smoke. Hospital Course:   : Remained stable, requiring lower amounts of cardene  : Patient had headache overnight. Given 1 g of mag and Benadryl. Also had urinary retention and straight cath once x3. Mcmahan was placed. Remains on 2.5 mg/h of nicardipine this morning. Systolic pressures have been in the 130s to 150s, few in the 160s. Repeat CT this morning showed stable intraparenchymal hemorrhage without midline shift. Plan to discontinue nicardipine today, continue lisinopril, use as needed labetalol and hydralazine as needed. :   Had urinary retention and straight cath once.  On lisinopril 10mg but BP still elevated, will increase. Pending MRI. Still unsteady without support. 1/29: Patient became agitated, confused and started having hallucinations. His wife reported daily ETOH use; ~2 vodka drinks per night. Repeat CT Head stable. Started on Luminal 260 mg once, then 130 mg bid, IV Thiamine 515 mg tid, folic acid, and Seroquel 25 mg nightly PRN. Hypertensive; Lisinopril increased to 40mg daily. Transferred back to ICU for precedex drip. 1/30: urinary retention requiring straight cath x2, refusing moore. Remains hypertensive, Weaned off precedex. Receiving phenobarbital and ativan via CIWA.   1/31:Reportedly had a fall out of bed overnight. Patients states he moves around a lot in his sleep and rolled over and accidentally rolled out of bed. Denies striking his head, denies LOC. States he landed on his knees, denying any injuries. Will have PT/OT evaluate this morning to make sure patient is safe in and out of bed. Patient very eager to go home, very focused on this. Plan to wean phenobarbital today. Received 1mg oral ativan in past 24 hours. Still remains hypertensive. Has received 3 doses of hydralazine in last 24 hours. Will start coreg. States urinary retention is improving, states it is very difficult for him to urinate unless he is sitting on the toilet  2/1: Patient has interval improvement on physical exam.  Full strength right upper and lower extremity, patient able to walk go to the bathroom unassisted and shower. Restarted on scheduled phenobarbital 130 mg twice daily for 3 days followed by 65 mg for 2 days for withdrawal symptoms. Pending [facility] to [facility]. Last 24h:  Patient continues to improve. Was started on scheduled phenobarbital and feels interval improvement in his withdrawal symptoms. Plan for 5-day course total.  Pending Pre-CERT to [facility].     CURRENT MEDICATIONS:  SCHEDULED MEDICATIONS:   PHENobarbital (LUMINAL) IVPB 130 mg IntraVENous BID    carvedilol  12.5 mg Oral BID WC    pantoprazole  40 mg Oral QAM AC    sodium chloride flush  5-40 mL IntraVENous 2 times per day    lisinopril  40 mg Oral Daily    folic acid  1 mg Oral Daily    melatonin  5 mg Oral Nightly    thiamine  100 mg Oral Daily    tamsulosin  0.4 mg Oral Daily    enoxaparin  40 mg SubCUTAneous Daily    nicotine  1 patch TransDERmal Daily     CONTINUOUS INFUSIONS:   sodium chloride 10 mL/hr at 23 1722     PRN MEDICATIONS:   fluticasone, labetalol, sodium chloride flush, sodium chloride, [DISCONTINUED] LORazepam **OR** [DISCONTINUED] LORazepam **OR** [DISCONTINUED] LORazepam **OR** [DISCONTINUED] LORazepam **OR** [DISCONTINUED] LORazepam **OR** [DISCONTINUED] LORazepam **OR** LORazepam **OR** [DISCONTINUED] LORazepam, QUEtiapine, calcium carbonate, sodium chloride flush, hydrALAZINE, acetaminophen, ondansetron **OR** ondansetron    VITALS:  Temperature Range: Temp: 98 °F (36.7 °C) Temp  Av.8 °F (36.6 °C)  Min: 97.6 °F (36.4 °C)  Max: 98 °F (36.7 °C)  BP Range: Systolic (80XTC), TBP:099 , Min:132 , TGI:159     Diastolic (54LIE), TXJ:11, Min:84, Max:106    Pulse Range: Pulse  Av.2  Min: 71  Max: 90  Respiration Range: Resp  Avg: 15.3  Min: 14  Max: 16  Current Pulse Ox: SpO2: 96 %  24HR Pulse Ox Range: SpO2  Av.5 %  Min: 96 %  Max: 99 %  Patient Vitals for the past 12 hrs:   BP Temp Temp src Pulse Resp   23 0400 (!) 132/90 98 °F (36.7 °C) Oral 71 16   23 0000 (!) 141/84 97.9 °F (36.6 °C) Oral 81 15   23 2000 (!) 157/90 97.8 °F (36.6 °C) Oral 82 16     RECENT LABS:   Lab Results   Component Value Date    WBC 8.0 2023    HGB 15.1 2023    HCT 45.6 2023     2023    ALT 60 (H) 2023    AST 54 (H) 2023     2023    K 4.0 2023     2023    CREATININE 0.66 (L) 2023    BUN 12 2023    CO2 22 2023    INR 1.1 2023    LABA1C 4.9 2023     24 HOUR INTAKE/OUTPUT:No intake or output data in the 24 hours ending 02/02/23 0614  CT HEAD WO CONTRAST  Result Date: 1/30/2023  Unchanged pontine hemorrhage. No intraventricular extension. CT head without contrast  Result Date: 1/27/2023  Stable intraparenchymal hemorrhage. No midline shift. CT HEAD WO CONTRAST  Result Date: 1/26/2023  13 x 8 mm intraparenchymal hemorrhage posterior ramses. Critical results were called by Dr. Joanna Davis to Dr. Noberto Duane on 1/26/2023 at 06:37. CTA HEAD NECK W CONTRAST  Result Date: 1/26/2023  Focal mild narrowing of the right P1 PCA. Otherwise no significant stenosis, aneurysm, or arterial injury in the CTA of the head and neck. Pontine intraparenchymal hemorrhage is redemonstrated. MRI BRAIN W WO CONTRAST  Result Date: 1/30/2023  Chronic pontine microhemorrhages in addition to the acute hematoma suggest hypertensive hemorrhage. No brain mass. Labs and Images reviewed with:  [] Dr. Simon Burks    [x] Dr. Brice Dance  [] Dr. Milagro Farncisco  [] There are no new interval images to review. PHYSICAL EXAM       CONSTITUTIONAL:  Alert, oriented x3. Forgetful. In no acute distress. Mild dysarthria. No aphasia. HEAD:  normocephalic, atraumatic    EYES:  Left eyelid ptosis. PERRL. 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 ENT:  moist mucous membranes   NECK:  supple, symmetric   LUNGS:  Equal air entry bilaterally, clear   CARDIOVASCULAR:  normal s1 / s2, tachycardic, regular rhythm, distal pulses intact   ABDOMEN:  Soft, no rigidity, normal bowel sounds   NEUROLOGIC:  Mental Status:  A & O x3. Following commands.              Cranial Nerves:    II: Visual acuity:  reports blurred vision  II: Visual fields:  intact  III: Pupils:  equal, round, reactive to light  III,IV,VI: Extra Ocular Movements: abnormal- as above  V: Facial sensation:  intact  VII: Facial strength: abnormal- left facial droop  VIII: Hearing: [place] at baseline     Motor Exam:       5/5 strength in all extremities. Sensory:    Touch:    Grossly intact. Denies numbness/tingling     Coordination/Dysmetria:     Finger to Nose:   Right:  abnormal - dysmetria  Left:  normal      NIH Stroke Scale Total (if not done complete detailed one below):    1a.  Level of consciousness:  0 - alert; keenly responsive  1b. Level of consciousness questions:  0 - answers both questions correctly  1c. Level of consciousness questions:  0 - performs both tasks correctly  2. Best Gaze:  1 - partial gaze palsy  3. Visual:  0 - no visual loss  4. Facial Palsy:  1 - minor paralysis (flattened nasolabial fold, asymmetric on smiling)  5a. Motor left arm:  0 - no drift, limb holds 90 (or 45) degrees for full 10 seconds  5b. Motor right arm:  0 - no drift, limb holds 90 (or 45) degrees for full 10 seconds  6a. Motor left le - no drift; leg holds 30 degree position for full 5 seconds  6b. Motor right le - no drift; leg holds 30 degree position for full 5 seconds  7. Limb Ataxia:  1 - present in one limb  8. Sensory:  0 - normal; no sensory loss  9. Best Language:  0 - no aphasia, normal  10. Dysarthria:  1 - mild to moderate, patient slurs at least some words and at worst, can be understood with some difficulty  11. Extinction and Inattention:  1 - visual, tactile, auditory, spatial or personal inattention or extinction to bilateral simultaneous stimulation in one of the sensory modalities     DRAINS:  [x] There are no drains for Neuro Critical Care to monitor at this time. ASSESSMENT AND PLAN:       The patient is a 43 y.o. male with a history of HTN and daily ETOH use who presented after waking up with right-sided weakness and numbness in addition to dizziness. 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 Hypertensive on arrival with initial SBP~ 200.   Admitted to the Neuro ICU for further management. Course complicated by presumed ETOH withdraw. Patient care will be discussed with attending, will reevaluate patient along with attending. 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  - CTA Head/Neck with no vascular abnormality to explain ICH  - MRI Brain with/without contrast 1/28 demonstrated stable pontine hemorrhage, chronic microhemorrhages to suggest hypertension  - Repeat CT Head 1/29 stable  - Neurosurgery signed off; F/U in clinic in 2 week with repeat CT Head without contrast  - Goal SBP <160  - ETOH withdraw  - Continue high dose Thiamine and folic acid supplementation  -Taken off CIWA Ativan protocol, restarted on phenobarbital 130 mg twice daily for 3 days followed by 2 days of 65 mg twice daily for withdrawal  - Weaned off Precedex infusion 1/30 in AM, Seroquel 25mg QHS PRN  -Phenobarbital 130 mg twice daily for 3 days, 65 mg for 2 days  - Neuro checks per protocol    CARDIOVASCULAR:  - Hypertensive emergency, resolved  - Goal SBP <160  - PRN Hydralazine/Labetalol  - Essential HTN; continue Lisinopril 40mg QD  - Added 12.5mg coreg  - Troponin 17, EKG NSR with prolonged QT  -Echo unremarkable  - Continue telemetry   - Tachycardia, monitor, evaluate response to coreg    PULMONARY:  - Maintaining O2 sats on room air  - Incentive spirometry  - Smoking cessation encouraged; continue Nicotine patch    RENAL/FLUID/ELECTROLYTE:  - Normal renal functioning  - BUN 12/ Creatinine 0.66  - Monitor I&O  - Recurrent acute urinary retention, no straight caths required overnight, patient has refused moore, states he needs to be sitting on the toilet to adequately void  - Continue Flomax 0.4mg daily  - Bladder scan Q6h and PRN, straigth cath for volume >400cc  - Consider Urology consult if persists  - No maintenance IVF, tolerating diet  - Replace electrolytes as needed  - BMP daily    GI/NUTRITION:  NUTRITION:  ADULT DIET;  Regular  - Tolerating regular diet  - Last BM 1/31  - Bowel regimen: Glycolax PRN  - Complaining of heart burn, restart home protonix  - GI prophylaxis: protonix, home med    ID:  - Afebrile  - Leukocytosis resolved, 8.0 (11.5)  - UA 1/27 negative to date  - Monitor off antibiotics   - Daily CBC    HEME:   - H&H 14.7/44.8  - Platelets 442  - CBC daily    ENDOCRINE:  - Continue to monitor blood glucose, goal <180  - Hemoglobin A1C 4.9    OTHER:  - PT/OT/ST   - PM&R following, recommending ARU when medically stable    PROPHYLAXIS:  Stress ulcer: N/A    DVT PROPHYLAXIS:  - SCD sleeves - Thigh High   - Lovenox 40mg subQ daily    DISPOSITION:  [x] Transfer to stepdown    We will continue to follow along. For any changes in exam or patient status please contact Neuro Critical Care.       Neel Craven MD  Neuro Critical Care  Pager [phone]  2/2/2023     6:14 AM

## 2023-02-02 NOTE — CARE COORDINATION
Pre-Admission Assessment completed and co-signed by PM&R physiatrist Dr. Lizbeth Nuñez. Preadmission assessment valid for 48 hours after co-signature. Discharging provider responsible for Discharge/Readmit medication reconciliation action. Status: Completed. Orders verified as signed/held, will be released by Acute Inpatient Rehabilitation upon admission    DVT Prophylaxis Plan:  Lovenox to be continued for DVT Prophylaxis     Discharging nurse to call nursing report to Acute Inpatient Rehabilitation nursing team at extension [phone] before patient departure. Transportation Time: JUAN M LUCERO to call ARU nursing unit with time [phone]    Updated [place] CM: Via Telephone at this time.

## 2023-02-02 NOTE — PROGRESS NOTES
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.01    Pontine hemorrhage (HCC) I61.3    Intracranial hemorrhage ([address] I62.9       Pain: 0/10    Cognitive Treatment    Treatment time: 8:55 - 9:27      Subjective: [x] Alert [x] Cooperative     [] Confused     [] Agitated    [] Lethargic      Objective/Assessment:    Recall: Delayed Recall: 2/3 increased to 3/3 with min verbal cues, 3/3  Memory and Mental Manipulation: 3 Words: 7/9 increased to 9/9 with 1 repetition  Functional Memory Tasks: 3-4 Elements: 9/16  Word List Retention: 7/8 increased to 8/8 with 2 repetitions      Problem Solving/Reasoning: Analogies: 17/18 increased to 18/18 with mod verbal cues  Word Deduction: 20/20    Other:     Pt. Seen for O/M treatment program for dysarthria. Pt. Completed O/M exercises X 10 X 2 sets with min verbal cues. Education provided re: compensatory strategies to increase speech intelligibility/clarity. Pt. Verbalized understanding. Exercise program left at bedside. Plan:  [x] Continue ST services    [] Discharge from ST:      Discharge recommendations: []  Further therapy recommended at discharge. The patient should be able to tolerate at least 3 hours of therapy per day over 5 days or 15 hours over 7 days. [x] Further therapy recommended at discharge. [] No therapy recommended at discharge. Treatment completed by: Completed by:  Jessie Wang  Clinician    Cosigned By: Lamont Cartagena S.CCC/SLP

## 2023-02-02 NOTE — CARE COORDINATION
Transitional planning    Spoke to Song at [facility]. Pre cert was submitted, no auth yet. 1400 Message from Song that patient has [place]. [address] and told her no D/c order yet . Will update her if D/c order comes through. [address] to Song, plan for discharge today. Will update with transportation when arranged. 1 CM spoke to patient and his wife at bedside. Insurance has approved. CM working on transportation. Patient and wife asked if wife can transport to ARU-provided patient assisted into car and out of car at ARU. Called Song and asked. She will review and call back. [phone] [employer]  called back and said wife cannot transport, too much of a liability. Matt told Song patient has IV orders for hydralazine and labetolol. She said if patient needs to come with IV meds she has to get Okay from Dr Cleo Denise. They do not have monitors there. Prefer oral meds. PS to resident    [phone] IV medications removed from d/c readmit orders. Request for transportation faxed to [address] [place] [address] Song called. She is leaving for the day. Call nurses if transportation arranged-[phone] to patient and his wife at bedside and told them  is 9pm to go to [facility]. They verbalized agreement. [address] and spoke to [place] and told her patient is getting picked up tonight at 9pm for transportation to ARU.

## 2023-02-02 NOTE — PROGRESS NOTES
Neuro Critical Care   Discharge Summary      PATIENT NAME: Mickey Vasquez  YOB: 1980  MEDICAL RECORD NO. 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 yo male who presented to [facility] on 1/26/2023  5:56 AM with dizziness, right-sided facial droop, right-sided weakness and right-sided numbness. Patient felt well when he went to bed 1/25, last known well 10 PM.  Medical history notable for hypertension, taking Coreg for this. Patient does not know his dose. EMS was called when patient woke up with these symptoms this morning. Systolic blood pressure was 200s on arrival to the ED. Stroke alert was called on arrival, CT of the head demonstrated 13 x 8 mm intraparenchymal hemorrhage of the posterior ramses. CTA of the head and neck showed focal mild narrowing of the right P1 PCA, no other significant stenosis, aneurysm, or arterial injury. Cardene was started for blood pressure control with goal blood pressure under 160. Patient's metabolic work-up unremarkable overall. Patient remains alert, oriented, able to provide history. He was describing double vision, sensation changes on the right and weakness. He was unable to walk when this happened. No other medical history. No known allergies. Patient does smoke. Patient was requiring Cardene to maintain BP goals. Repeat CT showed stable intraparenchymal hemorrhage without midline shift. Patient became agitated, confused and started having hallucinations. His wife reported daily ETOH use; ~2 vodka drinks per night. Repeat CT Head stable.   Started on Luminal 260 mg once, then 130 mg bid, IV Thiamine 125 mg tid, folic acid, and Seroquel 25 mg nightly PRN. Hypertensive; Lisinopril increased to 40mg daily. Transferred back to ICU for precedex drip. urinary retention requiring straight cath x2. Received phenobarbital and ativan via CIWA for withdrawal symptoms. Reportedly had a fall out of bed overnight. Patients states he moves around a lot in his sleep and rolled over and accidentally rolled out of bed. Denies striking his head, denies LOC. States he landed on his knees, denying any injuries. Patient had interval improvement on physical exam.  Full strength right upper and lower extremity, patient able to walk go to the bathroom unassisted and shower. Restarted on scheduled phenobarbital 130 mg twice daily for 3 days followed by 65 mg for 2 days for withdrawal symptoms. Labs and imaging were followed daily. At time of discharge, Milagros Benson was tolerating a ADULT DIET; Regular, having bowel movements, and is in stable condition to be discharged to [address] [place] [address]. PROCEDURES:    Arterial Line 1/26/2023    PHYSICAL EXAMINATION        Discharge Vitals:  height is 6' (1.829 m) and weight is 200 lb (90.7 kg). His oral temperature is 98 °F (36.7 °C). His blood pressure is 149/110 (abnormal) and his pulse is 65. His respiration is 12 and oxygen saturation is 97%. CONSTITUTIONAL:  Well developed, well nourished, alert and oriented x 3, in no acute distress. GCS 15. Nontoxic. No dysarthria. No aphasia.    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 Left facial droop    Motor Exam:    Drift:  absent  Tone:  normal    Motor exam is symmetrical 5 out of 5 all extremities bilaterally    Sensory:    Touch:    Right Upper Extremity:  normal  Left Upper Extremity:  normal  Right Lower Extremity:  normal  Left Lower Extremity:  normal    Deep Tendon Reflexes:    Right Bicep:  2+  Left Bicep:  2+  Right Knee:  2+  Left Knee:  2+    Plantar Response:  Right:  downgoing  Left:  downgoing    Clonus:  absent  Kong's:  absent    Coordination/Dysmetria:  Heel to Shin:  Right:  abnormal -mild dysmetria  Left:  normal  Finger to Nose:   Right:  abnormal -mild dysmetria  Left:  normal       Gait:  ataxic       LABS/IMAGING     Recent Labs     01/31/23  0402 02/01/23  0416   WBC 7.6 8.0   HGB 14.7 15.1   HCT 44.8 45.6   PLT See Reflexed IPF Result 182    137   K 3.9 4.0    104   CO2 21 22   BUN 16 12   CREATININE 0.73 0.66*       CT HEAD WO CONTRAST    Result Date: 1/30/2023  EXAMINATION: CT OF THE HEAD WITHOUT CONTRAST  1/29/2023 3:02 pm TECHNIQUE: CT of the head was performed without the administration of intravenous contrast. Automated exposure control, iterative reconstruction, and/or weight based adjustment of the mA/kV was utilized to reduce the radiation dose to as low as reasonably achievable. COMPARISON: 1/27/2023 HISTORY: ORDERING SYSTEM PROVIDED HISTORY: Assess for extension of bleed TECHNOLOGIST PROVIDED HISTORY: Assess for extension of bleed Reason for Exam: Assess for extension of bleed FINDINGS: BRAIN/VENTRICLES: Acute left dorsal pontine intraparenchymal hemorrhage is unchanged. No new hemorrhage is identified. There is no herniation or hydrocephalus. ORBITS: The visualized portion of the orbits demonstrate no acute abnormality. SINUSES: The visualized paranasal sinuses and mastoid air cells demonstrate no acute abnormality. SOFT TISSUES/SKULL:  No acute abnormality of the visualized skull or soft tissues.      Unchanged pontine hemorrhage. No intraventricular extension. CT head without contrast    Result Date: 1/27/2023  EXAMINATION: CT OF THE HEAD WITHOUT CONTRAST  1/27/2023 5:37 am TECHNIQUE: CT of the head was performed without the administration of intravenous contrast. Automated exposure control, iterative reconstruction, and/or weight based adjustment of the mA/kV was utilized to reduce the radiation dose to as low as reasonably achievable. COMPARISON: CT head, CTA head 01/26/2023 HISTORY: ORDERING SYSTEM PROVIDED HISTORY: Intracerebral Hemorrhage TECHNOLOGIST PROVIDED HISTORY: Intracerebral Hemorrhage Decision Support Exception - unselect if not a suspected or confirmed emergency medical condition->Emergency Medical Condition (MA) Reason for Exam: Intracerebral Hemorrhage FINDINGS: BRAIN/VENTRICLES: Stable intraparenchymal hemorrhage centered in the ramses, 13 x 8 mm (2:18). The gray-white differentiation is maintained without evidence of an acute infarct. There is no evidence of hydrocephalus. ORBITS: The visualized portion of the orbits demonstrate no acute abnormality. SINUSES: The visualized paranasal sinuses and mastoid air cells demonstrate no acute abnormality. SOFT TISSUES/SKULL:  No acute abnormality of the visualized skull or soft tissues. Stable intraparenchymal hemorrhage. No midline shift. CT HEAD WO CONTRAST    Result Date: 1/26/2023  EXAMINATION: CT OF THE HEAD WITHOUT CONTRAST  1/26/2023 6:09 am TECHNIQUE: CT of the head was performed without the administration of intravenous contrast. Automated exposure control, iterative reconstruction, and/or weight based adjustment of the mA/kV was utilized to reduce the radiation dose to as low as reasonably achievable. COMPARISON: None.  HISTORY: ORDERING SYSTEM PROVIDED HISTORY: L facial droop, L weakness, N/V, htn TECHNOLOGIST PROVIDED HISTORY: L facial droop, L weakness, N/V, htn Decision Support Exception - unselect if not a suspected or confirmed emergency medical condition->Emergency Medical Condition (MA) Reason for Exam: L facial droop, L weakness, N/V, htn FINDINGS: BRAIN/VENTRICLES: 13 x 8 mm intraparenchymal hemorrhage posterior ramses (2:23). There is no midline shift. No abnormal extra-axial fluid collection. The gray-white differentiation is maintained without evidence of an acute infarct. There is no evidence of hydrocephalus. ORBITS: The visualized portion of the orbits demonstrate no acute abnormality. SINUSES: The visualized paranasal sinuses and mastoid air cells demonstrate no acute abnormality. SOFT TISSUES/SKULL:  No acute abnormality of the visualized skull or soft tissues. 13 x 8 mm intraparenchymal hemorrhage posterior ramses. Critical results were called by Dr. Bautista Has to Dr. Sibley Search on 1/26/2023 at 06:37. CTA HEAD NECK W CONTRAST    Result Date: 1/26/2023  EXAMINATION: CTA OF THE HEAD AND NECK WITH CONTRAST 1/26/2023 6:09 am: TECHNIQUE: CTA of the head and neck was performed with the administration of intravenous contrast. Multiplanar reformatted images are provided for review. MIP images are provided for review. Stenosis of the internal carotid arteries measured using NASCET criteria. Automated exposure control, iterative reconstruction, and/or weight based adjustment of the mA/kV was utilized to reduce the radiation dose to as low as reasonably achievable. COMPARISON: None. HISTORY: ORDERING SYSTEM PROVIDED HISTORY: L facial droop, L weakness, N/V, htn TECHNOLOGIST PROVIDED HISTORY: L facial droop, L weakness, N/V, htn Decision Support Exception - unselect if not a suspected or confirmed emergency medical condition->Emergency Medical Condition (MA) Reason for Exam: L facial droop, L weakness, N/V, htn FINDINGS: CTA NECK: AORTIC ARCH/ARCH VESSELS: No dissection or arterial injury. No significant stenosis of the brachiocephalic or subclavian arteries.  CAROTID ARTERIES: No dissection, arterial injury, or hemodynamically significant stenosis by NASCET criteria. VERTEBRAL ARTERIES: No dissection, arterial injury, or significant stenosis. Left vertebral artery dominant. SOFT TISSUES: The lung apices are clear. No cervical or superior mediastinal lymphadenopathy. The larynx and pharynx are unremarkable. No acute abnormality of the salivary and thyroid glands. BONES: No acute osseous abnormality. CTA HEAD: ANTERIOR CIRCULATION: No significant stenosis of the intracranial internal carotid, anterior cerebral, or middle cerebral arteries. No aneurysm. POSTERIOR CIRCULATION: Focal mild narrowing right P1 (series 4, image 182) no significant stenosis of the vertebral, basilar, or posterior cerebral arteries. No aneurysm. The right vertebral artery terminates in the right PICA, a normal variant. OTHER: No dural venous sinus thrombosis on this non-dedicated study. BRAIN: Pontine intraparenchymal hemorrhage is redemonstrated. No mass effect or midline shift. No extra-axial fluid collection. The gray-white differentiation is maintained. Focal mild narrowing of the right P1 PCA. Otherwise no significant stenosis, aneurysm, or arterial injury in the CTA of the head and neck. Pontine intraparenchymal hemorrhage is redemonstrated. MRI BRAIN W WO CONTRAST    Result Date: 1/30/2023  EXAMINATION: MRI OF THE BRAIN WITHOUT AND WITH CONTRAST  1/28/2023 11:42 am TECHNIQUE: Multiplanar multisequence MRI of the head/brain was performed without and with the administration of intravenous contrast. COMPARISON: CT head from 01/27/2023 HISTORY: ORDERING SYSTEM PROVIDED HISTORY: r/o underlying mass TECHNOLOGIST PROVIDED HISTORY: r/o underlying mass What is the sedation requirement?->None Reason for Exam: r/o underlying mass Additional signs and symptoms: Pontine hemorrhage FINDINGS: INTRACRANIAL STRUCTURES/VENTRICLES:  13 mm left dorsal pontine intraparenchymal hematoma is unchanged in size. There is mild mass effect on the fourth ventricle.   There is mild surrounding edema. No new acute hemorrhage is identified. No herniation or hydrocephalus. There are approximately 10 additional punctate foci of susceptibility artifact in the ventral ramses which may represent chronic hemosiderin deposition. There is a single punctate focus in the left putamen. There are no cortical or cerebral hemisphere foci. There are minimal periventricular T2 hyperintense white matter lesions. There is a lacune in the right frontal periventricular white matter and a possible lacune in the left thalamus. No abnormal intracranial enhancement. No brain mass. ORBITS: The visualized portion of the orbits demonstrate no acute abnormality. SINUSES: The visualized paranasal sinuses and mastoid air cells demonstrate no acute abnormality. BONES/SOFT TISSUES: The bone marrow signal intensity appears normal. The soft tissues demonstrate no acute abnormality. Chronic pontine microhemorrhages in addition to the acute hematoma suggest hypertensive hemorrhage. No brain mass. DISCHARGE INSTRUCTIONS     Discharge Medications:        Medication List        ASK your doctor about these medications      omeprazole 10 MG delayed release capsule  Commonly known as: PRILOSEC            Diet: ADULT DIET;  Regular diet as tolerated  Activity: As tolerated  Follow-up: Follow-up with ophthalmology, Dr. Jerry Castellanos in 1 week  Follow-up with [facility] neurology in 3 weeks, call to make an appointment  Time Spent for discharge: 27 minutes    Amanuel Carlton MD  Neuro Critical Care  2/2/2023, 3:45 PM

## 2023-02-02 NOTE — CARE COORDINATION
[address]  PRE-ADMISSION ASSESSMENT    Patient Name: Leon Garcia        MRN: [id number]    : 1980  (43 y.o.)  Gender: male   Ethnicity: Not , /a or Vietnamese Origin  Race: White    Admitted from:  [facility]     Type of Admission:  New Admission     Date of Onset / Admission to the [address] [place] [address]:  2023    Inpatient Rehabilitation Admitting Diagnosis:  Hemorrhagic CVA with R dominant hemiparesis    Did patient have surgery/procedures? Yes, As Listed Below   2023: Arterial Line Insertion  Physicians:   Day Salinas MD   Physician   Neurology     Nick Diamond MD   Physician   Neurosurgery       Risk for Clinical Complications:   Moderate     Co-morbidities:    Hemorrhagic CVA with R dominant hemiparesis  Alcohol withdrawal : on CIWA protocol , Thiamine, folate  HTN  Insomnia  Nicotine dependence    Financial Information  Primary insurance: Commercial Insurance:[employer]      Secondary Insurance: None     Precautions:   [x]Cardiac Precautions: Yes: SBP <160 mmHg    []Total hip precautions: No Total Hip Precautions  []Weight Bearing status: No Weight Bearing Restrictions  [x]Safety Precautions/Concerns:  Fall Risk, General Precautions  [x]Visually impaired: Yes: Blurry Vision, Has glasses (but doesn't wear them)    []Hard of Hearing: Within Functional Limits       Communication Aids, Devices or Interpretation Services Required: None    Isolation Precautions:  None: Standard Precautions       Physiatrist: Dr. Valentino Medicus      Patients Occupation: Employed full time    Reviewed Lab and Diagnostic reports from Current Admission: Yes    Patients Prior Functional  Level: Prior Function  ADL Assistance: Independent  Homemaking Assistance: Independent  Ambulation Assistance: Independent  Transfer Assistance: Independent  Additional Comments: pt reported wife able to provide 24/7 assist    History of current illness, per PM&R Consult:  Myranda Camejo is a 43 y.o. RHD male admitted to [facility] on 1/26/2023. Patient with R weakness, numbness and facial droop who fell when he went to bed then awoke with the symptoms. SBP was in 200s upon arrival to ED. CT confirmed hemorrhagic CVA posterior ramses and narrowing R P1 PCA. He was treated with Cardene for BP control. Neurosurgery managing conservatively. Neuro ICU starting CIWA protocol with Precedex and phenobarbital. Has prn seroquel for agitation/sleep. Prognosis: Fair    Current functional status for upper extremity ADLs:  UE Bathing: Setup, Increased time to complete, Supervision (wash face standing at sink.)  UE Dressing: Modified independent , Increased time to complete, Setup, Minimal assistance (doffed gown w/ min A seated in shower. Donned tshirt Mod I seated in chair.)    Current functional status for lower extremity ADLs:  LE Bathing: Setup, Stand by assistance, Increased time to complete (seated in shower)  LE Dressing: Setup, Increased time to complete, Supervision (Don socks seated in bed.)    Current functional status for bed, chair, wheelchair transfers:  Transfers  Sit to Stand: Moderate Assistance  Stand to Sit: Minimal Assistance  Comment: Transfers performed 4x this date. verbal cues for hand placement. Current functional status for toilet transfers:       Current functional status for locomotion:  Ambulation  Surface: Level tile  Device: Rolling Walker  Assistance: Moderate assistance  Quality of Gait: Very unsteady, R lateral lean, inaccurate step placement, 5x LOB requiring mod-A to prevent a fall. Gait Deviations: Slow Tess, Deviated path, Staggers  Distance: 8 feet, seated rest break, 3 feet R/L at the EOB. 5 feet forward/retro.   More Ambulation?: No    Current functional status for comprehension: Exceptions To Functional Limits    Current functional status for expression: Exceptions To Functional Limits    Current functional status for social interaction: Exceptions To Functional Limits    Current functional status for problem solving: Exceptions To Functional Limits    Current functional status for memory: Exceptions To Functional Limits    Current Deficits R/T Impairment: Impaired Functional Mobility and Decreased ADLs    Required Therapy Services:  Physical Therapy: Yes  Occupational Therapy: Yes  Speech Therapy: Yes      Additional Services:    [x] /Case Management    [] Recreational Therapy, as appropriate    [x] Nutrition Consult, as appropriate  [x] Dietary Needs/Preferences: No Specialized Dietary Needs/Preferences Identified  [] Dialysis  [x] Cultural/Rastafarian Needs/Preferences: No Specialized Cultural/Rastafarian Needs/Preferences Identified  [] Special Equipment Needs  [] Special Medication Needs  [] Other information relevant to patient's care needs:    Preferred Language: English    Does the patient need or want an  to communicate with a doctor or health care staff? No    Rehab Justification:  Needs 3 hrs therapy per day or 15 hours per week:  Yes  Identified Rehab Nursing needs: Yes  Intense Interdisciplinary need:  Yes  Need for 24 hr physician supervision:  Yes  Measurable improved quality of life:  Yes  Willingness to participate:  Yes  Medical Necessity:  Yes  Patient able to tolerate care proposed:  Yes    Expected Discharge Destination/Functional Level:  Home with assist    Expected length of time to achieve level of improvement: Approximately 2 Weeks  Please Note: Estimated length of stay is based on individual condition and Acute Inpatient Rehabilitation specific needs. Length of stay may vary based upon interdisciplinary team assessment, insurance approval, and patient progression.     Expected Post Discharge Treatments: Home with possible Home Care    Acute Inpatient Rehabilitation Disclosure Statement will be provided to patient upon admission to ARU with patient's verbalization of understanding. I have reviewed and concur with the findings and results of the pre-admission screening assessment completed by the Inpatient Rehabilitation Admissions Coordinator.

## 2023-02-03 LAB
ABSOLUTE EOS #: 0.07 K/UL (ref 0–0.4)
ABSOLUTE LYMPH #: 1.7 K/UL (ref 1–4.8)
ABSOLUTE MONO #: 0.68 K/UL (ref 0.1–1.3)
ALBUMIN SERPL-MCNC: 4.2 G/DL (ref 3.5–5.2)
ALP SERPL-CCNC: 56 U/L (ref 40–129)
ALT SERPL-CCNC: 46 U/L (ref 5–41)
ANION GAP SERPL CALCULATED.3IONS-SCNC: 12 MMOL/L (ref 9–17)
AST SERPL-CCNC: 21 U/L
ATYPICAL LYMPHOCYTE ABSOLUTE COUNT: 0.27 K/UL
ATYPICAL LYMPHOCYTES: 4 %
BASOPHILS # BLD: 0 % (ref 0–2)
BASOPHILS ABSOLUTE: 0 K/UL (ref 0–0.2)
BILIRUB DIRECT SERPL-MCNC: 0.1 MG/DL
BILIRUB INDIRECT SERPL-MCNC: 0.3 MG/DL (ref 0–1)
BILIRUB SERPL-MCNC: 0.4 MG/DL (ref 0.3–1.2)
BUN SERPL-MCNC: 12 MG/DL (ref 6–20)
CALCIUM SERPL-MCNC: 11.3 MG/DL (ref 8.6–10.4)
CHLORIDE SERPL-SCNC: 104 MMOL/L (ref 98–107)
CO2 SERPL-SCNC: 23 MMOL/L (ref 20–31)
CREAT SERPL-MCNC: 0.74 MG/DL (ref 0.7–1.2)
EOSINOPHILS RELATIVE PERCENT: 1 % (ref 0–4)
GFR SERPL CREATININE-BSD FRML MDRD: >60 ML/MIN/1.73M2
GLUCOSE SERPL-MCNC: 100 MG/DL (ref 70–99)
HCT VFR BLD AUTO: 44.7 % (ref 41–53)
HGB BLD-MCNC: 15.2 G/DL (ref 13.5–17.5)
LYMPHOCYTES # BLD: 25 % (ref 24–44)
MCH RBC QN AUTO: 32.4 PG (ref 26–34)
MCHC RBC AUTO-ENTMCNC: 34.1 G/DL (ref 31–37)
MCV RBC AUTO: 95 FL (ref 80–100)
MONOCYTES # BLD: 10 % (ref 1–7)
MORPHOLOGY: ABNORMAL
PDW BLD-RTO: 13.2 % (ref 11.5–14.9)
PLATELET # BLD AUTO: 202 K/UL (ref 150–450)
PMV BLD AUTO: 8.9 FL (ref 6–12)
POTASSIUM SERPL-SCNC: 4.2 MMOL/L (ref 3.7–5.3)
PROT SERPL-MCNC: 7.2 G/DL (ref 6.4–8.3)
RBC # BLD: 4.7 M/UL (ref 4.5–5.9)
SEG NEUTROPHILS: 60 % (ref 36–66)
SEGMENTED NEUTROPHILS ABSOLUTE COUNT: 4.08 K/UL (ref 1.3–9.1)
SODIUM SERPL-SCNC: 139 MMOL/L (ref 135–144)
WBC # BLD AUTO: 6.8 K/UL (ref 3.5–11)

## 2023-02-03 PROCEDURE — 97535 SELF CARE MNGMENT TRAINING: CPT

## 2023-02-03 PROCEDURE — 80048 BASIC METABOLIC PNL TOTAL CA: CPT

## 2023-02-03 PROCEDURE — 97530 THERAPEUTIC ACTIVITIES: CPT

## 2023-02-03 PROCEDURE — 99222 1ST HOSP IP/OBS MODERATE 55: CPT | Performed by: STUDENT IN AN ORGANIZED HEALTH CARE EDUCATION/TRAINING PROGRAM

## 2023-02-03 PROCEDURE — 92523 SPEECH SOUND LANG COMPREHEN: CPT

## 2023-02-03 PROCEDURE — 97542 WHEELCHAIR MNGMENT TRAINING: CPT

## 2023-02-03 PROCEDURE — 97116 GAIT TRAINING THERAPY: CPT

## 2023-02-03 PROCEDURE — 97166 OT EVAL MOD COMPLEX 45 MIN: CPT

## 2023-02-03 PROCEDURE — 97162 PT EVAL MOD COMPLEX 30 MIN: CPT

## 2023-02-03 PROCEDURE — 6360000002 HC RX W HCPCS

## 2023-02-03 PROCEDURE — 80076 HEPATIC FUNCTION PANEL: CPT

## 2023-02-03 PROCEDURE — 6370000000 HC RX 637 (ALT 250 FOR IP): Performed by: STUDENT IN AN ORGANIZED HEALTH CARE EDUCATION/TRAINING PROGRAM

## 2023-02-03 PROCEDURE — 1180000000 HC REHAB R&B

## 2023-02-03 PROCEDURE — 6370000000 HC RX 637 (ALT 250 FOR IP)

## 2023-02-03 PROCEDURE — 36415 COLL VENOUS BLD VENIPUNCTURE: CPT

## 2023-02-03 PROCEDURE — 85025 COMPLETE CBC W/AUTO DIFF WBC: CPT

## 2023-02-03 RX ORDER — MINERAL OIL AND WHITE PETROLATUM 150; 830 MG/G; MG/G
OINTMENT OPHTHALMIC NIGHTLY
Status: DISCONTINUED | OUTPATIENT
Start: 2023-02-03 | End: 2023-02-15 | Stop reason: HOSPADM

## 2023-02-03 RX ORDER — POLYVINYL ALCOHOL 14 MG/ML
1 SOLUTION/ DROPS OPHTHALMIC PRN
Status: DISCONTINUED | OUTPATIENT
Start: 2023-02-03 | End: 2023-02-15 | Stop reason: HOSPADM

## 2023-02-03 RX ADMIN — CARVEDILOL 12.5 MG: 12.5 TABLET, FILM COATED ORAL at 07:36

## 2023-02-03 RX ADMIN — ACETAMINOPHEN 650 MG: 325 TABLET ORAL at 07:35

## 2023-02-03 RX ADMIN — THIAMINE HCL TAB 100 MG 100 MG: 100 TAB at 07:35

## 2023-02-03 RX ADMIN — FOLIC ACID 1 MG: 1 TABLET ORAL at 07:35

## 2023-02-03 RX ADMIN — Medication 3 MG: at 20:42

## 2023-02-03 RX ADMIN — FLUTICASONE PROPIONATE 2 SPRAY: 50 SPRAY, METERED NASAL at 07:36

## 2023-02-03 RX ADMIN — PANTOPRAZOLE SODIUM 40 MG: 40 TABLET, DELAYED RELEASE ORAL at 06:13

## 2023-02-03 RX ADMIN — TAMSULOSIN HYDROCHLORIDE 0.4 MG: 0.4 CAPSULE ORAL at 07:35

## 2023-02-03 RX ADMIN — PHENOBARBITAL 129.6 MG: 32.4 TABLET ORAL at 20:42

## 2023-02-03 RX ADMIN — PHENOBARBITAL 129.6 MG: 32.4 TABLET ORAL at 07:35

## 2023-02-03 RX ADMIN — LISINOPRIL 40 MG: 20 TABLET ORAL at 07:35

## 2023-02-03 RX ADMIN — MINERAL OIL, PETROLATUM: 425; 568 OINTMENT OPHTHALMIC at 20:42

## 2023-02-03 RX ADMIN — POLYETHYLENE GLYCOL 3350 17 G: 17 POWDER, FOR SOLUTION ORAL at 07:41

## 2023-02-03 RX ADMIN — ENOXAPARIN SODIUM 40 MG: 100 INJECTION SUBCUTANEOUS at 07:56

## 2023-02-03 RX ADMIN — CARVEDILOL 12.5 MG: 12.5 TABLET, FILM COATED ORAL at 17:34

## 2023-02-03 ASSESSMENT — PAIN DESCRIPTION - ORIENTATION: ORIENTATION: OTHER (COMMENT)

## 2023-02-03 ASSESSMENT — PAIN SCALES - GENERAL: PAINLEVEL_OUTOF10: 3

## 2023-02-03 ASSESSMENT — 9 HOLE PEG TEST
TEST_RESULT: IMPAIRED
TESTTIME_SECONDS: 39.42
TESTTIME_SECONDS: 340.69
TEST_RESULT: IMPAIRED

## 2023-02-03 ASSESSMENT — ENCOUNTER SYMPTOMS
SHORTNESS OF BREATH: 0
BLURRED VISION: 1
DOUBLE VISION: 1
ABDOMINAL PAIN: 0

## 2023-02-03 ASSESSMENT — PAIN DESCRIPTION - DESCRIPTORS: DESCRIPTORS: DULL

## 2023-02-03 ASSESSMENT — PAIN - FUNCTIONAL ASSESSMENT: PAIN_FUNCTIONAL_ASSESSMENT: ACTIVITIES ARE NOT PREVENTED

## 2023-02-03 ASSESSMENT — PAIN DESCRIPTION - LOCATION: LOCATION: HEAD

## 2023-02-03 NOTE — PROGRESS NOTES
ACUTE INPATIENT [facility]    Patient Name: Jared Mann  MRN: [id number]   Date of Admit: 2/2/2023  Time of Arrival: 2140     Patient admitted to the Acute Inpatient Rehabilitation Unit via Stretcher    Patient oriented to room, unit and fall prevention safety measures. Education provided on the rehabilitation routine and therapy schedules. Drug / Medication Regimen Review   Admitting medication orders compared with acute stay medications; home medication list reviewed with patient/family. Medication Issues Identified ? No If Yes, Check All That Apply   []  Allergy to medication   []  Drug interactions (drug/drug, drug/food, drug/disease interactions)   []  Duplicate drug   []  Omission (drug missing from prescribed regimen)   []  Non adherence   []  Adverse reaction   []  Wrong patient, drug, dose, route, time error   []  Ineffective drug therapy       High-Risk Drug Classes: Use and Indication   Check if the patient is taking any medications by pharmacological classification If yes, check if there is an indication noted for all meds in the drug class   Antipsychotic Yes If yes: indication noted? [x] If no indication noted, follow up with provider for order clarification   Anticoagulant Yes If yes: indication noted? [x]    Antibiotic No If yes: indication noted? []    Opioid No If yes: indication noted? []    Antiplatelet No If yes: indication noted? []    Hypoglycemic (Including Insulin) No If yes: indication noted? []      Attending Physical Medicine & Rehabilitation (PM&R) Admitting Order Review   Admission orders reviewed with Acute Inpatient Rehabilitation Attending PM&R Physician: Marcin Smith MD     Skin Assessment   Skin assessment performed by two nurses: all active alterations in skin integrity, wounds, lines, drains and airways assessed, measured, recorded and reconciled. Refer to [facility] avatar and LDA flowsheet for additional information.     Nurse 1 Completing Skin Assessment Chon CASAREZ LPN   Nurse 2 Completing Skin Assessment Heather NAVARRETE RN     Active pressure injuries or complex wounds identified? No  If yes: contact provider for wound care consult order []       Admission Weight   Patient's Weight Upon Admission  209lbs 3.2oz       Bowel and Bladder Functional Assessment   Prior history of bladder problems:  no problems with bladder   Number of pads used per day: none   Frequency of night time voiding:   Unable to Assess   Fluid intake volume and pattern: WNL   Last Bowel Movement 2/2/2023   Prior history of bowel problems:  No If Yes, Check All That Apply  []Incontinence   []Frequent Diarrhea  []Constipation   []Hemorrhoids  []Diverticulitis  []Bowel Surgery     Pain Assessment   Over the past 5 days, how much of the time has pain made it hard for you to sleep at night?   Rarely or not at all   Over the past 5 days, how often have you limited your participation in rehabilitation therapy sessions due to pain?   Rarely or not at all   Over the past 5 days, how often have you limited your day-to-day activities (excluding rehabilitation therapy session)?   Rarely or not at all     Special Treatments, Procedures, and Programs   Check all of the following treatments, procedures, and programs that apply on admission.    Cancer Treatments   Chemotherapy No   If Yes, Check All That Apply   []IV Chemotherapy   []Oral Chemotherapy    []Chemotherapy    Radiation No   Respiratory Therapies   Oxygen Therapy No  If Yes, Check All That Apply   []Continuous   []Intermittent   []High-Concentration    Suctioning No  If Yes, Check All That Apply   []Scheduled   []As Needed   Tracheostomy Care No   Invasive Mechanical Ventilator   (Ventilator or Respirator) No  If Yes, Check All That Apply   []Non-invasive Mechanical Ventilator   []BiPap   []CPAP   Other   IV Medications No  If Yes, Check All That Apply   []IV Vasoactive Medications   []IV Antibiotics   []IV Anticoagulation    []Other IV Medications   Transfusions No   Dialysis No  If Yes, Check All That Apply   []Hemodialysis   []Peritoneal Dialysis   IV Access No  If Yes, Check All That Apply   []Peripheral   []Midline   [](PICC, tunneled, port)       Admission folder with the following documents provided to patient/responsible party:   1. \"[facility] [address] Individualized Disclosure Statement\"  2. \"Data Collection Information Summary for Patients in Inpatient Rehabilitation Facilities\"  3. \"Privacy Act Statement - Health Care Records\"    Care plan was created with patient/responsible party input and goals were agreed upon. Please refer to the admission navigator for further information.

## 2023-02-03 NOTE — PLAN OF CARE
Problem: ABCDS Injury Assessment  Goal: Absence of physical injury  Outcome: Progressing  Flowsheets (Taken 2/2/2023 2220)  Absence of Physical Injury: Implement safety measures based on patient assessment     Problem: Safety - Adult  Goal: Free from fall injury  Outcome: Progressing     Problem: Skin/Tissue Integrity  Goal: Absence of new skin breakdown  Description: 1.  Monitor for areas of redness and/or skin breakdown  2.  Assess vascular access sites hourly  3.  Every 4-6 hours minimum:  Change oxygen saturation probe site  4.  Every 4-6 hours:  If on nasal continuous positive airway pressure, respiratory therapy assess nares and determine need for appliance change or resting period.  Outcome: Progressing     Problem: Pain  Goal: Verbalizes/displays adequate comfort level or baseline comfort level  Outcome: Progressing

## 2023-02-03 NOTE — PROGRESS NOTES
Comprehensive Nutrition Assessment    Type and Reason for Visit:  Initial, Consult (ARU)    Nutrition Recommendations/Plan:   Will continue to provide Regular diet     Malnutrition Assessment:  Malnutrition Status: At risk for malnutrition (Comment) (02/03/23 1325)    Context:  Acute Illness     Findings of the 6 clinical characteristics of malnutrition:  Energy Intake:  No significant decrease in energy intake  Weight Loss:  No significant weight loss     Body Fat Loss:  No significant body fat loss     Muscle Mass Loss:  No significant muscle mass loss    Fluid Accumulation:  Mild Extremities   Strength:  Not Performed    Nutrition Assessment:    Pt was admitted to [facility] 1/26 and found to have [address]. He consumed more than 75% of food provided at that facility. Pt is now admitted to ARU. Observed lunch tray consumed 100%. Nutrition Related Findings:    Trace RUE edema, BM 2/2, Labs/Meds: Reviewed Wound Type: None       Current Nutrition Intake & Therapies:    Average Meal Intake: %     ADULT DIET; Regular    Anthropometric Measures:  Height: 6' (182.9 cm)  Ideal Body Weight (IBW): 178 lbs (81 kg)    Admission Body Weight: 209 lb (94.8 kg)  Current Body Weight: 209 lb (94.8 kg), 117.4 % IBW. Weight Source: Bed Scale  Current BMI (kg/m2): 28.3                          BMI Categories: Overweight (BMI 25.0-29. 9)    Estimated Daily Nutrient Needs:  Energy Requirements Based On: Formula  Weight Used for Energy Requirements: Admission  Energy (kcal/day): [place] x 1-1.1= 3144-0982 kcal  Weight Used for Protein Requirements: Admission  Protein (g/day): 1.2g/kg= 115 g       Nutrition Diagnosis:   Overweight/Obese related to excessive energy intake as evidenced by BMI    Nutrition Interventions:   Food and/or Nutrient Delivery: Continue Current Diet  Nutrition Education/Counseling: No recommendation at this time  Coordination of Nutrition Care: Continue to monitor while inpatient       Goals:     Goals: (Stable wt with no gain)       Nutrition Monitoring and Evaluation:      Food/Nutrient Intake Outcomes: Food and Nutrient Intake  Physical Signs/Symptoms Outcomes: Biochemical Data, GI Status, Fluid Status or Edema, Weight, Skin    Discharge Planning:    Continue current diet     Fani Masterson [address] [place] [address], LD  Contact: [phone]

## 2023-02-03 NOTE — PLAN OF CARE
Problem: Discharge Planning  Goal: Discharge to home or other facility with appropriate resources  2/2/2023 2122 by Dougie Sanches RN  Outcome: Progressing  2/2/2023 1829 by Raymond Woodall RN  Outcome: Progressing  Flowsheets (Taken 2/2/2023 0830)  Discharge to home or other facility with appropriate resources: Identify barriers to discharge with patient and caregiver     Problem: Safety - Adult  Goal: Free from fall injury  2/2/2023 2122 by Dougie Sanches RN  Outcome: Progressing  2/2/2023 [address] by Raymond Woodall RN  Outcome: Progressing     Problem: ABCDS Injury Assessment  Goal: Absence of physical injury  2/2/2023 2122 by Dougie Sanches RN  Outcome: Progressing  2/2/2023 1829 by Raymond Woodall RN  Outcome: Progressing

## 2023-02-03 NOTE — CARE COORDINATION
Case Management Assessment  Initial Evaluation    Date/Time of Evaluation: 2/3/2023 8:05 AM  Assessment Completed by: Patricia Bonilla RN    If patient is discharged prior to next notation, then this note serves as note for discharge by case management. Patient Name: Milagros Benson                     Date / Time: 2/2/2023  9:39 PM  YOB: 1980  Diagnosis: Impending cerebrovascular accident [facility]) [I63.9]                     Patient Admission Status: REHAB IP   Readmission Risk (Low < 19, Mod (19-27), High > 27): Readmission Risk Score: 12.8      Current PCP: Gely Dougherty, DO  PCP verified by CM? Yes (Dr. Leilani Jordan)  Chart Reviewed: No      History Provided by: Patient  Patient Orientation: Alert and Oriented, Person, Place    Patient Cognition: Alert    Advance Directives:    Code Status: Full Code   Patient's Primary Decision Maker is: Legal Next of Kin        Discharge Planning:  Patient lives with: Spouse/Significant Other   Type of Home: House  Primary Care Giver: Self  Patient Support Systems include: Spouse/Significant Other, Family Members, Children   Family can provide assistance at DC: Yes  Does patient have 24 hour assistance at home:  No  Current Financial resources:  na  Current community resources:  na  Current services prior to admission: None  Type of Home Care services:  OT, PT    Is patient agreeable to VNS/Outpatient therapy TBD  [place] of choice provided:  Yes  List of Home Care Agencies/Outpatient therapy provided: Yes  VNS/Outpatient therapy chosen: TBD  Current services prior to admission: None  Current DME:  none  Do you have a wheelchair ramp/Plans to build? No  Handicap Placard: needs    Does patient go to outpatient dialysis: No  If yes, location and chair time: no    Would you like Case Management to discuss the discharge plan with any other family members/significant others, and if so, who?  No  Plans to Return to Present Housing: Unknown at present  Potential Assistance needed at discharge: Outpatient PT/OT  Patient expects to discharge to: House  Identified Issues/Barriers to RETURNING to current housing:none  Plan for transportation at discharge:  Family    ADLS  Prior functional level: Independent in ADLs/IADLs  Current functional level: Assistance with the following:, Bathing, Dressing, Toileting, Mobility    PT AM-PAC:   /24  OT AM-PAC:   /24    Financial  Payor: [employer] / Plan: [employer] NAP CHOICE POS II / Product Type: *No Product type* /     IMM Letter Status: Not Applicable: Patient does not have Medicare as a payor. What Pharmacy do you use:    [address] [facility] [id number] [place], [address] [facility] [address]  Phone: [phone] Fax: [phone]    [id number] - [place], [address] [facility] [phone] Raul Back [phone] [address] [facility] [address]  Phone: [phone] Fax: [phone]    Meds-to-Beds request: No  Does insurance require precert for SNF: yes  Potential assistance Purchasing Medications: No      Notes:  Factors facilitating achievement of predicted outcomes: Family support  Barriers to discharge: Decreased endurance    The Plan for Transition of Care is related to the following treatment goals of Impending cerebrovascular accident [facility]) [F51.2]    IF APPLICABLE: The Patient and/or patient representative Ihsan Horne and his family were provided with a choice of provider and agrees with the discharge plan. Freedom of choice list with basic dialogue that supports the patient's individualized plan of care/goals and shares the quality data associated with the providers was provided to: Patient      The Patient and/or Patient Representative Agree with the Discharge Plan?  Yes    Additional Case Management Notes: Dispo:home w wife DME none Placard pelon Saavedra RN  ARU/Case Management Department  JE:[phone] Fax: [phone]    ACUTE INPATIENT [address]    Case Management Assessment: IRF JOSE E 4.0   If score is above 15, Notify PM&R Physician    Patient Health Questionnaire-9 (PHQ-2 to 9)   Over the last 2 weeks, how often have you been bothered by any of the following problems? 1. Little Interest or pleasure in doing things? Never or 1 Day - Score 0    2. Feeling down, depressed or hopeless? Never or 1 Day - Score 0    3. Trouble falling or staying asleep, or sleeping too much? Never or 1 Day - Score 0    4. Feeling tired or having little energy? Never or 1 Day - Score 0    5. Poor apettite or overeating? Never or 1 Day - Score 0    6. Feeling bad about yourself-or that you are a failure or have let yourself or your family down? Never or 1 Day - Score 0    7. Trouble concentrating on things, such as reading the newspaper or watching television? Never or 1 Day - Score 0    8. Moving or speaking so slowly that other people could have noticed? Or the opposite-being so fidgety or restless that you have been moving around a lot more than usual?   Never or 1 Day - Score 0    9. Thoughts that you would be better off dead or of hurting yourself in some way? Never or 1 Day - Score 0    Total Score: 0    If you checked off any problems, how difficult have these problems made it for you to do your work, take care of things at home, or get along with other people? [x] Not difficult at all     [] Somewhat Difficult     [] Very Difficult     [] Extremely Difficult           Social Isolation     How often do you feel lonely or isolated from those around you? [x] Never  [] Rarely  [] Sometimes  [] Often  [] Always  [] Patient Unable to Respond       Access To Transportation     2. In the past six months to a year, has lack of transportation kept you from medical appointments or from getting your medications?  No    3. In the past six months to a year, has lack of transportation kept you from non-medical meetings, appointments, work, or from getting things that you need?  No

## 2023-02-03 NOTE — PROGRESS NOTES
Physical Therapy  Facility/Department: [facility] ACUTE REHAB  Rehabilitation Physical Therapy Progress Note    NAME: Iglesia Quezada  : 1980 (43 y.o.)  MRN: [id number]  CODE STATUS: Full Code    Date of Service: 2/3/23      Past Medical History:   Diagnosis Date    Cerebral artery occlusion with cerebral infarction [facility])     History of heartburn     takes omeprazole    Hypertension     Neoplasm of unspecified nature of bone, soft tissue, and skin 2009-present    lesions of right cheek x 2    Research study patient 2023    [id number]. Date of completion 23     Past Surgical History:   Procedure Laterality Date    APPENDECTOMY  in 's    PRE-MALIGNANT / BENIGN SKIN LESION EXCISION Right 2014    Excision lesion of right cheek. Dr. Jessica Madrid. Chart Reviewed: Yes  Patient assessed for rehabilitation services?: Yes  Additional Pertinent Hx: Mr. Iglesia Quezada is a 43 y.o. right handed male who was admitted to [facility] on [facility]23 with Extremity Weakness (Left sided weakness) and Facial Droop     Patient with R weakness, numbness and facial droop who fell when he went to bed then awoke with the symptoms. SBP was in 200s upon arrival to ED. CT confirmed hemorrhagic CVA posterior ramses and narrowing R P1 PCA. He was treated with Cardene for BP control. Neurosurgery managing conservatively. Family / Caregiver Present: No  Referring Practitioner: Palak Watkins MD  Referral Date : 23  Diagnosis: Hemorrhagic CVA  General Comment  Comments: Patient agreeable to treatment this PM.    Restrictions:  Restrictions/Precautions: Fall Risk;General Precautions  Position Activity Restriction  Other position/activity restrictions: Per Dr. Liz Pacheco OK to use Eye patch, alternate between eyes (to help with patient's double vision and balance)     SUBJECTIVE  Subjective: Patient reports double vision; patient does better with mobility with eye patch. Pain: Pt reports discomfort in left eye.     Prior Level of Function:  Social/Functional History  Lives With: Spouse (Pt's Spouse Tana Cisse, has 1 step son (15 yo)that may move in with the family)  Type of Home: House  Home Layout: Two level, Bed/Bath upstairs, 1/2 bath on main level (Laundry on first level.)  Home Access: Stairs to enter with rails  Entrance Stairs - Number of Steps: 2 CHIRAG; 13 steps upstairs with with L HR  Entrance Stairs - Rails: Right  Bathroom Shower/Tub: Tub/Shower unit, Curtain  Bathroom Toilet: Standard (Windowsills nearby toilet in both bathrooms; bench storage unit in front of toilet in full bathroom that can be used as support)  Bathroom Accessibility: Accessible  Home Equipment:  (Pt reports that his brother in law has equipment available for him to use such as RW, and Shower chair and he does not need it at this time)  Has the patient had two or more falls in the past year or any fall with injury in the past year?: No  Receives Help From: Family, Neighbor, Friend(s) (Sister, Brother in law, neighbors)  ADL Assistance: Independent  Homemaking Assistance: Independent  Homemaking Responsibilities: Yes  Meal Prep Responsibility: Primary  Laundry Responsibility: Primary  Cleaning Responsibility: Primary  Ambulation Assistance: Independent  Transfer Assistance: Independent  Active : Yes  Mode of Transportation: Truck, Car (Wife Drives [employer] car)  Occupation: Full time employment  Type of Occupation: operations for [employer]: watching history channel, fishing, spending time with kids, being outdoors  IADL Comments: Pt sleeps on flat bed. House is 50% carpet and 50% hardwood. Additional Comments: Pt's wife is employed, M-F 9-5 shifts, Pt reports her schedule can be flexible and can work remotely. Pt reports neightbor, brother-in-law, and sister that are also able to assist as needed.  Pt plans to return home and do OP therapies      OBJECTIVE  Vision  Vision: Impaired  Vision Exceptions: Wears glasses for reading    Hearing  Hearing: Exceptions to [facility]  Hearing Exceptions: Hard of hearing/hearing concerns    Cognition  Overall Cognitive Status: Exceptions  Arousal/Alertness: Appropriate responses to stimuli  Following Commands: Follows multistep commands with increased time  Attention Span: Appears intact; Attends with cues to redirect  Memory: Appears intact  Safety Judgement: Decreased awareness of need for assistance;Decreased awareness of need for safety  Problem Solving: Assistance required to identify errors made;Assistance required to correct errors made  Insights: Decreased awareness of deficits  Initiation: Requires cues for some  Sequencing: Requires cues for some    ROM  AROM RLE (degrees)  RLE AROM: WFL  AROM LLE (degrees)  LLE AROM : WFL  AROM RUE (degrees)  RUE General AROM: See OT  AROM LUE (degrees)  LUE General AROM: See OT    Strength  Strength RLE  Strength RLE: WFL  Comment: 5/5 Strength grossly; pt has challenge of creating adequate force production d/t lack of sensory input with impaired sensation, proprioception, and kinesthesia  Strength LLE  Strength LLE: WFL  Comment: 5/5 Strength grossly  Strength RUE  Comment: See OT  Strength LUE  Comment: See OT    Quality of Movement  Tone RLE  RLE Tone: Normotonic  Tone LLE  LLE Tone: Normotonic  Coordination  Movements Are Fluid And Coordinated: No  Coordination and Movement description: Poor coordination with stepping and RLE positioning/placement    Sensation  Overall Sensation Status: Impaired (R side of body)    Functional Mobility  Transfers  Sit to Stand: Contact guard assistance  Stand to Sit: Contact guard assistance  Comment: CGA for sit<>stands in parallel bars (vcs for hand placement for sit<>stands with poor carryover for all transfers)  Balance  Posture: Fair  Sitting - Static: Fair;+  Sitting - Dynamic: Fair  Standing - Static: Fair;-  Standing - Dynamic: Poor;+  Comments: standing balance in parallel bars    Environmental Mobility  Ambulation  Surface: Level tile  Device: Rolling Walker  Assistance: 2 Person assistance (Scar with RW and SBA for wc follow)  Quality of Gait: unsteady, R lateral lean with path deviation, intermittent wide and narrow ISRA[place], ataxic, decreased motor control R LE. CGA for with intermittent Scar required for right lateral LOB. Gait Deviations: Slow Tess;Deviated path;Staggers;Decreased step length;Decreased step height  Distance: 81  Comments: Increased time to complete with slow pace, RLE Ataxia with poorly coordinated steps going from scissoring to Wide ISRAEL with tendency to maintain R hip external rotation. Short standing rest breaks to regroup stance and right hand grasp as hand tends to slip from walker; patient donned gloves with grippers for improved support with good results. More Ambulation?: No  Ambulation 2  Assistance 2:  (MinAx2)    PT Exercises  Static Standing Balance Exercises: 3 min 47 sec (Romberg stance, eyes opn, no UE support, modA to correct right lateral lean progressing to CGA for safety, noted unsteadiness)  Dynamic Standing Balance Exercises: lateral stepping, weight shifitng, toe>cone taps, postural ex (in parallel bars with bilateral UE support)    ASSESSMENT  Vitals  O2 Device: None (Room air)    Activity Tolerance  Activity Tolerance: Patient tolerated treatment well;Patient limited by endurance  Activity Tolerance Comments: Greatest limitation to activity tolerance is fear of movement with recent loss of coordination and impaired sensation on R side of body.     Assessment  Therapy Prognosis: Good  Decision Making: Medium Complexity  PT Equipment Recommendations  Equipment Needed: No    GOALS  Patient Goals   Patient Goals : \"Practice going up/down a ladder\"  Short Term Goals  Time Frame for Short Term Goals: 7 days  Short Term Goal 1: pt to demo independent bed mobility on flat surface without use of rails per home set-up  Short Term Goal 2: pt to demo functional transfers with device and CGA  Short Term Goal 3: pt to ambulate 150' with device and MinAx1  Short Term Goal 4: pt to negotiate 10-12 steps with Single rail and device as needed demonstrating a landing pivot turn (per home set up stairs with landing) Benigno  Long Term Goals  Time Frame for Long Term Goals : Until D/C  Long Term Goal 1: Pt to improve Standing balance to FAIR + with use of device to dec risk for falls  Long Term Goal 2: pt to demo functional transfers with device Mod I  Long Term Goal 3: pt to ambulate 150' with device and supervision  Long Term Goal 4: pt to negotiate 2 steps with R rail and device and 10-12 successive steps with L rail and device as needed demonstrating a landing pivot turn (per home set up stairs with landing) CGA-SBA for stairs  Long Term Goal 5: pt to demo actual or simulated car transfer with SBA and device as needed  Long term goal 6: pt to improve on PASS score to 30/36 or greater to demonstrate improving balance, mobility, and independence  Long term goal 7: pt to demonstrate good technique for fall recovery with SBA    PLAN OF CARE  Frequency: 1-2 treatment sessions per day, 5-7 days per week  Physcial Therapy Plan  General Plan:  minutes of therapy at least 5 out of 7 days a week  Current Treatment Recommendations: Strengthening;Balance training;Functional mobility training;Transfer training;Cognitive/Perceptual training; Wheelchair mobility training;Gait training;Stair training;Neuromuscular re-education;Pain management;Home exercise program;Return to work related activity  Safety Devices  Type of Devices: All fall risk precautions in place;Call light within reach;Gait belt;Nurse notified; Patient at risk for falls; Left in chair (DOMO Mir)  Restraints  Restraints Initially in Place: No      Therapy Time   Individual Concurrent Group Co-treatment   Time In 1413         Time Out 1457         Minutes 44           Timed Code Treatment Minutes: 19 Cristal Hinson PTA, 02/03/23 at 4:42 PM

## 2023-02-03 NOTE — CARE COORDINATION
Jesus Mills, RN   Registered Nurse   Acute Rehab   Care Coordination       Addendum   Date of Service:  2023  1:40 PM          Related encounter: ED to Hosp-Admission (Discharged) from 2023 in [address] [place] [address]  PRE-ADMISSION ASSESSMENT     Patient Name: Robbi Oviedo        MRN:   [id number]    : 1980  (43 y.o.)  Gender: male   Ethnicity: Not , /a or Frisian Origin  Race: White     Admitted from:  [facility]      Type of Admission:  New Admission      Date of Onset / Admission to the [address] [place] [address]:  2023     Inpatient Rehabilitation Admitting Diagnosis:  Hemorrhagic CVA with R dominant hemiparesis     Did patient have surgery/procedures? Yes, As Listed Below   2023: Arterial Line Insertion  Physicians:   Casey Escalona MD   Physician   Neurology      Jason Fleming MD   Physician   Neurosurgery         Risk for Clinical Complications:   Moderate      Co-morbidities:    Hemorrhagic CVA with R dominant hemiparesis  Alcohol withdrawal : on CIWA protocol , Thiamine, folate  HTN  Insomnia  Nicotine dependence     Financial Information  Primary insurance: Commercial Insurance:[employer]       Secondary Insurance: None      Precautions:   [x]Cardiac Precautions: Yes: SBP <160 mmHg    []Total hip precautions: No Total Hip Precautions  []Weight Bearing status: No Weight Bearing Restrictions  [x]Safety Precautions/Concerns:  Fall Risk, General Precautions  [x]Visually impaired: Yes: Blurry Vision, Has glasses (but doesn't wear them)          []Hard of Hearing: Within Functional Limits        Communication Aids, Devices or Interpretation Services Required: None     Isolation Precautions:  None: Standard Precautions Physiatrist: Dr. Noe Pizano       Patients Occupation: Employed full time     Reviewed Lab and Diagnostic reports from Current Admission: Yes     Patients Prior Functional  Level: Prior Function  ADL Assistance: Independent  Homemaking Assistance: Independent  Ambulation Assistance: Independent  Transfer Assistance: Independent  Additional Comments: pt reported wife able to provide 24/7 assist     History of current illness, per PM&R Consult:  Susan Lopez is a 43 y.o. RHD male admitted [facility] on [facility]. Patient with R weakness, numbness and facial droop who fell when he went to bed then awoke with the symptoms. SBP was in 200s upon arrival to ED. CT confirmed hemorrhagic CVA posterior ramses and narrowing R P1 PCA. He was treated with Cardene for BP control. Neurosurgery managing conservatively. Neuro ICU starting CIWA protocol with Precedex and phenobarbital. Has prn seroquel for agitation/sleep. Prognosis: Fair     Current functional status for upper extremity ADLs:  UE Bathing: Setup, Increased time to complete, Supervision (wash face standing at sink.)  UE Dressing: Modified independent , Increased time to complete, Setup, Minimal assistance (doffed gown w/ min A seated in shower. Donned tshirt Mod I seated in chair.)     Current functional status for lower extremity ADLs:  LE Bathing: Setup, Stand by assistance, Increased time to complete (seated in shower)  LE Dressing: Setup, Increased time to complete, Supervision (Don socks seated in bed.)     Current functional status for bed, chair, wheelchair transfers:  Transfers  Sit to Stand: Moderate Assistance  Stand to Sit: Minimal Assistance  Comment: Transfers performed 4x this date. verbal cues for hand placement. Current functional status for toilet transfers:     Current functional status for locomotion:  Ambulation  Surface: Level tile  Device: Rolling Walker  Assistance:  Moderate assistance  Quality of Gait: Very unsteady, R lateral lean, inaccurate step placement, 5x LOB requiring mod-A to prevent a fall. Gait Deviations: Slow Tess, Deviated path, Staggers  Distance: 8 feet, seated rest break, 3 feet R/L at the EOB. 5 feet forward/retro. More Ambulation?: No     Current functional status for comprehension: Exceptions To Functional Limits     Current functional status for expression: Exceptions To Functional Limits     Current functional status for social interaction: Exceptions To Functional Limits     Current functional status for problem solving: Exceptions To Functional Limits     Current functional status for memory: Exceptions To Functional Limits     Current Deficits R/T Impairment: Impaired Functional Mobility and Decreased ADLs     Required Therapy Services:  Physical Therapy: Yes  Occupational Therapy: Yes  Speech Therapy: Yes       Additional Services:    [x] /Case Management    [] Recreational Therapy, as appropriate    [x] Nutrition Consult, as appropriate  [x] Dietary Needs/Preferences: No Specialized Dietary Needs/Preferences Identified  [] Dialysis  [x] Cultural/Hinduism Needs/Preferences: No Specialized Cultural/Hinduism Needs/Preferences Identified  [] Special Equipment Needs  [] Special Medication Needs  [] Other information relevant to patient's care needs:     Preferred Language: English     Does the patient need or want an  to communicate with a doctor or health care staff?  No     Rehab Justification:  Needs 3 hrs therapy per day or 15 hours per week:  Yes  Identified Rehab Nursing needs: Yes  Intense Interdisciplinary need:  Yes  Need for 24 hr physician supervision:  Yes  Measurable improved quality of life:  Yes  Willingness to participate:  Yes  Medical Necessity:  Yes  Patient able to tolerate care proposed:  Yes     Expected Discharge Destination/Functional Level:  Home with assist     Expected length of time to achieve level of improvement: Approximately 2 Weeks  Please Note: Estimated length of stay is based on individual condition and Acute Inpatient Rehabilitation specific needs. Length of stay may vary based upon interdisciplinary team assessment, insurance approval, and patient progression. Expected Post Discharge Treatments: Home with possible Home Care     Acute Inpatient Rehabilitation Disclosure Statement will be provided to patient upon admission to ARU with patient's verbalization of understanding. I have reviewed and concur with the findings and results of the pre-admission screening assessment completed by the Inpatient Rehabilitation Admissions Coordinator.            Cosigned by: Gabi Wesley MD at 2/2/2023  2:05 PM

## 2023-02-03 NOTE — PROGRESS NOTES
-called report to [facility] at [address] [place] at [id number]. All questions answered. IV removed at 8pm. Patient bathed before discharge. Picked up by Julius at [facility]. All belongings with wife.

## 2023-02-03 NOTE — PROGRESS NOTES
Speech Language Pathology  Facility/Department: [facility]  Initial Speech/Language/Cognitive Assessment    NAME: She Arias  : 1980   MRN: [id number]  ADMISSION DATE: 2023  ADMITTING DIAGNOSIS: has Neoplasm of unspecified nature of bone, soft tissue, and skin; Hemangioma of skin; Pontine hemorrhage ([facility] [address] Intracranial hemorrhage ([facility] [address] and Impending cerebrovascular accident [facility]) on their problem list.    Date of Eval: 2/3/2023   Evaluating Therapist: ABI Tay    RECENT RESULTS  CT OF HEAD: 2023  Impression   13 x 8 mm intraparenchymal hemorrhage posterior ramses. Primary Complaint: Mr. She Arias is a 43 y.o. right handed male who was admitted to [facility] on 2023 with Extremity Weakness (Left sided weakness) and Facial Droop     Patient with R weakness, numbness and facial droop who fell when he went to bed then awoke with the symptoms. SBP was in 200s upon arrival to ED. CT confirmed hemorrhagic CVA posterior ramses and narrowing R P1 PCA. He was treated with Cardene for BP control. Neurosurgery managing conservatively. Pain:  Pain Assessment  Pain Assessment: None - Denies Pain  Pain Level: 3  Patient's Stated Pain Goal: 0 - No pain  Pain Location: Head  Pain Orientation: Other (Comment) (generalized all over headache)  Pain Descriptors: Dull  Functional Pain Assessment: Activities are not prevented  Non-Pharmaceutical Pain Intervention(s): Distraction, Repositioned    Vision/ Hearing  Vision  Vision: Impaired  Vision Exceptions: Wears glasses for reading  Hearing  Hearing: Exceptions to [facility]  Hearing Exceptions: Hard of hearing/hearing concerns    Assessment:      Diagnosis: Pt presents with mild cognitive communication disorder characterized by deficits in memory, reaspong, problem solving, and thought organization. Pt's speech is mild-moderately dysarthric.  Pt would benefit from skilled ST services to facilitate effective communication of wants and needs and promote safe return to prior living environment. Recommendations:  Recommendations  Requires SLP Intervention: Yes  Patient Education: ST educated Pt re: results of assessment, plan of care, and treatment rationale. Patient Education Response: Verbalizes understanding             Plan:   Speech Therapy Prognosis  Prognosis: Good  Prognosis Considerations: Age;Participation Level  Individuals consulted  Consulted and agree with results and recommendations: Patient    Goals:  Short Term Goals  Goal 1: Pt will complete tasks targeting STM/delayed recall with 90% accuracy. Goal 2: Pt will complete tasks targeting reasoning/thought organization with 90% accuracy. Goal 3: Pt will complete tasks targeting problem solving/executive functioning with 90% accuracy. Goal 4: Pt will complete OMEX for facial weakness x 10 reps x 3 sets. Goal 5: Pt will implement dysarthria speaking strategies in 95% of opportunities. Patient/family involved in developing goals and treatment plan: patient    Subjective:   Previous level of function and limitations: Independent        Vision  Vision: Impaired  Vision Exceptions: Wears glasses for reading  Hearing  Hearing: Exceptions to [facility]  Hearing Exceptions: Hard of hearing/hearing concerns           Objective:            Motor Speech  Apraxic Characteristics: None  Dysarthric Characteristics: Blended word boundaries; Imprecise  Intelligibility: Impaired  Word Intelligibility (%): 90 %  Phrase Intelligibility (%): 85 %  Sentence Intelligibility (%): 80 %  Conversation Intelligibility (%): 75 %  Overall Impairment Severity: Mild-moderate    Auditory Comprehension  Comprehension: Within Functional Limits         Expression  Primary Mode of Expression: Verbal    Verbal Expression  Verbal Expression: Within functional limits                   Cognition:      Orientation  Overall Orientation Status: Within Functional Limits  Orientation Level: Oriented X4  Attention  Attention: Within Functional Limits  Memory  Memory: Exceptions to [facility]  Prospective Memory: Moderate  Short-term Memory: Moderate  Working Memory: Mild  Problem Solving  Problem Solving: Exceptions to [facility]  Simple Functional Tasks: [facility]  Verbal Reasoning Skills: Mild  Sequencing: [facility]  Executive Function Skills: Moderate  Numeric Reasoning  Numeric Reasoning: Within Functional Limits  Abstract Reasoning  Abstract Reasoning: Exceptions to [facility]  Convergent Thinking: Mild  Divergent Thinking: Mild  Safety/Judgment  Safety/Judgment: Exceptions to [facility]  Complex Functional Tasks: Moderate  Novel Situations: Mild  Insight: WFL  Impulsive: Mild  Flexibility of Thought: Mild    Additional Assessments:   MOCA 8.1 = 24/30          Prognosis:  Speech Therapy Prognosis  Prognosis: Good  Prognosis Considerations: Age;Participation Level  Individuals consulted  Consulted and agree with results and recommendations: Patient    Education:  Patient Education: [address] [place] [address]  educated Pt re: results of assessment, plan of care, and treatment rationale.   Patient Education Response: Verbalizes understanding          Therapy Time:   Individual Concurrent Group Co-treatment   Time In 5871         Time Out 4422         Minutes 20                 Electronically signed by Myranda Hernadez M.S., Ray Mazariegos on 2/3/2023 at 3:28 PM

## 2023-02-03 NOTE — CARE COORDINATION
ARU CASE MANAGEMENT NOTE:    Patient is alert and oriented x4. Spoke with patient regarding discharge plan and patient confirms plan is to go home w support from spouse and family    DME: none    Outside appointments: Will continue to follow for additional discharge needs.     Electronically signed by Lesvia Guerra RN on 2/3/2023 at 12:08 PM

## 2023-02-03 NOTE — PROGRESS NOTES
Physical Therapy  Facility/Department: [address] [place] [address]  Physical Therapy Initial Assessment    Name: Sita Ashraf  : 1980  MRN: [id number]  Date of Service: 2/3/2023    Discharge Recommendations:  Therapy recommended at discharge, Patient would benefit from continued therapy after discharge         Patient Diagnosis(es): There were no encounter diagnoses. Past Medical History:  has a past medical history of Cerebral artery occlusion with cerebral infarction [facility]), History of heartburn, Hypertension, Neoplasm of unspecified nature of bone, soft tissue, and skin, and Research study patient. Past Surgical History:  has a past surgical history that includes Appendectomy (in 20's) and pre-malignant / benign skin lesion excision (Right, 2014). Assessment   Assessment: The patient presents with R-sided sensation, proprioception, coordination, and kinesthetic deficits 2* hemorrhagic CVA impairing overall balance, safety, and independence with mobility. The patient requires SBA for bed mobility, Benigno for transfers with RW, 2 Person A to ambulate x14' with RW and 80' with TRINY walker, and MinAx1 to negotiate one stair step. Intensive PT services are warranted to progress pt towards prior level of function and independence. Treatment Diagnosis: Impaired balance and mobility 2* CVA  Therapy Prognosis: Good  Decision Making: Medium Complexity  Exam: ROM, MMT, Balance, and functional mobility assessments. PASS  Clinical Presentation: pt is alert, pleasant, and cooperative.  very motivated to improve  Barriers to Learning: Vision, hearing  Requires PT Follow-Up: Yes  Activity Tolerance  Activity Tolerance: Patient tolerated evaluation without incident;Patient tolerated treatment well     Plan   Physcial Therapy Plan  General Plan:  minutes of therapy at least 5 out of 7 days a week  Current Treatment Recommendations: Strengthening, Balance training, Functional mobility training, Transfer training, Cognitive/Perceptual training, Wheelchair mobility training, Gait training, Stair training, Neuromuscular re-education, Pain management, Home exercise program, Return to work related activity  Safety Devices  Type of Devices: All fall risk precautions in place, Call light within reach, Gait belt, Nurse notified, Patient at risk for falls, Left in chair (DOMO [place])  Restraints  Restraints Initially in Place: No     Restrictions  Restrictions/Precautions  Restrictions/Precautions: Fall Risk, General Precautions  Required Braces or Orthoses?: No  Implants present? :  (pt denies)  Position Activity Restriction  Other position/activity restrictions: Per Dr. Karimi Economy OK to use Eye patch, alternate between eyes (to help with patient's double vision and balance)     Subjective   Pain: Pt reports discomfort in left eye. General  Chart Reviewed: Yes  Patient assessed for rehabilitation services?: Yes  Additional Pertinent Hx: Mr. You Mock is a 43 y.o. right handed male who was admitted to [facility] on 1/26/2023 with Extremity Weakness (Left sided weakness) and Facial Droop     Patient with R weakness, numbness and facial droop who fell when he went to bed then awoke with the symptoms. SBP was in 200s upon arrival to ED. CT confirmed hemorrhagic CVA posterior ramses and narrowing R P1 PCA. He was treated with Cardene for BP control. Neurosurgery managing conservatively. Response To Previous Treatment: Not applicable  Family / Caregiver Present: No  Referring Practitioner: Kathy Garza MD  Referral Date : 02/02/23  Diagnosis: Hemorrhagic CVA  Follows Commands: Within Functional Limits  General Comment  Comments: Patient agreeable to treatment this PM.  Subjective  Subjective: Patient reports double vision; patient does better with mobility with eye patch.          Social/Functional History  Social/Functional History  Lives With: Spouse (Pt's Spouse Efrain Garza, has 1 step son (17 yo)that may move in with the family)  Type of Home: House  Home Layout: Two level, Bed/Bath upstairs, 1/2 bath on main level (Laundry on first level.)  Home Access: Stairs to enter with rails  Entrance Stairs - Number of Steps: 2 CHIRAG; 13 steps upstairs with with L HR  Entrance Stairs - Rails: Right  Bathroom Shower/Tub: Tub/Shower unit, Curtain  Bathroom Toilet: Standard (Windowsills nearby toilet in both bathrooms; bench storage unit in front of toilet in full bathroom that can be used as support)  Bathroom Accessibility: Accessible  Home Equipment:  (Pt reports that his brother in law has equipment available for him to use such as RW, and Shower chair and he does not need it at this time)  Has the patient had two or more falls in the past year or any fall with injury in the past year?: No  Receives Help From: Family, Neighbor, Friend(s) (Sister, Brother in law, neighbors)  ADL Assistance: Independent  Homemaking Assistance: Independent  Homemaking Responsibilities: Yes  Meal Prep Responsibility: Primary  Laundry Responsibility: Primary  Cleaning Responsibility: Primary  Ambulation Assistance: Independent  Transfer Assistance: Independent  Active : Yes  Mode of Transportation: Truck, Car (Wife Drives [employer] car)  Occupation: Full time employment  Type of Occupation: operations for [employer]: watching history channel, fishing, spending time with kids, being outdoors  IADL Comments: Pt sleeps on flat bed. House is 50% carpet and 50% hardwood. Additional Comments: Pt's wife is employed, M-F 9-5 shifts, Pt reports her schedule can be flexible and can work remotely. Pt reports neightbor, brother-in-law, and sister that are also able to assist as needed. Pt plans to return home and do OP therapies  Vision/Hearing  Vision  Vision: Impaired  Vision Exceptions: Wears glasses for reading (pt reporting double vision that began since the stroke.  was wearing glasses previously for reading)  Hearing  Hearing: Exceptions to WFL  Hearing Exceptions: Hard of hearing/hearing concerns (Left ear is the better)    Cognition   Orientation  Overall Orientation Status: Within Functional Limits  Orientation Level: Oriented X4  Cognition  Overall Cognitive Status: Exceptions  Arousal/Alertness: Appropriate responses to stimuli  Following Commands: Follows multistep commands with increased time  Attention Span: Appears intact; Attends with cues to redirect  Memory: Appears intact  Safety Judgement: Decreased awareness of need for assistance;Decreased awareness of need for safety  Problem Solving: Assistance required to identify errors made;Assistance required to correct errors made  Insights: Decreased awareness of deficits  Initiation: Requires cues for some  Sequencing: Requires cues for some  Patient affect[de-identified] Normal     Objective   O2 Device: None (Room air)          AROM RLE (degrees)  RLE AROM: WFL  AROM LLE (degrees)  LLE AROM : WFL  AROM RUE (degrees)  RUE General AROM: See OT  AROM LUE (degrees)  LUE General AROM: See OT  Strength RLE  Strength RLE: WFL  Comment: 5/5 Strength grossly; pt has challenge of creating adequate force production d/t lack of sensory input with impaired sensation, proprioception, and kinesthesia  Strength LLE  Strength LLE: WFL  Comment: 5/5 Strength grossly  Strength RUE  Comment: See OT  Strength LUE  Comment: See OT  Tone RLE  RLE Tone: Normotonic  Tone LLE  LLE Tone: Normotonic  Coordination  Movements Are Fluid And Coordinated: No  Coordination and Movement description: Poor coordination with stepping and RLE positioning/placement  Coordination  Rapid Alternating Movements: Normal  Heel to Shin: Abnormal (Slight impairment in comparison to non-affected side)  Knee proprioception : Abnormal  Ankle proprioception : Abnormal  Sensation  Overall Sensation Status: Impaired (Diminished sensation + tingling on R-side of face and body)     Bed mobility  Rolling to Left: Stand by assistance  Rolling to Right: Stand by assistance  Supine to Sit: Stand by assistance  Sit to Supine: Stand by assistance  Scooting: Stand by assistance  Bed Mobility Comments: All bed mobility completed on flat surface without use of rails. Transfers  Sit to Stand: Minimal Assistance;Contact guard assistance  Stand to Sit: Minimal Assistance;Contact guard assistance  Bed to Chair: Minimal assistance  Stand Pivot Transfers: Minimal Assistance  Comment: RW for multiple transfers from [facility], Hospital Bed, and recliner chair. VCs for hands placement with good carry over. VCs with Intermittent physical A to place/position RLE. Ambulation  Surface: Level tile  Device: Rolling Walker  Assistance: 2 Person assistance (ModA overall + addition Assist of wheelchair follow. MaxAx1 briefly for LOB)  Quality of Gait: unsteady, R lateral lean with path deviation, intermittent wide and narrow [place], ataxic, decreased motor control R LE. MaxA briefly to recover from major LOB while walking straight  Gait Deviations: Slow Tess;Deviated path;Staggers;Decreased step length;Decreased step height  Distance: 14'  Comments: Increased time to complete with slow pace, RLE Ataxia with poorly coordinated steps going from scissoring to Wide [place] with tendency to maintain R hip external rotation.  Requires VCs and ModA throughout for RW negotiation, maintaining R hand grasp, and intermittent RLE placement, Brief MaxA to recover from x1 major LOB laterally to the Right  More Ambulation?: No  Ambulation 2  Surface - 2: level tile  Device 2:  (TRINY Walker)  Assistance 2: 2 Person assistance (MinAx2)  Quality of Gait 2: Similar presentation of deviations  Gait Deviations: Slow Tess;Decreased step length;Decreased step height;Deviated path  Distance: 103'  Stairs/Curb  Stairs?: Yes  Stairs  # Steps : 1  Stairs Height: 6\"  Rails: Bilateral  Device: No Device  Assistance: Minimal assistance  Comment: Benigno for RLE positioning/placement  Wheelchair Activities  Wheelchair Size: 20  Wheelchair Type: Standard  Wheelchair Cushion: Standard (Air waffle cushion)  Wheelchair Parts Management: Yes  Left Leg Rest Level of Assistance: Minimal assistance  Right Leg Rest Level of Assistance: Minimal assistance  Left Brakes Level of Assistance: Stand by assistance  Right Brakes Level of Assistance: Stand by Assist  Propulsion: Yes  Propulsion 1  Propulsion: Manual  Level: Level Tile  Level of Assistance: Stand by assistance  Description/ Details: Pt demo's ability to propel but difficulty with RUE coordination and unfortunately scrapes hand on W/C Brake causing minor bleeding. Writer brings this to patient's attention as this went unnoticed by him. small wound is cleansed, bandaid provided. deferred further propulsion d/t poor RUE coordination and injury risk. Distance: 12'     Balance  Posture: Fair  Sitting - Static: Fair;+  Sitting - Dynamic: Fair  Standing - Static: Fair (RW)  Standing - Dynamic: Poor;+ (RW)  Static Standing Balance Exercises: 3 min 47 sec (Romberg stance, eyes opn, no UE support, modA to correct right lateral lean progressing to CGA for safety, noted unsteadiness)  Dynamic Standing Balance Exercises: lateral stepping, weight shifitng, toe>cone taps, postural ex (in parallel bars with bilateral UE support)        OutComes Score     Postural Assessment Scale for Stroke Patients   (PASS) Scoring Form     Give the subject instructions for each item as written below. When scoring the item, record the lowest response category that applies for each item. Maintaining a Posture  Sitting Without Support  Examiner: Have the subject sit on a bench/mat without support and with feet flat on the floor. 3 Can sit for 5 minutes without support  2. Standing with Support Examiner: Have the subject stand, providing support as needed. Evaluate only the ability to stand with or without support. Do not consider the quality of the stance. 3     Can stand with support of only 1 hand  3. Standing Without Support  Examiner:  Have the subject stand without support. Evaluate only the ability to stand with or without support. Do not consider the quality of the stance. 2  Can stand without support for 1 minute or stands slightly asymmetrically   4. Standing on Non-paretic Leg  Examiner: Have the subject stand on the non-paretic leg. Evaluate only the ability to bear weight entirely on the non-paretic leg. Do not consider how the subject accomplishes the task. 0  Cannot stand on non-paretic leg    5. Standing on Paretic Leg  Examiner: Have the subject stand on the paretic leg. Evaluate only the ability to bear weight entirely on the paretic leg. Do not consider how the subject accomplishes the task. 0  Cannot stand of paretic leg     Maintaining Posture SUBTOTAL     8/15    Changing a Posture  6. Supine to Paretic Side Lateral  Examiner:  Begin with the subject in supine on a treatment mat. Instruct the subject to roll to the paretic side (lateral movement). Assist as necessary. Evaluated the subject's performance on the amount of help required. Do not consider the quality of performance. 3  Can perform without help  7. Supine to Non-paretic Side Lateral  Examiner:  Begin with the subject in supine on a treatment mat. Instruct the subject to roll to the non-paretic side (lateral movement). Assist as necessary. Evaluate the subject's Performance on the amount of help required. Do not consider the quality of performance. 3  Can perform without help  8. Supine to Sitting up on the Edge of the Mat   Examiner:  Begin with the subject in supine on a treatment mat. Instruct the subject to come to sitting on the edge of the mat. Assist as necessary. Evaluate the subject's performance on the amount of help required. Do not considered the quality performance. 3  Can perform without help   9.   Sitting on the Edge of the Mat to Supine  Examiner: Begin with the subject sitting on the edge of a treatment mat. Instruct the subject to return to supine. Assist as necessary. Evaluate the subjects performance on the amount of help required. Co not consider the quality of performance. 3  Can perform without help  10. Sitting to Standing Up  Examiner:  Begin with the subject sitting on the edge of a treatment mat. Instruct the subject to stand up without support. Assist if necessary. Evaluated the subject's performance on the amount ot help required. Do not consider the quality of the performance. 2  Can perform with little help  11. Standing Up to Sitting Down  Examiner:  Begin with the subject standing by the edge of a treatment mat. Instruct the subject to sit on the edge of mat without support. Assist if necessary. Evaluated the subject's performance on the amount of help required. Do not consider the quality of the performance. 2  Can perform with little help  12 . Standing, Picking up a Pencil from the Floor  Examiner:  Begin with the subject standing. Instruct the subject to  a pencil from the floor without support. Assist if necessary. Evaluate the subject's performance on the amount of help required. Do Not consider the quality of the performance.    1  Can perform with much help     Changing Posture SUBTOTAL   17/21    PASS TOTAL   25/36     Goals  Short Term Goals  Time Frame for Short Term Goals: 7 days  Short Term Goal 1: pt to demo independent bed mobility on flat surface without use of rails per home set-up  Short Term Goal 2: pt to demo functional transfers with device and CGA  Short Term Goal 3: pt to ambulate 150' with device and MinAx1  Short Term Goal 4: pt to negotiate 10-12 steps with Single rail and device as needed demonstrating a landing pivot turn (per home set up stairs with landing) Benigno  Long Term Goals  Time Frame for Long Term Goals : Until D/C  Long Term Goal 1: Pt to improve Standing balance to FAIR + with use of device to dec risk for falls  Long Term Goal 2: pt to demo functional transfers with device Mod I  Long Term Goal 3: pt to ambulate 150' with device and supervision  Long Term Goal 4: pt to negotiate 2 steps with R rail and device and 10-12 successive steps with L rail and device as needed demonstrating a landing pivot turn (per home set up stairs with landing) CGA-SBA for stairs  Long Term Goal 5: pt to demo actual or simulated car transfer with SBA and device as needed  Long term goal 6: pt to improve on PASS score to 30/36 or greater to demonstrate improving balance, mobility, and independence  Long term goal 7: pt to demonstrate good technique for fall recovery with SBA  Long term goal 8: Pt to progress to step Ladder negotiation with CGA to allow for safe participation in home making/maintainance tasks with assist for safety. Patient Goals   Patient Goals : \"Practice going up/down a ladder\"       Education  Patient Education  Education Given To: Patient  Education Provided: Role of Therapy;Transfer Training;Equipment;Plan of Care; Fall Prevention Strategies;Precautions  Education Method: Verbal;Demonstration  Barriers to Learning: Cognition  Education Outcome: Continued education needed;Verbalized understanding;Demonstrated understanding      Therapy Time   02/03/23 0800   Time Code Minutes   Timed Code Treatment Minutes 55 Minutes   PT Individual Minutes   Time In 0820   Time Out 0935   Minutes 76      Kai Patel, PT

## 2023-02-03 NOTE — PROGRESS NOTES
[address]    Acute Rehabilitation Occupational Therapy Evaluation     Date: 2/3/23  Patient Name: Dinah Horn       Room: 0753/9521-83  MRN: [id number]  Account: [de-identified]   : 1980  (43 y.o.) Gender: male       Referring Practitioner: Samuel Callaway MD  Diagnosis: Hemorrhagic CVA  Additional Pertinent Hx: Mr. Dinah Horn is a 43 y.o. right handed male who was admitted to [facility] on 2023 with Extremity Weakness (Right sided weakness) and Facial Droop. Patient with R weakness, numbness and facial droop who fell when he went to bed then awoke with the symptoms. SBP was in 200s upon arrival to ED. CT confirmed hemorrhagic CVA posterior ramses and narrowing R P1 PCA. He was treated with Cardene for BP control. Neurosurgery managing conservatively. Pt admitted to ARU 23    Treatment Diagnosis: Impaired self care status    Past Medical History:  has a past medical history of Cerebral artery occlusion with cerebral infarction [facility]), History of heartburn, Hypertension, Neoplasm of unspecified nature of bone, soft tissue, and skin, and Research study patient. Past Surgical History:   has a past surgical history that includes Appendectomy (in 20's) and pre-malignant / benign skin lesion excision (Right, 2014). Restrictions     Restrictions/Precautions: Fall Risk, General Precautions  Required Braces or Orthoses?: No  Implants present? :  (pt denies)         Position Activity Restriction  Other position/activity restrictions: Per Dr. Chad Reagan OK to use Eye patch, alternate between eyes (to help with patient's double vision and balance)      Vitals  Vitals  O2 Device: None (Room air)     Subjective  Subjective: \"What does your  think about you walking around with all these naked studs. \" Pt was pleasant and agreeable to occupational therapy    Comments: Glynn Vincent per [employer] to shower.  Pt was pleasant and agreeable to Occupational therapy         Pain: Pt reports discomfort in left eye. Social/Functional History  Social/Functional History  Lives With: Spouse (Pt's Spouse Veronica Morrell, has 1 step son (15 yo)that may move in with the family)  Type of Home: House  Home Layout: Two level, Bed/Bath upstairs, 1/2 bath on main level (Laundry on first level.)  Home Access: Stairs to enter with rails  Entrance Stairs - Number of Steps: 2 CHIRAG; 13 steps upstairs with with L HR  Entrance Stairs - Rails: Right  Bathroom Shower/Tub: Tub/Shower unit, Curtain  Bathroom Toilet: Standard (Windowsills nearby toilet in both bathrooms; bench storage unit in front of toilet in full bathroom that can be used as support)  Bathroom Accessibility: Accessible  Home Equipment:  (Pt reports that his brother in law has equipment available for him to use such as RW, and Shower chair and he does not need it at this time)  Has the patient had two or more falls in the past year or any fall with injury in the past year?: No  Receives Help From: Family, Neighbor, Friend(s) (Sister, Brother in law, neighbors)  ADL Assistance: Independent  Homemaking Assistance: Independent  Homemaking Responsibilities: Yes  Meal Prep Responsibility: Primary  Laundry Responsibility: Primary  Cleaning Responsibility: Primary  Ambulation Assistance: Independent  Transfer Assistance: Independent  Active : Yes  Mode of Transportation: Truck, Car (Wife Drives [employer] car)  Occupation: Full time employment  Type of Occupation: operations for [employer]: watching history channel, fishing, spending time with kids, being outdoors  IADL Comments: Pt sleeps on flat bed. House is 50% carpet and 50% hardwood. Additional Comments: Pt's wife is employed, M-F 9-5 shifts, Pt reports her schedule can be flexible and can work remotely. Pt reports neightbor, brother-in-law, and sister that are also able to assist as needed.  Pt plans to return home and do OP therapies    Occupational therapy includes sexuality, sexual activity, and relationship roles into activities of daily living.     The following questions were included in the prior level of function section of the Occupational therapy evaluation to address intimacy and relationship roles:    Is it important is it for you to participate in intimacy? Yes    Are you distressed or concerned about your future intimacy/ relationship roles with your partner? No    Are you comfortable discussing your intimacy concerns with your partner? Yes    Would you like to address intimacy and relationship roles in your plan of care? No    Objective  Vision  Vision: Impaired  Vision Exceptions: Wears glasses for reading  Vision - Basic Assessment  Patient Visual Report: Blurring of print when reading, Unable to keep objects in focus, Difficulty maintaining concentration with focus, Blurring of vision when changing focal distance, Diplopia  Vision Comments: Look to the left assessment completed on this date x 2 with each eye individually due to diplopia and use of eye patch at this time. Pt able to correctly complete Look to the left assessment with each eye with increased time and compensatory methods. Further visual assessment completed with OT noted pt unable to scan/track visually to the left side of each eye. Pt able to visually see to the left with peripheral vision. No visual field cut noted. Pt demonstrated slight nystagmus. RN and Dr. Dowd notified.    Hearing  Hearing: Exceptions to WFL  Hearing Exceptions: Hard of hearing/hearing concerns    Perception  Overall Perceptual Status: Impaired (Slight proprioception deficits noted on R side)    Sensation  Overall Sensation Status: Impaired (R side of body)         Cognition  Overall Orientation Status: Within Functional Limits  Orientation Level: Oriented X4    Cognition  Overall Cognitive Status: Exceptions  Arousal/Alertness: Appropriate responses to stimuli  Following Commands: Follows multistep commands with increased  time  Attention Span: Appears intact, Attends with cues to redirect  Memory: Appears intact  Safety Judgement: Decreased awareness of need for assistance, Decreased awareness of need for safety  Problem Solving: Assistance required to identify errors made, Assistance required to correct errors made  Insights: Decreased awareness of deficits  Initiation: Requires cues for some  Sequencing: Requires cues for some  Patient affect[de-identified] Normal    Activities of Daily Living  Equipment Provided: Feeding equipment (Built up handles)    Feeding: Setup  Feeding Skilled Clinical Factors: Pt demonstrated increased difficulty with grasping silverwear during eating task. OT provided built up handles during task to increase ease of use during self feeding. Grooming: Minimal assistance  Grooming Skilled Clinical Factors: Min A for standing balance standing sink side with slight R side lean    UE Bathing: Stand by assistance  UE Bathing Skilled Clinical Factors: Sitting on tub bench    LE Bathing: Minimal assistance  LE Bathing Skilled Clinical Factors: LOB while standing in shower to dry bottom requiring A to right self    UE Dressing: Stand by assistance       LE Dressing: Minimal assistance  LE Dressing Skilled Clinical Factors: Pt demonstrated figure 4 technique to thread BLE. Min A while standing with unsteadiness while pulling up over hips    Toileting: Minimal assistance  Toileting Skilled Clinical Factors: A for standing balance with hygiene and clothing management    Additional Comments: OT facilitated pts engagement in full shower on this date with use of tub bench, grab bars, and hand held shower. Pt required A while standing due to intermittet LOB to right side.  Pt currently limited due to decreased strength, balance, FMC, visual deficits, and activity tolerance impacting safety and independence with self care tasks    OT scores     Eating  Assistance Needed: Setup or clean-up assistance  CARE Score: 5  Discharge Goal: Independent  Oral Hygiene  Assistance Needed: Partial/moderate assistance  CARE Score: 3  Discharge Goal: [address] needed: Partial/moderate assistance  CARE Score: 3  Discharge Goal: Independent  Shower/Bathe Self  Assistance Needed: Partial/moderate assistance  CARE Score: 3  Discharge Goal: Independent  Upper Body Dressing  Assistance Needed: Supervision or touching assistance  CARE Score: 4  Discharge Goal: Independent  Lower Body Dressing  Assistance Needed: Partial/moderate assistance  CARE Score: 3  Discharge Goal: Independent  Putting On/Taking Off Footwear  Assistance Needed: Partial/moderate assistance  CARE Score: 3  Discharge Goal: Independent  Toilet Transfer  Assistance needed: Partial/moderate assistance  CARE Score: 3  Discharge Goal: Independent    UE Function     LUE AROM (degrees)  LUE AROM : WFL        Tone RUE  RUE Tone: Normotonic    LUE Strength  Gross LUE Strength: WFL  L Hand General: 4+/5    Left Hand Strength -  (lbs)  Handle Setting 3: 106# (107, 106, 105) (Norm: #)            RUE AROM (degrees)  RUE AROM : WFL     Right Hand AROM (degrees)  Right Hand AROM: WFL  Right Hand General AROM: with increased time  Tone LUE  LUE Tone: Normotonic    RUE Strength  Gross RUE Strength: WFL  R Hand General: 4/5    Right Hand Strength -  (lbs)  Handle Setting 3: 93.3# (100, 83, 97) (Norm: #)         Fine Motor Skills/Coordination  Hand Dominance  Hand Dominance: Right  Coordination  Movements Are Fluid And Coordinated: No  Coordination and Movement Description: Fine motor impairments, Gross motor impairments, Decreased speed, Decreased accuracy, Right UE  Fine Motor Skills  Left 9-Hole Peg Test: Impaired  Left 9 Hole Peg Test Time (secs): 39.42 (Norm: 17-25 seconds)  Right 9-Hole Peg Test: Impaired  Right 9 Hole Peg Test Time (secs): 340.69 (Norm: 17-25 seconds)       Mobility  Bed mobility  Bed Mobility Comments: Pt sitting in chair at the start and end of session. Balance  Balance  Sitting Balance: Stand by assistance  Standing Balance: Moderate assistance (CGA-Mod A with multiple LOB to right side)       Transfers  Transfers  Sit to stand: Minimal assistance  Stand to sit: Minimal assistance  Transfer Comments: Verbal cues for hand placement and safety  Toilet Transfers  Toilet - Technique: Ambulating  Equipment Used: Grab bars  Toilet Transfer: Minimal assistance  Toilet Transfers Comments: Verbal cues for hand placement and safety. Slight unsteadiness with R side LOB     Shower Transfers  Shower - Transfer From: [place] - Transfer Type: To and From  Shower - Transfer To: Transfer tub bench  Shower - Technique: Ambulating  Shower Transfers: Moderate assistance  Shower Transfers Comments: Verbal cues for hand placement, safety and RW management over threshold. LOB to right side with Mod A to right self. Functional mobility  Functional Mobility  Functional - Mobility Device: Rolling Walker  Activity: To/from bathroom  Assist Level: Moderate assistance (CGA-Mod A with multiple LOB to right side)  Functional Mobility Comments: Verbal cues for hand placement and safety. Increased time needed with RLE.  Multiple LOB to right side requiring A to right self    Assessment  Activity Tolerance  Activity Tolerance: Patient Tolerated treatment well (Limited due to visual deficits)  Assessment  Performance deficits / Impairments: Decreased ADL status, Decreased functional mobility , Decreased ROM, Decreased strength, Decreased safe awareness, Decreased endurance, Decreased balance, Decreased vision/visual deficit, Decreased high-level IADLs, Decreased fine motor control, Decreased coordination  Treatment Diagnosis: Impaired self care status  Prognosis: Good  Decision Making: Medium Complexity  Discharge Recommendations: Home with assist PRN, Outpatient OT ('s Rehab)    Patient Education  Education  Education Given To: Patient  Education Provided: Role of Therapy, Plan of Care, Safety, Mobility Training, Transfer Training  Education Method: Verbal  Barriers to Learning: Vision  Education Outcome: Verbalized understanding, Continued education needed    OT Equipment Recommendations  Other: TBD    Safety Devices  Type of Devices: All fall risk precautions in place, Call light within reach, Gait belt, Nurse notified, Patient at risk for falls, Left in chair (RN Cooper Peters)  Restraints  Restraints Initially in Place: No    Goals  Patient Goals   Patient goals : \"To get back to those things (ADL, IADL, and [facility] in [facility]). \"  Short Term Goals  Time Frame for Short Term Goals: By 1 week  Short Term Goal 1: Pt will complete lower body dressing/bathing with CGA and Good safety with use of AE as needed  Short Term Goal 2: Pt will complete functional transfers/mobility during self care tasks with CGA and Good safety with no LOB  Short Term Goal 3: Pt will tolerate standing 7+ minutes during functional activity of choice with CGA and no LOB during functional activity of choice  Short Term Goal 4: Pt will verbalize/demonstrate Good understanding of AE/adaptive strategies/DME to increase safety and independence with self care and mobility  Short Term Goal 5: Pt will verbalize/demonstrate 3 visual compensatory strategies to increase safety with daily activities and increase quality of life  Short Term Goal 6: Pt will participate in 30+ minutes of therapeutic exercises/functional activities to increase safety and independence with self care and mobility    Long Term Goals  Time Frame for Long Term Goals : By discharge  Long Term Goal 1: Pt will complete BADLs with Mod I and Good safety with use of AE as needed  Long Term Goal 2: Pt will complete functional mobility during self care tasks with Mod I and Good safety with use of least restrictive device  Long Term Goal 3: Pt will tolerate standing 15+ minutes during functional activity of choice with Good safety  Long Term Goal 4: Pt will complete simple meal prep/light house keeping task with Supervision and Good safety  Long Term Goal 5: Pt will complete tub transfer with Supervision and Good safety with use of DME as needed  Long Term Goal 6: Pt will verbalize/demonstrate Good understanding of vision HEP to increase neurofacilitation of left side visual field to increase safety during daily activities  Long Term Goal 7: Pt will verbalize/demonstrate Good understanding of home safety/fall prevention strategies to increase safety and independence with self care and mobility  Long Term Goal 8: Pt will demonstrate increased [address] during self care tasks as evident by 5 second improvement with LUE and 30 second improvement with RUE during 9 hole peg test    Plan  Occupational Therapy Plan  Times Per Week: 5-7  Times Per Day: Twice a day  Current Treatment Recommendations: Self-Care / ADL, Strengthening, Balance training, Functional mobility training, Endurance training, Neuromuscular re-education, Pain management, Safety education & training, Patient/Caregiver education & training, Equipment evaluation, education, & procurement, Home management training, Cognitive/Perceptual training, Coordination training       02/03/23 1100 02/03/23 1500   OT Individual Minutes   Time In [id number]   Time Out [id number]   [id number]   Time Code Minutes    Timed Code Treatment Minutes 53 Minutes 31 Minutes          Electronically signed by DARCY Sierra on 2/3/23 at 6:05 PM EST

## 2023-02-03 NOTE — H&P
Physical Medicine & Rehabilitation History and Physical  [facility] Acute Rehabilitation Unit     Primary care provider:  Chad Merrill DO     Chief Complaint and Reason for Rehabilitation Admission:   ADL and mobility deficits secondary to hemorrhagic pontine CVA    History of Present Illness:  Luis Manuel De La Garza is a 43 y.o. right-handed male with history of HTN and GERD admitted to the [address] [place] [address] Acute Rehabiliation unit on 2/2/2023. He was originally admitted to [place] [address] on 1/26/23. He initially presented with right-sided weakness, numbness, and facial droop upon waking. He was noted to have systolic BP in the 354V on arrival to the ED. CT head showed intraparenchymal hemorrhage in the posterior ramses. He was started on cardene drip for BP control. He did not require any surgical intervention. He was treated for alcohol withdrawal with CIWA protocol, precedex, and phenobarbital.    He is currently requiring assistance for self-care activities and mobility prompting this admission. He reports ongoing diplopia and blurred vision as well as burning in the left eye. He also notes right-sided numbness/tingling and weakness. He states that he had a slight headache this morning but that it is improved. He denies any other acute concerns. Premorbid function:  Independent    Current Function:    Physical Therapy    Restrictions/Precautions: Fall Risk, General Precautions  Implants present? :  (pt denies)  Other position/activity restrictions: Per Dr. Iva Lorenz OK to use Eye patch, alternate between eyes (to help with patient's double vision and balance)    Bed mobility  Rolling to Left: Stand by assistance  Rolling to Right: Stand by assistance  Supine to Sit: Stand by assistance  Sit to Supine: Stand by assistance  Scooting: Stand by assistance  Bed Mobility Comments: Pt sitting in chair at the start and end of session.     Transfers  Sit to Stand: Contact guard assistance  Stand to Sit: Contact guard assistance  Bed to Chair: Minimal assistance  Stand Pivot Transfers: Minimal Assistance  Comment: CGA for sit<>stands in parallel bars (vcs for hand placement for sit<>stands with poor carryover for all transfers)    Ambulation  Surface: Level tile  Device: Rolling Walker  Assistance: 2 Person assistance (Scar with RW and SBA for wc follow)  Quality of Gait: unsteady, R lateral lean with path deviation, intermittent wide and narrow ISRA[place], ataxic, decreased motor control R LE. CGA for with intermittent Scar required for right lateral LOB. Gait Deviations: Slow Tess, Deviated path, Staggers, Decreased step length, Decreased step height  Distance: 81  Comments: Increased time to complete with slow pace, RLE Ataxia with poorly coordinated steps going from scissoring to Wide ISRA[place] with tendency to maintain R hip external rotation. Short standing rest breaks to regroup stance and right hand grasp as hand tends to slip from walker; patient donned gloves with grippers for improved support with good results. More Ambulation?: No      Occupational Therapy    ADL  Equipment Provided: Feeding equipment (Built up handles)  Feeding: Setup  Feeding Skilled Clinical Factors: Pt demonstrated increased difficulty with grasping silverwear during eating task. OT provided built up handles during task to increase ease of use during self feeding. Grooming: Minimal assistance  Grooming Skilled Clinical Factors: Min A for standing balance standing sink side with slight R side lean  UE Bathing: Stand by assistance  UE Bathing Skilled Clinical Factors: Sitting on tub bench  LE Bathing: Minimal assistance  LE Bathing Skilled Clinical Factors: LOB while standing in shower to dry bottom requiring A to right self  UE Dressing: Stand by assistance  LE Dressing: Minimal assistance  LE Dressing Skilled Clinical Factors: Pt demonstrated figure 4 technique to thread BLE.  Min A while standing with unsteadiness while pulling up over hips  Toileting: Minimal assistance  Toileting Skilled Clinical Factors: A for standing balance with hygiene and clothing management  Additional Comments: OT facilitated pts engagement in full shower on this date with use of tub bench, grab bars, and hand held shower. Pt required A while standing due to intermittet LOB to right side. Pt currently limited due to decreased strength, balance, FMC, visual deficits, and activity tolerance impacting safety and independence with self care tasks          Balance  Sitting Balance: Stand by assistance  Standing Balance: Moderate assistance (CGA-Mod A with multiple LOB to right side)     Functional Mobility  Functional - Mobility Device: Rolling Walker  Activity: To/from bathroom  Assist Level: Moderate assistance (CGA-Mod A with multiple LOB to right side)  Functional Mobility Comments: Verbal cues for hand placement and safety. Increased time needed with RLE. Multiple LOB to right side requiring A to right self     Transfers  Sit to stand: Minimal assistance  Stand to sit: Minimal assistance  Transfer Comments: Verbal cues for hand placement and safety  Toilet Transfers  Toilet - Technique: Ambulating  Equipment Used: Grab bars  Toilet Transfer: Minimal assistance  Toilet Transfers Comments: Verbal cues for hand placement and safety. Slight unsteadiness with R side LOB     Shower Transfers  Shower - Transfer From: Lam Pean - Transfer Type: To and From  Shower - Transfer To: Transfer tub bench  Shower - Technique: Ambulating  Shower Transfers: Moderate assistance  Shower Transfers Comments: Verbal cues for hand placement, safety and RW management over threshold. LOB to right side with Mod A to right self. Speech Therapy  Assessment:      Diagnosis: Pt presents with mild cognitive communication disorder characterized by deficits in memory, reaspong, problem solving, and thought organization. Pt's speech is mild-moderately dysarthric.  Pt would benefit from skilled  services to facilitate effective communication of wants and needs and promote safe return to prior living environment. Past Medical History:      Diagnosis Date    Cerebral artery occlusion with cerebral infarction [facility])     History of heartburn     takes omeprazole    Hypertension     Neoplasm of unspecified nature of bone, soft tissue, and skin 2009-present    lesions of right cheek x 2    Research study patient 01/26/2023    [id number]. Date of completion 1/28/23       Past Surgical History:      Procedure Laterality Date    APPENDECTOMY  in 20's    PRE-MALIGNANT / BENIGN SKIN LESION EXCISION Right 9/29/2014    Excision lesion of right cheek. Dr. Gina Smith. Allergies:    Patient has no known allergies. Medications  Scheduled Meds:    artificial tears   Left Eye Nightly    carvedilol  12.5 mg Oral BID WC    enoxaparin  40 mg SubCUTAneous Daily    folic acid  1 mg Oral Daily    lisinopril  40 mg Oral Daily    melatonin  3 mg Oral Nightly    nicotine  1 patch TransDERmal Daily    pantoprazole  40 mg Oral QAM AC    PHENobarbital  129.6 mg Oral BID    [START ON 2/4/2023] PHENobarbital  64.8 mg Oral BID    tamsulosin  0.4 mg Oral Daily    thiamine  100 mg Oral Daily    polyethylene glycol  17 g Oral Daily     Continuous Infusions:   PRN Meds: polyvinyl alcohol, acetaminophen, calcium carbonate, fluticasone, QUEtiapine, senna, bisacodyl     Family History:   No family history on file.     Social History:  Lives With: Spouse (Pt's Spouse Saritha Posey, has 1 step son (17 yo)that may move in with the family)  Type of Home: House  Home Layout: Two level, Bed/Bath upstairs, 1/2 bath on main level (Laundry on first level.)  Home Access: Stairs to enter with rails  Entrance Stairs - Number of Steps: 2 CHIRAG; 13 steps upstairs with with L HR  Entrance Stairs - Rails: Right  Bathroom Shower/Tub: Tub/Shower unit, Curtain  Bathroom Toilet: Standard (Windowsills nearby toilet in both bathrooms; bench storage unit in front of toilet in full bathroom that can be used as support)  Bathroom Accessibility: Accessible  Home Equipment:  (Pt reports that his brother in law has equipment available for him to use such as RW, and Shower chair and he does not need it at this time)  Has the patient had two or more falls in the past year or any fall with injury in the past year?: No  Receives Help From: Family, Neighbor, Friend(s) (Sister, Brother in law, neighbors)  ADL Assistance: Independent  Homemaking Assistance: Independent  Homemaking Responsibilities: Yes  Meal Prep Responsibility: Primary  Laundry Responsibility: Primary  Cleaning Responsibility: Primary  Ambulation Assistance: Independent  Transfer Assistance: Independent  Active : Yes  Mode of Transportation: Truck, Car ([employer]ife [employer] car)  Occupation: Full time employment  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 House is 50% carpet and 50% hardwood. Additional Comments: Pt's wife is employed, M-F 9-5 shifts, Pt reports her schedule can be flexible and can work remotely. Pt reports neightbor, brother-in-law, and sister that are also able to assist as needed. Pt plans to return home and do OP therapies  Social History     Socioeconomic History    Marital status:    Tobacco Use    Smoking status: Every Day     Packs/day: 1.00     Years: 10.00     Pack years: 10.00     Types: Cigarettes     Last attempt to quit: 2013     Years since quittin.5    Smokeless tobacco: Never   Substance and Sexual Activity    Alcohol use: Yes     Alcohol/week: 24.0 standard drinks     Types: 24 Cans of beer per week    Drug use: No    Sexual activity: Yes     Partners: Female       Review of Systems:  Review of Systems   Constitutional:  Negative for fever. Eyes:  Positive for blurred vision and double vision.    Respiratory:  Negative for shortness of breath. Cardiovascular:  Negative for chest pain. Gastrointestinal:  Negative for abdominal pain. No change in bowel control   Genitourinary:         No change in bladder control   Neurological:  Positive for sensory change, weakness and headaches. Physical Exam:  BP (!) 142/99   Pulse 74   Temp 97.5 °F (36.4 °C)   Resp 16   Ht 6' (1.829 m)   Wt 209 lb 3.2 oz (94.9 kg)   SpO2 93%   BMI 28.37 kg/m²     GEN: Well developed, well nourished, no acute distress  HEENT: NCAT. Unable to look to the left with both eyes. Mild redness of the left conjunctiva noted. Unable to fully close left eye. Hearing grossly intact. Mucous membranes pink and moist.  RESP: Normal breath sounds with no wheezing, rales, or rhonchi. Respirations WNL and unlabored. CV: Regular rate and rhythm. No murmurs, rubs, or gallops. ABD: Soft, non-distended, BS+ and equal.  NEURO: Alert. Speech fluent. Left facial droop. Symmetrical shoulder shrug. Midline tongue protrusion. Sensation to light touch decreased in the right upper and lower limbs compared to left. MSK:  Muscle tone and bulk are normal bilaterally. Strength 5/5 in all limbs. LIMBS: No edema in bilateral lower limbs. SKIN: Warm and dry with good turgor. PSYCH: Mood WNL. Affect WNL. Appropriately interactive. Diagnostics:     CBC:   Recent Labs     02/01/23  0416 02/03/23  0649   WBC 8.0 6.8   RBC 4.69 4.70   HGB 15.1 15.2   HCT 45.6 44.7   MCV 97.2 95.0   RDW 12.1 13.2    202     BMP:   Recent Labs     02/01/23  0416 02/03/23  0649    139   K 4.0 4.2    104   CO2 22 23   BUN 12 12   CREATININE 0.66* 0.74   GLUCOSE 93 100*     HbA1c:   Lab Results   Component Value Date    LABA1C 4.9 01/27/2023     BNP: No results for input(s): BNP in the last 72 hours. PT/INR: No results for input(s): PROTIME, INR in the last 72 hours. APTT: No results for input(s): APTT in the last 72 hours.   CARDIAC ENZYMES: No results for input(s): CKMB, CKMBINDEX, TROPONINT in the last 72 hours. Invalid input(s): CKTOTAL;3  FASTING LIPID PANEL:No results found for: CHOL, HDL, TRIG  LIVER PROFILE:   Recent Labs     02/03/23  0649   AST 21   ALT 46*   BILIDIR 0.1   BILITOT 0.4   ALKPHOS 56        Imaging:  CT head, 1/26/23:  Impression   13 x 8 mm intraparenchymal hemorrhage posterior ramses. MRI brain, 1/30/23:  Impression   Chronic pontine microhemorrhages in addition to the acute hematoma suggest   hypertensive hemorrhage. No brain mass. Principal Diagnosis/plan:  The patient is a 43y.o.-year-old with ADL and mobility deficits secondary to hemorrhagic pontine CVA. He will require close medical monitoring for the comorbidities listed below. He will benefit from intensive interdisciplinary therapies and rehab nursing care and is appropriate for inpatient rehabilitation. The post admission physician evaluation (JAYDE) is consistent with the pre-admission assessment. See above findings to reflect the elements required in the JAYDE. Patient's admitting condition is consistent with the findings of the preadmission assessment by the rehabilitation admissions coordinator. Diagnoses/plan:    Hemorrhagic pontine CVA:  PT/OT for gait, mobility, strengthening, endurance, ADLs, and self care. SLP for cognition, speech. Diplopia, blurred vision:  Secondary to hemorrhagic stroke. Has eye patch - alternate use on both eyes. Added artificial tears as needed, artificial tears ointment nightly for left eye due to incomplete closing and burning of the eye. Urinary retention:  Noted in acute care. On flomax. Will monitor bladder scans, PVRs. Alcohol withdrawal:  On phenobarbital wean (to be completed on 2/6/23). On folate, thiamine supplementation. HTN:  On carvedilol, lisinopril. Goal is SBP below 160 with gradual normalization of BP, per neurology.   GERD:  On protonix  Bowel Management: Miralax daily, senokot prn, dulcolax prn. DVT Prophylaxis:  low molecular weight heparin, SCD's while in bed, and KP's during the day  Dr. Denise Leo for medical management  Follow up PCP 1-2 weeks, PM&R 4-6 weeks, Neurology 3 weeks, Neurosurgery 2 weeks (with repeat CT head), Neuro-ophthalmology - Dr. Desire Guadalupe      Reviewed notes from neurosurgery, neurology. Estimated Length of Stay:  1-2 weeks.     Prognosis  good    Goals  Home at [facility] at Discharge: Intermittent      January Cameron MD

## 2023-02-04 PROCEDURE — 97110 THERAPEUTIC EXERCISES: CPT

## 2023-02-04 PROCEDURE — 97530 THERAPEUTIC ACTIVITIES: CPT

## 2023-02-04 PROCEDURE — 6370000000 HC RX 637 (ALT 250 FOR IP): Performed by: STUDENT IN AN ORGANIZED HEALTH CARE EDUCATION/TRAINING PROGRAM

## 2023-02-04 PROCEDURE — 6370000000 HC RX 637 (ALT 250 FOR IP)

## 2023-02-04 PROCEDURE — 97116 GAIT TRAINING THERAPY: CPT

## 2023-02-04 PROCEDURE — 92507 TX SP LANG VOICE COMM INDIV: CPT

## 2023-02-04 PROCEDURE — 6360000002 HC RX W HCPCS

## 2023-02-04 PROCEDURE — 97535 SELF CARE MNGMENT TRAINING: CPT

## 2023-02-04 PROCEDURE — 97129 THER IVNTJ 1ST 15 MIN: CPT

## 2023-02-04 PROCEDURE — 1180000000 HC REHAB R&B

## 2023-02-04 RX ADMIN — ENOXAPARIN SODIUM 40 MG: 100 INJECTION SUBCUTANEOUS at 09:28

## 2023-02-04 RX ADMIN — TAMSULOSIN HYDROCHLORIDE 0.4 MG: 0.4 CAPSULE ORAL at 09:29

## 2023-02-04 RX ADMIN — MINERAL OIL, PETROLATUM: 425; 568 OINTMENT OPHTHALMIC at 20:17

## 2023-02-04 RX ADMIN — CARVEDILOL 12.5 MG: 12.5 TABLET, FILM COATED ORAL at 09:29

## 2023-02-04 RX ADMIN — Medication 3 MG: at 20:17

## 2023-02-04 RX ADMIN — POLYETHYLENE GLYCOL 3350 17 G: 17 POWDER, FOR SOLUTION ORAL at 09:30

## 2023-02-04 RX ADMIN — PHENOBARBITAL 64.8 MG: 32.4 TABLET ORAL at 20:17

## 2023-02-04 RX ADMIN — THIAMINE HCL TAB 100 MG 100 MG: 100 TAB at 09:29

## 2023-02-04 RX ADMIN — PHENOBARBITAL 129.6 MG: 32.4 TABLET ORAL at 09:30

## 2023-02-04 RX ADMIN — FOLIC ACID 1 MG: 1 TABLET ORAL at 09:29

## 2023-02-04 RX ADMIN — ACETAMINOPHEN 650 MG: 325 TABLET ORAL at 15:01

## 2023-02-04 RX ADMIN — LISINOPRIL 40 MG: 20 TABLET ORAL at 09:29

## 2023-02-04 RX ADMIN — CARVEDILOL 12.5 MG: 12.5 TABLET, FILM COATED ORAL at 17:45

## 2023-02-04 RX ADMIN — PANTOPRAZOLE SODIUM 40 MG: 40 TABLET, DELAYED RELEASE ORAL at 05:57

## 2023-02-04 ASSESSMENT — PAIN DESCRIPTION - DESCRIPTORS: DESCRIPTORS: ACHING

## 2023-02-04 ASSESSMENT — PAIN DESCRIPTION - LOCATION: LOCATION: HEAD

## 2023-02-04 ASSESSMENT — PAIN SCALES - GENERAL: PAINLEVEL_OUTOF10: 3

## 2023-02-04 NOTE — PROGRESS NOTES
[address]    Acute Rehabilitation Occupational Therapy Daily Treatment Note    Date: 23  Patient Name: Axel Lipscomb       Room: 2202/1483-76  MRN: [id number]  Account: [de-identified]   : 1980  (43 y.o.) Gender: male       Referring Practitioner: Nira Kanner, MD  Diagnosis: Hemorrhagic CVA  Additional Pertinent Hx: Mr. Axel Lipscomb is a 43 y.o. right handed male who was admitted to [facility] on 2023 with Extremity Weakness (Right sided weakness) and Facial Droop. Patient with R weakness, numbness and facial droop who fell when he went to bed then awoke with the symptoms. SBP was in 200s upon arrival to ED. CT confirmed hemorrhagic CVA posterior ramses and narrowing R P1 PCA. He was treated with Cardene for BP control. Neurosurgery managing conservatively. Pt admitted to ARU 23    Treatment Diagnosis: Impaired self care status    Past Medical History:  has a past medical history of Cerebral artery occlusion with cerebral infarction [facility]), History of heartburn, Hypertension, Neoplasm of unspecified nature of bone, soft tissue, and skin, and Research study patient. Past Surgical History:   has a past surgical history that includes Appendectomy (in 20's) and pre-malignant / benign skin lesion excision (Right, 2014). Restrictions  Restrictions/Precautions  Restrictions/Precautions: Fall Risk, General Precautions  Required Braces or Orthoses?: No  Implants present? :  (pt denies)     Position Activity Restriction  Other position/activity restrictions: Per Dr. Karley Cohen OK to use Eye patch, alternate between eyes (to help with patient's double vision and balance)          Vitals  Vital Signs  O2 Device: None (Room air)     Subjective  Subjective  Subjective: \"My eyes hate you right now. \" Pt was pleasant and agreeable to Occupational therapy  Pain: Pt denies pain at this time       Objective  Cognition  Overall Orientation Status: Within Functional Limits  Orientation Level: Oriented X4    Cognition  Overall Cognitive Status: Exceptions  Following Commands: Follows multistep commands with increased time  Safety Judgement: Decreased awareness of need for assistance;Decreased awareness of need for safety  Problem Solving: Assistance required to identify errors made;Assistance required to correct errors made  Insights: Decreased awareness of deficits  Initiation: Requires cues for some  Sequencing: Requires cues for some    Activities of Daily Living  Feeding  Equipment Provided: Feeding utensils (Built up handles)  Assistance Level: Set-up    Grooming/Oral Hygiene  Assistance Level: Contact guard assist  Skilled Clinical Factors: Standing/sitting in wheelchair for grooming task shaving    Upper Extremity Bathing  Assistance Level: Stand by assist    Lower Extremity Bathing  Assistance Level: Minimal assistance  Skilled Clinical Factors: Min A while standing due to Right side LOB    Upper Extremity Dressing  Assistance Level: Stand by assist    Lower Extremity Dressing  Assistance Level: Minimal assistance  Skilled Clinical Factors: Min A while standing due to Right side LOB    Putting On/Taking Off Footwear  Assistance Level: Stand by assist  Skilled Clinical Factors: Declines TEDs at this time              Tub/Shower Transfers  Type: Shower  Transfer From: Rolling walker  Transfer To: Tub transfer bench  Additional Factors: Verbal cues; Increased time to complete  Assistance Level: Minimal assistance  Skilled Clinical Factors: Verbal cues for safety over threashold with increased time.  Min A for LOB to R side    Mobility                            Sit to Stand  Assistance Level: Contact guard assist  Skilled Clinical Factors: Verbal cues for hand placement and safety  Stand to Sit  Assistance Level: Contact guard assist  Skilled Clinical Factors: Verbal cues for hand placement and safety                      Functional Mobility  Device: Rolling walker  Activity: To/From bathroom  Assistance Level: Minimal assistance  Skilled Clinical Factors: Verbal cues for hand placement and safety with Fair carryover. Min A with multiple Right side LOB requiring A to right self            OT Exercises  Resistive Exercises: AM:OT facilitated pts engagement in orange theraputty with use of R hand. exercises provided with pt educated on completing exercises in room after therapy to increase strength and FM in R hand. Wife/pt verbalized Good understanding. Motor Control/Coordination: AM: OT facilitated pts engagement in FM activities of placing large peg board with use of RUE. Pt requried increased time to complete with pt noted to drop multiple pegs when completing. Increased ease completing with repetition. OT facilitated pts engagement in large mosaic grid activity with use of RUE to increase [address] and coordination. Increase ease noted with repetition. Ot provided pt with large nut/bolt to screw and unscrew in room after therapy to increase isolated FM movement. Additional Activities  Additional Activities Comment: AM: OT facilitated pts engagement in visual activity to increase functional use of vision and provide neurofacilitation in bilateral eyes to look towards the left. Pt completed Spational relation activity with pt educated on completing activity with head at midline to promote and faciliate visual scanning to the left. Eye patch doffed for this task. Pt required increased time to complete with compnesatory methods utilized with fatigue. Slight left side visual shift noted with R eye on this date. Pt reports increased pain/fatigue with attempting to visually track/scan to the left side of midline.     Assessment  Assessment  Activity Tolerance: Patient tolerated treatment well;Patient limited by endurance  Discharge Recommendations: Home with assist PRN;Outpatient OT ('s Rehab)    Patient Education  Education  Education Given To: Patient  Education Provided: Role of Therapy;Plan of Care;Safety; Mobility Training;Transfer Training;Visual Perceptual Function  Education Method: Verbal;Demonstration  Barriers to Learning: Vision  Education Outcome: Verbalized understanding;Continued education needed    OT Equipment Recommendations  Other: TBD    Safety Devices  Safety Devices in place: Yes  Type of devices: All fall risk precautions in place;Call light within reach;Gait belt;Patient at risk for falls; Left in chair;Nurse notified       Goals  Patient Goals   Patient goals : \"To get back to those things (ADL, IADL, and [address] in RUE). \"  Short Term Goals  Time Frame for Short Term Goals: By 1 week  Short Term Goal 1: Pt will complete lower body dressing/bathing with CGA and Good safety with use of AE as needed  Short Term Goal 2: Pt will complete functional transfers/mobility during self care tasks with CGA and Good safety with no LOB  Short Term Goal 3: Pt will tolerate standing 7+ minutes during functional activity of choice with CGA and no LOB during functional activity of choice  Short Term Goal 4: Pt will verbalize/demonstrate Good understanding of AE/adaptive strategies/DME to increase safety and independence with self care and mobility  Short Term Goal 5: Pt will verbalize/demonstrate 3 visual compensatory strategies to increase safety with daily activities and increase quality of life  Short Term Goal 6: Pt will participate in 30+ minutes of therapeutic exercises/functional activities to increase safety and independence with self care and mobility    Long Term Goals  Time Frame for Long Term Goals : By discharge  Long Term Goal 1: Pt will complete BADLs with Mod I and Good safety with use of AE as needed  Long Term Goal 2: Pt will complete functional mobility during self care tasks with Mod I and Good safety with use of least restrictive device  Long Term Goal 3: Pt will tolerate standing 15+ minutes during functional activity of choice with Good safety  Long Term Goal 4: Pt will complete simple meal prep/light house keeping task with Supervision and Good safety  Long Term Goal 5: Pt will complete tub transfer with Supervision and Good safety with use of DME as needed  Long Term Goal 6: Pt will verbalize/demonstrate Good understanding of vision HEP to increase neurofacilitation of left side visual field to increase safety during daily activities  Long Term Goal 7: Pt will verbalize/demonstrate Good understanding of home safety/fall prevention strategies to increase safety and independence with self care and mobility  Long Term Goal 8: Pt will demonstrate increased [address] during self care tasks as evident by 5 second improvement with LUE and 30 second improvement with RUE during 9 hole peg test    Plan  Occupational Therapy Plan  Times Per Week: 5-7  Times Per Day: Twice a day  Current Treatment Recommendations: Self-Care / ADL, Strengthening, Balance training, Functional mobility training, Endurance training, Neuromuscular re-education, Pain management, Safety education & training, Patient/Caregiver education & training, Equipment evaluation, education, & procurement, Home management training, Cognitive/Perceptual training, Coordination training         02/04/23 1045 02/04/23 1335   OT Individual Minutes   Time In 0744 [id number]   Time Out [id number]   [id number]   Time Code Minutes    Timed Code Treatment Minutes 70 Minutes 31 Minutes         Electronically signed by MASON Rinaldi/L on 2/4/23 at 4:06 PM EST

## 2023-02-04 NOTE — PLAN OF CARE
Problem: ABCDS Injury Assessment  Goal: Absence of physical injury  Outcome: Progressing  Flowsheets (Taken 2/2/2023 2220 by Anjum Khan LPN)  Absence of Physical Injury: Implement safety measures based on patient assessment  Note: Implement measures to maintain pts safety     Problem: Safety - Adult  Goal: Free from fall injury  Outcome: Progressing  Flowsheets (Taken 2/3/2023 2321)  Free From Fall Injury: Instruct family/caregiver on patient safety  Note: Assist patient with ambulation and elimination needs, remind patient to call for assistance when in need     Problem: Skin/Tissue Integrity  Goal: Absence of new skin breakdown  Description: 1. Monitor for areas of redness and/or skin breakdown  2. Assess vascular access sites hourly  3. Every 4-6 hours minimum:  Change oxygen saturation probe site  4. Every 4-6 hours:  If on nasal continuous positive airway pressure, respiratory therapy assess nares and determine need for appliance change or resting period.   Outcome: Progressing  Note: Will continue to keep skin intact     Problem: Pain  Goal: Verbalizes/displays adequate comfort level or baseline comfort level  Outcome: Progressing  Flowsheets (Taken 2/3/2023 2321)  Verbalizes/displays adequate comfort level or baseline comfort level:   Encourage patient to monitor pain and request assistance   Assess pain using appropriate pain scale   Administer analgesics based on type and severity of pain and evaluate response  Note: Will continue to assess and monitor for pain     Problem: Discharge Planning  Goal: Discharge to home or other facility with appropriate resources  Outcome: Progressing  Flowsheets (Taken 2/2/2023 2224 by Anjum Khan LPN)  Discharge to home or other facility with appropriate resources:   Identify barriers to discharge with patient and caregiver   Identify discharge learning needs (meds, wound care, etc)   Refer to discharge planning if patient needs post-hospital services based on physician order or complex needs related to functional status, cognitive ability or social support system   Arrange for needed discharge resources and transportation as appropriate  Note: Home with family     Problem: Chronic Conditions and Co-morbidities  Goal: Patient's chronic conditions and co-morbidity symptoms are monitored and maintained or improved  Outcome: Progressing  Flowsheets (Taken 2/3/2023 2321)  Care Plan - Patient's Chronic Conditions and Co-Morbidity Symptoms are Monitored and Maintained or Improved:   Monitor and assess patient's chronic conditions and comorbid symptoms for stability, deterioration, or improvement   Collaborate with multidisciplinary team to address chronic and comorbid conditions and prevent exacerbation or deterioration  Note: Will continue to monitor labs and vitals      Problem: Nutrition Deficit:  Goal: Optimize nutritional status  Outcome: Progressing  Flowsheets (Taken 2/3/2023 2321)  Nutrient intake appropriate for improving, restoring, or maintaining nutritional needs:   Assess nutritional status and recommend course of action   Monitor oral intake, labs, and treatment plans   Recommend appropriate diets, oral nutritional supplements, and vitamin/mineral supplements  Note: Ensure adequate oral intake

## 2023-02-04 NOTE — PLAN OF CARE
Problem: ABCDS Injury Assessment  Goal: Absence of physical injury  2/4/2023 1636 by Yordy Srinivasan LPN  Outcome: Progressing     Problem: Safety - Adult  Goal: Free from fall injury  2/4/2023 1636 by Yordy Srinivasan LPN  Outcome: Progressing     Problem: Skin/Tissue Integrity  Goal: Absence of new skin breakdown  Description: 1. Monitor for areas of redness and/or skin breakdown  2. Assess vascular access sites hourly  3. Every 4-6 hours minimum:  Change oxygen saturation probe site  4. Every 4-6 hours:  If on nasal continuous positive airway pressure, respiratory therapy assess nares and determine need for appliance change or resting period.   2/4/2023 1636 by Yordy Srinivasan LPN  Outcome: Progressing     Problem: Pain  Goal: Verbalizes/displays adequate comfort level or baseline comfort level  2/4/2023 1636 by Yordy Srinivasan LPN  Outcome: Progressing     Problem: Discharge Planning  Goal: Discharge to home or other facility with appropriate resources  2/4/2023 1636 by Yordy Srinivasan LPN  Outcome: Progressing     Problem: Chronic Conditions and Co-morbidities  Goal: Patient's chronic conditions and co-morbidity symptoms are monitored and maintained or improved  2/4/2023 1636 by Yordy Srinivasan LPN  Outcome: Progressing     Problem: Nutrition Deficit:  Goal: Optimize nutritional status  2/4/2023 1636 by Yordy Srinivasan LPN  Outcome: Progressing

## 2023-02-04 NOTE — PROGRESS NOTES
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 Orientation: x4 (I). Recall: Delayed word recall: 3/3 (I); Pt exhibited use of association strategy with modified independence. Organization: NT    Problem Solving/Reasoning: Stating multiple causes for problem scenarios (3+): 68% (I) improved to 100% with min verbal cues to differentiate responses. Speech: ST instructed Pt in dysarthria speaking strategies. Speech subjectively judged to to be ~90% intelligible in conversation. Pt exhibited good self awareness and stated \"Today is a bad day for my speech. \" ST educated Pt re: cause of dysarthria and exacerbating factors, including fatigue. Pt verbalized comprehension. Pt able to implement slow rate and over-articulation with min cues. Other: Pt's spouse present for portion of session. ST provided education re: goals and plan of care.      Plan:  [] Continue ST services    [] Discharge from ST:      Discharge recommendations: [] Inpatient Rehab   [] [place]   [] Outpatient Therapy  [] Follow up at trauma clinic   [] Other:       Treatment completed by: Vilma Canavan, M.S., [id number] [facility]

## 2023-02-04 NOTE — PLAN OF CARE
Problem: ABCDS Injury Assessment  Goal: Absence of physical injury  Outcome: Progressing     Problem: Safety - Adult  Goal: Free from fall injury  Outcome: Progressing     Problem: Skin/Tissue Integrity  Goal: Absence of new skin breakdown  Description: 1. Monitor for areas of redness and/or skin breakdown  2. Assess vascular access sites hourly  3. Every 4-6 hours minimum:  Change oxygen saturation probe site  4. Every 4-6 hours:  If on nasal continuous positive airway pressure, respiratory therapy assess nares and determine need for appliance change or resting period.   Outcome: Progressing     Problem: Pain  Goal: Verbalizes/displays adequate comfort level or baseline comfort level  Outcome: Progressing     Problem: Discharge Planning  Goal: Discharge to home or other facility with appropriate resources  Outcome: Progressing     Problem: Chronic Conditions and Co-morbidities  Goal: Patient's chronic conditions and co-morbidity symptoms are monitored and maintained or improved  Outcome: Progressing     Problem: Nutrition Deficit:  Goal: Optimize nutritional status  Outcome: Progressing

## 2023-02-04 NOTE — PLAN OF CARE
Individualized Plan of Care  [facility] Inpatient Rehabilitation Unit    Rehabilitation physician: Dr. Jermaine Baig Date: 2/2/2023     Rehabilitation Diagnosis: Impending cerebrovascular accident [facility]) [I63.9]     Rehabilitation impairments: self care, mobility, bowel/bladder management, and safety    Factors facilitating achievement of predicted outcomes: Family support, Motivated, Cooperative, Pleasant, and Good insight into deficits  Barriers to the achievement of predicted outcomes: Decreased endurance, Impaired vision, and Incontinence of bladder    Patient Goals: Improve independence with mobility, Improvement of mobility at a wheelchair level, Increase overall strength and endurance, Increase balance, Increase endurance, Increase independence with activities of daily living, Improve cognition, Increase self-awareness, Increase safety awareness, Increase community integration, Increase socialization, Functional communication with caregivers, Integrate appropriate pain management plan, Assure adequate nutritional option for discharge, Continence of bowel and bladder, and Provide appropriate patient and family education      NURSING:  Nursing goals for Jesus Chowdhury while on the rehabilitation unit will include:  Continence of bowel and bladder, Adequate number of bowel movements, Urinate with no urinary retention >300ml in bladder, Complete bladder protocol with moore removal, Maintain O2 SATs at an acceptable level during stay, Effective pain management while on the rehabilitation unit, Establish adequate pain control plan for discharge, Absence of skin breakdown while on the rehabilitation unit, Improved skin integrity via assessments including wound measurements, Avoidance of any hospital acquired infections, No signs/symptoms of infection at the wound site, Freedom from injury during hospitalization, and Complete education with patient/family with understanding demonstrated regarding disease process and resultant impairment     In order to achieve these goals, nursing interventions may include bowel/bladder training, education for medical assistive devices, medication education, O2 saturation management, energy conservation, stress management techniques, fall prevention, alarms protocol, seating and positioning, skin/wound care, pressure relief instruction, dressing changes, infection protection, DVT prophylaxis, assistance with safe transfers , and/or assistance with bathroom activities and hygiene.        PHYSICAL THERAPY:  Goals:        Short Term Goals  Time Frame for Short Term Goals: 7 days  Short Term Goal 1: pt to demo independent bed mobility on flat surface without use of rails per home set-up  Short Term Goal 2: pt to demo functional transfers with device and CGA  Short Term Goal 3: pt to ambulate 150' with device and MinAx1  Short Term Goal 4: pt to negotiate 10-12 steps with Single rail and device as needed demonstrating a landing pivot turn (per home set up stairs with landing) Benigno  Long Term Goals  Time Frame for Long Term Goals : Until D/C  Long Term Goal 1: Pt to improve Standing balance to FAIR + with use of device to dec risk for falls  Long Term Goal 2: pt to demo functional transfers with device Mod I  Long Term Goal 3: pt to ambulate 150' with device and supervision  Long Term Goal 4: pt to negotiate 2 steps with R rail and device and 10-12 successive steps with L rail and device as needed demonstrating a landing pivot turn (per home set up stairs with landing) CGA-SBA for stairs  Long Term Goal 5: pt to demo actual or simulated car transfer with SBA and device as needed  Long term goal 6: pt to improve on PASS score to 30/36 or greater to demonstrate improving balance, mobility, and independence  Long term goal 7: pt to demonstrate good technique for fall recovery with SBA  Long term goal 8: Pt to progress to step Ladder negotiation with CGA to allow for safe participation in home making/maintainance tasks with assist for safety. Plan of Care: Pt to be seen by physical therapy services 1 Hour per day at least 5 out of 7 days per week     Anticipated interventions may include therapeutic exercises, gait training, neuromuscular re-ed, transfer training, community reintegration, bed mobility, w/c mobility and training.       OCCUPATIONAL THERAPY:  Goals:             Short Term Goals  Time Frame for Short Term Goals: By 1 week  Short Term Goal 1: Pt will complete lower body dressing/bathing with CGA and Good safety with use of AE as needed  Short Term Goal 2: Pt will complete functional transfers/mobility during self care tasks with CGA and Good safety with no LOB  Short Term Goal 3: Pt will tolerate standing 7+ minutes during functional activity of choice with CGA and no LOB during functional activity of choice  Short Term Goal 4: Pt will verbalize/demonstrate Good understanding of AE/adaptive strategies/DME to increase safety and independence with self care and mobility  Short Term Goal 5: Pt will verbalize/demonstrate 3 visual compensatory strategies to increase safety with daily activities and increase quality of life  Short Term Goal 6: Pt will participate in 30+ minutes of therapeutic exercises/functional activities to increase safety and independence with self care and mobility  Long Term Goals  Time Frame for Long Term Goals : By discharge  Long Term Goal 1: Pt will complete BADLs with Mod I and Good safety with use of AE as needed  Long Term Goal 2: Pt will complete functional mobility during self care tasks with Mod I and Good safety with use of least restrictive device  Long Term Goal 3: Pt will tolerate standing 15+ minutes during functional activity of choice with Good safety  Long Term Goal 4: Pt will complete simple meal prep/light house keeping task with Supervision and Good safety  Long Term Goal 5: Pt will complete tub transfer with Supervision and Good safety with use of DME as needed  Long Term Goal 6: Pt will verbalize/demonstrate Good understanding of vision HEP to increase neurofacilitation of left side visual field to increase safety during daily activities  Long Term Goal 7: Pt will verbalize/demonstrate Good understanding of home safety/fall prevention strategies to increase safety and independence with self care and mobility  Long Term Goal 8: Pt will demonstrate increased [address] during self care tasks as evident by 5 second improvement with LUE and 30 second improvement with RUE during 9 hole peg test    Plan of Care: Patient to be seen by occupational therapy services 1 Hour per day at least 5 out of 7 days per week     Anticipated interventions may include ADL and IADL retraining, strengthening, safety education and training, patient/caregiver education and training, equipment evaluation/ training/procurement, neuromuscular reeducation, wheelchair mobility training. SPEECH THERAPY:   Goals:             Short Term Goals  Goal 1: Pt will complete tasks targeting STM/delayed recall with 90% accuracy. Goal 2: Pt will complete tasks targeting reasoning/thought organization with 90% accuracy. Goal 3: Pt will complete tasks targeting problem solving/executive functioning with 90% accuracy. Goal 4: Pt will complete OMEX for facial weakness x 10 reps x 3 sets. Goal 5: Pt will implement dysarthria speaking strategies in 95% of opportunities. Plan of Care: Pt to be seen by speech therapy services 1 Hour per day at least 5 out of 7 days per week    Anticipated interventions may include speech/language/communication therapy, cognitive training, group therapy, education, and/or dysphagia therapy based on the above goals. CASE MANAGEMENT:  Goals:   Assist patient/family with discharge planning, patient/family counseling,  and coordination with insurance during the inpatient rehabilitation stay.          Other members of the multidisciplinary rehabilitation team that will be involved in the patient's plan of care include recreational therapy, dietary, respiratory therapy, and neuropsychology. Medical issues being managed closely and that require 24 hour availability of a physician:  Swallowing precautions, Bowel/Bladder function, Weight bearing precautions, Wound care, Pain management, Infection protection, DVT prophylaxis, Fall precautions, Fluid/Electrolyte balance, Nutritional status, Respiratory needs, Anemia, and History of heart disease                                           Physician anticipated functional outcomes: Improved independence with functional measures   Estimated length of stay for this admission 2-3 weeks  Medical Prognosis: Fair  Anticipated disposition: Home. The potential to achieve the above medical and rehabilitative goals is fair. This plan of care has been developed with the assistance and input of the multidisciplinary rehabilitation team.  The plan was reviewed with the patient on 2/4/2023. The patient has had the opportunity to provide input to the therapy team.    I have reviewed this Individualized Plan of Care and agree with its contents. Above documentation has been expanded, modified, adjusted to reflect the findings of my evaluations and goals for the patient.     Physician:  Shirin Newton

## 2023-02-04 NOTE — PROGRESS NOTES
Physical Therapy  Facility/Department: [facility] ACUTE REHAB  Rehabilitation Physical Therapy    NAME: Cal Mead  : 1980 (43 y.o.)  MRN: [id number]  CODE STATUS: Full Code    Date of Service: 23      Past Medical History:   Diagnosis Date    Cerebral artery occlusion with cerebral infarction [facility])     History of heartburn     takes omeprazole    Hypertension     Neoplasm of unspecified nature of bone, soft tissue, and skin 2009-present    lesions of right cheek x 2    Research study patient 2023    [id number]. Date of completion 23     Past Surgical History:   Procedure Laterality Date    APPENDECTOMY  in 's    PRE-MALIGNANT / BENIGN SKIN LESION EXCISION Right 2014    Excision lesion of right cheek. Dr. Maria Luisa Melendrez. Chart Reviewed: Yes  Patient assessed for rehabilitation services?: Yes  Additional Pertinent Hx: Mr. Cal Mead is a 43 y.o. right handed male who was admitted to [facility] on 2023 with Extremity Weakness (Left sided weakness) and Facial Droop     Patient with R weakness, numbness and facial droop who fell when he went to bed then awoke with the symptoms. SBP was in 200s upon arrival to ED. CT confirmed hemorrhagic CVA posterior ramses and narrowing R P1 PCA. He was treated with Cardene for BP control. Neurosurgery managing conservatively. Family / Caregiver Present: No  Referring Practitioner: Neema Jama MD  Referral Date : 23  Diagnosis: Hemorrhagic CVA  General Comment  Comments: Patient agreeable to treatment this PM.    Restrictions:  Restrictions/Precautions: Fall Risk;General Precautions  Position Activity Restriction  Other position/activity restrictions: Per Dr. Shakeel Judge OK to use Eye patch, alternate between eyes (to help with patient's double vision and balance)     SUBJECTIVE  Subjective: Patient reports double vision; patient does better with mobility with eye patch.          OBJECTIVE                  Functional Mobility  Bed mobility  Rolling to Left: Stand by assistance  Rolling to Right: Stand by assistance  Supine to Sit: Stand by assistance  Sit to Supine: Stand by assistance  Scooting: Stand by assistance  Bed Mobility Comments: All bed mobility completed on flat surface without use of rails. Transfers  Sit to Stand: Minimal Assistance;Contact guard assistance  Stand to Sit: Minimal Assistance;Contact guard assistance  Bed to Chair: Minimal assistance  Stand Pivot Transfers: Minimal Assistance  Comment: RW for multiple transfers from [facility], Hospital Bed, and recliner chair. VCs for hands placement with good carry over. VCs with Intermittent physical A to place/position RLE. Environmental Mobility  Ambulation  Surface: Level tile  Device: Rolling Walker  Assistance: Moderate assistance (Scar with RW and SBA for wc follow)  Quality of Gait: unsteady, R lateral lean with path deviation, intermittent wide and narrow [place], ataxic, decreased motor control R LE. CGA for with intermittent Scar required for right lateral LOB. Gait Deviations: Slow Tess;Deviated path;Staggers;Decreased step length;Decreased step height  Distance: 81  Comments: Increased time to complete with slow pace, RLE Ataxia with poorly coordinated steps going from scissoring to Wide [place] with tendency to maintain R hip external rotation. Short standing rest breaks to regroup stance and right hand grasp as hand tends to slip from walker; patient donned gloves with grippers for improved support with good results.   More Ambulation?: No  Ambulation 2  Assistance 2:  (MinAx2)  Stairs  # Steps : 12  Stairs Height: 6\"  Rails: Bilateral  Device: No Device  Assistance: Minimal assistance  Comment: Scar for RLE positioning/placement  Wheelchair Activities  Wheelchair Size: 20  Left Leg Rest Level of Assistance: Minimal assistance  Right Leg Rest Level of Assistance: Minimal assistance  Left Brakes Level of Assistance: Stand by assistance  Right Brakes Level of Assistance: Stand by Assist  Propulsion: Yes  Propulsion 1  Propulsion: Manual  Level: Level Tile  Method: RLE;LLE;LUE  Level of Assistance: Stand by assistance  Description/ Details: Pt demo's ability to propel but difficulty with RUE coordination and unfortunately scrapes hand on W/C Brake causing minor bleeding. Writer brings this to patient's attention as this went unnoticed by him. small wound is cleansed, bandaid provided. deferred further propulsion d/t poor RUE coordination and injury risk. Distance: 100 feet x 4    PT Exercises  Exercise Treatment: x8 in standing: hip flexion, heel raises, partial squats. A/AROM Exercises: AROM RLE: LAQs x 20 reps with 4 pound cuff wt, verbal cues for control/decreased speed of movement  Dynamic Standing Balance Exercises: lateral stepping, weight shifitng, toe>cone taps, postural ex  Motor Control/Coordination: step over step flight of stairs  Vestibular Exercises: focus eye gaze target gait 40 feet x6;    ASSESSMENT       Activity Tolerance  Activity Tolerance: Patient tolerated treatment well;Patient limited by endurance  Activity Tolerance Comments: Greatest limitation to activity tolerance is fear of movement with recent loss of coordination and impaired sensation on R side of body.          GOALS  Patient Goals   Patient Goals : \"Practice going up/down a ladder\"  Short Term Goals  Time Frame for Short Term Goals: 7 days  Short Term Goal 1: pt to demo independent bed mobility on flat surface without use of rails per home set-up  Short Term Goal 2: pt to demo functional transfers with device and CGA  Short Term Goal 3: pt to ambulate 150' with device and MinAx1  Short Term Goal 4: pt to negotiate 10-12 steps with Single rail and device as needed demonstrating a landing pivot turn (per home set up stairs with landing) Benigno  Long Term Goals  Time Frame for Long Term Goals : Until D/C  Long Term Goal 1: Pt to improve Standing balance to FAIR + with use of device to dec risk for falls  Long Term Goal 2: pt to demo functional transfers with device Mod I  Long Term Goal 3: pt to ambulate 150' with device and supervision  Long Term Goal 4: pt to negotiate 2 steps with R rail and device and 10-12 successive steps with L rail and device as needed demonstrating a landing pivot turn (per home set up stairs with landing) CGA-SBA for stairs  Long Term Goal 5: pt to demo actual or simulated car transfer with SBA and device as needed  Long term goal 6: pt to improve on PASS score to 30/36 or greater to demonstrate improving balance, mobility, and independence  Long term goal 7: pt to demonstrate good technique for fall recovery with SBA    PLAN OF CARE  Frequency: 1-2 treatment sessions per day, 5-7 days per week  Safety Devices  Type of Devices: All fall risk precautions in place;Call light within reach;Gait belt;Nurse notified; Patient at risk for falls; Left in chair (RN Bogdan Mir)  Restraints  Restraints Initially in Place: No    EDUCATION  Education  Education Given To: Patient  Education Provided:  Mobility Training;Transfer Training  Education Method: Demonstration;Verbal  Barriers to Learning: None  Education Outcome: Verbalized understanding;Demonstrated understanding    Therapy Time   02/04/23 0804 02/04/23 1557   PT Individual Minutes   Time In 0804 1302   Time Out 0900 1332   Minutes 64 1100 [facility] MINDY Hanley PTA, 02/04/23 at 4:31 PM

## 2023-02-04 NOTE — H&P
Physical Medicine & Rehabilitation  Progress Note    2/4/2023 1:33 PM     Subjective:   Patient is doing well , denies any acte concerns,       ROS:    Review of Systems   Constitutional:  Negative for fever. Eyes:  Positive for blurred vision and double vision. Respiratory:  Negative for shortness of breath. Cardiovascular:  Negative for chest pain. Gastrointestinal:  Negative for abdominal pain. No change in bowel control   Genitourinary:         No change in bladder control   Neurological:  Positive for sensory change, weakness and headaches. Rehabilitation:       Cognition  Overall Cognitive Status: Exceptions  Arousal/Alertness: Appropriate responses to stimuli  Following Commands: Follows multistep commands with increased time  Attention Span: Appears intact; Attends with cues to redirect  Memory: Appears intact  Safety Judgement: Decreased awareness of need for assistance;Decreased awareness of need for safety  Problem Solving: Assistance required to identify errors made;Assistance required to correct errors made  Insights: Decreased awareness of deficits  Initiation: Requires cues for some  Sequencing: Requires cues for some     ROM  AROM RLE (degrees)  RLE AROM: WFL  AROM LLE (degrees)  LLE AROM : WFL  AROM RUE (degrees)  RUE General AROM: See OT  AROM LUE (degrees)  LUE General AROM: See OT     Strength  Strength RLE  Strength RLE: WFL  Comment: 5/5 Strength grossly; pt has challenge of creating adequate force production d/t lack of sensory input with impaired sensation, proprioception, and kinesthesia  Strength LLE  Strength LLE: WFL  Comment: 5/5 Strength grossly  Strength RUE  Comment: See OT  Strength LUE  Comment: See OT     Quality of Movement  Tone RLE  RLE Tone: Normotonic  Tone LLE  LLE Tone: Normotonic  Coordination  Movements Are Fluid And Coordinated: No  Coordination and Movement description: Poor coordination with stepping and RLE positioning/placement Sensation  Overall Sensation Status: Impaired (R side of body)     Functional Mobility  Transfers  Sit to Stand: Contact guard assistance  Stand to Sit: Contact guard assistance  Comment: CGA for sit<>stands in parallel bars (vcs for hand placement for sit<>stands with poor carryover for all transfers)  Balance  Posture: Fair  Sitting - Static: Fair;+  Sitting - Dynamic: Fair  Standing - Static: Fair;-  Standing - Dynamic: Poor;+  Comments: standing balance in parallel bars     Environmental Mobility  Ambulation  Surface: Level tile  Device: Rolling Walker  Assistance: 2 Person assistance (Scar with RW and SBA for wc follow)  Quality of Gait: unsteady, R lateral lean with path deviation, intermittent wide and narrow [place], ataxic, decreased motor control R LE. CGA for with intermittent Scar required for right lateral LOB. Gait Deviations: Slow Tess;Deviated path;Staggers;Decreased step length;Decreased step height  Distance: 81  Comments: Increased time to complete with slow pace, RLE Ataxia with poorly coordinated steps going from scissoring to Wide [place] with tendency to maintain R hip external rotation. Short standing rest breaks to regroup stance and right hand grasp as hand tends to slip from walker; patient donned gloves with grippers for improved support with good results. More Ambulation?: No  Ambulation 2  Assistance 2:  (MinAx2)       OT    Objective  Vision  Vision: Impaired  Vision Exceptions: Wears glasses for reading  Vision - Basic Assessment  Patient Visual Report: Blurring of print when reading, Unable to keep objects in focus, Difficulty maintaining concentration with focus, Blurring of vision when changing focal distance, Diplopia  Vision Comments: Look to the left assessment completed on this date x 2 with each eye individually due to diplopia and use of eye patch at this time. Pt able to correctly complete Look to the left assessment with each eye with increased time and compensatory methods. Further visual assessment completed with OT noted pt unable to scan/track visually to the left side of each eye. Pt able to visually see to the left with peripheral vision. No visual field cut noted. Pt demonstrated slight nystagmus. RN and Dr. Chad Reagan notified. Hearing  Hearing: Exceptions to [facility]  Hearing Exceptions: Hard of hearing/hearing concerns     Perception  Overall Perceptual Status: Impaired (Slight proprioception deficits noted on R side)     Sensation  Overall Sensation Status: Impaired (R side of body)        Cognition  Overall Orientation Status: Within Functional Limits  Orientation Level: Oriented X4     Cognition  Overall Cognitive Status: Exceptions  Arousal/Alertness: Appropriate responses to stimuli  Following Commands: Follows multistep commands with increased time  Attention Span: Appears intact, Attends with cues to redirect  Memory: Appears intact  Safety Judgement: Decreased awareness of need for assistance, Decreased awareness of need for safety  Problem Solving: Assistance required to identify errors made, Assistance required to correct errors made  Insights: Decreased awareness of deficits  Initiation: Requires cues for some  Sequencing: Requires cues for some  Patient affect[de-identified] Normal     Activities of Daily Living  Equipment Provided: Feeding equipment (Built up handles)     Feeding: Setup  Feeding Skilled Clinical Factors: Pt demonstrated increased difficulty with grasping silverwear during eating task. OT provided built up handles during task to increase ease of use during self feeding.      Grooming: Minimal assistance  Grooming Skilled Clinical Factors: Min A for standing balance standing sink side with slight R side lean     UE Bathing: Stand by assistance  UE Bathing Skilled Clinical Factors: Sitting on tub bench     LE Bathing: Minimal assistance  LE Bathing Skilled Clinical Factors: LOB while standing in shower to dry bottom requiring A to right self     UE Dressing: Stand by assistance     LE Dressing: Minimal assistance  LE Dressing Skilled Clinical Factors: Pt demonstrated figure 4 technique to thread BLE. Min A while standing with unsteadiness while pulling up over hips     Toileting: Minimal assistance  Toileting Skilled Clinical Factors: A for standing balance with hygiene and clothing management     Additional Comments: OT facilitated pts engagement in full shower on this date with use of tub bench, grab bars, and hand held shower. Pt required A while standing due to intermittet LOB to right side.  Pt currently limited due to decreased strength, balance, FMC, visual deficits, and activity tolerance impacting safety and independence with self care tasks     OT scores      Eating  Assistance Needed: Setup or clean-up assistance  CARE Score: 5  Discharge Goal: Independent  Oral Hygiene  Assistance Needed: Partial/moderate assistance  CARE Score: 3  Discharge Goal: [address] needed: Partial/moderate assistance  CARE Score: 3  Discharge Goal: Independent  Shower/Bathe Self  Assistance Needed: Partial/moderate assistance  CARE Score: 3  Discharge Goal: Independent  Upper Body Dressing  Assistance Needed: Supervision or touching assistance  CARE Score: 4  Discharge Goal: Independent  Lower Body Dressing  Assistance Needed: Partial/moderate assistance  CARE Score: 3  Discharge Goal: Independent  Putting On/Taking Off Footwear  Assistance Needed: Partial/moderate assistance  CARE Score: 3  Discharge Goal: Independent  Toilet Transfer  Assistance needed: Partial/moderate assistance  CARE Score: 3  Discharge Goal: Independent     UE Function  LUE AROM (degrees)  LUE AROM : WFL  Tone RUE  RUE Tone: Normotonic     LUE Strength  Gross LUE Strength: WFL  L Hand General: 4+/5     Left Hand Strength -  (lbs)  Handle Setting 3: 106# (107, 106, 105) (Norm: #)        RUE AROM (degrees)  RUE AROM : WFL  Right Hand AROM (degrees)  Right Hand AROM: WFL  Right Hand General AROM: with increased time  Tone LUE  LUE Tone: Normotonic     RUE Strength  Gross RUE Strength: WFL  R Hand General: 4/5     Right Hand Strength -  (lbs)  Handle Setting 3: 93.3# (100, 83, 97) (Norm: #)        Fine Motor Skills/Coordination  Hand Dominance  Hand Dominance: Right  Coordination  Movements Are Fluid And Coordinated: No  Coordination and Movement Description: Fine motor impairments, Gross motor impairments, Decreased speed, Decreased accuracy, Right UE  Fine Motor Skills  Left 9-Hole Peg Test: Impaired  Left 9 Hole Peg Test Time (secs): 39.42 (Norm: 17-25 seconds)  Right 9-Hole Peg Test: Impaired  Right 9 Hole Peg Test Time (secs): 340.69 (Norm: 17-25 seconds)     Mobility  Bed mobility  Bed Mobility Comments: Pt sitting in chair at the start and end of session. Balance  Balance  Sitting Balance: Stand by assistance  Standing Balance: Moderate assistance (CGA-Mod A with multiple LOB to right side)     Transfers  Transfers  Sit to stand: Minimal assistance  Stand to sit: Minimal assistance  Transfer Comments: Verbal cues for hand placement and safety  Toilet Transfers  Toilet - Technique: Ambulating  Equipment Used: Grab bars  Toilet Transfer: Minimal assistance  Toilet Transfers Comments: Verbal cues for hand placement and safety. Slight unsteadiness with R side LOB  Shower Transfers  Shower - Transfer From: Juany Art - Transfer Type: To and From  Shower - Transfer To: Transfer tub bench  Shower - Technique: Ambulating  Shower Transfers: Moderate assistance  Shower Transfers Comments: Verbal cues for hand placement, safety and RW management over threshold. LOB to right side with Mod A to right self. Functional mobility  Functional Mobility  Functional - Mobility Device: Rolling Walker  Activity: To/from bathroom  Assist Level: Moderate assistance (CGA-Mod A with multiple LOB to right side)  Functional Mobility Comments: Verbal cues for hand placement and safety. Increased time needed with RLE. Multiple LOB to right side requiring A to right self    ST       Objective:        Motor Speech  Apraxic Characteristics: None  Dysarthric Characteristics: Blended word boundaries; Imprecise  Intelligibility: Impaired  Word Intelligibility (%): 90 %  Phrase Intelligibility (%): 85 %  Sentence Intelligibility (%): 80 %  Conversation Intelligibility (%): 75 %  Overall Impairment Severity: Mild-moderate     Auditory Comprehension  Comprehension: Within Functional Limits        Expression  Primary Mode of Expression: Verbal     Verbal Expression  Verbal Expression: Within functional limits              Cognition:   Orientation  Overall Orientation Status: Within Functional Limits  Orientation Level: Oriented X4  Attention  Attention: Within Functional Limits  Memory  Memory: Exceptions to [facility]  Prospective Memory: Moderate  Short-term Memory: Moderate  Working Memory: Mild  Problem Solving  Problem Solving: Exceptions to [facility]  Simple Functional Tasks: [facility]  Verbal Reasoning Skills: Mild  Sequencing: [facility]  Executive Function Skills: Moderate  Numeric Reasoning  Numeric Reasoning: Within Functional Limits  Abstract Reasoning  Abstract Reasoning: Exceptions to [facility]  Convergent Thinking: Mild  Divergent Thinking: Mild  Safety/Judgment  Safety/Judgment: Exceptions to [facility]  Complex Functional Tasks: Moderate  Novel Situations: Mild  Insight: WFL  Impulsive: Mild  Flexibility of Thought: Mild     Additional Assessments:   MOCA 8.1 = 24/30     Prognosis:  Speech Therapy Prognosis  Prognosis: Good  Prognosis Considerations: Age;Participation Level  Individuals consulted  Consulted and agree with results and recommendations: Patient     Education:  Patient Education: [address] [place] [address] Dr smiley Pt re: results of assessment, plan of care, and treatment rationale.   Patient Education Response: Verbalizes understanding       Objective:  BP (!) 140/100   Pulse 70   Temp 98.1 °F (36.7 °C) (Oral)   Resp 16   Ht 6' (1.829 m) Wt 209 lb 3.2 oz (94.9 kg)   SpO2 97%   BMI 28.37 kg/m²     GEN: Well developed, well nourished, no acute distress  HEENT: NCAT. Unable to look to the left with both eyes. Mild redness of the left conjunctiva noted. Unable to fully close left eye. Hearing grossly intact. Mucous membranes pink and moist.  RESP: Normal breath sounds with no wheezing, rales, or rhonchi. Respirations WNL and unlabored. CV: Regular rate and rhythm. No murmurs, rubs, or gallops. ABD: Soft, non-distended, BS+ and equal.  NEURO: Alert. Speech fluent. Left facial droop. Symmetrical shoulder shrug. Midline tongue protrusion. Sensation to light touch decreased in the right upper and lower limbs compared to left. MSK:  Muscle tone and bulk are normal bilaterally. Strength 5/5 in all limbs. LIMBS: No edema in bilateral lower limbs. SKIN: Warm and dry with good turgor. PSYCH: Mood WNL. Affect WNL. Appropriately interactive.        Diagnostics:   Recent Results (from the past 72 hour(s))   Basic Metabolic Panel w/ Reflex to MG    Collection Time: 02/03/23  6:49 AM   Result Value Ref Range    Glucose 100 (H) 70 - 99 mg/dL    BUN 12 6 - 20 mg/dL    Creatinine 0.74 0.70 - 1.20 mg/dL    Est, Glom Filt Rate >60 >60 mL/min/1.73m2    Calcium 11.3 (H) 8.6 - 10.4 mg/dL    Sodium 139 135 - 144 mmol/L    Potassium 4.2 3.7 - 5.3 mmol/L    Chloride 104 98 - 107 mmol/L    CO2 23 20 - 31 mmol/L    Anion Gap 12 9 - 17 mmol/L   Hepatic function panel    Collection Time: 02/03/23  6:49 AM   Result Value Ref Range    Albumin 4.2 3.5 - 5.2 g/dL    Alkaline Phosphatase 56 40 - 129 U/L    ALT 46 (H) 5 - 41 U/L    AST 21 <40 U/L    Total Bilirubin 0.4 0.3 - 1.2 mg/dL    Bilirubin, Direct 0.1 <0.3 mg/dL    Bilirubin, Indirect 0.3 0.0 - 1.0 mg/dL    Total Protein 7.2 6.4 - 8.3 g/dL   CBC auto differential    Collection Time: 02/03/23  6:49 AM   Result Value Ref Range    WBC 6.8 3.5 - 11.0 k/uL    RBC 4.70 4.5 - 5.9 m/uL    Hemoglobin 15.2 13.5 - 17.5 g/dL    Hematocrit 44.7 41 - 53 %    MCV 95.0 80 - 100 fL    MCH 32.4 26 - 34 pg    MCHC 34.1 31 - 37 g/dL    RDW 13.2 11.5 - 14.9 %    Platelets 446 381 - 136 k/uL    MPV 8.9 6.0 - 12.0 fL    Seg Neutrophils 60 36 - 66 %    Lymphocytes 25 24 - 44 %    Atypical Lymphocytes 4 %    Monocytes 10 (H) 1 - 7 %    Eosinophils % 1 0 - 4 %    Basophils 0 0 - 2 %    Segs Absolute 4.08 1.3 - 9.1 k/uL    Absolute Lymph # 1.70 1.0 - 4.8 k/uL    Atypical Lymphocytes Absolute 0.27 k/uL    Absolute Mono # 0.68 0.1 - 1.3 k/uL    Absolute Eos # 0.07 0.0 - 0.4 k/uL    Basophils Absolute 0.00 0.0 - 0.2 k/uL    Morphology ANISOCYTOSIS PRESENT        Impression/Plan:    Hemorrhagic pontine CVA:  PT/OT for gait, mobility, strengthening, endurance, ADLs, and self care. SLP for cognition, speech. Diplopia, blurred vision:  Secondary to hemorrhagic stroke. Has eye patch - alternate use on both eyes. Added artificial tears as needed, artificial tears ointment nightly for left eye due to incomplete closing and burning of the eye. Urinary retention:  Noted in acute care. On flomax. Will monitor bladder scans, PVRs. Alcohol withdrawal:  On phenobarbital wean (to be completed on 2/6/23). On folate, thiamine supplementation. HTN:  On carvedilol, lisinopril. Goal is SBP below 160 with gradual normalization of BP, per neurology. GERD:  On protonix  Bowel Management: Miralax daily, senokot prn, dulcolax prn.   DVT Prophylaxis:  low molecular weight heparin, SCD's while in bed, and KP's during the day  Dr. Michaelle Arzola for medical management  Follow up PCP 1-2 weeks, PM&R 4-6 weeks, Neurology 3 weeks, Neurosurgery 2 weeks (with repeat CT head), Neuro-ophthalmology - Dr. Sher Phillips MD

## 2023-02-05 PROCEDURE — 97112 NEUROMUSCULAR REEDUCATION: CPT

## 2023-02-05 PROCEDURE — 97542 WHEELCHAIR MNGMENT TRAINING: CPT

## 2023-02-05 PROCEDURE — 6370000000 HC RX 637 (ALT 250 FOR IP)

## 2023-02-05 PROCEDURE — 1180000000 HC REHAB R&B

## 2023-02-05 PROCEDURE — 6370000000 HC RX 637 (ALT 250 FOR IP): Performed by: STUDENT IN AN ORGANIZED HEALTH CARE EDUCATION/TRAINING PROGRAM

## 2023-02-05 PROCEDURE — 6360000002 HC RX W HCPCS

## 2023-02-05 PROCEDURE — 97110 THERAPEUTIC EXERCISES: CPT

## 2023-02-05 PROCEDURE — 97129 THER IVNTJ 1ST 15 MIN: CPT

## 2023-02-05 PROCEDURE — 97530 THERAPEUTIC ACTIVITIES: CPT

## 2023-02-05 PROCEDURE — 92507 TX SP LANG VOICE COMM INDIV: CPT

## 2023-02-05 PROCEDURE — 97116 GAIT TRAINING THERAPY: CPT

## 2023-02-05 PROCEDURE — 97535 SELF CARE MNGMENT TRAINING: CPT

## 2023-02-05 RX ADMIN — PHENOBARBITAL 64.8 MG: 32.4 TABLET ORAL at 07:59

## 2023-02-05 RX ADMIN — MINERAL OIL, PETROLATUM 1 APPLICATOR: 425; 568 OINTMENT OPHTHALMIC at 21:46

## 2023-02-05 RX ADMIN — ENOXAPARIN SODIUM 40 MG: 100 INJECTION SUBCUTANEOUS at 07:59

## 2023-02-05 RX ADMIN — LISINOPRIL 40 MG: 20 TABLET ORAL at 08:00

## 2023-02-05 RX ADMIN — FOLIC ACID 1 MG: 1 TABLET ORAL at 08:00

## 2023-02-05 RX ADMIN — POLYETHYLENE GLYCOL 3350 17 G: 17 POWDER, FOR SOLUTION ORAL at 07:59

## 2023-02-05 RX ADMIN — THIAMINE HCL TAB 100 MG 100 MG: 100 TAB at 08:00

## 2023-02-05 RX ADMIN — Medication 3 MG: at 21:45

## 2023-02-05 RX ADMIN — CARVEDILOL 12.5 MG: 12.5 TABLET, FILM COATED ORAL at 08:00

## 2023-02-05 RX ADMIN — PANTOPRAZOLE SODIUM 40 MG: 40 TABLET, DELAYED RELEASE ORAL at 05:30

## 2023-02-05 RX ADMIN — CARVEDILOL 12.5 MG: 12.5 TABLET, FILM COATED ORAL at 17:36

## 2023-02-05 RX ADMIN — PHENOBARBITAL 64.8 MG: 32.4 TABLET ORAL at 21:45

## 2023-02-05 RX ADMIN — TAMSULOSIN HYDROCHLORIDE 0.4 MG: 0.4 CAPSULE ORAL at 07:59

## 2023-02-05 NOTE — PLAN OF CARE
Problem: ABCDS Injury Assessment  Goal: Absence of physical injury  2/5/2023 0126 by Marlene Fabry, RN  Outcome: Progressing     Problem: Safety - Adult  Goal: Free from fall injury  2/5/2023 0126 by Marlene Fabry, RN  Outcome: Progressing     Problem: Skin/Tissue Integrity  Goal: Absence of new skin breakdown  Description: 1. Monitor for areas of redness and/or skin breakdown  2. Assess vascular access sites hourly  3. Every 4-6 hours minimum:  Change oxygen saturation probe site  4. Every 4-6 hours:  If on nasal continuous positive airway pressure, respiratory therapy assess nares and determine need for appliance change or resting period.   2/5/2023 0126 by Marlene Fabry, RN  Outcome: Progressing     Problem: Pain  Goal: Verbalizes/displays adequate comfort level or baseline comfort level  2/5/2023 0126 by Marlene Fabry, RN  Outcome: Progressing     Problem: Discharge Planning  Goal: Discharge to home or other facility with appropriate resources  2/5/2023 0126 by Marlene Fabry, RN  Outcome: Progressing     Problem: Chronic Conditions and Co-morbidities  Goal: Patient's chronic conditions and co-morbidity symptoms are monitored and maintained or improved  2/5/2023 0126 by Marlene Fabry, RN  Outcome: Progressing     Problem: Nutrition Deficit:  Goal: Optimize nutritional status  2/5/2023 0126 by Marlene Fabry, RN  Outcome: Progressing

## 2023-02-05 NOTE — PROGRESS NOTES
Physical Therapy  Facility/Department: [facility] ACUTE REHAB  Rehabilitation Physical Therapy    NAME: Liana Gaspar  : 1980 (43 y.o.)  MRN: [id number]  CODE STATUS: Full Code    Date of Service: 23      Past Medical History:   Diagnosis Date    Cerebral artery occlusion with cerebral infarction [facility])     History of heartburn     takes omeprazole    Hypertension     Neoplasm of unspecified nature of bone, soft tissue, and skin 2009-present    lesions of right cheek x 2    Research study patient 2023    [id number]. Date of completion 23     Past Surgical History:   Procedure Laterality Date    APPENDECTOMY  in 's    PRE-MALIGNANT / BENIGN SKIN LESION EXCISION Right 2014    Excision lesion of right cheek. Dr. Jill Beck. Chart Reviewed: Yes  Patient assessed for rehabilitation services?: Yes  Additional Pertinent Hx: Mr. Liana Gaspar is a 43 y.o. right handed male who was admitted to [facility] on 2023 with Extremity Weakness (Left sided weakness) and Facial Droop     Patient with R weakness, numbness and facial droop who fell when he went to bed then awoke with the symptoms. SBP was in 200s upon arrival to ED. CT confirmed hemorrhagic CVA posterior ramses and narrowing R P1 PCA. He was treated with Cardene for BP control. Neurosurgery managing conservatively. Family / Caregiver Present: No  Referring Practitioner: Karina Cervantes MD  Referral Date : 23  Diagnosis: Hemorrhagic CVA  General Comment  Comments: Patient agreeable to treatment this PM.    Restrictions:  Restrictions/Precautions: Fall Risk;General Precautions  Position Activity Restriction  Other position/activity restrictions: Per Dr. Nasrin Mcgowan OK to use Eye patch, alternate between eyes (to help with patient's double vision and balance)     SUBJECTIVE  Subjective: patient with intermittent use of eye patch.             OBJECTIVE                  Functional Mobility  Bed mobility  Rolling to Left: Stand by assistance  Rolling to Right: Stand by assistance  Supine to Sit: Stand by assistance  Sit to Supine: Stand by assistance  Scooting: Stand by assistance  Bed Mobility Comments: All bed mobility completed on flat surface without use of rails. Transfers  Sit to Stand: Minimal Assistance;Contact guard assistance  Stand to Sit: Minimal Assistance;Contact guard assistance  Bed to Chair: Minimal assistance; Moderate assistance  Stand Pivot Transfers: Minimal Assistance; Moderate Assistance  Comment: RW for multiple transfers from [facility], Hospital Bed, and recliner chair. VCs for hands placement with good carry over. VCs with Intermittent physical A to place/position RLE. Environmental Mobility  Ambulation  Surface: Level tile  Device: Rolling Walker;Forearm Crutches;Single point cane (bilateral canes and crutches.)  Assistance: Moderate assistance (Scar with RW and SBA for wc follow)  Quality of Gait: unsteady, R lateral lean with path deviation, intermittent wide and narrow [place], ataxic, decreased motor control R LE. CGA for with intermittent Scar required for right lateral LOB. Gait Deviations: Slow Tess;Deviated path;Staggers;Decreased step length;Decreased step height (high fall risk.)  Distance: 50 feet x 6 with focused gaze, around cones. Comments: Increased time to complete with slow pace, RLE Ataxia with poorly coordinated steps going from scissoring to Wide [place] with tendency to maintain R hip external rotation. LOB to right several times to prevent falls with therapist support. Short standing rest breaks to regroup stance and right hand grasp as hand tends to slip from walker; patient donned gloves with grippers for improved support with good results. Multiple gait devices used to improve patient balance rightening reactions as tendency to be pusher with over stimulation.   More Ambulation?: No  Ambulation 2  Assistance 2:  (MinAx2)  Stairs  # Steps : 18 (preformed 4 times with varied method for neuro re ed and safety instructions for home)  Stairs Height: 8\"  Rails: Bilateral;Right ascending;Left ascending  Device: No Device  Assistance: Minimal assistance; Moderate assistance;2 Person assistance (varied assistance for multiple techniques)  Comment: Benigno for RLE positioning/placement  Wheelchair Activities  Wheelchair Size: 20  Left Leg Rest Level of Assistance: Minimal assistance  Right Leg Rest Level of Assistance: Minimal assistance  Left Brakes Level of Assistance: Stand by assistance  Right Brakes Level of Assistance: Stand by Assist  Propulsion: Yes  Propulsion 1  Propulsion: Manual  Level: Level Tile  Method: RLE;LLE;LUE  Level of Assistance: Stand by assistance  Description/ Details: Pt demo's ability to propel but difficulty with RUE coordination and unfortunately scrapes hand on W/C Brake causing minor bleeding. Writer brings this to patient's attention as this went unnoticed by him. small wound is cleansed, bandaid provided. deferred further propulsion d/t poor RUE coordination and injury risk. Distance: 100 feet x 4    PT Exercises  Exercise Treatment: x8 in standing: hip flexion, heel raises, partial squats.   A/AROM Exercises: AROM RLE: LAQs x 20 reps with 4 pound cuff wt, verbal cues for control/decreased speed of movement  Dynamic Standing Balance Exercises: lateral stepping, weight shifitng, toe>cone taps, postural ex  Motor Control/Coordination: step over step flight of stairs  Vestibular Exercises: focus eye gaze target gait 40 feet x6;  Exercise Equipment: Your Policy Manager 10 minutes    ASSESSMENT       Activity Tolerance  Activity Tolerance: Patient tolerated treatment well;Patient limited by endurance         GOALS  Patient Goals   Patient Goals : \"Practice going up/down a ladder\"  Short Term Goals  Time Frame for Short Term Goals: 7 days  Short Term Goal 1: pt to demo independent bed mobility on flat surface without use of rails per home set-up  Short Term Goal 2: pt to demo functional transfers with device and CGA  Short Term Goal 3: pt to ambulate 150' with device and MinAx1  Short Term Goal 4: pt to negotiate 10-12 steps with Single rail and device as needed demonstrating a landing pivot turn (per home set up stairs with landing) Benigno  Long Term Goals  Time Frame for Long Term Goals : Until D/C  Long Term Goal 1: Pt to improve Standing balance to FAIR + with use of device to dec risk for falls  Long Term Goal 2: pt to demo functional transfers with device Mod I  Long Term Goal 3: pt to ambulate 150' with device and supervision  Long Term Goal 4: pt to negotiate 2 steps with R rail and device and 10-12 successive steps with L rail and device as needed demonstrating a landing pivot turn (per home set up stairs with landing) CGA-SBA for stairs  Long Term Goal 5: pt to demo actual or simulated car transfer with SBA and device as needed  Long term goal 6: pt to improve on PASS score to 30/36 or greater to demonstrate improving balance, mobility, and independence  Long term goal 7: pt to demonstrate good technique for fall recovery with SBA    PLAN OF CARE  Frequency: 1-2 treatment sessions per day, 5-7 days per week  Safety Devices  Type of Devices: All fall risk precautions in place;Call light within reach;Gait belt;Nurse notified; Patient at risk for falls; Left in chair (DOMO Smith)  Restraints  Restraints Initially in Place: No    EDUCATION  Education  Education Given To: Patient  Education Provided:  Mobility Training;Transfer Training  Education Method: Demonstration;Verbal  Barriers to Learning: None  Education Outcome: Verbalized understanding;Demonstrated understanding      Therapy Time   02/05/23 1040 02/05/23 1328   PT Individual Minutes   Time In 0758 1300   Time Out 0902 1332   Minutes 64 [address] [facility], 02/05/23 at 4:17 PM

## 2023-02-05 NOTE — PROGRESS NOTES
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 Repeated stimuli needed during structured tasks. Orientation: x4 (I). Recall: Recall of daily events: 2/3 target items (I) improved to 3/3 with min verbal cues. Organization: NT    Problem Solving/Reasoning: Functional time calculations: 80% (I) improved to 95% with min A/repeated stimuli. Speech: ST instructed Pt in oral motor exercises for dysarthria. Pt responded to verbal cues/models to complete exercises x 10 reps x 1 set. Pt demo decreased oral motor coordination (B) with decreased ROM (L). Speech subjectively judged to to be ~90% intelligible in conversation. Other: No family present.      Plan:  [] Continue ST services    [] Discharge from ST:      Discharge recommendations: [] Inpatient Rehab   [] [place]   [] Outpatient Therapy  [] Follow up at trauma clinic   [] Other:       Treatment completed by: Sebastian García M.S., [id number] [facility]

## 2023-02-05 NOTE — PROGRESS NOTES
[address] [facility]   Acute Rehabilitation Occupational Therapy Daily Treatment Note    Date: 23  Patient Name: Isma Toure       Room: 4905/1404-04  MRN: [id number]  Account: [de-identified]   : 1980  (43 y.o.) Gender: male       Referring Practitioner: Kimmie Posey MD  Diagnosis: Hemorrhagic CVA  Additional Pertinent Hx: Mr. Isma Toure is a 43 y.o. right handed male who was admitted to [facility] on 2023 with Extremity Weakness (Right sided weakness) and Facial Droop. Patient with R weakness, numbness and facial droop who fell when he went to bed then awoke with the symptoms. SBP was in 200s upon arrival to ED. CT confirmed hemorrhagic CVA posterior ramses and narrowing R P1 PCA. He was treated with Cardene for BP control. Neurosurgery managing conservatively. Pt admitted to ARU 23    Treatment Diagnosis: Impaired self care status    Past Medical History:  has a past medical history of Cerebral artery occlusion with cerebral infarction [facility]), History of heartburn, Hypertension, Neoplasm of unspecified nature of bone, soft tissue, and skin, and Research study patient. Past Surgical History:   has a past surgical history that includes Appendectomy (in 20's) and pre-malignant / benign skin lesion excision (Right, 2014). Restrictions  Restrictions/Precautions  Restrictions/Precautions: Fall Risk, General Precautions  Required Braces or Orthoses?: No  Implants present? :  (pt denies)     Position Activity Restriction  Other position/activity restrictions: Per Dr. Steven Dowell OK to use Eye patch, alternate between eyes (to help with patient's double vision and balance)          Vitals  Vital Signs  O2 Device: None (Room air)     Subjective  Subjective  Subjective: \"I would like to shower in the afternoon. \" pt spouse in room during PM session  Pain: Pt denies pain at this time  Pain Assessment  Pain Assessment: None - Denies Pain    Objective  Cognition  Overall Orientation Status: Within Functional Limits  Orientation Level: Oriented X4    Cognition  Overall Cognitive Status: Exceptions  Following Commands: Follows multistep commands with increased time  Safety Judgement: Decreased awareness of need for assistance;Decreased awareness of need for safety  Problem Solving: Assistance required to identify errors made;Assistance required to correct errors made  Insights: Decreased awareness of deficits  Initiation: Requires cues for some  Sequencing: Requires cues for some  Cognition Comment: pt can be impulsive at times    Activities of Daily Living  Feeding  Equipment Provided: Feeding utensils (Built up handles)  Assistance Level: Set-up    Grooming/Oral Hygiene  Assistance Level: Stand by assist  Skilled Clinical Factors: sitting at sink for oral care    Upper Extremity Bathing  Assistance Level: Stand by assist    Lower Extremity Bathing  Assistance Level: Stand by assist  Skilled Clinical Factors: close SBA while standing due to history of R side LOB, no LOB noted this date    Upper Extremity Dressing  Assistance Level: Stand by assist  Skilled Clinical Factors: pt able to doff/izabela [place] shirt    Lower Extremity Dressing  Assistance Level: Minimal assistance  Skilled Clinical Factors: Min A while standing due to Right side LOB x 1    Putting On/Taking Off Footwear  Assistance Level: Stand by assist  Skilled Clinical Factors: Declines TEDs at this time, utilizes figure 4 method to izabela socks/shoes    Comment: pt encouraged throughout shower to utilize RUE as much as possible to improve RUE coordination. Pt drops wash cloth multiple times durring shower. Tub/Shower Transfers  Type: Shower  Transfer From: Rolling walker  Transfer To: Tub transfer bench  Additional Factors: Verbal cues; Increased time to complete  Assistance Level: Minimal assistance  Skilled Clinical Factors: Verbal cues for safety over threashold with increased time. Min A for LOB to R side x 2         Mobility     Sit to Stand  Assistance Level: Contact guard assist  Skilled Clinical Factors: Verbal cues for hand placement and safety  Stand to Sit  Assistance Level: Contact guard assist  Skilled Clinical Factors: Verbal cues for hand placement and safety. R hand slips off WC arm rest x 3 this date. Functional Mobility  Device: Rolling walker  Activity: To/From bathroom  Assistance Level: Minimal assistance  Skilled Clinical Factors: Verbal cues for hand placement and safety with Fair carryover. Min A with multiple Right side LOB requiring min A to right self            OT Exercises  Motor Control/Coordination: AM: writer facilitated pts engagement in FM threading activity with use of RUE to reach into bin and remove object and hold while threading with L hand. Pt requried increased time to complete 2* decreased hand/eye coordination and pt noted to drop multiple objects when completing task. Increased ease completing with repetition. Additional Activities  Additional Activities Comment: AM: writer facilitated pts engagement in visual activity to increase functional use of vision and provide neurofacilitation in bilateral eyes to look towards the left. writer places 5 sticky notes with pictures on L side of full mirror. pt engages in activity with head at midline to promote and faciliate visual scanning to the left. Eye patch doffed for this task. Pt required increased time to complete with compnesatory methods utilized with fatigue. Slight left side visual shift noted with R eye on this date. pt verbalizes that he is able to see picture on sticky notes in upper and mid quadrant but not the two in lower quadrant with L eye.   pt states that he can only see the two sticky notes in the upper quadrant but not able to identify pictures,  Pt reports increased fatigue with attempting to visually track/scan to the left side of midline. Assessment  Assessment  Activity Tolerance: Patient tolerated treatment well;Patient limited by endurance  Discharge Recommendations: Home with assist PRN;Outpatient OT ('s Rehab)    Patient Education  Education  Education Given To: Patient  Education Provided: Role of Therapy;Plan of Care;Safety; Mobility Training;Transfer Training;Visual Perceptual Function  Education Method: Verbal;Demonstration  Barriers to Learning: Vision  Education Outcome: Verbalized understanding;Continued education needed    OT Equipment Recommendations  Other: TBD    Safety Devices  Safety Devices in place: Yes  Type of devices: All fall risk precautions in place;Call light within reach;Gait belt;Patient at risk for falls; Left in chair;Nurse notified (pt spouse in room as writer departs in PM)       Goals  Patient Goals   Patient goals : \"To get back to those things (ADL, IADL, and [facility] in [facility]). \"  Short Term Goals  Time Frame for Short Term Goals: By 1 week  Short Term Goal 1: Pt will complete lower body dressing/bathing with CGA and Good safety with use of AE as needed  Short Term Goal 2: Pt will complete functional transfers/mobility during self care tasks with CGA and Good safety with no LOB  Short Term Goal 3: Pt will tolerate standing 7+ minutes during functional activity of choice with CGA and no LOB during functional activity of choice  Short Term Goal 4: Pt will verbalize/demonstrate Good understanding of AE/adaptive strategies/DME to increase safety and independence with self care and mobility  Short Term Goal 5: Pt will verbalize/demonstrate 3 visual compensatory strategies to increase safety with daily activities and increase quality of life  Short Term Goal 6: Pt will participate in 30+ minutes of therapeutic exercises/functional activities to increase safety and independence with self care and mobility    Long Term Goals  Time Frame for Long Term Goals : By discharge  Long Term Goal 1: Pt will complete BADLs with Mod I and Good safety with use of AE as needed  Long Term Goal 2: Pt will complete functional mobility during self care tasks with Mod I and Good safety with use of least restrictive device  Long Term Goal 3: Pt will tolerate standing 15+ minutes during functional activity of choice with Good safety  Long Term Goal 4: Pt will complete simple meal prep/light house keeping task with Supervision and Good safety  Long Term Goal 5: Pt will complete tub transfer with Supervision and Good safety with use of DME as needed  Long Term Goal 6: Pt will verbalize/demonstrate Good understanding of vision HEP to increase neurofacilitation of left side visual field to increase safety during daily activities  Long Term Goal 7: Pt will verbalize/demonstrate Good understanding of home safety/fall prevention strategies to increase safety and independence with self care and mobility  Long Term Goal 8: Pt will demonstrate increased [facility] during self care tasks as evident by 5 second improvement with LUE and 30 second improvement with RUE during 9 hole peg test    Plan  Occupational Therapy Plan  Times Per Week: 5-7  Times Per Day: Twice a day  Current Treatment Recommendations: Self-Care / ADL, Strengthening, Balance training, Functional mobility training, Endurance training, Neuromuscular re-education, Pain management, Safety education & training, Patient/Caregiver education & training, Equipment evaluation, education, & procurement, Home management training, Cognitive/Perceptual training, Coordination training    Electronically signed by EDGARDO Rubin on 2/5/23 at 4:32 PM EST        02/05/23 1057 02/05/23 1058   OT Individual Minutes   Time In 5812 [id number]   Time Out 5410 1527   [id number]

## 2023-02-05 NOTE — PROGRESS NOTES
Physical Medicine & Rehabilitation  Progress Note    2/5/2023 10:52 AM     Subjective:     Patient is doing well , denies any acute concerns, B/B ok. ROS:    Review of Systems   Constitutional:  Negative for fever. Eyes:  Positive for blurred vision and double vision. Respiratory:  Negative for shortness of breath. Cardiovascular:  Negative for chest pain. Gastrointestinal:  Negative for abdominal pain. No change in bowel control   Genitourinary:         No change in bladder control   Neurological:  Positive for sensory change, weakness and headaches. Rehabilitation:     PT    Functional Mobility  Bed mobility  Rolling to Left: Stand by assistance  Rolling to Right: Stand by assistance  Supine to Sit: Stand by assistance  Sit to Supine: Stand by assistance  Scooting: Stand by assistance  Bed Mobility Comments: All bed mobility completed on flat surface without use of rails. Transfers  Sit to Stand: Minimal Assistance;Contact guard assistance  Stand to Sit: Minimal Assistance;Contact guard assistance  Bed to Chair: Minimal assistance  Stand Pivot Transfers: Minimal Assistance  Comment: RW for multiple transfers from [facility], Hospital Bed, and recliner chair. VCs for hands placement with good carry over. VCs with Intermittent physical A to place/position RLE. Environmental Mobility  Ambulation  Surface: Level tile  Device: Rolling Walker  Assistance: Moderate assistance (Scar with RW and SBA for wc follow)  Quality of Gait: unsteady, R lateral lean with path deviation, intermittent wide and narrow [place], ataxic, decreased motor control R LE. CGA for with intermittent Scar required for right lateral LOB. Gait Deviations: Slow Tess;Deviated path;Staggers;Decreased step length;Decreased step height  Distance: 81  Comments: Increased time to complete with slow pace, RLE Ataxia with poorly coordinated steps going from scissoring to Wide [place] with tendency to maintain R hip external rotation. Short standing rest breaks to regroup stance and right hand grasp as hand tends to slip from walker; patient donned gloves with grippers for improved support with good results. More Ambulation?: No  Ambulation 2  Assistance 2:  (MinAx2)  Stairs  # Steps : 12  Stairs Height: 6\"  Rails: Bilateral  Device: No Device  Assistance: Minimal assistance  Comment: Benigno for RLE positioning/placement  Wheelchair Activities  Wheelchair Size: 20  Left Leg Rest Level of Assistance: Minimal assistance  Right Leg Rest Level of Assistance: Minimal assistance  Left Brakes Level of Assistance: Stand by assistance  Right Brakes Level of Assistance: Stand by Assist  Propulsion: Yes  Propulsion 1  Propulsion: Manual  Level: Level Tile  Method: RLE;LLE;LUE  Level of Assistance: Stand by assistance  Description/ Details: Pt demo's ability to propel but difficulty with RUE coordination and unfortunately scrapes hand on W/C Brake causing minor bleeding. Writer brings this to patient's attention as this went unnoticed by him. small wound is cleansed, bandaid provided. deferred further propulsion d/t poor RUE coordination and injury risk. Distance: 100 feet x 4       OT    Cognition  Overall Orientation Status: Within Functional Limits  Orientation Level: Oriented X4     Cognition  Overall Cognitive Status: Exceptions  Following Commands:  Follows multistep commands with increased time  Safety Judgement: Decreased awareness of need for assistance;Decreased awareness of need for safety  Problem Solving: Assistance required to identify errors made;Assistance required to correct errors made  Insights: Decreased awareness of deficits  Initiation: Requires cues for some  Sequencing: Requires cues for some     Activities of Daily Living  Feeding  Equipment Provided: Feeding utensils (Built up handles)  Assistance Level: Set-up     Grooming/Oral Hygiene  Assistance Level: Contact guard assist  Skilled Clinical Factors: Standing/sitting in wheelchair for grooming task shaving     Upper Extremity Bathing  Assistance Level: Stand by assist     Lower Extremity Bathing  Assistance Level: Minimal assistance  Skilled Clinical Factors: Min A while standing due to Right side LOB     Upper Extremity Dressing  Assistance Level: Stand by assist     Lower Extremity Dressing  Assistance Level: Minimal assistance  Skilled Clinical Factors: Min A while standing due to Right side LOB     Putting On/Taking Off Footwear  Assistance Level: Stand by assist  Skilled Clinical Factors: Declines TEDs at this time           Tub/Shower Transfers  Type: Shower  Transfer From: Rolling walker  Transfer To: Tub transfer bench  Additional Factors: Verbal cues; Increased time to complete  Assistance Level: Minimal assistance  Skilled Clinical Factors: Verbal cues for safety over threashold with increased time. Min A for LOB to R side     Mobility     Sit to Stand  Assistance Level: Contact guard assist  Skilled Clinical Factors: Verbal cues for hand placement and safety  Stand to Sit  Assistance Level: Contact guard assist  Skilled Clinical Factors: Verbal cues for hand placement and safety     Functional Mobility  Device: Rolling walker  Activity: To/From bathroom  Assistance Level: Minimal assistance  Skilled Clinical Factors: Verbal cues for hand placement and safety with Fair carryover. Min A with multiple Right side LOB requiring A to right self        OT Exercises  Resistive Exercises: AM:OT facilitated pts engagement in orange theraputty with use of R hand. exercises provided with pt educated on completing exercises in room after therapy to increase strength and FM in R hand. Wife/pt verbalized Good understanding. Motor Control/Coordination: AM: OT facilitated pts engagement in FM activities of placing large peg board with use of RUE. Pt requried increased time to complete with pt noted to drop multiple pegs when completing. Increased ease completing with repetition.  OT facilitated pts engagement in large mosaic grid activity with use of RUE to increase [address] and coordination. Increase ease noted with repetition. Ot provided pt with large nut/bolt to screw and unscrew in room after therapy to increase isolated FM movement. Additional Activities  Additional Activities Comment: AM: OT facilitated pts engagement in visual activity to increase functional use of vision and provide neurofacilitation in bilateral eyes to look towards the left. Pt completed Spational relation activity with pt educated on completing activity with head at midline to promote and faciliate visual scanning to the left. Eye patch doffed for this task. Pt required increased time to complete with compnesatory methods utilized with fatigue. Slight left side visual shift noted with R eye on this date. Pt reports increased pain/fatigue with attempting to visually track/scan to the left side of midline. ST    Objective/Assessment:  Attention: Occasional cues to redirect from environmental distractors, however Pt demo good self-monitoring. Orientation: x4 (I). Recall: Delayed word recall: 3/3 (I); Pt exhibited use of association strategy with modified independence. Organization: NT     Problem Solving/Reasoning: Stating multiple causes for problem scenarios (3+): 68% (I) improved to 100% with min verbal cues to differentiate responses. Speech: ST instructed Pt in dysarthria speaking strategies. Speech subjectively judged to to be ~90% intelligible in conversation. Pt exhibited good self awareness and stated \"Today is a bad day for my speech. \" ST educated Pt re: cause of dysarthria and exacerbating factors, including fatigue. Pt verbalized comprehension. Pt able to implement slow rate and over-articulation with min cues. Other: Pt's spouse present for portion of session. ST provided education re: goals and plan of care.            Objective:  BP (!) 139/96   Pulse 71   Temp 97.7 °F (36.5 °C) (Oral) Resp 18   Ht 6' (1.829 m)   Wt 209 lb 3.2 oz (94.9 kg)   SpO2 95%   BMI 28.37 kg/m²     GEN: Well developed, well nourished, no acute distress  HEENT: NCAT. Unable to look to the left with both eyes. Mild redness of the left conjunctiva noted. Unable to fully close left eye. Hearing grossly intact. Mucous membranes pink and moist.  RESP: Normal breath sounds with no wheezing, rales, or rhonchi. Respirations WNL and unlabored. CV: Regular rate and rhythm. No murmurs, rubs, or gallops. ABD: Soft, non-distended, BS+ and equal.  NEURO: Alert. Speech fluent. Left facial droop. Symmetrical shoulder shrug. Midline tongue protrusion. Sensation to light touch decreased in the right upper and lower limbs compared to left. MSK:  Muscle tone and bulk are normal bilaterally. Strength 5/5 in all limbs. LIMBS: No edema in bilateral lower limbs. SKIN: Warm and dry with good turgor. PSYCH: Mood WNL. Affect WNL. Appropriately interactive.        Diagnostics:   Recent Results (from the past 72 hour(s))   Basic Metabolic Panel w/ Reflex to MG    Collection Time: 02/03/23  6:49 AM   Result Value Ref Range    Glucose 100 (H) 70 - 99 mg/dL    BUN 12 6 - 20 mg/dL    Creatinine 0.74 0.70 - 1.20 mg/dL    Est, Glom Filt Rate >60 >60 mL/min/1.73m2    Calcium 11.3 (H) 8.6 - 10.4 mg/dL    Sodium 139 135 - 144 mmol/L    Potassium 4.2 3.7 - 5.3 mmol/L    Chloride 104 98 - 107 mmol/L    CO2 23 20 - 31 mmol/L    Anion Gap 12 9 - 17 mmol/L   Hepatic function panel    Collection Time: 02/03/23  6:49 AM   Result Value Ref Range    Albumin 4.2 3.5 - 5.2 g/dL    Alkaline Phosphatase 56 40 - 129 U/L    ALT 46 (H) 5 - 41 U/L    AST 21 <40 U/L    Total Bilirubin 0.4 0.3 - 1.2 mg/dL    Bilirubin, Direct 0.1 <0.3 mg/dL    Bilirubin, Indirect 0.3 0.0 - 1.0 mg/dL    Total Protein 7.2 6.4 - 8.3 g/dL   CBC auto differential    Collection Time: 02/03/23  6:49 AM   Result Value Ref Range    WBC 6.8 3.5 - 11.0 k/uL    RBC 4.70 4.5 - 5.9 m/uL Hemoglobin 15.2 13.5 - 17.5 g/dL    Hematocrit 44.7 41 - 53 %    MCV 95.0 80 - 100 fL    MCH 32.4 26 - 34 pg    MCHC 34.1 31 - 37 g/dL    RDW 13.2 11.5 - 14.9 %    Platelets 038 002 - 677 k/uL    MPV 8.9 6.0 - 12.0 fL    Seg Neutrophils 60 36 - 66 %    Lymphocytes 25 24 - 44 %    Atypical Lymphocytes 4 %    Monocytes 10 (H) 1 - 7 %    Eosinophils % 1 0 - 4 %    Basophils 0 0 - 2 %    Segs Absolute 4.08 1.3 - 9.1 k/uL    Absolute Lymph # 1.70 1.0 - 4.8 k/uL    Atypical Lymphocytes Absolute 0.27 k/uL    Absolute Mono # 0.68 0.1 - 1.3 k/uL    Absolute Eos # 0.07 0.0 - 0.4 k/uL    Basophils Absolute 0.00 0.0 - 0.2 k/uL    Morphology ANISOCYTOSIS PRESENT        Impression/Plan:    Hemorrhagic pontine CVA:  PT/OT for gait, mobility, strengthening, endurance, ADLs, and self care. SLP for cognition, speech. Diplopia, blurred vision:  Secondary to hemorrhagic stroke. Has eye patch - alternate use on both eyes. Added artificial tears as needed, artificial tears ointment nightly for left eye due to incomplete closing and burning of the eye. Urinary retention:  Noted in acute care. On flomax. Will monitor bladder scans, PVRs. Alcohol withdrawal:  On phenobarbital wean (to be completed on 2/6/23). On folate, thiamine supplementation. HTN:  On carvedilol, lisinopril. Goal is SBP below 160 with gradual normalization of BP, per neurology. GERD:  On protonix  Bowel Management: Miralax daily, senokot prn, dulcolax prn.   DVT Prophylaxis:  low molecular weight heparin, SCD's while in bed, and KP's during the day  Dr. Joe Neff for medical management  Follow up PCP 1-2 weeks, PM&R 4-6 weeks, Neurology 3 weeks, Neurosurgery 2 weeks (with repeat CT head), Neuro-ophthalmology - Dr. Dayanna Abdi MD

## 2023-02-05 NOTE — PLAN OF CARE
Problem: ABCDS Injury Assessment  Goal: Absence of physical injury  2/5/2023 1303 by Saurabh Gross LPN  Outcome: Progressing     Problem: Safety - Adult  Goal: Free from fall injury  2/5/2023 1303 by Saurabh Gross LPN  Outcome: Progressing     Problem: Skin/Tissue Integrity  Goal: Absence of new skin breakdown  Description: 1. Monitor for areas of redness and/or skin breakdown  2. Assess vascular access sites hourly  3. Every 4-6 hours minimum:  Change oxygen saturation probe site  4. Every 4-6 hours:  If on nasal continuous positive airway pressure, respiratory therapy assess nares and determine need for appliance change or resting period.   2/5/2023 1303 by Saurabh Gross LPN  Outcome: Progressing     Problem: Pain  Goal: Verbalizes/displays adequate comfort level or baseline comfort level  2/5/2023 1303 by Saurabh Gross LPN  Outcome: Progressing     Problem: Discharge Planning  Goal: Discharge to home or other facility with appropriate resources  2/5/2023 1303 by Saurabh Gross LPN  Outcome: Progressing     Problem: Chronic Conditions and Co-morbidities  Goal: Patient's chronic conditions and co-morbidity symptoms are monitored and maintained or improved  2/5/2023 1303 by Saurabh Gross LPN  Outcome: Progressing     Problem: Nutrition Deficit:  Goal: Optimize nutritional status  2/5/2023 1303 by Saurabh Gross LPN  Outcome: Progressing

## 2023-02-06 PROCEDURE — 97116 GAIT TRAINING THERAPY: CPT

## 2023-02-06 PROCEDURE — 99232 SBSQ HOSP IP/OBS MODERATE 35: CPT | Performed by: PHYSICAL MEDICINE & REHABILITATION

## 2023-02-06 PROCEDURE — 6370000000 HC RX 637 (ALT 250 FOR IP): Performed by: STUDENT IN AN ORGANIZED HEALTH CARE EDUCATION/TRAINING PROGRAM

## 2023-02-06 PROCEDURE — 97542 WHEELCHAIR MNGMENT TRAINING: CPT

## 2023-02-06 PROCEDURE — 97112 NEUROMUSCULAR REEDUCATION: CPT

## 2023-02-06 PROCEDURE — 92507 TX SP LANG VOICE COMM INDIV: CPT

## 2023-02-06 PROCEDURE — 97110 THERAPEUTIC EXERCISES: CPT

## 2023-02-06 PROCEDURE — 97530 THERAPEUTIC ACTIVITIES: CPT

## 2023-02-06 PROCEDURE — 97535 SELF CARE MNGMENT TRAINING: CPT

## 2023-02-06 PROCEDURE — 1180000000 HC REHAB R&B

## 2023-02-06 PROCEDURE — 6360000002 HC RX W HCPCS

## 2023-02-06 PROCEDURE — 6370000000 HC RX 637 (ALT 250 FOR IP)

## 2023-02-06 PROCEDURE — 97129 THER IVNTJ 1ST 15 MIN: CPT

## 2023-02-06 RX ADMIN — MINERAL OIL, PETROLATUM: 425; 568 OINTMENT OPHTHALMIC at 20:45

## 2023-02-06 RX ADMIN — LISINOPRIL 40 MG: 20 TABLET ORAL at 09:47

## 2023-02-06 RX ADMIN — POLYETHYLENE GLYCOL 3350 17 G: 17 POWDER, FOR SOLUTION ORAL at 09:49

## 2023-02-06 RX ADMIN — CARVEDILOL 12.5 MG: 12.5 TABLET, FILM COATED ORAL at 17:16

## 2023-02-06 RX ADMIN — THIAMINE HCL TAB 100 MG 100 MG: 100 TAB at 09:48

## 2023-02-06 RX ADMIN — CARVEDILOL 12.5 MG: 12.5 TABLET, FILM COATED ORAL at 09:48

## 2023-02-06 RX ADMIN — TAMSULOSIN HYDROCHLORIDE 0.4 MG: 0.4 CAPSULE ORAL at 09:48

## 2023-02-06 RX ADMIN — PHENOBARBITAL 64.8 MG: 32.4 TABLET ORAL at 09:49

## 2023-02-06 RX ADMIN — FOLIC ACID 1 MG: 1 TABLET ORAL at 09:48

## 2023-02-06 RX ADMIN — PANTOPRAZOLE SODIUM 40 MG: 40 TABLET, DELAYED RELEASE ORAL at 06:03

## 2023-02-06 RX ADMIN — ENOXAPARIN SODIUM 40 MG: 100 INJECTION SUBCUTANEOUS at 09:49

## 2023-02-06 RX ADMIN — Medication 3 MG: at 20:44

## 2023-02-06 NOTE — PLAN OF CARE
Problem: ABCDS Injury Assessment  Goal: Absence of physical injury  Outcome: Progressing     Problem: Safety - Adult  Goal: Free from fall injury  Outcome: Progressing     Problem: Skin/Tissue Integrity  Goal: Absence of new skin breakdown  Description: 1. Monitor for areas of redness and/or skin breakdown  2. Assess vascular access sites hourly  3. Every 4-6 hours minimum:  Change oxygen saturation probe site  4. Every 4-6 hours:  If on nasal continuous positive airway pressure, respiratory therapy assess nares and determine need for appliance change or resting period.   Outcome: Progressing     Problem: Pain  Goal: Verbalizes/displays adequate comfort level or baseline comfort level  Outcome: Progressing     Problem: Discharge Planning  Goal: Discharge to home or other facility with appropriate resources  Outcome: Progressing

## 2023-02-06 NOTE — PATIENT CARE CONFERENCE
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[facility])    NURSING    Bladder Continence  Always Continent  Bowel Continence Always Continent    Date of Last BM: 23    Bladder/Bowel Program Interventions: No Bladder or Bowel Program Indicated    Wounds/Incisions/Ulcers: No skin issues identified    Pain Control: Patient's pain currently controlled and pain regimen effective as ordered    Pain Medication Regimen Usage Pattern: MAR reviewed and pain medications are being used at the following frequency (Specify Medication, # Doses Administered on average per day, identified patterns of use - for example: time of day, prior to activity/therapy)  PRN tylenol taken 23    Fall Risk:  Falling star program initiated    Medication Education Program: Patient able to manage medications and being educated by nursing    Discharge Preparation Patient/Responsible Party Education In Progress:   No Educational Needs Identified    Nursing specific communication for TEAM: No additional information identified requiring communication at this time    PHYSICAL THERAPY    Bed Mobility:        Rolling to Left: Modified independent  Rolling to Right: Modified independent  Supine to Sit: Modified independent  Sit to Supine: Modified independent  Scooting: Modified independent    Transfers:  Sit to Stand: Contact guard assistance  Stand to Sit: Contact guard assistance  Bed to Chair: Contact guard assistance  Comment: RW for multiple transfers from [facility], Hospital Bed, and recliner chair. Ambulation  Surface: Level tile  Device: Rolling Walker;Forearm Crutches;Single point cane (bilateral UE support devices needed)  Assistance:  Moderate assistance;2 Person assistance (intermittent SBA-JOSE C support of 2nd person for safety with change in terrain and supportive devices)  Quality of Gait: unsteady, R lateral lean with path deviation, intermittent wide and narrow IS[place], ataxic, decreased motor control R LE. CGA for with intermittent Jose C required for right lateral LOB. Gait Deviations: Slow Tess;Deviated path;Staggers;Decreased step length;Decreased step height  Distance: 50 feet x 6 with focused gaze, around cones. Comments: Increased time to complete with slow pace, RLE Ataxia with poorly coordinated steps going from scissoring to Wide IS[place] with tendency to maintain R hip external rotation. LOB to right several times to prevent falls with therapist support. Short standing rest breaks to regroup stance and right hand grasp as hand tends to slip from walker; patient donned gloves with grippers for improved support with good results. Multiple gait devices used to improve patient balance rightening reactions as tendency to be pusher with over stimulation. Ambulation:  # Steps : 18 (preformed with varied method for neuro re ed and safety instructions for home)  Stairs Height: 8\"  Rails: Bilateral;Right ascending;Left ascending  Device: No Device  Assistance: Minimal assistance; Moderate assistance;2 Person assistance  Comment: patient able to place RLE with tendency to cross midline with RLE but able to correct with cueing.          Goals  Time Frame for Short Term Goals: 7 days  Short Term Goal 1: pt to demo independent bed mobility on flat surface without use of rails per home set-up  Short Term Goal 2: pt to demo functional transfers with device and CGA  Short Term Goal 3: pt to ambulate 150' with device and MinAx1  Short Term Goal 4: pt to negotiate 10-12 steps with Single rail and device as needed demonstrating a landing pivot turn (per home set up stairs with landing) Jose C                OCCUPATIONAL THERAPY  SELF CARE        Feeding  Equipment Provided: Feeding utensils (Built up handles)  Assistance Level: Set-up          Grooming/Oral Hygiene  Assistance Level: Stand by assist  Skilled Clinical Factors: standing with RW at sink for oral care. No LOB noted. Upper Extremity Bathing  Assistance Level: Stand by assist  Skilled Clinical Factors: completed while seated on shower bench. G use of RUE. Lower Extremity Bathing  Assistance Level: Stand by assist  Skilled Clinical Factors: SBA while standing due to history of R side LOB, no LOB noted this date        Upper Extremity Dressing  Assistance Level: Stand by assist  Skilled Clinical Factors: pt able to doff while seated on shower bench. [place] while seated in w/c. Lower Extremity Dressing  Assistance Level: Stand by assist  Skilled Clinical Factors: able to doff/ludwin underwear and shorts while seated on shower bench. Putting On/Taking Off Footwear  Assistance Level: Stand by assist  Skilled Clinical Factors: Declines TEDs at this time, utilizes figure 4 method to ludwin socks/shoes       Toileting: Minimal assistance  Toileting Skilled Clinical Factors: A for standing balance with hygiene and clothing management       Toilet transfer: Minimal assistance  Verbal cues for hand placement and safety.  Slight unsteadiness with R side LOB    Short Term Goals  Time Frame for Short Term Goals: By 1 week  Short Term Goal 1: Pt will complete lower body dressing/bathing with CGA and Good safety with use of AE as needed  Short Term Goal 2: Pt will complete functional transfers/mobility during self care tasks with CGA and Good safety with no LOB  Short Term Goal 3: Pt will tolerate standing 7+ minutes during functional activity of choice with CGA and no LOB during functional activity of choice  Short Term Goal 4: Pt will verbalize/demonstrate Good understanding of AE/adaptive strategies/DME to increase safety and independence with self care and mobility  Short Term Goal 5: Pt will verbalize/demonstrate 3 visual compensatory strategies to increase safety with daily activities and increase quality of life  Short Term Goal 6: Pt will participate in 30+ minutes of therapeutic exercises/functional activities to increase safety and independence with self care and mobility     SPEECH THERAPY  Supervision for expression (dysarthria); Supervision for problem solving; Min A for memory   Orientation Log (O-Log) Score:  30/30 (administered 02/06)  Diet: Regular diet with thin liquids (some pocketing and anterior spillage with meals -- good insight into deficits, using strategies to compensate)   Short Term Goal: Mod I for expression; Mod I for problem solving; Supervision for memory     NUTRITION  Weight: 209 lb 3.2 oz (94.9 kg) / Body mass index is 28.37 kg/m². Diet Rx: Regular. PO intake appears adequate; pt eating well. Please see nutrition note for details.     CASE MANAGEMENT ASSESSMENT    Discharge Disposition: home w/ spouse  Family Support: spouse- can be available during the day since her work schedule is flexible     PCP Established: [x] Yes [] No (If No: Specify Status of Designation)    Services Being Followed In Preparation For Discharge: (Check All That Apply)  [] [address] [place])  [x] Outpatient Therapy(Specify Location)  [] Dialysis   [] Hemodialysis (Specify Location/Days/Chair Times)   [] Peritoneal Dialysis  [] IV Infusion Services  [] Enteral Nutrition Services  [] Oxygen  [] Stoma/Ostomy  [] Catheter  [] Lovenox    Patient Mood Interview PHQ-2 to 9 Score: 0    Pre-Admission Status:  Lives With: Spouse (Pt's Spouse Xavier Watson, has 1 step son (17 yo)that may move in with the family)  Type of Home: House  Home Layout: Two level, Bed/Bath upstairs, 1/2 bath on main level (Laundry on first level.)  Home Access: Stairs to enter with rails  Entrance Stairs - Number of Steps: 2 CHIRAG; 13 steps upstairs with with L HR  Entrance Stairs - Rails: Right  Bathroom Shower/Tub: Tub/Shower unit, Curtain  Bathroom Toilet: Standard (Windowsills nearby toilet in both bathrooms; bench storage unit in front of toilet in full bathroom that can be used as support)  Bathroom Accessibility: Accessible  Home Equipment:  (Pt reports that his brother in law has equipment available for him to use such as RW, and Shower chair and he does not need it at this time)  Has the patient had two or more falls in the past year or any fall with injury in the past year?: No  Receives Help From: Family, Neighbor, Friend(s) (Sister, Brother in law, neighbors)  ADL Assistance: Independent  Homemaking Assistance: Independent  Homemaking Responsibilities: Yes  Meal Prep Responsibility: Primary  Laundry Responsibility: Primary  Cleaning Responsibility: Primary  Ambulation Assistance: Independent  Transfer Assistance: Independent  Active : Yes  Mode of Transportation: Truck, Car (Wife Drives [employer] car)  Occupation: Full time employment  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 House is 50% carpet and 50% hardwood. Additional Comments: Pt's wife is employed, M-F 9-5 shifts, Pt reports her schedule can be flexible and can work remotely. Pt reports neightbor, brother-in-law, and sister that are also able to assist as needed.  Pt plans to return home and do OP therapies     Family Education: Family Education initiated and Ongoing    Percentage Risk for Readmission: Low 0 - 18%   Readmission Risk              Risk of Unplanned Readmission:  11       %    Critical Items: None       Problem / Barrier Intervention / Plan  Results   Impaired mobility related to deficits from CVA Strengthening, balance activities, functional mobility training    Diplopia Eye patch    Impaired cognition Cognitive retraining exercises    Dysarthria  OMEs; articulation drills; compensatory dysarthria strategies    Dysphagia  OMEs; Vital Stim (NMES); swallow safety strategies     Altered ability to care for self Remediation and strategies to increase safety and independence with self care tasks    R UE deficits Neuro re-education strategies to increase functional use of RUE    Left side visual deficits in bilateral eyes Visual exercises/training and education in compensatory strategies to increase safety in daily activities      Total Self Care Score    Total Mobility Score  Admission Score:  24      Admission Score:  34  Goal:  42/42         Goal:  72/90    THERAPY MINUTE COMPLIANCE  Patient is participating and compliant with meeting therapy minutes as outlined in plan of care: Yes `    Discharge Plan   Estimated Discharge Date: 2/15/23  Home evaluation needed? No  Overnight or Day Pass: No  Factors facilitating achievement of predicted outcomes: Family support, Cooperative, Pleasant, and Good insight into deficits  Barriers to the achievement of predicted outcomes:  Anxiety, Decreased endurance, Upper extremity weakness, Lower extremity weakness, Medication managment, and visual def    Functional Goals at discharge:  Predicted Outcome: Home with familyPATIENT'S LEVEL OF ASSISTANCE: Occasional Supervision   Discharge therapy goals:  PT: Long Term Goals  Time Frame for Long Term Goals : Until D/C  Long Term Goal 1: Pt to improve Standing balance to FAIR + with use of device to dec risk for falls  Long Term Goal 2: pt to demo functional transfers with device Mod I  Long Term Goal 3: pt to ambulate 150' with device and supervision  Long Term Goal 4: pt to negotiate 2 steps with R rail and device and 10-12 successive steps with L rail and device as needed demonstrating a landing pivot turn (per home set up stairs with landing) CGA-SBA for stairs  Long Term Goal 5: pt to demo actual or simulated car transfer with SBA and device as needed  Long term goal 6: pt to improve on PASS score to 30/36 or greater to demonstrate improving balance, mobility, and independence  Long term goal 7: pt to demonstrate good technique for fall recovery with SBA  Long term goal 8: Pt to progress to step Ladder negotiation with CGA to allow for safe participation in home making/maintainance tasks with assist for safety.  OT:Long Term Goals  Time Frame for Long Term Goals : By discharge  Long Term Goal 1: Pt will complete BADLs with Mod I and Good safety with use of AE as needed  Long Term Goal 2: Pt will complete functional mobility during self care tasks with Mod I and Good safety with use of least restrictive device  Long Term Goal 3: Pt will tolerate standing 15+ minutes during functional activity of choice with Good safety  Long Term Goal 4: Pt will complete simple meal prep/light house keeping task with Supervision and Good safety  Long Term Goal 5: Pt will complete tub transfer with Supervision and Good safety with use of DME as needed  Long Term Goal 6: Pt will verbalize/demonstrate Good understanding of vision HEP to increase neurofacilitation of left side visual field to increase safety during daily activities  Long Term Goal 7: Pt will verbalize/demonstrate Good understanding of home safety/fall prevention strategies to increase safety and independence with self care and mobility  Long Term Goal 8: Pt will demonstrate increased FMC during self care tasks as evident by 5 second improvement with LUE and 30 second improvement with RUE during 9 hole peg test  ST: Independent for expression and problem solving; Mod I for memory     Participating Team Members:  /:  Maria Freyer, RN  Occupational Therapist:  Víctor Gresham OT   Physical Therapist: Sonia Goldberg PT  Speech Therapist:  Willow Xiao MA CCC-SLP   Nurse: Dora Butcher RN   Dietary/Nutrition: Kika Garcia RD, LD  Pastoral Care: Chaplain Patricia Mota  CMG: Ramila Yoon RN    I approve the established interdisciplinary plan of care as documented within the  medical record of Thomas Tubbs. Jerson Busby.  Pipe Campa MD

## 2023-02-06 NOTE — PROGRESS NOTES
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 Repeated stimuli needed during structured tasks. Orientation: Orientation log administered, O-Log score- 30/30 (oriented to all concepts). Recall: Image retention (fishing, 15 min delay)- 100% accuracy (I). Organization: Convergent categorization, ID category of 3 similar items (concrete)- 100% accuracy (I). Divergent thinking, add 1 to list of 3 similar items (concrete)- 100% accuracy (I). Problem Solving/Reasoning: n/a    Speech: Education provided re: compensatory dysarthria strategies to increase speech intelligibility (e.g., over-articulate, increase vocal intensity, reduce rate). Pt verbalized understanding, required min cues to facilitate strategies in conversation. Speech subjectively judged to be ~90% intelligible in conversation. ST instructed Pt in oral motor exercises for dysarthria. Pt responded to verbal cues/models to complete exercises x 10 reps x 4 sets. Pt demo decreased oral motor coordination (B) with decreased ROM (L). Little to no movement noted on L side of face with labial retraction and lateralization. Pt able to complete alliteration drills with min cues to use dysarthria strategies.       Dysphagia: No overt s/s of aspiration observed with sips of thin liquid via straw taken throughout session. Pt reports notices anterior spillage when drinking from cup and pocketing of solids on L side. Education provided re: lingual sweep and/or liquid wash to clear oral cavity. Pt verbalized understanding, reports tries to use liquid wash independently. Discussed use of VitalStim (NMES) on facial muscles to target musculature of the oropharyngeal sling. Pt verbalized understanding and agreeable to \"trying it out\". Plan to attempt use of NMES on facial muscles during tomorrow's session. Other: Pt's wife present for session, providing encouragement throughout. Plan:  [x] Continue ST services    [] Discharge from ST:      Discharge recommendations: [] Inpatient Rehab   [] [place]   [] Outpatient Therapy  [] Follow up at trauma clinic   [] Other:       Treatment completed by:  Dorian Royal M.A., CCC-SLP

## 2023-02-06 NOTE — PLAN OF CARE
Problem: ABCDS Injury Assessment  Goal: Absence of physical injury  2/6/2023 1635 by Derick Dee LPN  Outcome: Progressing     Problem: Safety - Adult  Goal: Free from fall injury  2/6/2023 1635 by Derick Dee LPN  Outcome: Progressing     Problem: Skin/Tissue Integrity  Goal: Absence of new skin breakdown  Description: 1. Monitor for areas of redness and/or skin breakdown  2. Assess vascular access sites hourly  3. Every 4-6 hours minimum:  Change oxygen saturation probe site  4. Every 4-6 hours:  If on nasal continuous positive airway pressure, respiratory therapy assess nares and determine need for appliance change or resting period.   2/6/2023 1635 by Derick Dee LPN  Outcome: Progressing     Problem: Pain  Goal: Verbalizes/displays adequate comfort level or baseline comfort level  2/6/2023 1635 by Derick Dee LPN  Outcome: Progressing     Problem: Discharge Planning  Goal: Discharge to home or other facility with appropriate resources  2/6/2023 1635 by Derick Dee LPN  Outcome: Progressing     Problem: Chronic Conditions and Co-morbidities  Goal: Patient's chronic conditions and co-morbidity symptoms are monitored and maintained or improved  2/6/2023 1635 by Derick Dee LPN  Outcome: Progressing     Problem: Nutrition Deficit:  Goal: Optimize nutritional status  2/6/2023 1635 by Derick Dee LPN  Outcome: Progressing

## 2023-02-06 NOTE — PROGRESS NOTES
[address] [facility]    Acute Rehabilitation Occupational Therapy Daily Treatment Note    Date: 23  Patient Name: Jesus Chowdhury       Room: 9580/9096-56  MRN: [id number]  Account: [de-identified]   : 1980  (43 y.o.) Gender: male       Referring Practitioner: Abisai Figueroa MD  Diagnosis: Hemorrhagic CVA  Additional Pertinent Hx: Mr. Jesus Chowdhury is a 43 y.o. right handed male who was admitted to [facility] on 2023 with Extremity Weakness (Right sided weakness) and Facial Droop. Patient with R weakness, numbness and facial droop who fell when he went to bed then awoke with the symptoms. SBP was in 200s upon arrival to ED. CT confirmed hemorrhagic CVA posterior ramses and narrowing R P1 PCA. He was treated with Cardene for BP control. Neurosurgery managing conservatively. Pt admitted to ARU 23    Treatment Diagnosis: Impaired self care status    Past Medical History:  has a past medical history of Cerebral artery occlusion with cerebral infarction [facility]), History of heartburn, Hypertension, Neoplasm of unspecified nature of bone, soft tissue, and skin, and Research study patient. Past Surgical History:   has a past surgical history that includes Appendectomy (in 20's) and pre-malignant / benign skin lesion excision (Right, 2014). Restrictions  Restrictions/Precautions  Restrictions/Precautions: Fall Risk, General Precautions  Required Braces or Orthoses?: No  Implants present? :  (pt denies)     Position Activity Restriction  Other position/activity restrictions: Per Dr. Hildreth Bumpers OK to use Eye patch, alternate between eyes (to help with patient's double vision and balance)    Vitals  Vital Signs  BP Location: Left Arm  O2 Device: None (Room air)     Subjective  Subjective  Subjective: \"I would like to shower in the afternoon. \" Spouse in room. Pt agreeable to participate at therapy gym.   Pain Assessment  Pain Assessment: None - Denies Pain    Objective  Cognition  Overall Orientation Status: Within Functional Limits  Orientation Level: Oriented X4    Cognition  Overall Cognitive Status: Exceptions  Arousal/Alertness: Appropriate responses to stimuli  Following Commands: Follows multistep commands with increased time  Attention Span: Appears intact; Attends with cues to redirect  Memory: Appears intact  Safety Judgement: Decreased awareness of need for assistance;Decreased awareness of need for safety  Problem Solving: Assistance required to identify errors made;Assistance required to correct errors made  Insights: Decreased awareness of deficits  Initiation: Requires cues for some  Sequencing: Requires cues for some  Cognition Comment: pt can be impulsive at times    Activities of Daily Living  Feeding  Equipment Provided: Feeding utensils (Built up handles)  Assistance Level: Set-up    Grooming/Oral Hygiene  Assistance Level: Stand by assist  Skilled Clinical Factors: standing with RW at sink for oral care. No LOB noted. Upper Extremity Bathing  Assistance Level: Stand by assist  Skilled Clinical Factors: completed while seated on shower bench. G use of RUE. Lower Extremity Bathing  Assistance Level: Stand by assist  Skilled Clinical Factors: SBA while standing due to history of R side LOB, no LOB noted this date    Upper Extremity Dressing  Assistance Level: Stand by assist  Skilled Clinical Factors: pt able to doff while seated on shower bench. [place] while seated in w/c. Lower Extremity Dressing  Assistance Level: Stand by assist  Skilled Clinical Factors: able to doff/ludwin underwear and shorts while seated on shower bench. Putting On/Taking Off Footwear  Assistance Level: Stand by assist  Skilled Clinical Factors: Declines TEDs at this time, utilizes figure 4 method to ludwin socks/shoes    Tub/Shower Transfers  Type: Shower  Transfer From: Rolling walker  Transfer To: Tub transfer bench  Additional Factors: Verbal cues; Increased time to complete  Assistance Level: Stand by assist  Skilled Clinical Factors: Verbal cues for safety over threashold d/t visual deficits with increased time. Mobility    Sit to Stand  Assistance Level: Stand by assist  Skilled Clinical Factors: completed with no cuing. G safety noted. Stand to Sit  Assistance Level: Stand by assist  Skilled Clinical Factors: completed with no cuing. G safety noted. Functional Mobility  Device: Rolling walker  Activity: To/From bathroom  Assistance Level: Stand by assist  Skilled Clinical Factors: Demo G safety. OT Exercises  Motor Control/Coordination: Writer facilited fine motor activies to increase RUE [facility] during self care tasks. Pt completed pinching and releasing clothes pins on metal rods, stacking blocks from a picture pattern, and placing large pegs in a large foam board. Pt req increased time to complete each activity d/t hand/eye coordination. Pt demo fatigue with repetitions and difficulty maintaining grasp of blocks and large pegs with mulitiple drops of objects. Additional Activities  Additional Activities Comment: Writer facilitated visual activies to increase functional use of vision and provide neurofacilitation in bilateral eyes to look towards the left. Writer placed 4 colored blocks on L side and asked to maintain head at midline to promote and faciliate visual scanning to the left. Eye patch doffed for this task. 1 error in color noted, but tolerated well. Writer placed large colored pegs on L side and asked pt to maintain head at midline to promote and faciliate visual scanning to the left. Eye patch doffed for this task, then writer asked pt to reach for the colored peg requested by writer to L side with RUE to promote crossing the midline, then place large peg in foam mat to  increasing [facility] on RUE. Pt required increased time to complete with compensatory methods utilized and slight double vision with fatigue.  Pt demo fatigue with repetitions and difficulty maintaining grasp of blocks and large pegs with mulitiple drops of objects. Assessment  Assessment  Activity Tolerance: Patient tolerated treatment well;Patient limited by endurance  Discharge Recommendations: Home with assist PRN;Outpatient OT ('s Rehab)    Patient Education  Education  Education Given To: Patient  Education Provided: Role of Therapy;Plan of Care;Safety; Mobility Training;Transfer Training;Visual Perceptual Function  Education Method: Verbal;Demonstration  Barriers to Learning: Vision  Education Outcome: Verbalized understanding;Continued education needed    OT Equipment Recommendations  Other: TBD    Safety Devices  Safety Devices in place: Yes  Type of devices: All fall risk precautions in place;Call light within reach;Gait belt;Patient at risk for falls; Left in chair;Nurse notified       Goals  Patient Goals   Patient goals : \"To get back to those things (ADL, IADL, and [address] in RUE). \"  Short Term Goals  Time Frame for Short Term Goals: By 1 week  Short Term Goal 1: Pt will complete lower body dressing/bathing with CGA and Good safety with use of AE as needed  Short Term Goal 2: Pt will complete functional transfers/mobility during self care tasks with CGA and Good safety with no LOB  Short Term Goal 3: Pt will tolerate standing 7+ minutes during functional activity of choice with CGA and no LOB during functional activity of choice  Short Term Goal 4: Pt will verbalize/demonstrate Good understanding of AE/adaptive strategies/DME to increase safety and independence with self care and mobility  Short Term Goal 5: Pt will verbalize/demonstrate 3 visual compensatory strategies to increase safety with daily activities and increase quality of life  Short Term Goal 6: Pt will participate in 30+ minutes of therapeutic exercises/functional activities to increase safety and independence with self care and mobility    Long Term Goals  Time Frame for Long Term Goals : By discharge  Long Term Goal 1: Pt will complete BADLs with Mod I and Good safety with use of AE as needed  Long Term Goal 2: Pt will complete functional mobility during self care tasks with Mod I and Good safety with use of least restrictive device  Long Term Goal 3: Pt will tolerate standing 15+ minutes during functional activity of choice with Good safety  Long Term Goal 4: Pt will complete simple meal prep/light house keeping task with Supervision and Good safety  Long Term Goal 5: Pt will complete tub transfer with Supervision and Good safety with use of DME as needed  Long Term Goal 6: Pt will verbalize/demonstrate Good understanding of vision HEP to increase neurofacilitation of left side visual field to increase safety during daily activities  Long Term Goal 7: Pt will verbalize/demonstrate Good understanding of home safety/fall prevention strategies to increase safety and independence with self care and mobility  Long Term Goal 8: Pt will demonstrate increased [address] during self care tasks as evident by 5 second improvement with LUE and 30 second improvement with RUE during 9 hole peg test    Plan  Occupational Therapy Plan  Times Per Week: 5-7  Times Per Day: Twice a day  Current Treatment Recommendations: Self-Care / ADL, Strengthening, Balance training, Functional mobility training, Endurance training, Neuromuscular re-education, Pain management, Safety education & training, Patient/Caregiver education & training, Equipment evaluation, education, & procurement, Home management training, Cognitive/Perceptual training, Coordination training       02/06/23 1100 02/06/23 1541   OT Individual Minutes   Time In [id number]   Time Out [id number]   [id number]       Electronically signed by JASON Garcia on 2/6/23 at 4:34 PM EST

## 2023-02-06 NOTE — PROGRESS NOTES
Writer assumed care of this patient at this time. Patient in bed, call light within reach, and bed alarm activated. Patient's wife at bedside. Will continue to assess.

## 2023-02-06 NOTE — PROGRESS NOTES
Physical Therapy  Facility/Department: [facility]  Rehabilitation Physical Therapy    NAME: You Mock  : 1980 (43 y.o.)  MRN: [id number]  CODE STATUS: Full Code    Date of Service: 23      Past Medical History:   Diagnosis Date    Cerebral artery occlusion with cerebral infarction [facility])     History of heartburn     takes omeprazole    Hypertension     Neoplasm of unspecified nature of bone, soft tissue, and skin 2009-present    lesions of right cheek x 2    Research study patient 2023    [id number]. Date of completion 23     Past Surgical History:   Procedure Laterality Date    APPENDECTOMY  in 's    PRE-MALIGNANT / BENIGN SKIN LESION EXCISION Right 2014    Excision lesion of right cheek. Dr. Barbara Smith. Family / Caregiver Present: No    Restrictions:  Restrictions/Precautions: Fall Risk;General Precautions  Position Activity Restriction  Other position/activity restrictions: Per Dr. Karimi Economy OK to use Eye patch, alternate between eyes (to help with patient's double vision and balance)     SUBJECTIVE  Subjective: patient with no new complaints. Denies headache, nausea and dizziness. OBJECTIVE                      Functional Mobility  Bed mobility  Bridging: Modified independent   Rolling to Left: Modified independent  Rolling to Right: Modified independent  Supine to Sit: Modified independent  Sit to Supine: Modified independent  Scooting: Modified independent  Transfers  Sit to Stand: Contact guard assistance  Stand to Sit: Contact guard assistance  Bed to Chair: Contact guard assistance  Stand Pivot Transfers: Contact guard assistance  Comment: RW for multiple transfers from [facility] [address], and recliner chair. Environmental Mobility  Ambulation  Surface: Level tile  Device: Rolling Walker;Forearm Crutches;Single point cane (bilateral UE support devices needed)  Assistance:  Moderate assistance;2 Person assistance (intermittent SBA-JOSE C support of 2nd person for safety with change in terrain and supportive devices)  Quality of Gait: unsteady, R lateral lean with path deviation, intermittent wide and narrow [place], ataxic, decreased motor control R LE. CGA for with intermittent Scar required for right lateral LOB. Gait Deviations: Slow Tess;Deviated path;Staggers;Decreased step length;Decreased step height  Distance: 50 feet x 6 with focused gaze, around cones. Comments: Increased time to complete with slow pace, RLE Ataxia with poorly coordinated steps going from scissoring to Wide [place] with tendency to maintain R hip external rotation. LOB to right several times to prevent falls with therapist support. Short standing rest breaks to regroup stance and right hand grasp as hand tends to slip from walker; patient donned gloves with grippers for improved support with good results. Multiple gait devices used to improve patient balance rightening reactions as tendency to be pusher with over stimulation. Stairs  # Steps : 18 (preformed with varied method for neuro re ed and safety instructions for home)  Stairs Height: 8\"  Rails: Bilateral;Right ascending;Left ascending  Device: No Device  Assistance: Minimal assistance; Moderate assistance;2 Person assistance  Comment: patient able to place RLE with tendency to cross midline with RLE but able to correct with cueing. Propulsion 1  Propulsion: Manual  Level: Level Tile  Method: RLE;LLE;LUE  Level of Assistance: Stand by assistance  Distance: 100 feet x 4    PT Exercises  Exercise Treatment: neuro bernardo and balance training with varied assistive devices and manage objects and distraction with repeative gait in therapy room. Head turns and eye gaze and tracking with aand without eye patch use.   A/AROM Exercises: AROM RLE: LAQs x 20 reps with 4 pound cuff wt, verbal cues for control/decreased speed of movement  Exercise Equipment: FoKo 10 minutes    ASSESSMENT       Activity Tolerance  Activity Tolerance: Patient tolerated treatment well;Patient limited by endurance  Activity Tolerance Comments: Greatest limitation to activity tolerance is fear of movement with recent loss of coordination and impaired sensation on R side of body. GOALS  Patient Goals   Patient Goals : \"Practice going up/down a ladder\"  Short Term Goals  Time Frame for Short Term Goals: 7 days  Short Term Goal 1: pt to demo independent bed mobility on flat surface without use of rails per home set-up  Short Term Goal 2: pt to demo functional transfers with device and CGA  Short Term Goal 3: pt to ambulate 150' with device and MinAx1  Short Term Goal 4: pt to negotiate 10-12 steps with Single rail and device as needed demonstrating a landing pivot turn (per home set up stairs with landing) Benigno  Long Term Goals  Time Frame for Long Term Goals : Until D/C  Long Term Goal 1: Pt to improve Standing balance to FAIR + with use of device to dec risk for falls  Long Term Goal 2: pt to demo functional transfers with device Mod I  Long Term Goal 3: pt to ambulate 150' with device and supervision  Long Term Goal 4: pt to negotiate 2 steps with R rail and device and 10-12 successive steps with L rail and device as needed demonstrating a landing pivot turn (per home set up stairs with landing) CGA-SBA for stairs  Long Term Goal 5: pt to demo actual or simulated car transfer with SBA and device as needed  Long term goal 6: pt to improve on PASS score to 30/36 or greater to demonstrate improving balance, mobility, and independence  Long term goal 7: pt to demonstrate good technique for fall recovery with SBA    PLAN OF CARE  Frequency: 1-2 treatment sessions per day, 5-7 days per week  Safety Devices  Type of Devices: Gait belt;Call light within reach    EDUCATION  Education  Education Given To: Patient  Education Provided:  Mobility Training;Transfer Training  Education Method: Demonstration;Verbal  Education Outcome: Verbalized understanding;Demonstrated understanding      Therapy Time   02/06/23 0800 02/06/23 1452   PT Individual Minutes   Time In 0759 1325   Time Out 0900 1350   Minutes 61 [phone] VINICIO Hanley, 02/06/23 at 3:47 PM

## 2023-02-06 NOTE — PROGRESS NOTES
Physical Medicine & Rehabilitation  Progress Note    2/6/2023 2:50 PM     CC: Ambulatory and ADL dysfunction due to hemorrhagic pontine CVA    Subjective:   No complaints. Continues with diplopia using eye patch. ROS:  Denies fevers, chills, sweats. No chest pain, palpitations, lightheadedness. Denies coughing, wheezing or shortness of breath. Denies abdominal pain, nausea, diarrhea or constipation. No new areas of joint pain. Denies new areas of numbness or weakness. Denies new anxiety or depression issues. No new skin problems. Rehabilitation:   PT:  Restrictions/Precautions: Fall Risk, General Precautions  Implants present? :  (pt denies)  Other position/activity restrictions: Per Dr. Axel Vargas OK to use Eye patch, alternate between eyes (to help with patient's double vision and balance)   Transfers  Sit to Stand: Minimal Assistance, Contact guard assistance  Stand to Sit: Minimal Assistance, Contact guard assistance  Bed to Chair: Minimal assistance, Moderate assistance  Stand Pivot Transfers: Minimal Assistance, Moderate Assistance  Comment: RW for multiple transfers from [place] [id number], and recliner chair. VCs for hands placement with good carry over. VCs with Intermittent physical A to place/position RLE. Ambulation  Surface: Level tile  Device: Rolling Walker, Forearm Crutches, Single point cane (bilateral canes and crutches.)  Assistance: Moderate assistance (Scar with RW and SBA for wc follow)  Quality of Gait: unsteady, R lateral lean with path deviation, intermittent wide and narrow IS[place], ataxic, decreased motor control R LE. CGA for with intermittent Scar required for right lateral LOB. Gait Deviations: Slow Tess, Deviated path, Staggers, Decreased step length, Decreased step height (high fall risk.)  Distance: 50 feet x 6 with focused gaze, around cones.   Comments: Increased time to complete with slow pace, RLE Ataxia with poorly coordinated steps going from scissoring to Wide ISRAEL with tendency to maintain R hip external rotation. LOB to right several times to prevent falls with therapist support. Short standing rest breaks to regroup stance and right hand grasp as hand tends to slip from walker; patient donned gloves with grippers for improved support with good results. Multiple gait devices used to improve patient balance rightening reactions as tendency to be pusher with over stimulation. More Ambulation?: No      OT:  ADL  Equipment Provided: Feeding equipment (Built up handles)  Feeding: Setup  Feeding Skilled Clinical Factors: Pt demonstrated increased difficulty with grasping silverwear during eating task. OT provided built up handles during task to increase ease of use during self feeding. Grooming: Minimal assistance  Grooming Skilled Clinical Factors: Min A for standing balance standing sink side with slight R side lean  UE Bathing: Stand by assistance  UE Bathing Skilled Clinical Factors: Sitting on tub bench  LE Bathing: Minimal assistance  LE Bathing Skilled Clinical Factors: LOB while standing in shower to dry bottom requiring A to right self  UE Dressing: Stand by assistance  LE Dressing: Minimal assistance  LE Dressing Skilled Clinical Factors: Pt demonstrated figure 4 technique to thread BLE. Min A while standing with unsteadiness while pulling up over hips  Toileting: Minimal assistance  Toileting Skilled Clinical Factors: A for standing balance with hygiene and clothing management  Additional Comments: OT facilitated pts engagement in full shower on this date with use of tub bench, grab bars, and hand held shower. Pt required A while standing due to intermittet LOB to right side. Pt currently limited due to decreased strength, balance, FMC, visual deficits, and activity tolerance impacting safety and independence with self care tasks         Balance  Sitting Balance: Stand by assistance  Standing Balance:  Moderate assistance (CGA-Mod A with multiple LOB to right side) Functional Mobility  Functional - Mobility Device: Rolling Walker  Activity: To/from bathroom  Assist Level: Moderate assistance (CGA-Mod A with multiple LOB to right side)  Functional Mobility Comments: Verbal cues for hand placement and safety. Increased time needed with RLE. Multiple LOB to right side requiring A to right self     Bed mobility  Rolling to Left: Stand by assistance  Rolling to Right: Stand by assistance  Supine to Sit: Stand by assistance  Sit to Supine: Stand by assistance  Scooting: Stand by assistance  Bed Mobility Comments: All bed mobility completed on flat surface without use of rails. Transfers  Sit to stand: Minimal assistance  Stand to sit: Minimal assistance  Transfer Comments: Verbal cues for hand placement and safety   Toilet Transfers  Toilet - Technique: Ambulating  Equipment Used: Grab bars  Toilet Transfer: Minimal assistance  Toilet Transfers Comments: Verbal cues for hand placement and safety. Slight unsteadiness with R side LOB     Shower Transfers  Shower - Transfer From: Jacquie Hays - Transfer Type: To and From  Shower - Transfer To: Transfer tub bench  Shower - Technique: Ambulating  Shower Transfers: Moderate assistance  Shower Transfers Comments: Verbal cues for hand placement, safety and RW management over threshold. LOB to right side with Mod A to right self. ST:    Attention: Occasional cues to redirect from environmental distractors, however Pt demo good self-monitoring. Repeated stimuli needed during structured tasks. Orientation: Orientation log administered, O-Log score- 30/30 (oriented to all concepts). Recall: Image retention (fishing, 15 min delay)- 100% accuracy (I). Organization: Convergent categorization, ID category of 3 similar items (concrete)- 100% accuracy (I). Divergent thinking, add 1 to list of 3 similar items (concrete)- 100% accuracy (I).       Problem Solving/Reasoning: n/a     Speech: Education provided re: compensatory dysarthria strategies to increase speech intelligibility (e.g., over-articulate, increase vocal intensity, reduce rate). Pt verbalized understanding, required min cues to facilitate strategies in conversation. Speech subjectively judged to be ~90% intelligible in conversation. ST instructed Pt in oral motor exercises for dysarthria. Pt responded to verbal cues/models to complete exercises x 10 reps x 4 sets. Pt demo decreased oral motor coordination (B) with decreased ROM (L). Little to no movement noted on L side of face with labial retraction and lateralization. Pt able to complete alliteration drills with min cues to use dysarthria strategies. Dysphagia: No overt s/s of aspiration observed with sips of thin liquid via straw taken throughout session. Pt reports notices anterior spillage when drinking from cup and pocketing of solids on L side. Education provided re: lingual sweep and/or liquid wash to clear oral cavity. Pt verbalized understanding, reports tries to use liquid wash independently. Discussed use of VitalStim (NMES) on facial muscles to target musculature of the oropharyngeal sling. Pt verbalized understanding and agreeable to \"trying it out\". Plan to attempt use of NMES on facial muscles during tomorrow's session. Other: Pt's wife present for session, providing encouragement throughout. Objective:  /88   Pulse 63   Temp 97.9 °F (36.6 °C) (Oral)   Resp 16   Ht 6' (1.829 m)   Wt 209 lb 3.2 oz (94.9 kg)   SpO2 96%   BMI 28.37 kg/m²  I Body mass index is 28.37 kg/m². I   Wt Readings from Last 1 Encounters:   23 209 lb 3.2 oz (94.9 kg)      Temp (24hrs), Av.8 °F (36.6 °C), Min:97.7 °F (36.5 °C), Max:97.9 °F (36.6 °C)         GEN: well developed, well nourished, no acute distress  HEENT: Normocephalic atraumatic, EOMI, mucous membranes pink and moist  CV: RRR, no murmurs, rubs or gallops  PULM: CTAB, no rales or rhonchi.  Respirations WNL and unlabored  ABD: soft, NT, ND, +BS and equal  NEURO: A&O x3. Sensation decreased right upper and lower extremity compared to left, left facial droop,   MSK: Legs 4+/5 upper and lower extremities  EXTREMITIES: No calf tenderness to palpation bilaterally. No edema BLEs  SKIN: warm dry and intact with good turgor  PSYCH: appropriately interactive. Affect WNL. Good spirits        Medications   Scheduled Meds:   artificial tears   Left Eye Nightly    carvedilol  12.5 mg Oral BID WC    enoxaparin  40 mg SubCUTAneous Daily    folic acid  1 mg Oral Daily    lisinopril  40 mg Oral Daily    melatonin  3 mg Oral Nightly    nicotine  1 patch TransDERmal Daily    pantoprazole  40 mg Oral QAM AC    tamsulosin  0.4 mg Oral Daily    thiamine  100 mg Oral Daily    polyethylene glycol  17 g Oral Daily     Continuous Infusions:  PRN Meds:.polyvinyl alcohol, acetaminophen, calcium carbonate, fluticasone, QUEtiapine, senna, bisacodyl     Diagnostics:     CBC: No results for input(s): WBC, RBC, HGB, HCT, MCV, RDW, PLT in the last 72 hours. BMP: No results for input(s): NA, K, CL, CO2, PHOS, BUN, CREATININE, CA in the last 72 hours. BNP: No results for input(s): BNP in the last 72 hours. PT/INR: No results for input(s): PROTIME, INR in the last 72 hours. APTT: No results for input(s): APTT in the last 72 hours. CARDIAC ENZYMES: No results for input(s): CKMB, CKMBINDEX, TROPONINT in the last 72 hours. Invalid input(s): CKTOTAL;3  FASTING LIPID PANEL:No results found for: CHOL, HDL, TRIG  LIVER PROFILE: No results for input(s): AST, ALT, ALB, BILIDIR, BILITOT, ALKPHOS in the last 72 hours. I/O (24Hr): No intake or output data in the 24 hours ending 02/06/23 1450    Glu last 24 hour  No results for input(s): POCGLU in the last 72 hours.     No results for input(s): CLARITYU, COLORU, PHUR, [address], [address] [facility], [address] [facility], BLOODU, BACTERIA, NITRU, [address] [facility], SAVANNA, YEAST, Soila Diesel in the last 72 hours. Impression/Plan:    Hemorrhagic pontine CVA:  PT/OT for gait, mobility, strengthening, endurance, ADLs, and self care. SLP for cognition, speech. Diplopia, blurred vision:  Secondary to hemorrhagic stroke. Has eye patch - alternate use on both eyes. Added artificial tears as needed, artificial tears ointment nightly for left eye due to incomplete closing and burning of the eye. Reviewed with wife neuro-ophthalmology evaluation as outpatient  Urinary retention:  Noted in acute care. On flomax. Will monitor bladder scans, PVRs. -Appears to have improved  Alcohol withdrawal:  On phenobarbital wean (to be completed on 2/6/23). On folate, thiamine supplementation. HTN:  On carvedilol, lisinopril. Goal is SBP below 160 with gradual normalization of BP, per neurology. GERD:  On protonix  Bowel Management: Miralax daily, senokot prn, dulcolax prn. DVT Prophylaxis:  low molecular weight heparin, SCD's while in bed, and KP's during the day  Dr. Emy Drake for medical management  Follow up PCP 1-2 weeks, PM&R 4-6 weeks, Neurology 3 weeks, Neurosurgery 2 weeks from 1/29 (with repeat CT head), Neuro-ophthalmology - Dr. Rufino Sewell. Roberth Will MD       This note is created with the assistance of a speech recognition program.  While intending to generate a document that actually reflects the content of the visit, the document can still have some errors including those of syntax and sound a like substitutions which may escape proof reading.   In such instances, actual meaning can be extrapolated by contextual diversion

## 2023-02-06 NOTE — CARE COORDINATION
ARU CASE MANAGEMENT NOTE:    Patient is alert and oriented x4. Spoke with patient regarding discharge plan and patient confirms plan plan is to go home w/ support from spouse and family    DME:none  Outside appointments: none    Will continue to follow for additional discharge needs.     Electronically signed by Vivien Cisneros RN on 2/6/2023 at 12:03 PM

## 2023-02-07 PROCEDURE — 99232 SBSQ HOSP IP/OBS MODERATE 35: CPT | Performed by: PHYSICAL MEDICINE & REHABILITATION

## 2023-02-07 PROCEDURE — 97530 THERAPEUTIC ACTIVITIES: CPT

## 2023-02-07 PROCEDURE — 6370000000 HC RX 637 (ALT 250 FOR IP): Performed by: STUDENT IN AN ORGANIZED HEALTH CARE EDUCATION/TRAINING PROGRAM

## 2023-02-07 PROCEDURE — 6360000002 HC RX W HCPCS

## 2023-02-07 PROCEDURE — 97110 THERAPEUTIC EXERCISES: CPT

## 2023-02-07 PROCEDURE — 6370000000 HC RX 637 (ALT 250 FOR IP)

## 2023-02-07 PROCEDURE — 97116 GAIT TRAINING THERAPY: CPT

## 2023-02-07 PROCEDURE — 92526 ORAL FUNCTION THERAPY: CPT

## 2023-02-07 PROCEDURE — 1180000000 HC REHAB R&B

## 2023-02-07 PROCEDURE — 97535 SELF CARE MNGMENT TRAINING: CPT

## 2023-02-07 PROCEDURE — 97129 THER IVNTJ 1ST 15 MIN: CPT

## 2023-02-07 RX ADMIN — LISINOPRIL 40 MG: 20 TABLET ORAL at 07:51

## 2023-02-07 RX ADMIN — CARVEDILOL 12.5 MG: 12.5 TABLET, FILM COATED ORAL at 07:51

## 2023-02-07 RX ADMIN — CARVEDILOL 12.5 MG: 12.5 TABLET, FILM COATED ORAL at 18:34

## 2023-02-07 RX ADMIN — TAMSULOSIN HYDROCHLORIDE 0.4 MG: 0.4 CAPSULE ORAL at 07:51

## 2023-02-07 RX ADMIN — Medication 3 MG: at 19:33

## 2023-02-07 RX ADMIN — THIAMINE HCL TAB 100 MG 100 MG: 100 TAB at 07:51

## 2023-02-07 RX ADMIN — PANTOPRAZOLE SODIUM 40 MG: 40 TABLET, DELAYED RELEASE ORAL at 06:11

## 2023-02-07 RX ADMIN — ENOXAPARIN SODIUM 40 MG: 100 INJECTION SUBCUTANEOUS at 07:50

## 2023-02-07 RX ADMIN — POLYETHYLENE GLYCOL 3350 17 G: 17 POWDER, FOR SOLUTION ORAL at 07:50

## 2023-02-07 RX ADMIN — MINERAL OIL, PETROLATUM: 425; 568 OINTMENT OPHTHALMIC at 19:34

## 2023-02-07 RX ADMIN — FOLIC ACID 1 MG: 1 TABLET ORAL at 07:51

## 2023-02-07 NOTE — PROGRESS NOTES
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 Date of completion 23     Past Surgical History:   Procedure Laterality Date    APPENDECTOMY  in 's    PRE-MALIGNANT / BENIGN SKIN LESION EXCISION Right 2014    Excision lesion of right cheek. Dr. Angeline Hensley. Additional Pertinent Hx: Mr. Junior Dean is a 43 y.o. right handed male who was admitted to [facility] on 2023 with Extremity Weakness (Left sided weakness) and Facial Droop     Patient with R weakness, numbness and facial droop who fell when he went to bed then awoke with the symptoms. SBP was in 200s upon arrival to ED. CT confirmed hemorrhagic CVA posterior ramses and narrowing R P1 PCA. He was treated with Cardene for BP control. Neurosurgery managing conservatively. Family / Caregiver Present: No  Diagnosis: Hemorrhagic CVA     SUBJECTIVE  Subjective: Patient reporting did not sleep the best last night. Denies headache, nausea and dizziness.             OBJECTIVE                       Functional Mobility  Bed mobility  Bridging: Modified independent   Rolling to Left: Modified independent  Rolling to Right: Modified independent  Supine to Sit: Modified independent  Sit to Supine: Modified independent  Scooting: Modified independent  Transfers  Sit to Stand: Contact guard assistance  Stand to Sit: Contact guard assistance  Bed to Chair: Contact guard assistance  Stand Pivot Transfers: Contact guard assistance  Comment: RW for multiple transfers from W/C, Hospital Bed, and recliner chair. Environmental Mobility  Ambulation  Surface: Level tile  Device: Hand-Held Assist (bilateral UE support devices needed)  Assistance: Moderate assistance;2 Person assistance (intermittent SBA-JOSE C support of 2nd person for safety with change in terrain and supportive devices)  Quality of Gait: unsteady, R lateral lean with path deviation, intermittent wide and narrow [place], ataxic, decreased motor control R LE. CGA for with intermittent Jose C required for right lateral LOB. Gait Deviations: Slow Tess;Deviated path;Staggers;Decreased step length;Decreased step height  Distance: 50 feet x 6 with focused gaze, around cones. Comments: Increased time to complete with slow pace, RLE Ataxia with poorly coordinated steps going from scissoring to Wide ISRA[place] with tendency to maintain R hip external rotation. LOB to right several times to prevent falls with therapist support. Short standing rest breaks to regroup stance Multiple gait devices used to improve patient balance rightening reactions as tendency to be pusher with over stimulation. Ambulation 2  Surface - 2: level tile;ramp  Device 2: Rolling Walker;Forearm Crutches;Single point cane;Parallel Bars; Hahn rail  Distance: 100 feet x 3, cones  Comments: Multiple gait devices used to improve patient balance rightening reactions as tendency to be pusher with over stimulation. Stairs  # Steps : 18 (preformed with varied method for neuro re ed and safety instructions for home)  Stairs Height: 8\"  Rails: Bilateral;Right ascending;Left ascending  Device: No Device  Assistance: Minimal assistance; Moderate assistance;2 Person assistance  Comment: patient able to place RLE with tendency to cross midline with RLE but able to correct with cueing.   Propulsion 1  Propulsion: Manual  Level: Level Tile  Method: RLE;LLE;LUE  Level of Assistance: Stand by assistance  Distance: 100 feet x 4    PT Exercises  Exercise Treatment: neuro bernardo and balance training with varied assistive devices and manage objects and distraction with repeative gait in therapy room. Head turns and eye gaze and tracking with aand without eye patch use. A/AROM Exercises: AROM RLE: LAQs x 20 reps with 4 pound cuff wt, verbal cues for control/decreased speed of movement  Exercise Equipment: NuStep 10 minutes    ASSESSMENT       Activity Tolerance  Activity Tolerance: Patient tolerated treatment well  Activity Tolerance Comments: Greatest limitation to activity tolerance is fear of movement with recent loss of coordination and impaired sensation on R side of body.             GOALS  Patient Goals   Patient Goals : \"Practice going up/down a ladder\"  Short Term Goals  Time Frame for Short Term Goals: 7 days  Short Term Goal 1: pt to demo independent bed mobility on flat surface without use of rails per home set-up  Short Term Goal 2: pt to demo functional transfers with device and CGA  Short Term Goal 3: pt to ambulate 150' with device and MinAx1  Short Term Goal 4: pt to negotiate 10-12 steps with Single rail and device as needed demonstrating a landing pivot turn (per home set up stairs with landing) Benigno  Long Term Goals  Time Frame for Long Term Goals : Until D/C  Long Term Goal 1: Pt to improve Standing balance to FAIR + with use of device to dec risk for falls  Long Term Goal 2: pt to demo functional transfers with device Mod I  Long Term Goal 3: pt to ambulate 150' with device and supervision  Long Term Goal 4: pt to negotiate 2 steps with R rail and device and 10-12 successive steps with L rail and device as needed demonstrating a landing pivot turn (per home set up stairs with landing) CGA-SBA for stairs  Long Term Goal 5: pt to demo actual or simulated car transfer with SBA and device as needed  Long term goal 6: pt to improve on PASS score to 30/36 or greater to demonstrate improving balance, mobility, and independence  Long term goal 7: pt to demonstrate good technique for fall recovery with SBA    PLAN OF CARE  Frequency: 1-2 treatment sessions per day, 5-7 days per week  Safety Devices  Type of Devices: Gait belt;Call light within reach    EDUCATION  Education  Education Given To: Patient  Education Provided:  Mobility Training;Transfer Training  Education Method: Demonstration;Verbal  Education Outcome: Verbalized understanding;Demonstrated understanding      Therapy Time   02/07/23 0853 02/07/23 1556   PT Individual Minutes   Time In [phone]   Time Out 0852 1438   Minutes 54 [address] VINICIO Self, 02/07/23 at 4:11 PM

## 2023-02-07 NOTE — PROGRESS NOTES
[address]    Acute Rehabilitation Occupational Therapy Daily Treatment Note    Date: 23  Patient Name: Liana Gaspar       Room: 0035/9613-50  MRN: [id number]  Account: [de-identified]   : 1980  (43 y.o.) Gender: male       Referring Practitioner: Karina Cervantes MD  Diagnosis: Hemorrhagic CVA  Additional Pertinent Hx: Mr. Liana Gaspar is a 43 y.o. right handed male who was admitted to [facility] on 2023 with Extremity Weakness (Right sided weakness) and Facial Droop. Patient with R weakness, numbness and facial droop who fell when he went to bed then awoke with the symptoms. SBP was in 200s upon arrival to ED. CT confirmed hemorrhagic CVA posterior ramses and narrowing R P1 PCA. He was treated with Cardene for BP control. Neurosurgery managing conservatively. Pt admitted to ARU 23    Treatment Diagnosis: Impaired self care status    Past Medical History:  has a past medical history of Cerebral artery occlusion with cerebral infarction [facility]), History of heartburn, Hypertension, Neoplasm of unspecified nature of bone, soft tissue, and skin, and Research study patient. Past Surgical History:   has a past surgical history that includes Appendectomy (in 20's) and pre-malignant / benign skin lesion excision (Right, 2014). Restrictions  Restrictions/Precautions  Restrictions/Precautions: Fall Risk, General Precautions  Required Braces or Orthoses?: No  Implants present? :  (pt denies)     Position Activity Restriction  Other position/activity restrictions: Per Dr. Nasrin Mcgowan OK to use Eye patch, alternate between eyes (to help with patient's double vision and balance)    Vitals  Vital Signs  BP Location: Left upper arm  O2 Device: None (Room air)     Subjective  Subjective  Subjective: AM: \"I would like to shower in the afternoon. \" Pt agreeable to participate at therapy gym. PM: Pt found asleep in bed upon writer's arrival. Easy to bring alert.  Cooperative to participate with therapy. Pain: Pt denies pain at this time    Objective  Cognition  Overall Orientation Status: Within Functional Limits  Orientation Level: Oriented X4    Cognition  Overall Cognitive Status: Exceptions  Arousal/Alertness: Appropriate responses to stimuli  Following Commands: Follows multistep commands with increased time  Attention Span: Appears intact; Attends with cues to redirect  Memory: Appears intact  Safety Judgement: Decreased awareness of need for assistance;Decreased awareness of need for safety  Problem Solving: Assistance required to identify errors made;Assistance required to correct errors made  Insights: Decreased awareness of deficits  Initiation: Requires cues for some  Sequencing: Requires cues for some  Cognition Comment: pt can be impulsive at times    Activities of Daily Living  Feeding  Equipment Provided: Feeding utensils (Built up handles)  Assistance Level: Set-up    Grooming/Oral Hygiene  Assistance Level: Stand by assist  Skilled Clinical Factors: standing with RW at sink for oral care and shaving. G use of BUE for shaving. Pt dropped razor x 2 with RUE. Upper Extremity Bathing  Assistance Level: Stand by assist  Skilled Clinical Factors: completed while seated on shower bench. G use of RUE. Lower Extremity Bathing  Assistance Level: Stand by assist  Skilled Clinical Factors: SBA while standing due to history of R side LOB, no LOB noted this date    Upper Extremity Dressing  Assistance Level: Stand by assist  Skilled Clinical Factors: pt able to doff while seated on shower bench. [place] while seated in w/c. Lower Extremity Dressing  Assistance Level: Stand by assist  Skilled Clinical Factors: able to doff/ludwin underwear and shorts while seated on shower bench.     Putting On/Taking Off Footwear  Assistance Level: Stand by assist  Skilled Clinical Factors: Declines TEDs at this time, utilizes figure 4 method to ludwin socks/shoes    Tub/Shower Transfers  Type: Shower  Transfer From: Rolling walker  Transfer To: Tub transfer bench  Additional Factors: Verbal cues; Increased time to complete  Assistance Level: Contact guard assist  Skilled Clinical Factors: CGA and v/c for safety over threashold d/t visual deficits with increased time. Mobility    Sit to Stand  Assistance Level: Stand by assist  Skilled Clinical Factors: completed with no cuing. G safety noted. Stand to Sit  Assistance Level: Stand by assist  Skilled Clinical Factors: completed with no cuing. G safety noted. Functional Mobility  Device: Rolling walker; Wheelchair  Activity: To/From bathroom; Retrieve items;Transport items; To/From therapy gym (RW and w/c to/from bathroom, w/c to/from therapy gym)  Assistance Level: Contact guard assist  Skilled Clinical Factors: AM: Pt amb in w/c room <> therapy gym. PM: Pt amb from RW to w/c with SBA-CGA to retrieve and transport towels and clothing to bathroom. Pt demo 1 LOB noted when pt impulsively attempted to move w/c into position to sit. Writer edu pt on correct positioning and hand placement when performing stand>sit from RW. G return noted. OT Exercises  Motor Control/Coordination: Writer facilited fine motor activies to increase RUE [address] during self care tasks. Pt completed threading beads with RUE onto thread being held by BRITTANIE to encourage bilateral coordination and [address]. Pt req 2 v/c for choosing the incorrect bead d/t visual defects. Pt requried increased time to complete with pt noted to drop multiple pegs when completing. Additional Activities  Additional Activities Comment: Writer facilitated many visual activies to increase functional use of vision and provide neurofacilitation in bilateral eyes to look towards the left. Writer engaged pt in baloon taps on L side and asked to maintain head at midline to promote and faciliate visual scanning to the left. Eye patch doffed for this task.  Pt demo G use of hand eye coordination and bilateral coordination. Writer placed large colored pegs on L side and asked pt to maintain head at midline to promote and faciliate visual scanning to the left. Eye patch doffed for this task, then writer asked pt to reach for the colored peg requested by writer to L side with RUE to promote crossing the midline, then place large peg in foam mat to  increasing [address] on RUE. Pt brings each peg to midline as a compensatory stradegy for pegs outside of visual ability. Pt demo fatigue with repetitions and difficulty maintaining grasp with pt noted to drop multiple pegs when completing. Pt reports increased pain/fatigue with attempting to visually track/scan to the left side of midline. Assessment  Assessment  Activity Tolerance: Patient tolerated treatment well  Discharge Recommendations: Home with assist PRN;Outpatient OT ('s Rehab)    Patient Education  Education  Education Given To: Patient  Education Provided: Role of Therapy;Plan of Care;Safety; Mobility Training;Transfer Training;Visual Perceptual Function  Education Method: Verbal;Demonstration  Barriers to Learning: Vision  Education Outcome: Verbalized understanding;Continued education needed    OT Equipment Recommendations  Other: TBD    Safety Devices  Safety Devices in place: Yes  Type of devices: All fall risk precautions in place;Call light within reach;Gait belt;Patient at risk for falls; Left in chair;Nurse notified       Goals  Patient Goals   Patient goals : \"To get back to those things (ADL, IADL, and [address] in RUE). \"  Short Term Goals  Time Frame for Short Term Goals: By 1 week  Short Term Goal 1: Pt will complete lower body dressing/bathing with CGA and Good safety with use of AE as needed  Short Term Goal 2: Pt will complete functional transfers/mobility during self care tasks with CGA and Good safety with no LOB  Short Term Goal 3: Pt will tolerate standing 7+ minutes during functional activity of choice with CGA and no LOB during functional activity of choice  Short Term Goal 4: Pt will verbalize/demonstrate Good understanding of AE/adaptive strategies/DME to increase safety and independence with self care and mobility  Short Term Goal 5: Pt will verbalize/demonstrate 3 visual compensatory strategies to increase safety with daily activities and increase quality of life  Short Term Goal 6: Pt will participate in 30+ minutes of therapeutic exercises/functional activities to increase safety and independence with self care and mobility    Long Term Goals  Time Frame for Long Term Goals : By discharge  Long Term Goal 1: Pt will complete BADLs with Mod I and Good safety with use of AE as needed  Long Term Goal 2: Pt will complete functional mobility during self care tasks with Mod I and Good safety with use of least restrictive device  Long Term Goal 3: Pt will tolerate standing 15+ minutes during functional activity of choice with Good safety  Long Term Goal 4: Pt will complete simple meal prep/light house keeping task with Supervision and Good safety  Long Term Goal 5: Pt will complete tub transfer with Supervision and Good safety with use of DME as needed  Long Term Goal 6: Pt will verbalize/demonstrate Good understanding of vision HEP to increase neurofacilitation of left side visual field to increase safety during daily activities  Long Term Goal 7: Pt will verbalize/demonstrate Good understanding of home safety/fall prevention strategies to increase safety and independence with self care and mobility  Long Term Goal 8: Pt will demonstrate increased [address] during self care tasks as evident by 5 second improvement with LUE and 30 second improvement with RUE during 9 hole peg test    Plan  Occupational Therapy Plan  Times Per Week: 5-7  Times Per Day: Twice a day  Current Treatment Recommendations: Self-Care / ADL, Strengthening, Balance training, Functional mobility training, Endurance training, Neuromuscular re-education, Pain management, Safety education & training, Patient/Caregiver education & training, Equipment evaluation, education, & procurement, Home management training, Cognitive/Perceptual training, Coordination training         02/07/23 1030 02/07/23 1559   OT Individual Minutes   Time In 0832 1234   Time Out [id number]   Minutes 60 37         Electronically signed by JASON Pastrana on 2/7/23 at 5:57 PM EST

## 2023-02-07 NOTE — PROGRESS NOTES
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 Orientation: Oriented x4. Recall: Not addressed directly. Organization: Convergent categorization, ID category of 3 similar items (abstract)- 100% accuracy (I). Divergent thinking, add 1 to list of 3 similar items (abstract)- 100% accuracy (I). Problem Solving/Reasoning: n/a    Speech: Continued education provided re: compensatory dysarthria strategies to increase speech intelligibility (e.g., over-articulate, increase vocal intensity, reduce rate). Pt verbalized understanding, required min cues to facilitate strategies in conversation and reports attempts use of strategies outside of ST sessions. Speech subjectively judged to be ~95% intelligible in conversation. Pt continues to demo decreased oral motor coordination (B) with decreased ROM (L). Pt able to complete sentence/paragraph repetition drills with min cues to use dysarthria strategies. Dysphagia: No overt s/s of aspiration observed with sips of thin liquid via straw taken throughout session. Patient seen for dysphagia treatment this date x23 minutes using NMES as an adjunct: used a unilateral facial placement with electrodes positioned over the buccal branch of the facial nerve (max intensity = 5.5 mA) on the L side.  Placement used to target the musculature of the oropharyngeal sling. Pt able to complete OMEs x5 sets in reps of 10 c mod cues. No movement noted on L side of face with labial retraction; minimal movement noted on L side with labial lateralization and protrusion. Other: No family present. Pt pleasant and highly motivated. Plan:  [x] Continue ST services    [] Discharge from ST:      Discharge recommendations: [] Inpatient Rehab   [] [place]   [] Outpatient Therapy  [] Follow up at trauma clinic   [] Other:       Treatment completed by:  Maria Randall M.A., CCC-SLP

## 2023-02-07 NOTE — CARE COORDINATION
ARU CASE MANAGEMENT NOTE:     Patient is alert and oriented x4. Spoke with patient regarding discharge plan and patient confirms plan plan is to go home w/ support from spouse and family     DME:none    Outside appointments: 2/13/23 Neuro appointment with Jose Leon     Will continue to follow for additional discharge needs.     Electronically signed by Kimberley Rodriguez RN on 2/7/2023 at 11:47 AM

## 2023-02-07 NOTE — PLAN OF CARE
Problem: ABCDS Injury Assessment  Goal: Absence of physical injury  2/7/2023 0355 by Yesenia Christianson LPN  Outcome: Progressing     Problem: Safety - Adult  Goal: Free from fall injury  2/7/2023 0355 by Yesenia Christianson LPN  Outcome: Progressing     Problem: Skin/Tissue Integrity  Goal: Absence of new skin breakdown  Description: 1. Monitor for areas of redness and/or skin breakdown  2. Assess vascular access sites hourly  3. Every 4-6 hours minimum:  Change oxygen saturation probe site  4. Every 4-6 hours:  If on nasal continuous positive airway pressure, respiratory therapy assess nares and determine need for appliance change or resting period.   2/7/2023 0355 by Yesenia Christianson LPN  Outcome: Progressing     Problem: Pain  Goal: Verbalizes/displays adequate comfort level or baseline comfort level  2/7/2023 0355 by Yesenia Christianson LPN  Outcome: Progressing     Problem: Discharge Planning  Goal: Discharge to home or other facility with appropriate resources  2/7/2023 0355 by Yesenia Christianson LPN  Outcome: Progressing  Flowsheets (Taken 2/6/2023 1955)  Discharge to home or other facility with appropriate resources:   Identify barriers to discharge with patient and caregiver   Arrange for needed discharge resources and transportation as appropriate   Identify discharge learning needs (meds, wound care, etc)   Refer to discharge planning if patient needs post-hospital services based on physician order or complex needs related to functional status, cognitive ability or social support system     Problem: Chronic Conditions and Co-morbidities  Goal: Patient's chronic conditions and co-morbidity symptoms are monitored and maintained or improved  2/7/2023 0355 by Yesenia Christianson LPN  Outcome: Progressing  Flowsheets (Taken 2/6/2023 1955)  Care Plan - Patient's Chronic Conditions and Co-Morbidity Symptoms are Monitored and Maintained or Improved:   Monitor and assess patient's chronic conditions and comorbid symptoms for stability, deterioration, or improvement   Collaborate with multidisciplinary team to address chronic and comorbid conditions and prevent exacerbation or deterioration   Update acute care plan with appropriate goals if chronic or comorbid symptoms are exacerbated and prevent overall improvement and discharge     Problem: Nutrition Deficit:  Goal: Optimize nutritional status  2/7/2023 0355 by Sim Sol LPN  Outcome: Progressing

## 2023-02-07 NOTE — PLAN OF CARE
Problem: ABCDS Injury Assessment  Goal: Absence of physical injury  2/7/2023 1614 by Adonis Robbins LPN  Outcome: Progressing  2/7/2023 0355 by Abhijeet Clarke LPN  Outcome: Progressing     Problem: Safety - Adult  Goal: Free from fall injury  2/7/2023 1614 by Adonis Robbins LPN  Outcome: Progressing  2/7/2023 0355 by Abhijeet Clarke LPN  Outcome: Progressing     Problem: Skin/Tissue Integrity  Goal: Absence of new skin breakdown  Description: 1. Monitor for areas of redness and/or skin breakdown  2. Assess vascular access sites hourly  3. Every 4-6 hours minimum:  Change oxygen saturation probe site  4. Every 4-6 hours:  If on nasal continuous positive airway pressure, respiratory therapy assess nares and determine need for appliance change or resting period.   2/7/2023 1614 by Adonis Robbins LPN  Outcome: Progressing  2/7/2023 0355 by Abhijeet Clarke LPN  Outcome: Progressing     Problem: Pain  Goal: Verbalizes/displays adequate comfort level or baseline comfort level  2/7/2023 1614 by Adonis Robbins LPN  Outcome: Progressing  2/7/2023 0355 by Abhijeet Clarke LPN  Outcome: Progressing     Problem: Discharge Planning  Goal: Discharge to home or other facility with appropriate resources  2/7/2023 1614 by Adonis Robbins LPN  Outcome: Progressing  Flowsheets (Taken 2/7/2023 0830)  Discharge to home or other facility with appropriate resources:   Identify barriers to discharge with patient and caregiver   Arrange for needed discharge resources and transportation as appropriate   Identify discharge learning needs (meds, wound care, etc)   Refer to discharge planning if patient needs post-hospital services based on physician order or complex needs related to functional status, cognitive ability or social support system  2/7/2023 0355 by Abhijeet Clarke LPN  Outcome: Progressing  Flowsheets (Taken 2/6/2023 1955)  Discharge to home or other facility with appropriate resources:   Identify barriers to discharge with patient and caregiver   Arrange for needed discharge resources and transportation as appropriate   Identify discharge learning needs (meds, wound care, etc)   Refer to discharge planning if patient needs post-hospital services based on physician order or complex needs related to functional status, cognitive ability or social support system     Problem: Chronic Conditions and Co-morbidities  Goal: Patient's chronic conditions and co-morbidity symptoms are monitored and maintained or improved  2/7/2023 1614 by Adonis Robbins LPN  Outcome: Progressing  Flowsheets (Taken 2/7/2023 0830)  Care Plan - Patient's Chronic Conditions and Co-Morbidity Symptoms are Monitored and Maintained or Improved:   Monitor and assess patient's chronic conditions and comorbid symptoms for stability, deterioration, or improvement   Collaborate with multidisciplinary team to address chronic and comorbid conditions and prevent exacerbation or deterioration   Update acute care plan with appropriate goals if chronic or comorbid symptoms are exacerbated and prevent overall improvement and discharge  2/7/2023 0355 by Abhijeet Clarke LPN  Outcome: Progressing  Flowsheets (Taken 2/6/2023 1955)  Care Plan - Patient's Chronic Conditions and Co-Morbidity Symptoms are Monitored and Maintained or Improved:   Monitor and assess patient's chronic conditions and comorbid symptoms for stability, deterioration, or improvement   Collaborate with multidisciplinary team to address chronic and comorbid conditions and prevent exacerbation or deterioration   Update acute care plan with appropriate goals if chronic or comorbid symptoms are exacerbated and prevent overall improvement and discharge     Problem: Nutrition Deficit:  Goal: Optimize nutritional status  2/7/2023 1614 by Adonis Robbins LPN  Outcome: Progressing  2/7/2023 0355 by Abhijeet Clarke LPN  Outcome: Progressing

## 2023-02-07 NOTE — PROGRESS NOTES
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 Continues with diplopia using eye patch. ROS:  Denies fevers, chills, sweats. No chest pain, palpitations, lightheadedness. Denies coughing, wheezing or shortness of breath. Denies abdominal pain, nausea, diarrhea or constipation. No new areas of joint pain. Denies new areas of numbness or weakness. Denies new anxiety or depression issues. No new skin problems. Rehabilitation:   PT:  Restrictions/Precautions: Fall Risk, General Precautions  Implants present? :  (pt denies)  Other position/activity restrictions: Per Dr. Yanez Button OK to use Eye patch, alternate between eyes (to help with patient's double vision and balance)   Transfers  Sit to Stand: Contact guard assistance  Stand to Sit: Contact guard assistance  Bed to Chair: Contact guard assistance  Stand Pivot Transfers: Contact guard assistance  Comment: RW for multiple transfers from [place] [id number], [facility]. Ambulation  Surface: Level tile  Device: Hand-Held Assist (bilateral UE support devices needed)  Assistance: Moderate assistance, 2 Person assistance (intermittent SBA-SCAR support of 2nd person for safety with change in terrain and supportive devices)  Quality of Gait: unsteady, R lateral lean with path deviation, intermittent wide and narrow IS[place], ataxic, decreased motor control R LE. CGA for with intermittent Scar required for right lateral LOB. Gait Deviations: Slow Tess, Deviated path, Staggers, Decreased step length, Decreased step height  Distance: 50 feet x 6 with focused gaze, around cones. Comments: Increased time to complete with slow pace, RLE Ataxia with poorly coordinated steps going from scissoring to Wide ISRAEL with tendency to maintain R hip external rotation. LOB to right several times to prevent falls with therapist support.   Short standing rest breaks to regroup stance Multiple gait devices used to improve patient balance rightening reactions as tendency to be pusher with over stimulation. More Ambulation?: No      OT:  ADL  Equipment Provided: Feeding equipment (Built up handles)  Feeding: Setup  Feeding Skilled Clinical Factors: Pt demonstrated increased difficulty with grasping silverwear during eating task. OT provided built up handles during task to increase ease of use during self feeding. Grooming: Minimal assistance  Grooming Skilled Clinical Factors: Min A for standing balance standing sink side with slight R side lean  UE Bathing: Stand by assistance  UE Bathing Skilled Clinical Factors: Sitting on tub bench  LE Bathing: Minimal assistance  LE Bathing Skilled Clinical Factors: LOB while standing in shower to dry bottom requiring A to right self  UE Dressing: Stand by assistance  LE Dressing: Minimal assistance  LE Dressing Skilled Clinical Factors: Pt demonstrated figure 4 technique to thread BLE. Min A while standing with unsteadiness while pulling up over hips  Toileting: Minimal assistance  Toileting Skilled Clinical Factors: A for standing balance with hygiene and clothing management  Additional Comments: OT facilitated pts engagement in full shower on this date with use of tub bench, grab bars, and hand held shower. Pt required A while standing due to intermittet LOB to right side. Pt currently limited due to decreased strength, balance, FMC, visual deficits, and activity tolerance impacting safety and independence with self care tasks         Balance  Sitting Balance: Stand by assistance  Standing Balance: Moderate assistance (CGA-Mod A with multiple LOB to right side)      Functional Mobility  Functional - Mobility Device: Rolling Walker  Activity: To/from bathroom  Assist Level: Moderate assistance (CGA-Mod A with multiple LOB to right side)  Functional Mobility Comments: Verbal cues for hand placement and safety.  Increased time needed with RLE. Multiple LOB to right side requiring A to right self     Bed mobility  Bridging: Modified independent   Rolling to Left: Modified independent  Rolling to Right: Modified independent  Supine to Sit: Modified independent  Sit to Supine: Modified independent  Scooting: Modified independent  Bed Mobility Comments: All bed mobility completed on flat surface without use of rails. Transfers  Sit to stand: Minimal assistance  Stand to sit: Minimal assistance  Transfer Comments: Verbal cues for hand placement and safety   Toilet Transfers  Toilet - Technique: Ambulating  Equipment Used: Grab bars  Toilet Transfer: Minimal assistance  Toilet Transfers Comments: Verbal cues for hand placement and safety. Slight unsteadiness with R side LOB     Shower Transfers  Shower - Transfer From: Hipolito Marks - Transfer Type: To and From  Shower - Transfer To: Transfer tub bench  Shower - Technique: Ambulating  Shower Transfers: Moderate assistance  Shower Transfers Comments: Verbal cues for hand placement, safety and RW management over threshold. LOB to right side with Mod A to right self. ST:    t.Subjective: [x] Alert     [x] Cooperative     [] Confused     [] Agitated    [] Lethargic        Objective/Assessment:  Attention: Sustained throughout, distractions minimized. Orientation: Oriented x4. Recall: Not addressed directly. Organization: Convergent categorization, ID category of 3 similar items (abstract)- 100% accuracy (I). Divergent thinking, add 1 to list of 3 similar items (abstract)- 100% accuracy (I). Problem Solving/Reasoning: n/a     Speech: Continued education provided re: compensatory dysarthria strategies to increase speech intelligibility (e.g., over-articulate, increase vocal intensity, reduce rate). Pt verbalized understanding, required min cues to facilitate strategies in conversation and reports attempts use of strategies outside of ST sessions.   Speech subjectively judged to be ~95% intelligible in conversation. Pt continues to demo decreased oral motor coordination (B) with decreased ROM (L). Pt able to complete sentence/paragraph repetition drills with min cues to use dysarthria strategies. Dysphagia: No overt s/s of aspiration observed with sips of thin liquid via straw taken throughout session. Patient seen for dysphagia treatment this date x23 minutes using NMES as an adjunct: used a unilateral facial placement with electrodes positioned over the buccal branch of the facial nerve (max intensity = 5.5 mA) on the L side. Placement used to target the musculature of the oropharyngeal sling. Pt able to complete OMEs x5 sets in reps of 10 c mod cues. No movement noted on L side of face with labial retraction; minimal movement noted on L side with labial lateralization and protrusion. Other: No family present. Pt pleasant and highly motivated. Objective:  BP (!) 134/90   Pulse 73   Temp 98.2 °F (36.8 °C)   Resp 16   Ht 6' (1.829 m)   Wt 209 lb 3.2 oz (94.9 kg)   SpO2 98%   BMI 28.37 kg/m²  I Body mass index is 28.37 kg/m². I   Wt Readings from Last 1 Encounters:   23 209 lb 3.2 oz (94.9 kg)      Temp (24hrs), Av.2 °F (36.8 °C), Min:98.1 °F (36.7 °C), Max:98.2 °F (36.8 °C)         GEN: well developed, well nourished, no acute distress  HEENT: Normocephalic atraumatic, EOMI, mucous membranes pink and moist  CV: RRR, no murmurs, rubs or gallops  PULM: CTAB, no rales or rhonchi. Respirations WNL and unlabored  ABD: soft, NT, ND, +BS and equal  NEURO: A&O x3. Sensation decreased right upper and lower extremity compared to left, left facial droop,   MSK: Legs 4+/5 upper and lower extremities  EXTREMITIES: No calf tenderness to palpation bilaterally. No edema BLEs  SKIN: warm dry and intact with good turgor  PSYCH: appropriately interactive. Affect WNL.   Good spirits        Medications   Scheduled Meds:   artificial tears   Left Eye Nightly carvedilol  12.5 mg Oral BID WC    enoxaparin  40 mg SubCUTAneous Daily    folic acid  1 mg Oral Daily    lisinopril  40 mg Oral Daily    melatonin  3 mg Oral Nightly    nicotine  1 patch TransDERmal Daily    pantoprazole  40 mg Oral QAM AC    tamsulosin  0.4 mg Oral Daily    thiamine  100 mg Oral Daily    polyethylene glycol  17 g Oral Daily     Continuous Infusions:  PRN Meds:.polyvinyl alcohol, acetaminophen, calcium carbonate, fluticasone, QUEtiapine, senna, bisacodyl     Diagnostics:     CBC: No results for input(s): WBC, RBC, HGB, HCT, MCV, RDW, PLT in the last 72 hours. BMP: No results for input(s): NA, K, CL, CO2, PHOS, BUN, CREATININE, CA in the last 72 hours. BNP: No results for input(s): BNP in the last 72 hours. PT/INR: No results for input(s): PROTIME, INR in the last 72 hours. APTT: No results for input(s): APTT in the last 72 hours. CARDIAC ENZYMES: No results for input(s): CKMB, CKMBINDEX, TROPONINT in the last 72 hours. Invalid input(s): CKTOTAL;3  FASTING LIPID PANEL:No results found for: CHOL, HDL, TRIG  LIVER PROFILE: No results for input(s): AST, ALT, ALB, BILIDIR, BILITOT, ALKPHOS in the last 72 hours. I/O (24Hr): No intake or output data in the 24 hours ending 02/07/23 1816    Glu last 24 hour  No results for input(s): POCGLU in the last 72 hours. No results for input(s): CLARITYU, COLORU, PHUR, SPECGRAV, PROTEINU, RBCUA, BLOODU, BACTERIA, NITRU, WBCUA, LEUKOCYTESUR, YEAST, GLUCOSEU, BILIRUBINUR in the last 72 hours. Impression/Plan:    Hemorrhagic pontine CVA:  PT/OT for gait, mobility, strengthening, endurance, ADLs, and self care. SLP for cognition, speech. Discharge plan 2/15  Diplopia, blurred vision:  Secondary to hemorrhagic stroke. Has eye patch - alternate use on both eyes. Added artificial tears as needed, artificial tears ointment nightly for left eye due to incomplete closing and burning of the eye.   Reviewed with wife neuro-ophthalmology evaluation as outpatient  Urinary retention:  Noted in acute care. On flomax. Will monitor bladder scans, PVRs. -Appears to have improved  Alcohol withdrawal:  On phenobarbital wean (tcompleted on 2/6/23). On folate, thiamine supplementation. HTN:  On carvedilol, lisinopril. Goal is SBP below 160 with gradual normalization of BP, per neurology. GERD:  On protonix  Bowel Management: Miralax daily, senokot prn, dulcolax prn. DVT Prophylaxis:  low molecular weight heparin, SCD's while in bed, and KP's during the day  Dr. Abhijeet Castillo for medical management  Follow up PCP 1-2 weeks, PM&R 4-6 weeks, Neurology 3 weeks, Neurosurgery 2 weeks from 1/29 (with repeat CT head), Neuro-ophthalmology - Dr. Manri Fraser. Alen Chaudhari MD       This note is created with the assistance of a speech recognition program.  While intending to generate a document that actually reflects the content of the visit, the document can still have some errors including those of syntax and sound a like substitutions which may escape proof reading.   In such instances, actual meaning can be extrapolated by contextual diversion

## 2023-02-08 PROCEDURE — 1180000000 HC REHAB R&B

## 2023-02-08 PROCEDURE — 6370000000 HC RX 637 (ALT 250 FOR IP): Performed by: PHYSICAL MEDICINE & REHABILITATION

## 2023-02-08 PROCEDURE — 97129 THER IVNTJ 1ST 15 MIN: CPT

## 2023-02-08 PROCEDURE — 97535 SELF CARE MNGMENT TRAINING: CPT

## 2023-02-08 PROCEDURE — 97116 GAIT TRAINING THERAPY: CPT

## 2023-02-08 PROCEDURE — 97530 THERAPEUTIC ACTIVITIES: CPT

## 2023-02-08 PROCEDURE — 92507 TX SP LANG VOICE COMM INDIV: CPT

## 2023-02-08 PROCEDURE — 97110 THERAPEUTIC EXERCISES: CPT

## 2023-02-08 PROCEDURE — 99231 SBSQ HOSP IP/OBS SF/LOW 25: CPT | Performed by: PHYSICAL MEDICINE & REHABILITATION

## 2023-02-08 PROCEDURE — 6370000000 HC RX 637 (ALT 250 FOR IP)

## 2023-02-08 PROCEDURE — 6360000002 HC RX W HCPCS

## 2023-02-08 RX ADMIN — ENOXAPARIN SODIUM 40 MG: 100 INJECTION SUBCUTANEOUS at 07:54

## 2023-02-08 RX ADMIN — FOLIC ACID 1 MG: 1 TABLET ORAL at 07:55

## 2023-02-08 RX ADMIN — THIAMINE HCL TAB 100 MG 100 MG: 100 TAB at 07:54

## 2023-02-08 RX ADMIN — PANTOPRAZOLE SODIUM 40 MG: 40 TABLET, DELAYED RELEASE ORAL at 06:05

## 2023-02-08 RX ADMIN — CARVEDILOL 12.5 MG: 12.5 TABLET, FILM COATED ORAL at 07:55

## 2023-02-08 RX ADMIN — LISINOPRIL 40 MG: 20 TABLET ORAL at 07:55

## 2023-02-08 RX ADMIN — TAMSULOSIN HYDROCHLORIDE 0.4 MG: 0.4 CAPSULE ORAL at 07:55

## 2023-02-08 RX ADMIN — MINERAL OIL, PETROLATUM: 425; 568 OINTMENT OPHTHALMIC at 19:21

## 2023-02-08 RX ADMIN — CARVEDILOL 12.5 MG: 12.5 TABLET, FILM COATED ORAL at 18:43

## 2023-02-08 NOTE — PROGRESS NOTES
Physical Therapy  Facility/Department: [facility]UTE REHAB  Rehabilitation Physical TherapyNAME: Sita Ashraf  : 1980 (43 y.o.)  MRN: [id number]  CODE STATUS: Full Code    Date of Service: 23      Past Medical History:   Diagnosis Date    Cerebral artery occlusion with cerebral infarction [facility])     History of heartburn     takes omeprazole    Hypertension     Neoplasm of unspecified nature of bone, soft tissue, and skin 2009-present    lesions of right cheek x 2    Research study patient 2023    [id number]. Date of completion 23     Past Surgical History:   Procedure Laterality Date    APPENDECTOMY  in 's    PRE-MALIGNANT / BENIGN SKIN LESION EXCISION Right 2014    Excision lesion of right cheek. Dr. Merlin De Jesus. Additional Pertinent Hx: Mr. Sita Ashraf is a 43 y.o. right handed male who was admitted to [facility] on 2023 with Extremity Weakness (Left sided weakness) and Facial Droop     Patient with R weakness, numbness and facial droop who fell when he went to bed then awoke with the symptoms. SBP was in 200s upon arrival to ED. CT confirmed hemorrhagic CVA posterior ramses and narrowing R P1 PCA. He was treated with Cardene for BP control. Neurosurgery managing conservatively. Family / Caregiver Present: No  Diagnosis: Hemorrhagic CVA    Restrictions:        SUBJECTIVE  Subjective: Patient reporting did not sleep the best last night. Denies headache, nausea and dizziness.          OBJECTIVE                    Functional Mobility  Bed mobility  Bridging: Modified independent   Rolling to Left: Modified independent  Rolling to Right: Modified independent  Supine to Sit: Modified independent  Sit to Supine: Modified independent  Scooting: Modified independent  Transfers  Sit to Stand: Contact guard assistance  Stand to Sit: Contact guard assistance  Bed to Chair: Contact guard assistance  Stand Pivot Transfers: Contact guard assistance  Comment: RW for multiple transfers from [facility] Bed, and recliner chair. Environmental Mobility  Ambulation  Surface: Level tile  Device: Hand-Held Assist (bilateral UE support devices needed)  Assistance: Moderate assistance;2 Person assistance (intermittent SBA-JOSE C support of 2nd person for safety with change in terrain and supportive devices)  Quality of Gait: unsteady, R lateral lean with path deviation, intermittent wide and narrow [place], ataxic, decreased motor control R LE. CGA for with intermittent Jose C required for right lateral LOB. Gait Deviations: Slow Tess;Deviated path;Staggers;Decreased step length;Decreased step height  Distance: 50 feet x 6 with focused gaze, around cones. Comments: Increased time to complete with slow pace, RLE Ataxia with poorly coordinated steps going from scissoring to Wide [place] with tendency to maintain R hip external rotation. LOB to right several times to prevent falls with therapist support. Short standing rest breaks to regroup stance Multiple gait devices used to improve patient balance rightening reactions as tendency to be pusher with over stimulation. Ambulation 2  Surface - 2: level tile;ramp  Device 2: Rolling Walker;Forearm Crutches;Single point cane;Parallel Bars; Hahn rail  Distance: 100 feet x 3, cones  Comments: Multiple gait devices used to improve patient balance rightening reactions as tendency to be pusher with over stimulation. Stairs  # Steps : 18 (preformed with varied method for neuro re ed and safety instructions for home)  Stairs Height: 8\"  Rails: Bilateral;Right ascending;Left ascending  Device: No Device  Assistance: Minimal assistance; Moderate assistance;2 Person assistance  Comment: patient able to place RLE with tendency to cross midline with RLE but able to correct with cueing.   Propulsion 1  Propulsion: Manual  Level: Level Tile  Method: RLE;LLE;LUE  Level of Assistance: Stand by assistance  Distance: 100 feet x 4    PT Exercises  Exercise Treatment: neuro bernardo and balance training with varied assistive devices and manage objects and distraction with repeative gait in therapy room. Head turns and eye gaze and tracking with aand without eye patch use.   A/AROM Exercises: AROM RLE: LAQs x 20 reps with 4 pound cuff wt, verbal cues for control/decreased speed of movement  Exercise Equipment: Firetideep 10 minutes    ASSESSMENT             PLAN OF CARE  Frequency: 1-2 treatment sessions per day, 5-7 days per week          Therapy Time   02/08/23 1227 02/08/23 1551 02/08/23 1601   PT Individual Minutes   Time In 2701 0645 [id number]   Time Out 0900 1405 1434   Minutes 58 19 14   PT Co-Treatment Minutes   Time In  --  0786  --    Time Out  --  1420  --    Minutes  --  15  --        Raul Munguia PTA, 02/08/23 at 4:05 PM

## 2023-02-08 NOTE — PROGRESS NOTES
Verbal orders given from Dr Dinah Feldman during rounds; alright to reduce Nicotine patch from 14mg to 7 mg daily. Orders have been modified to reflect physician's order. Will cont to monitor.

## 2023-02-08 NOTE — CARE COORDINATION
ARU CASE MANAGEMENT NOTE:     Patient is alert and oriented x4. Spoke with patient regarding discharge plan and patient confirms plan plan is to go home w/ support from spouse and family     DME:none     Outside appointments: 2/13/23 Neuro appointment with Chanel Barber Patient is due to DC on 2/15/23     Will continue to follow for additional discharge needs.       Electronically signed by: Helder Carbajal RN on 2/8/2023 at 1:29 PM

## 2023-02-08 NOTE — PROGRESS NOTES
Speech Language Pathology  Speech Language Pathology  [facility]    Cognitive/Speech Treatment Note    Date: 2/8/2023  Patients Name: Claudell Alas  MRN: [id number]  Diagnosis:   Patient Active Problem List   Diagnosis Code    Neoplasm of unspecified nature of bone, soft tissue, and skin D49.2    Hemangioma of skin D18.01    Pontine hemorrhage (HCC) I61.3    Intracranial hemorrhage ([facility] [address] I62.9    Impending cerebrovascular accident ([facility] [address] I63.9       Pain: 0/10    Cognitive/Speech Treatment    Treatment time: 7111-0520      Subjective: [x] Alert [x] Cooperative     [] Confused     [] Agitated    [] Lethargic      Objective/Assessment:    Attention: Sustained t/o. Visual attention/processing task: located difference between pictures with 95% accuracy MI (extended time), improved to 100% with min verbal cues. Possible mild left neglect noted. Orientation: x4 (I). Recall: NT    Organization: Word grid (category/letter): 88% MI (extra time) improved to 97% with mod A to recognize and correct errors. Problem Solving/Reasoning: NT    Speech: ST educated Pt re: oral motor exercises for dysarthria. Pt reports he is \"trying\" to complete exercises outside of therapy sessions. ST encouraged Pt to complete exercises 3x/day and utilize mirror for feedback as needed. Pt able to state strategies for over-articulation and slow rate (I), utilized appropriately t/o session MI. Speech subjectively judged to to be ~95% intelligible in conversation. Other: No family present.      Plan:  [] Continue ST services    [] Discharge from :      Discharge recommendations: [] Inpatient Rehab   [] [place]   [] Outpatient Therapy  [] Follow up at trauma clinic   [] Other:       Treatment completed by: Caesar Chamberlain M.S., [id number] [facility]

## 2023-02-08 NOTE — PROGRESS NOTES
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 Continues with diplopia using eye patch. Request decrease in NicoDerm patch    ROS:  Denies fevers, chills, sweats. No chest pain, palpitations, lightheadedness. Denies coughing, wheezing or shortness of breath. Denies abdominal pain, nausea, diarrhea or constipation. No new areas of joint pain. Denies new areas of numbness or weakness. Denies new anxiety or depression issues. No new skin problems. Rehabilitation:   PT:  Restrictions/Precautions: Fall Risk, General Precautions  Implants present? :  (pt denies)  Other position/activity restrictions: Per Dr. Sarika Aldrich OK to use Eye patch, alternate between eyes (to help with patient's double vision and balance)   Transfers  Sit to Stand: Contact guard assistance  Stand to Sit: Contact guard assistance  Bed to Chair: Contact guard assistance  Stand Pivot Transfers: Contact guard assistance  Comment: RW for multiple transfers from [place] [address], and [facility]. Ambulation  Surface: Level tile  Device: Hand-Held Assist (bilateral UE support devices needed)  Assistance: Moderate assistance, 2 Person assistance (intermittent SBA-JOSE C support of 2nd person for safety with change in terrain and supportive devices)  Quality of Gait: unsteady, R lateral lean with path deviation, intermittent wide and narrow [place], ataxic, decreased motor control R LE. CGA for with intermittent Jose C required for right lateral LOB. Gait Deviations: Slow Tess, Deviated path, Staggers, Decreased step length, Decreased step height  Distance: 50 feet x 6 with focused gaze, around cones. Comments: Increased time to complete with slow pace, RLE Ataxia with poorly coordinated steps going from scissoring to Wide [place] with tendency to maintain R hip external rotation. LOB to right several times to prevent falls with therapist support. Short standing rest breaks to regroup stance Multiple gait devices used to improve patient balance rightening reactions as tendency to be pusher with over stimulation. More Ambulation?: No      OT:  ADL  Equipment Provided: Feeding equipment (Built up handles)  Feeding: Setup  Feeding Skilled Clinical Factors: Pt demonstrated increased difficulty with grasping silverwear during eating task. OT provided built up handles during task to increase ease of use during self feeding. Grooming: Minimal assistance  Grooming Skilled Clinical Factors: Min A for standing balance standing sink side with slight R side lean  UE Bathing: Stand by assistance  UE Bathing Skilled Clinical Factors: Sitting on tub bench  LE Bathing: Minimal assistance  LE Bathing Skilled Clinical Factors: LOB while standing in shower to dry bottom requiring A to right self  UE Dressing: Stand by assistance  LE Dressing: Minimal assistance  LE Dressing Skilled Clinical Factors: Pt demonstrated figure 4 technique to thread BLE. Min A while standing with unsteadiness while pulling up over hips  Toileting: Minimal assistance  Toileting Skilled Clinical Factors: A for standing balance with hygiene and clothing management  Additional Comments: OT facilitated pts engagement in full shower on this date with use of tub bench, grab bars, and hand held shower. Pt required A while standing due to intermittet LOB to right side. Pt currently limited due to decreased strength, balance, FMC, visual deficits, and activity tolerance impacting safety and independence with self care tasks         Balance  Sitting Balance: Stand by assistance  Standing Balance: Moderate assistance (CGA-Mod A with multiple LOB to right side)      Functional Mobility  Functional - Mobility Device: Rolling Walker  Activity: To/from bathroom  Assist Level:  Moderate assistance (CGA-Mod A with multiple LOB to right side)  Functional Mobility Comments: Verbal cues for hand placement and safety. Increased time needed with RLE. Multiple LOB to right side requiring A to right self     Bed mobility  Bridging: Modified independent   Rolling to Left: Modified independent  Rolling to Right: Modified independent  Supine to Sit: Modified independent  Sit to Supine: Modified independent  Scooting: Modified independent  Bed Mobility Comments: All bed mobility completed on flat surface without use of rails. Transfers  Sit to stand: Minimal assistance  Stand to sit: Minimal assistance  Transfer Comments: Verbal cues for hand placement and safety   Toilet Transfers  Toilet - Technique: Ambulating  Equipment Used: Grab bars  Toilet Transfer: Minimal assistance  Toilet Transfers Comments: Verbal cues for hand placement and safety. Slight unsteadiness with R side LOB     Shower Transfers  Shower - Transfer From: Deisi Jaime - Transfer Type: To and From  Shower - Transfer To: Transfer tub bench  Shower - Technique: Ambulating  Shower Transfers: Moderate assistance  Shower Transfers Comments: Verbal cues for hand placement, safety and RW management over threshold. LOB to right side with Mod A to right self. ST:    Subjective: [x] Alert     [x] Cooperative     [] Confused     [] Agitated    [] Lethargic        Objective/Assessment:     Attention: Sustained t/o. Visual attention/processing task: located difference between pictures with 95% accuracy MI (extended time), improved to 100% with min verbal cues. Possible mild left neglect noted. Orientation: x4 (I). Recall: NT     Organization: Word grid (category/letter): 88% MI (extra time) improved to 97% with mod A to recognize and correct errors. Problem Solving/Reasoning: NT     Speech: ST educated Pt re: oral motor exercises for dysarthria. Pt reports he is \"trying\" to complete exercises outside of therapy sessions. ST encouraged Pt to complete exercises 3x/day and utilize mirror for feedback as needed.  Pt able to state strategies for over-articulation and slow rate (I), utilized appropriately t/o session MI. Speech subjectively judged to to be ~95% intelligible in conversation.          Objective:  BP (!) 142/83   Pulse 80   Temp 97.5 °F (36.4 °C)   Resp 16   Ht 6' (1.829 m)   Wt 205 lb 9.6 oz (93.3 kg)   SpO2 97%   BMI 27.88 kg/m²  I Body mass index is 27.88 kg/m². I   Wt Readings from Last 1 Encounters:   23 205 lb 9.6 oz (93.3 kg)      Temp (24hrs), Av.7 °F (36.5 °C), Min:97.5 °F (36.4 °C), Max:97.9 °F (36.6 °C)         GEN: well developed, well nourished, no acute distress  HEENT: Normocephalic atraumatic, EOMI, mucous membranes pink and moist  CV: RRR, no murmurs, rubs or gallops  PULM: CTAB, no rales or rhonchi. Respirations WNL and unlabored  ABD: soft, NT, ND, +BS and equal  NEURO: A&O x3. Sensation decreased right upper and lower extremity compared to left, left facial droop,   MSK: Legs 4+/5 upper and lower extremities  EXTREMITIES: No calf tenderness to palpation bilaterally. No edema BLEs  SKIN: warm dry and intact with good turgor  PSYCH: appropriately interactive. Affect WNL.  Good spirits        Medications   Scheduled Meds:   nicotine  1 patch TransDERmal Daily    artificial tears   Left Eye Nightly    carvedilol  12.5 mg Oral BID WC    enoxaparin  40 mg SubCUTAneous Daily    folic acid  1 mg Oral Daily    lisinopril  40 mg Oral Daily    melatonin  3 mg Oral Nightly    pantoprazole  40 mg Oral QAM AC    tamsulosin  0.4 mg Oral Daily    thiamine  100 mg Oral Daily    polyethylene glycol  17 g Oral Daily     Continuous Infusions:  PRN Meds:.polyvinyl alcohol, acetaminophen, calcium carbonate, fluticasone, QUEtiapine, senna, bisacodyl     Diagnostics:     CBC: No results for input(s): WBC, RBC, HGB, HCT, MCV, RDW, PLT in the last 72 hours.  BMP: No results for input(s): NA, K, CL, CO2, PHOS, BUN, CREATININE, CA in the last 72 hours.  BNP: No results for input(s): BNP in the last 72 hours.  PT/INR: No  results for input(s): PROTIME, INR in the last 72 hours. APTT: No results for input(s): APTT in the last 72 hours. CARDIAC ENZYMES: No results for input(s): CKMB, CKMBINDEX, TROPONINT in the last 72 hours. Invalid input(s): CKTOTAL;3  FASTING LIPID PANEL:No results found for: CHOL, HDL, TRIG  LIVER PROFILE: No results for input(s): AST, ALT, ALB, BILIDIR, BILITOT, ALKPHOS in the last 72 hours. I/O (24Hr): No intake or output data in the 24 hours ending 02/08/23 1753    Glu last 24 hour  No results for input(s): POCGLU in the last 72 hours. No results for input(s): CLARITYU, COLORU, PHUR, SPECGRAV, PROTEINU, RBCUA, BLOODU, BACTERIA, NITRU, WBCUA, LEUKOCYTESUR, YEAST, GLUCOSEU, BILIRUBINUR in the last 72 hours. Impression/Plan:    Hemorrhagic pontine CVA:  PT/OT for gait, mobility, strengthening, endurance, ADLs, and self care. SLP for cognition, speech. Discharge plan 2/15  Diplopia, blurred vision:  Secondary to hemorrhagic stroke. Has eye patch - alternate use on both eyes. Added artificial tears as needed, artificial tears ointment nightly for left eye due to incomplete closing and burning of the eye. Reviewed with wife neuro-ophthalmology evaluation as outpatient  Urinary retention:  Noted in acute care. On flomax. Will monitor bladder scans, PVRs. -Appears to have improved  Alcohol withdrawal:  On phenobarbital wean (tcompleted on 2/6/23). On folate, thiamine supplementation. History of smoker-on NicoDerm patch- will decrease  HTN:  On carvedilol, lisinopril. Goal is SBP below 160 with gradual normalization of BP, per neurology. GERD:  On protonix  Bowel Management: Miralax daily, senokot prn, dulcolax prn.   History of agitation-Seroquel at night, has not needed  DVT Prophylaxis:  low molecular weight heparin, SCD's while in bed, and KP's during the day  Dr. Will Pereira for medical management-renotify  Follow up PCP 1-2 weeks, PM&R 4-6 weeks, Neurology 3 weeks, Neurosurgery 2 weeks from 1/29 (with repeat CT head), Neuro-ophthalmology - Dr. Bambi Wu. Jeana Veliz MD       This note is created with the assistance of a speech recognition program.  While intending to generate a document that actually reflects the content of the visit, the document can still have some errors including those of syntax and sound a like substitutions which may escape proof reading.   In such instances, actual meaning can be extrapolated by contextual diversion

## 2023-02-08 NOTE — PROGRESS NOTES
[address]    Acute Rehabilitation Occupational Therapy Daily Treatment Note    Date: 23  Patient Name: Harry Saxena       Room: 3666/1745-91  MRN: [id number]  Account: [de-identified]   : 1980  (43 y.o.) Gender: male       Referring Practitioner: Alma Badillo MD  Diagnosis: Hemorrhagic CVA  Additional Pertinent Hx: Mr. Harry aSxena is a 43 y.o. right handed male who was admitted to [facility] on 2023 with Extremity Weakness (Right sided weakness) and Facial Droop. Patient with R weakness, numbness and facial droop who fell when he went to bed then awoke with the symptoms. SBP was in 200s upon arrival to ED. CT confirmed hemorrhagic CVA posterior ramses and narrowing R P1 PCA. He was treated with Cardene for BP control. Neurosurgery managing conservatively. Pt admitted to ARU 23    Treatment Diagnosis: Impaired self care status    Past Medical History:  has a past medical history of Cerebral artery occlusion with cerebral infarction [facility]), History of heartburn, Hypertension, Neoplasm of unspecified nature of bone, soft tissue, and skin, and Research study patient. Past Surgical History:   has a past surgical history that includes Appendectomy (in 20's) and pre-malignant / benign skin lesion excision (Right, 2014). Restrictions  Restrictions/Precautions  Restrictions/Precautions: Fall Risk, General Precautions  Required Braces or Orthoses?: No  Implants present? :  (pt denies)     Position Activity Restriction  Other position/activity restrictions: Per Dr. Komal Camacho OK to use Eye patch, alternate between eyes (to help with patient's double vision and balance)     Vitals      Subjective  Subjective  Subjective: AM: Pt. in wheelchair upon arrival. Pleasant. Cooperative. Stating he would prefer to shower this afternoon. PM: Pt. in wheelchair in room upon arrival. Agreeable to full shower/self care routine.   Pain: Pt denies pain at this time Objective  Cognition  Overall Orientation Status: Within Functional Limits  Orientation Level: Oriented X4    Cognition  Overall Cognitive Status: Exceptions  Arousal/Alertness: Appropriate responses to stimuli  Following Commands: Follows multistep commands with increased time  Attention Span: Appears intact; Attends with cues to redirect  Memory: Appears intact  Safety Judgement: Decreased awareness of need for assistance;Decreased awareness of need for safety  Problem Solving: Assistance required to identify errors made;Assistance required to correct errors made  Insights: Decreased awareness of deficits  Initiation: Requires cues for some  Sequencing: Requires cues for some  Cognition Comment: pt can be impulsive at times    Activities of Daily Living  Feeding  Assistance Level: Set-up    Grooming/Oral Hygiene  Assistance Level: Stand by assist  Skilled Clinical Factors: Standing at sink side with rw for oral hygiene and shaving tasks. Upper Extremity Bathing  Assistance Level: Supervision  Skilled Clinical Factors: Seated on shower bench. Lower Extremity Bathing  Assistance Level: Supervision  Skilled Clinical Factors: Completed all lower body bathing tasks while seated on shower bench. Weight shifting for norberto hygiene and washing of buttocks. Upper Extremity Dressing  Assistance Level: Modified independent  Skilled Clinical Factors: Pt. was able to retreive, doff, and izabela UB clothing items with (I) from wheelchair level this date. Lower Extremity Dressing  Assistance Level: Stand by assist  Skilled Clinical Factors: Distant SUP provided during threading/unthreading while seated, SBA/close SUP during clothing management while standing.     Putting On/Taking Off Footwear  Assistance Level: Set-up  Skilled Clinical Factors: Declines TEDs at this time, utilizes figure 4 method to [employer]  Assistance Level: Stand by assist  Skilled Clinical Factors: SBA/close SUP during clothing management. Tub/Shower Transfers  Type: Shower  Transfer From: Rolling walker  Transfer To: Tub transfer bench  Additional Factors: Verbal cues; Increased time to complete  Assistance Level: Contact guard assist  Skilled Clinical Factors: CGA and v/c for safety over threashold d/t visual deficits with increased time. Light Housekeeping  Light Housekeeping Level: Wheelchair  Light Housekeeping Level of Assistance: Modified independent  Light Housekeeping: Pt. completed self care set up and clean up from wheelchair level without safety concern. Able to safely retreive and transport items. Mobility     Sit to Stand  Assistance Level: Stand by assist  Skilled Clinical Factors: completed with no cuing. G safety noted. Stand to Sit  Assistance Level: Stand by assist  Skilled Clinical Factors: completed with no cuing. G safety noted. Participation in short co-tx session with physical therapy assistant Beka Durand to further challenge static and dynamic standing balance, as well as ability to visual scan and interact with his enviornment- using maxi move lift for standing balance and support. Functional Mobility  Device: Rolling walker; Wheelchair  Activity: To/From bathroom; Retrieve items;Transport items; To/From therapy gym  Assistance Level: Contact guard assist (CGA with rw. Mod(I) for wheelchair mobility within room. SUP for wheelchair mobility within hallway to ensure safety around obstacles and other individuals.)     OT Exercises  Exercise Treatment: Instruction and participation in B UE str exercises, different weights, all planes, to continue to increase general str and activity tolerance for maximized safety and indep during all daily activities and transfers. rest breaks taken as needed.   Motor Control/Coordination: Graded functional fine motor activities, challenging B hand control and coordination, generally R UE/hand/digits, to increase ability to assist L UE during self care object manipulation for most effective and indep performance. Assessment  Assessment  Activity Tolerance: Patient tolerated treatment well  Discharge Recommendations: Home with assist PRN;Outpatient OT    Patient Education  Education  Education Given To: Patient  Education Provided: Role of Therapy;Plan of Care;Safety; Mobility Training;Transfer Training;Visual Perceptual Function  Education Method: Verbal;Demonstration; Teach Back  Barriers to Learning: Vision  Education Outcome: Verbalized understanding;Continued education needed;Demonstrated understanding    OT Equipment Recommendations  Other: TBD    Safety Devices  Safety Devices in place: Yes  Type of devices: All fall risk precautions in place;Call light within reach;Gait belt;Patient at risk for falls; Left in chair;Nurse notified     Goals  Patient Goals   Patient goals : \"To get back to those things (ADL, IADL, and [address] in [place]). \"  Short Term Goals  Time Frame for Short Term Goals: By 1 week  Short Term Goal 1: Pt will complete lower body dressing/bathing with CGA and Good safety with use of AE as needed  Short Term Goal 2: Pt will complete functional transfers/mobility during self care tasks with CGA and Good safety with no LOB  Short Term Goal 3: Pt will tolerate standing 7+ minutes during functional activity of choice with CGA and no LOB during functional activity of choice  Short Term Goal 4: Pt will verbalize/demonstrate Good understanding of AE/adaptive strategies/DME to increase safety and independence with self care and mobility  Short Term Goal 5: Pt will verbalize/demonstrate 3 visual compensatory strategies to increase safety with daily activities and increase quality of life  Short Term Goal 6: Pt will participate in 30+ minutes of therapeutic exercises/functional activities to increase safety and independence with self care and mobility    Long Term Goals  Time Frame for Long Term Goals : By discharge  Long Term Goal 1: Pt will complete BADLs with Mod I and Good safety with use of AE as needed  Long Term Goal 2: Pt will complete functional mobility during self care tasks with Mod I and Good safety with use of least restrictive device  Long Term Goal 3: Pt will tolerate standing 15+ minutes during functional activity of choice with Good safety  Long Term Goal 4: Pt will complete simple meal prep/light house keeping task with Supervision and Good safety  Long Term Goal 5: Pt will complete tub transfer with Supervision and Good safety with use of DME as needed  Long Term Goal 6: Pt will verbalize/demonstrate Good understanding of vision HEP to increase neurofacilitation of left side visual field to increase safety during daily activities  Long Term Goal 7: Pt will verbalize/demonstrate Good understanding of home safety/fall prevention strategies to increase safety and independence with self care and mobility  Long Term Goal 8: Pt will demonstrate increased [address] during self care tasks as evident by 5 second improvement with LUE and 30 second improvement with RUE during 9 hole peg test    Plan  Occupational Therapy Plan  Times Per Week: 5-7  Times Per Day: Twice a day  Current Treatment Recommendations: Self-Care / ADL, Strengthening, Balance training, Functional mobility training, Endurance training, Neuromuscular re-education, Pain management, Safety education & training, Patient/Caregiver education & training, Equipment evaluation, education, & procurement, Home management training, Cognitive/Perceptual training, Coordination training       02/08/23 1238 02/08/23 1601   OT Individual Minutes   Time In 0902 1500   Time Out [id number]   Minutes 45 40   OT Co-Treatment Minutes   Time In 3572  --    Time Out 7941  --    Minutes 15  --      Writer taking 8min from co-tx session with PT. Billing 93min for the day. 6 units.      Electronically signed by JASON Trejo on 2/8/23 at 4:02 PM EST

## 2023-02-09 PROCEDURE — 6370000000 HC RX 637 (ALT 250 FOR IP): Performed by: STUDENT IN AN ORGANIZED HEALTH CARE EDUCATION/TRAINING PROGRAM

## 2023-02-09 PROCEDURE — 6370000000 HC RX 637 (ALT 250 FOR IP): Performed by: PHYSICAL MEDICINE & REHABILITATION

## 2023-02-09 PROCEDURE — 97535 SELF CARE MNGMENT TRAINING: CPT

## 2023-02-09 PROCEDURE — 92507 TX SP LANG VOICE COMM INDIV: CPT

## 2023-02-09 PROCEDURE — 97530 THERAPEUTIC ACTIVITIES: CPT

## 2023-02-09 PROCEDURE — 6360000002 HC RX W HCPCS

## 2023-02-09 PROCEDURE — 97129 THER IVNTJ 1ST 15 MIN: CPT

## 2023-02-09 PROCEDURE — 1180000000 HC REHAB R&B

## 2023-02-09 PROCEDURE — 97110 THERAPEUTIC EXERCISES: CPT

## 2023-02-09 PROCEDURE — 6370000000 HC RX 637 (ALT 250 FOR IP)

## 2023-02-09 PROCEDURE — 97116 GAIT TRAINING THERAPY: CPT

## 2023-02-09 PROCEDURE — 99231 SBSQ HOSP IP/OBS SF/LOW 25: CPT | Performed by: PHYSICAL MEDICINE & REHABILITATION

## 2023-02-09 PROCEDURE — 97112 NEUROMUSCULAR REEDUCATION: CPT

## 2023-02-09 RX ORDER — QUETIAPINE FUMARATE 25 MG/1
12.5 TABLET, FILM COATED ORAL NIGHTLY PRN
Status: DISCONTINUED | OUTPATIENT
Start: 2023-02-09 | End: 2023-02-15 | Stop reason: HOSPADM

## 2023-02-09 RX ADMIN — MINERAL OIL, PETROLATUM: 425; 568 OINTMENT OPHTHALMIC at 20:02

## 2023-02-09 RX ADMIN — FOLIC ACID 1 MG: 1 TABLET ORAL at 09:41

## 2023-02-09 RX ADMIN — ENOXAPARIN SODIUM 40 MG: 100 INJECTION SUBCUTANEOUS at 09:41

## 2023-02-09 RX ADMIN — CARVEDILOL 12.5 MG: 12.5 TABLET, FILM COATED ORAL at 09:42

## 2023-02-09 RX ADMIN — POLYETHYLENE GLYCOL 3350 17 G: 17 POWDER, FOR SOLUTION ORAL at 09:41

## 2023-02-09 RX ADMIN — THIAMINE HCL TAB 100 MG 100 MG: 100 TAB at 09:41

## 2023-02-09 RX ADMIN — LISINOPRIL 40 MG: 20 TABLET ORAL at 09:41

## 2023-02-09 RX ADMIN — TAMSULOSIN HYDROCHLORIDE 0.4 MG: 0.4 CAPSULE ORAL at 09:41

## 2023-02-09 RX ADMIN — CARVEDILOL 12.5 MG: 12.5 TABLET, FILM COATED ORAL at 16:53

## 2023-02-09 RX ADMIN — PANTOPRAZOLE SODIUM 40 MG: 40 TABLET, DELAYED RELEASE ORAL at 06:08

## 2023-02-09 ASSESSMENT — PAIN SCALES - GENERAL
PAINLEVEL_OUTOF10: 0

## 2023-02-09 NOTE — CONSULTS
Dr. [facility]    CHIEF COMPLAINT: Pontine hemorrhage with right-sided weakness    Reason for Admission: As above    History Obtained From:  Patient     HISTORY OF PRESENT ILLNESS:      The patient is a pleasant 43 y.o. male with significant medical history of pontine hemorrhage spontaneous history looks like he has been in acute rehab for almost a week but the talk to me about him last night when I was discussing about the patient with Dr. Kamala Back that we have the patient in acute rehab so patient this morning and patient is doing okay he denies any pain or headaches or chest pain or shortness of breath or dizziness he is coming along in therapy according to the staff. He has diplopia he has a patch on his left eye and drooping on the left. Otherwise his strength is good. Patient gives me history of hypertension for quite some times he was on medication but he gets was running high he tells me patient also drank outpatient almost daily between beer and hard liquor as well. Patient is on a nicotine patch and he has not drank since he has been in the hospital he says. He is  to his 6 children he says. He works for [employer]. Patient initially was admitted to [facility] and came to [address] [place] [address] for his acute rehab program his blood pressure is coming alone last documented is in the 457 systolic    Past Medical History:    Past Medical History:   Diagnosis Date    Cerebral artery occlusion with cerebral infarction [facility])     History of heartburn     takes omeprazole    Hypertension     Neoplasm of unspecified nature of bone, soft tissue, and skin 2009-present    lesions of right cheek x 2    Research study patient 01/26/2023    [id number].  Date of completion 1/28/23     Patient Active Problem List   Diagnosis Code    Neoplasm of unspecified nature of bone, soft tissue, and skin D49.2    Hemangioma of skin D18.01    Pontine hemorrhage ([address] I61.3 Intracranial hemorrhage (HCC) I62.9    Impending cerebrovascular accident [facility]) I63.9       Past Surgical History:       Past Surgical History:   Procedure Laterality Date    APPENDECTOMY  in 20's    PRE-MALIGNANT / BENIGN SKIN LESION EXCISION Right 9/29/2014    Excision lesion of right cheek. Dr. Gina Smith.        Current Medications:    Current Facility-Administered Medications   Medication Dose Route Frequency Provider Last Rate Last Admin    nicotine (NICODERM CQ) 7 MG/24HR 1 patch  1 patch TransDERmal Daily Mena Umaña MD   1 patch at 02/08/23 9834    polyvinyl alcohol (LIQUIFILM TEARS) 1.4 % ophthalmic solution 1 drop  1 drop Both Eyes PRN Phil Ledezma MD        lubrifresh P.M. (artificial tears) ophthalmic ointment   Left Eye Nightly Phil Ledezma MD   Given at 02/08/23 1921    acetaminophen (TYLENOL) tablet 650 mg  650 mg Oral Q4H PRN Johnathan Loya MD   650 mg at 02/04/23 1501    calcium carbonate (TUMS) chewable tablet 500 mg  500 mg Oral TID PRN Johnathan Loya MD        carvedilol (COREG) tablet 12.5 mg  12.5 mg Oral BID WC Johnathan Loya MD   12.5 mg at 02/08/23 1843    enoxaparin (LOVENOX) injection 40 mg  40 mg SubCUTAneous Daily Johnathan Loya MD   40 mg at 02/08/23 0754    fluticasone (FLONASE) 50 MCG/ACT nasal spray 2 spray  2 spray Each Nostril BID PRN Johnathan Loya MD   2 spray at 03/74/24 5320    folic acid (FOLVITE) tablet 1 mg  1 mg Oral Daily Johnathan Loya MD   1 mg at 02/08/23 0755    lisinopril (PRINIVIL;ZESTRIL) tablet 40 mg  40 mg Oral Daily Johnathan Loya MD   40 mg at 02/08/23 0755    melatonin tablet 3 mg  3 mg Oral Nightly Johnathan Loya MD   3 mg at 02/07/23 1933    pantoprazole (PROTONIX) tablet 40 mg  40 mg Oral QAM AC Johnathan Loya MD   40 mg at 02/09/23 2162    QUEtiapine (SEROQUEL) tablet 25 mg  25 mg Oral Nightly PRN Johnathan Loya MD        [facility]) capsule 0.4 mg  0.4 mg Oral Daily Johnathan Loya MD   0.4 mg at 02/08/23 9960    thiamine mononitrate tablet 100 mg  100 mg Oral Daily Johnathan Loya MD 100 mg at 02/08/23 0754    polyethylene glycol (GLYCOLAX) packet 17 g  17 g Oral Daily Karina Cervantes MD   17 g at 02/07/23 0750    senna (SENOKOT) tablet 17.2 mg  2 tablet Oral Daily PRN Karina Cervantes MD        bisacodyl (DULCOLAX) suppository 10 mg  10 mg Rectal Daily PRN Karina Cervantes MD           Allergies:  Patient has no known allergies. Social History:    reports that he has been smoking cigarettes. He has a 10.00 pack-year smoking history. He has never used smokeless tobacco. He reports current alcohol use of about 24.0 standard drinks per week. He reports that he does not use drugs. Family History:   No family history on file.     REVIEW OF SYSTEMS:  RESPIRATORY:  negative for  dry cough, dyspnea, wheezing and chest pain positive for  smoker  CARDIOVASCULAR:  negative for  chest pain, dyspnea, palpitations, orthopnea, exertional chest pressure/discomfort, fatigue, edema, syncope positive for  htn  GASTROINTESTINAL:  negative for nausea, vomiting, change in bowel habits, diarrhea, constipation, abdominal pain and reflux positive for   GENITOURINARY:  negative for frequency, dysuria, nocturia, urinary incontinence and hesitancy positive for urinary retention  HEMATOLOGIC/LYMPHATIC:  negative for easy bruising, bleeding and swelling/edemapositive for hemmorahgic cva  ENDOCRINE:  negative for weight changes, change in bowel habits and diabetic symptoms including neither polyuria nor polydipsia nor blurred vision nor foot ulcerations nor neuropathypositive for   MUSCULOSKELETAL:  negative for  myalgias, arthralgias, pain, joint swelling, stiff joints and muscle weakness positive for  neg  NEUROLOGICAL:  negative for headaches, dizziness, memory problems, speech problems, visual disturbance, gait problems, weakness and numbness positive for cva    Vitals:  BP (!) 146/86   Pulse 75   Temp 97.7 °F (36.5 °C)   Resp 18   Ht 6' (1.829 m)   Wt 205 lb 9.6 oz (93.3 kg)   SpO2 95%   BMI 27.88 kg/m² PHYSICAL EXAM:    CONSTITUTIONAL:  awake, alert, cooperative, no apparent distress, and appears stated age just had left-sided droop left side  EYES:  Lids and lashes normal, pupils equal, round and reactive to light, extra ocular muscles intact, sclera clear, conjunctiva normal he does have an eye patch  ENT:  Normocephalic, without obvious abnormality, atraumatic, sinuses nontender on palpation, external ears without lesions, oral pharynx with moist mucus membranes, tonsils without erythema or exudates,   NECK:  Supple, symmetrical, trachea midline, no adenopathy, thyroid symmetric, not enlarged and no tenderness, skin normal    HEMATOLOGIC/LYMPHATICS:  no cervical lymphadenopathy   BACK:  Symmetric, no curvature, spinous processes are non-tender on palpation, paraspinous muscles are non-tender on palpation, no costal vertebral tenderness    LUNGS:  No increased work of breathing, good air exchange, clear to auscultation bilaterally, no crackles or wheezing   CARDIOVASCULAR:  Normal apical impulse, regular rate and rhythm, normal S1 and S2, no S3 or S4, and no murmur noted     ABDOMEN:  No scars, normal bowel sounds, soft, non-distended, non-tender, no masses palpated, no hepatosplenomegally    GENITAL/URINARY:    MUSCULOSKELETAL:  There is no redness, warmth, or swelling of the joints. Full range of motion noted. Motor strength is 5 out of 5 all extremities bilaterally. Tone is normal.   NEUROLOGIC:  Awake, alert, oriented to name, place and time. Cranial nerves II-XII are grossly intact. Motor is 5 out of 5 bilaterally.     Sensory is intact. gait is normal.  The left facial droop with the strength is good the speech is normal     SKIN:  no bruising or bleeding, normal skin color, texture, turgor, no redness, warmth, or swelling and no rashes strength is good    RECENT DATA:  No results found for: CBC, BMP    DATA:  none     Latest Reference Range & Units 1/31/23 04:02 2/1/23 04:16 2/3/23 06:49   Sodium 135 - 144 mmol/L 136 137 139   Potassium 3.7 - 5.3 mmol/L 3.9 4.0 4.2   Chloride 98 - 107 mmol/L 104 104 104   CO2 20 - 31 mmol/L 21 22 23   BUN,BUNPL 6 - 20 mg/dL 16 12 12   Creatinine 0.70 - 1.20 mg/dL 0.73 0.66 (L) 0.74   Anion Gap 9 - 17 mmol/L 11 11 12   Est, Glom Filt Rate >60 mL/min/1.73m2 >60 >60 >60   Glucose, Random 70 - 99 mg/dL 92 93 100 (H)   CALCIUM, SERUM, 618423 8.6 - 10.4 mg/dL 11.2 (H) 10.7 (H) 11.3 (H)   Total Protein 6.4 - 8.3 g/dL   7.2   (L): Data is abnormally low  (H): Data is abnormally high    ASSESSMENT:  Patient Active Problem List   Diagnosis Code    Neoplasm of unspecified nature of bone, soft tissue, and skin D49.2    Hemangioma of skin D18.01    Pontine hemorrhage (HCC) I61.3    Intracranial hemorrhage (HCC) I62.9    Impending cerebrovascular accident ([facility] [address] I63.9     Left-sided hemiparesis    Diplopia secondary to CVA      Pretension blood pressure coming down slowly we will monitor and adjust meds as needed  History of smoking on nicotine patch  History of alcohol use no evidence of withdrawal but he is on phenobarbital looks like bolus thiamine multivitamin      PLAN:  We will continue with the current treatment and monitoring to see what he does with the blood pressure continues to be elevated we will adjust his meds slowly    Patient is has not had blood work since his last admission we will do CBC differential BMP magnesium in the a.m.   We will follow with you  Thanks for the consult        Electronically signed by Rich Montes De Oca DO FAAFPon 2/9/2023 at 7:24 [address], [address]

## 2023-02-09 NOTE — PROGRESS NOTES
Speech Language Pathology  Speech Language Pathology  [facility]    Cognitive/Speech Treatment Note    Date: 2/9/2023  Patients Name: Luis Manuel De La Garza  MRN: [id number]  Diagnosis:   Patient Active Problem List   Diagnosis Code    Neoplasm of unspecified nature of bone, soft tissue, and skin D49.2    Hemangioma of skin D18.01    Pontine hemorrhage ([facility] [address] I61.3    Intracranial hemorrhage (HCC) I62.9    Impending cerebrovascular accident ([facility] [address] I63.9       Pain: 0/10    Cognitive/Speech Treatment    Treatment time: [id number]      Subjective: [x] Alert [x] Cooperative     [] Confused     [] Agitated    [] Lethargic      Objective/Assessment:    Attention: Sustained throughout, distractions minimized. Orientation: Oriented x4. Recall: Not addressed directly. Pt independently recalling directions from therapy gym to personal room. Organization: n/a    Problem Solving/Reasoning: Multiple definitions- 95% accuracy (I), 100% cued. Speech: ST educated Pt re: oral motor exercises for dysarthria. Pt reports he is \"trying\" to complete exercises outside of therapy sessions. ST encouraged Pt to complete exercises 3x/day and utilize mirror for feedback as needed. Discussed speech tasks to practice (OMEs, reading aloud, conversation). Pt verbalized understanding. Pt able to state strategies for over-articulation and slow rate (I), utilized appropriately t/o session MI. Pt able to complete sentence repetition task with very min A to use dysarthria strategies. Speech subjectively judged to to be ~95% intelligible in conversation. Other: No family present. Plan:  [x] Continue ST services    [] Discharge from ST:      Discharge recommendations: [] Inpatient Rehab   [] [place]   [] Outpatient Therapy  [] Follow up at trauma clinic   [] Other:       Treatment completed by:  Magdalene Shore M.A., CCC-SLP

## 2023-02-09 NOTE — PROGRESS NOTES
Physical Medicine & Rehabilitation  Progress Note    2/9/2023 2:03 PM     CC: Ambulatory and ADL dysfunction due to hemorrhagic pontine CVA    Subjective:   No complaints. Continues with diplopia using eye patch. Doing okay with decrease NicoDerm patch. ROS:  Denies fevers, chills, sweats. No chest pain, palpitations, lightheadedness. Denies coughing, wheezing or shortness of breath. Denies abdominal pain, nausea, diarrhea or constipation. No new areas of joint pain. Denies new areas of numbness or weakness. Denies new anxiety or depression issues. No new skin problems. Rehabilitation:   PT:  Restrictions/Precautions: Fall Risk, General Precautions  Implants present? :  (pt denies)  Other position/activity restrictions: Per Dr. Katarina Vincent OK to use Eye patch, alternate between eyes (to help with patient's double vision and balance)   Transfers  Sit to Stand: Stand by assistance  Stand to Sit: Stand by assistance  Bed to Chair: Stand by assistance  Stand Pivot Transfers: Stand by assistance  Comment: COMPENSATION techniques demonstrated, Needs UE support to stablize to preform transfer with SBA. LOB to right with decreased stability and varied least restrictive devices such as [employer] used. patient improved with standard rolling walker with wide ISRAEL demonstrated. patient also demonstrating transfers with forearm crutches and rollator. Rollator benefits discussed with patient for home and community return. patient also demonstrating ability to transfer with no assistive devices with varied level of assist needed with visual barrier, double vision, and RLE foot position with turns.   Ambulation  Surface: Level tile, Ramp  Device: Rollator (bilateral UE support devices needed)  Assistance: Minimal assistance, Contact guard assistance (intermittent SBA-JOSE C support of 2nd person for safety with change in terrain and supportive devices)  Quality of Gait: unsteady, R lateral lean with path deviation, intermittent wide and narrow ISRAEL, ataxic, decreased motor control R LE. CGA for with intermittent Scar required for right lateral LOB. Gait Deviations: Slow Tess, Deviated path, Staggers, Decreased step length, Decreased step height  Distance: 150 feet x 3; 100 feet ramp  Comments: Increased time to complete with slowed pace for patient, RLE Ataxia with poorly coordinated steps going from scissoring to Wide ISRAEL with tendency to maintain R hip external rotation. LOB to right corrected with patient lateral steppage gait to maintain balance. More Ambulation?: Yes      OT:  ADL  Equipment Provided: Feeding equipment (Built up handles)  Feeding: Setup  Feeding Skilled Clinical Factors: Pt demonstrated increased difficulty with grasping silverwear during eating task. OT provided built up handles during task to increase ease of use during self feeding. Grooming: Minimal assistance  Grooming Skilled Clinical Factors: Min A for standing balance standing sink side with slight R side lean  UE Bathing: Stand by assistance  UE Bathing Skilled Clinical Factors: Sitting on tub bench  LE Bathing: Minimal assistance  LE Bathing Skilled Clinical Factors: LOB while standing in shower to dry bottom requiring A to right self  UE Dressing: Stand by assistance  LE Dressing: Minimal assistance  LE Dressing Skilled Clinical Factors: Pt demonstrated figure 4 technique to thread BLE. Min A while standing with unsteadiness while pulling up over hips  Toileting: Minimal assistance  Toileting Skilled Clinical Factors: A for standing balance with hygiene and clothing management  Additional Comments: OT facilitated pts engagement in full shower on this date with use of tub bench, grab bars, and hand held shower. Pt required A while standing due to intermittet LOB to right side.  Pt currently limited due to decreased strength, balance, FMC, visual deficits, and activity tolerance impacting safety and independence with self care tasks Balance  Sitting Balance: Stand by assistance  Standing Balance: Moderate assistance (CGA-Mod A with multiple LOB to right side)      Functional Mobility  Functional - Mobility Device: Rolling Walker  Activity: To/from bathroom  Assist Level: Moderate assistance (CGA-Mod A with multiple LOB to right side)  Functional Mobility Comments: Verbal cues for hand placement and safety. Increased time needed with RLE. Multiple LOB to right side requiring A to right self     Bed mobility  Bridging: Modified independent   Rolling to Left: Modified independent  Rolling to Right: Modified independent  Supine to Sit: Modified independent  Sit to Supine: Modified independent  Scooting: Modified independent  Bed Mobility Comments: All bed mobility completed on flat surface without use of rails. Transfers  Sit to stand: Minimal assistance  Stand to sit: Minimal assistance  Transfer Comments: Verbal cues for hand placement and safety   Toilet Transfers  Toilet - Technique: Ambulating  Equipment Used: Grab bars  Toilet Transfer: Minimal assistance  Toilet Transfers Comments: Verbal cues for hand placement and safety. Slight unsteadiness with R side LOB     Shower Transfers  Shower - Transfer From: [place] Mejía - Transfer Type: To and From  Shower - Transfer To: Transfer tub bench  Shower - Technique: Ambulating  Shower Transfers: Moderate assistance  Shower Transfers Comments: Verbal cues for hand placement, safety and RW management over threshold. LOB to right side with Mod A to right self. ST:       Subjective: [x] Alert     [x] Cooperative     [] Confused     [] Agitated    [] Lethargic        Objective/Assessment:     Attention: Sustained throughout, distractions minimized. Orientation: Oriented x4. Recall: Not addressed directly. Pt independently recalling directions from therapy gym to personal room. Organization: n/a     Problem Solving/Reasoning: Multiple definitions- 95% accuracy (I), 100% cued. Speech: ST educated Pt re: oral motor exercises for dysarthria. Pt reports he is \"trying\" to complete exercises outside of therapy sessions. ST encouraged Pt to complete exercises 3x/day and utilize mirror for feedback as needed. Discussed speech tasks to practice (OMEs, reading aloud, conversation). Pt verbalized understanding. Pt able to state strategies for over-articulation and slow rate (I), utilized appropriately t/o session MI. Pt able to complete sentence repetition task with very min A to use dysarthria strategies. Speech subjectively judged to to be ~95% intelligible in conversation. Other: No family present. Objective:  /86   Pulse 81   Temp 98.3 °F (36.8 °C) (Oral)   Resp 17   Ht 6' (1.829 m)   Wt 205 lb 9.6 oz (93.3 kg)   SpO2 95%   BMI 27.88 kg/m²  I Body mass index is 27.88 kg/m². I   Wt Readings from Last 1 Encounters:   23 205 lb 9.6 oz (93.3 kg)      Temp (24hrs), Av °F (36.7 °C), Min:97.7 °F (36.5 °C), Max:98.3 °F (36.8 °C)         GEN: well developed, well nourished, no acute distress  HEENT: Normocephalic atraumatic, EOMI, mucous membranes pink and moist  CV: RRR, no murmurs, rubs or gallops  PULM: CTAB, no rales or rhonchi. Respirations WNL and unlabored  ABD: soft, NT, ND, +BS and equal  NEURO: A&O x3. Sensation decreased right upper and lower extremity compared to left, left facial droop,   MSK: Legs 4+/5 upper and lower extremities  EXTREMITIES: No calf tenderness to palpation bilaterally. No edema BLEs  SKIN: warm dry and intact with good turgor  PSYCH: appropriately interactive. Affect WNL.   Good spirits        Medications   Scheduled Meds:   nicotine  1 patch TransDERmal Daily    artificial tears   Left Eye Nightly    carvedilol  12.5 mg Oral BID WC    enoxaparin  40 mg SubCUTAneous Daily    folic acid  1 mg Oral Daily    lisinopril  40 mg Oral Daily    melatonin  3 mg Oral Nightly    pantoprazole  40 mg Oral QAM AC    tamsulosin  0.4 mg Oral Daily    thiamine  100 mg Oral Daily    polyethylene glycol  17 g Oral Daily     Continuous Infusions:  PRN Meds:.polyvinyl alcohol, acetaminophen, calcium carbonate, fluticasone, QUEtiapine, senna, bisacodyl     Diagnostics:     CBC: No results for input(s): WBC, RBC, HGB, HCT, MCV, RDW, PLT in the last 72 hours. BMP: No results for input(s): NA, K, CL, CO2, PHOS, BUN, CREATININE, CA in the last 72 hours. BNP: No results for input(s): BNP in the last 72 hours. PT/INR: No results for input(s): PROTIME, INR in the last 72 hours. APTT: No results for input(s): APTT in the last 72 hours. CARDIAC ENZYMES: No results for input(s): CKMB, CKMBINDEX, TROPONINT in the last 72 hours. Invalid input(s): CKTOTAL;3  FASTING LIPID PANEL:No results found for: CHOL, HDL, TRIG  LIVER PROFILE: No results for input(s): AST, ALT, ALB, BILIDIR, BILITOT, ALKPHOS in the last 72 hours. I/O (24Hr): No intake or output data in the 24 hours ending 02/09/23 1403    Glu last 24 hour  No results for input(s): POCGLU in the last 72 hours. No results for input(s): CLARITYU, COLORU, PHUR, SPECGRAV, PROTEINU, RBCUA, BLOODU, BACTERIA, NITRU, WBCUA, LEUKOCYTESUR, YEAST, GLUCOSEU, BILIRUBINUR in the last 72 hours. Impression/Plan:    Hemorrhagic pontine CVA:  PT/OT for gait, mobility, strengthening, endurance, ADLs, and self care. SLP for cognition, speech. Discharge plan 2/15  Diplopia, blurred vision:  Secondary to hemorrhagic stroke. Has eye patch - alternate use on both eyes. Added artificial tears as needed, artificial tears ointment nightly for left eye due to incomplete closing and burning of the eye.-Improved reviewed with wife neuro-ophthalmology evaluation as outpatient  Urinary retention:  Noted in acute care. On flomax. Will monitor bladder scans, PVRs. -Appears to have improved  Alcohol withdrawal:  On phenobarbital wean (tcompleted on 2/6/23). On folate, thiamine supplementation.   History of smoker-on NicoDerm patch- will decrease  HTN:  On carvedilol, lisinopril. Goal is SBP below 160 with gradual normalization of BP, per neurology. GERD:  On protonix  Bowel Management: Miralax daily, senokot prn, dulcolax prn. History of agitation-Seroquel at night, has not needed  Lab work done on admission 2/3 noted, order for weekly due again tomorrow  DVT Prophylaxis:  low molecular weight heparin, SCD's while in bed, and KP's during the day  Dr. Cabello Code for medical management-renotify  Follow up PCP 1-2 weeks, PM&R 4-6 weeks, Neurology 3 weeks, Neurosurgery 2 weeks from 1/29 - on 2/13(with repeat CT head), Neuro-ophthalmology - Dr. Finesse uV requires the assistance of a wheeled walker to successfully complete daily living tasks such as: bathing, toileting, dressing and grooming. A wheeled walker is necessary due to the patient's unsteady gait, upper body weakness, inability to  an ambulation device, and can ambulate only by pushing a walker instead of a lesser assistive device such as a cane, crutch, or standard walker. Kristian Parks. Kamini Tony MD       This note is created with the assistance of a speech recognition program.  While intending to generate a document that actually reflects the content of the visit, the document can still have some errors including those of syntax and sound a like substitutions which may escape proof reading.   In such instances, actual meaning can be extrapolated by contextual diversion

## 2023-02-09 NOTE — PROGRESS NOTES
Physical Therapy  Facility/Department: [facility] ACUTE REHAB  Rehabilitation Physical Therapy     NAME: Jolene Muller  : 1980 (43 y.o.)  MRN: [id number]  CODE STATUS: Full Code    Date of Service: 23      Past Medical History:   Diagnosis Date    Cerebral artery occlusion with cerebral infarction [facility])     History of heartburn     takes omeprazole    Hypertension     Neoplasm of unspecified nature of bone, soft tissue, and skin 2009-present    lesions of right cheek x 2    Research study patient 2023    [id number]. Date of completion 23     Past Surgical History:   Procedure Laterality Date    APPENDECTOMY  in 's    PRE-MALIGNANT / BENIGN SKIN LESION EXCISION Right 2014    Excision lesion of right cheek. Dr. Tasia Santillan. Additional Pertinent Hx: Mr. Jolene Muller is a 43 y.o. right handed male who was admitted to [facility]  on 2023 with Extremity Weakness (Left sided weakness) and Facial Droop     Patient with R weakness, numbness and facial droop who fell when he went to bed then awoke with the symptoms. SBP was in 200s upon arrival to ED. CT confirmed hemorrhagic CVA posterior ramses and narrowing R P1 PCA. He was treated with Cardene for BP control. Neurosurgery managing conservatively. Family / Caregiver Present: No  Diagnosis: Hemorrhagic CVA    Restrictions:  Restrictions/Precautions: Fall Risk;General Precautions  Position Activity Restriction  Other position/activity restrictions: Per Dr. Lionel Power OK to use Eye patch, alternate between eyes (to help with patient's double vision and balance)     SUBJECTIVE  Subjective: Patient reporting did not sleep the best last night. Denies headache, nausea and dizziness. Patient requesting walking to find the best device to go home with and return to community. patient with continued use of eye patch and alternating eyes. Reinforced patient to place on right eye as patient to report he had tendency to cover left.  patient also addressed use of both eyes and cautioned patient about right eye dominance and weaking of left eye. patient agreeable to follow recommendation alternating schedule. OBJECTIVE                      Functional Mobility  Bed mobility  Bridging: Modified independent   Rolling to Left: Modified independent  Rolling to Right: Modified independent  Supine to Sit: Modified independent  Sit to Supine: Modified independent  Scooting: Modified independent  Transfers  Sit to Stand: Stand by assistance  Stand to Sit: Stand by assistance  Bed to Chair: Stand by assistance  Stand Pivot Transfers: Stand by assistance  Comment: COMPENSATION techniques demonstrated, Needs UE support to stablize to preform transfer with SBA. LOB to right with decreased stability and varied least restrictive devices such as [employer] used. patient improved with standard rolling walker with wide ISRAEL demonstrated. patient also demonstrating transfers with forearm crutches and rollator. Rollator benefits discussed with patient for home and community return. patient also demonstrating ability to transfer with no assistive devices with varied level of assist needed with visual barrier, double vision, and RLE foot position with turns. Environmental Mobility  Ambulation  Surface: Level tile;Ramp;Outdoors (outside terrain in pm)  Device: Rollator (bilateral UE support devices needed)  Assistance: Contact guard assistance (intermittent SBA-SCAR support of 2nd person for safety with change in terrain and supportive devices)  Quality of Gait: unsteady, R lateral lean with path deviation, intermittent wide and narrow ISRAEL, ataxic, decreased motor control R LE. CGA for with intermittent Scar required for right lateral LOB.   Gait Deviations: Slow Tess;Deviated path;Staggers;Decreased step length;Decreased step height  Distance: 150 feet x 3; 100 feet ramp; 1000 feet plus on outside terrain  Comments: Increased time to complete with slowed pace for patient, RLE Ataxia with poorly coordinated steps going from scissoring to Wide ISRAEL with tendency to maintain R hip external rotation. LOB to right corrected with patient lateral steppage gait to maintain balance. More Ambulation?: Yes  Ambulation 2  Surface - 2: level tile;ramp;outdoors  Device 2: Single point cane  Assistance 2: Moderate assistance;Minimal assistance (2nd person assist for safety with SBA-CGA needed with LOB)  Quality of Gait 2: Similar presentation of deviations  Distance: 150 feet x 2; 100 feet ramp; outside 250 feet steps and ramp included  Comments: LOB to right with therapist intervention needed to prevent fall. patient able to quickly step out with therapist limiting intervention to prevent \"pusher\" reaction  Stairs  # Steps : 18 (preformed with varied method for neuro re ed and safety instructions for home)  Stairs Height: 8\" (times 3 different techniques)  Rails: Bilateral;Right ascending;Left ascending (alternating techniques)  Assistance: Minimal assistance  Comment: patient able to place RLE with tendency to cross midline with RLE but able to correct with cueing. ALTERNATING STEPS TO CONTINUE TO FACILITATE NEURO RE-ED. STEP TO FOR HOME SAFETY  Wheelchair Activities  Left Leg Rest Level of Assistance: Modified independent  Right Leg Rest Level of Assistance: Modified independent  Left Brakes Level of Assistance: Unable to assess(comment)  Right Brakes Level of Assistance: Modified independent  Propulsion 1  Propulsion: Manual  Level: Outdoors (LEVEL RAMP AND OUTDOORS)  Method: RLE;LLE;LUE  Level of Assistance: Modified independent  Distance: 500 FEET    PT Exercises  Exercise Treatment: neuro bernardo and balance training with varied assistive devices and manage objects and distraction with repeative gait in therapy room. Head turns and eye gaze and tracking with aand without eye patch use.   A/AROM Exercises: AROM RLE: LAQs x 20 reps with 4 pound cuff wt, verbal cues for control/decreased speed of movement  Dynamic Standing Balance Exercises: balance training with head turns and tracking with varied objects 45 minutes with end on Nustep activity in pm  Motor Control/Coordination: step over step flight of stairs  Exercise Equipment: NuStep 10 minutes    ASSESSMENT       Activity Tolerance  Activity Tolerance: Patient tolerated treatment well         GOALS  Patient Goals   Patient Goals : \"Practice going up/down a ladder\"  Short Term Goals  Time Frame for Short Term Goals: 7 days  Short Term Goal 1: pt to demo independent bed mobility on flat surface without use of rails per home set-up  Short Term Goal 2: pt to demo functional transfers with device and CGA  Short Term Goal 3: pt to ambulate 150' with device and MinAx1  Short Term Goal 4: pt to negotiate 10-12 steps with Single rail and device as needed demonstrating a landing pivot turn (per home set up stairs with landing) Benigno  Long Term Goals  Time Frame for Long Term Goals : Until D/C  Long Term Goal 1: Pt to improve Standing balance to FAIR + with use of device to dec risk for falls  Long Term Goal 2: pt to demo functional transfers with device Mod I  Long Term Goal 3: pt to ambulate 150' with device and supervision  Long Term Goal 4: pt to negotiate 2 steps with R rail and device and 10-12 successive steps with L rail and device as needed demonstrating a landing pivot turn (per home set up stairs with landing) CGA-SBA for stairs  Long Term Goal 5: pt to demo actual or simulated car transfer with SBA and device as needed  Long term goal 6: pt to improve on PASS score to 30/36 or greater to demonstrate improving balance, mobility, and independence  Long term goal 7: pt to demonstrate good technique for fall recovery with SBA    PLAN OF CARE  Frequency: 1-2 treatment sessions per day, 5-7 days per week  Safety Devices  Type of Devices: Gait belt;Call light within reach    EDUCATION  Education  Education Given To: Patient  Education Provided: Mobility Training;Transfer Training  Education Method: Demonstration;Verbal  Education Outcome: Verbalized understanding;Demonstrated understanding         Therapy Time   02/09/23 0900 02/09/23 1440   PT Individual Minutes   Time In 0800 1402   Time Out 0900 1432   Minutes [address] [facility] PTA, 02/09/23 at 3:16 PM

## 2023-02-09 NOTE — PLAN OF CARE
Problem: ABCDS Injury Assessment  Goal: Absence of physical injury  2/9/2023 0428 by Abhijeet Clarke LPN  Outcome: Progressing     Problem: Safety - Adult  Goal: Free from fall injury  2/9/2023 0428 by Abhijeet Clarke LPN  Outcome: Progressing     Problem: Skin/Tissue Integrity  Goal: Absence of new skin breakdown  Description: 1. Monitor for areas of redness and/or skin breakdown  2. Assess vascular access sites hourly  3. Every 4-6 hours minimum:  Change oxygen saturation probe site  4. Every 4-6 hours:  If on nasal continuous positive airway pressure, respiratory therapy assess nares and determine need for appliance change or resting period.   2/9/2023 0428 by Abhijeet Clarke LPN  Outcome: Progressing     Problem: Pain  Goal: Verbalizes/displays adequate comfort level or baseline comfort level  2/9/2023 0428 by Abhijeet Clarke LPN  Outcome: Progressing     Problem: Discharge Planning  Goal: Discharge to home or other facility with appropriate resources  2/9/2023 0428 by Abhijeet Clarke LPN  Outcome: Progressing  Flowsheets (Taken 2/8/2023 1945)  Discharge to home or other facility with appropriate resources:   Identify barriers to discharge with patient and caregiver   Arrange for needed discharge resources and transportation as appropriate   Identify discharge learning needs (meds, wound care, etc)   Refer to discharge planning if patient needs post-hospital services based on physician order or complex needs related to functional status, cognitive ability or social support system     Problem: Chronic Conditions and Co-morbidities  Goal: Patient's chronic conditions and co-morbidity symptoms are monitored and maintained or improved  2/9/2023 0428 by Abhijeet Clarke LPN  Outcome: Progressing  Flowsheets (Taken 2/8/2023 1945)  Care Plan - Patient's Chronic Conditions and Co-Morbidity Symptoms are Monitored and Maintained or Improved:   Monitor and assess patient's chronic conditions and comorbid symptoms for stability, deterioration, or improvement   Collaborate with multidisciplinary team to address chronic and comorbid conditions and prevent exacerbation or deterioration   Update acute care plan with appropriate goals if chronic or comorbid symptoms are exacerbated and prevent overall improvement and discharge     Problem: Nutrition Deficit:  Goal: Optimize nutritional status  2/9/2023 0428 by Piper Mcguire LPN  Outcome: Progressing

## 2023-02-09 NOTE — PROGRESS NOTES
Dr. Ghanshyam Fernandez was called and updated regarding consult. Message left with answering service.

## 2023-02-09 NOTE — PLAN OF CARE
Problem: ABCDS Injury Assessment  Goal: Absence of physical injury  2/9/2023 1620 by Gene Burger LPN  Flowsheets (Taken 2/9/2023 1556)  Absence of Physical Injury: Implement safety measures based on patient assessment     Problem: Safety - Adult  Goal: Free from fall injury  2/9/2023 1620 by Gene Burger LPN  Flowsheets (Taken 2/3/2023 2321 by Dayan Tam RN)  Free From Fall Injury: Instruct family/caregiver on patient safety     Problem: Skin/Tissue Integrity  Goal: Absence of new skin breakdown  Description: 1. Monitor for areas of redness and/or skin breakdown  2. Assess vascular access sites hourly  3. Every 4-6 hours minimum:  Change oxygen saturation probe site  4. Every 4-6 hours:  If on nasal continuous positive airway pressure, respiratory therapy assess nares and determine need for appliance change or resting period.   2/9/2023 1621 by Gene Burger LPN  Outcome: Progressing     Problem: Pain  Goal: Verbalizes/displays adequate comfort level or baseline comfort level  2/9/2023 1621 by Gene Burger LPN  Outcome: Progressing     Problem: Discharge Planning  Goal: Discharge to home or other facility with appropriate resources  2/9/2023 1621 by Gene Burger LPN  Outcome: Progressing     Problem: Chronic Conditions and Co-morbidities  Goal: Patient's chronic conditions and co-morbidity symptoms are monitored and maintained or improved  2/9/2023 1621 by Gene Burger LPN  Outcome: Progressing     Problem: Nutrition Deficit:  Goal: Optimize nutritional status  2/9/2023 1621 by Gene Burger LPN  Outcome: Progressing

## 2023-02-09 NOTE — CARE COORDINATION
ARU CASE MANAGEMENT NOTE:    Patient is alert and oriented x4. Spoke with patient regarding discharge plan and patient confirms plan plan is to go home w/ support from spouse and family     DME: Bath and shower seat ordered and sent to [facility]. Outside appointments: 2/13/23 Neuro appointment with Adama Lugo Patient is due to DC on 2/15/23     Will continue to follow for additional discharge needs.     Electronically signed by Hilda Choi RN on 2/9/2023 at 12:55 PM

## 2023-02-09 NOTE — PROGRESS NOTES
[id number]    Acute Rehabilitation Occupational Therapy Daily Treatment Note    Date: 23  Patient Name: Martin Lewis       Room: 4179/6670-47  MRN: [id number]  Account: [de-identified]   : 1980  (43 y.o.) Gender: male       Referring Practitioner: Yan Brooks MD  Diagnosis: Hemorrhagic CVA  Additional Pertinent Hx: Mr. Martin Lewis is a 43 y.o. right handed male who was admitted to [facility] on 2023 with Extremity Weakness (Right sided weakness) and Facial Droop. Patient with R weakness, numbness and facial droop who fell when he went to bed then awoke with the symptoms. SBP was in 200s upon arrival to ED. CT confirmed hemorrhagic CVA posterior ramses and narrowing R P1 PCA. He was treated with Cardene for BP control. Neurosurgery managing conservatively. Pt admitted to ARU 23    Treatment Diagnosis: Impaired self care status    Past Medical History:  has a past medical history of Cerebral artery occlusion with cerebral infarction [facility]), History of heartburn, Hypertension, Neoplasm of unspecified nature of bone, soft tissue, and skin, and Research study patient. Past Surgical History:   has a past surgical history that includes Appendectomy (in 20's) and pre-malignant / benign skin lesion excision (Right, 2014). Restrictions  Restrictions/Precautions  Restrictions/Precautions: Fall Risk, General Precautions  Required Braces or Orthoses?: No  Implants present? :  (pt denies)     Position Activity Restriction  Other position/activity restrictions: Per Dr. Jimmy CROWDER to use Eye patch, alternate between eyes (to help with patient's double vision and balance)     Vitals      Subjective  Subjective  Subjective: AM: Pt. in recliner chair in room upon arrival. Awake. Watching tv. Pleasant and agreeable to participate in session. PM: Pt. in wheelchair, agreeable to afternoon shower routine.   Pain: Pt denies pain at this time     Objective  Cognition  Overall Orientation Status: Within Functional Limits  Orientation Level: Oriented X4    Cognition  Overall Cognitive Status: Exceptions  Arousal/Alertness: Appropriate responses to stimuli  Following Commands: Follows multistep commands with increased time  Attention Span: Appears intact; Attends with cues to redirect  Memory: Appears intact  Safety Judgement: Decreased awareness of need for assistance;Decreased awareness of need for safety  Problem Solving: Assistance required to identify errors made;Assistance required to correct errors made  Insights: Decreased awareness of deficits  Initiation: Requires cues for some  Sequencing: Requires cues for some  Cognition Comment: pt can be impulsive at times    Activities of Daily Living  Feeding  Assistance Level: Set-up  Skilled Clinical Factors: Pt. reporting he is getting better with packets, containers, and silverware. Stated sometimes he uses built up utensil- otherwise challenges self not to. Grooming/Oral Hygiene  Assistance Level: Stand by assist  Skilled Clinical Factors: SBA/close SUP- standing at sink side with rw for oral hygiene and shaving tasks. Upper Extremity Bathing  Assistance Level: Set-up  Skilled Clinical Factors: Seated on shower bench. Lower Extremity Bathing  Assistance Level: Set-up  Skilled Clinical Factors: Seated on shower bench. Upper Extremity Dressing  Assistance Level: Modified independent  Skilled Clinical Factors: Pt. was able to retreive, doff, and izabela UB clothing items with (I) from wheelchair level this date. Lower Extremity Dressing  Assistance Level: Stand by assist  Skilled Clinical Factors: Distant SUP provided during threading/unthreading while seated, SBA/Close SUP during clothing management while standing.     Putting On/Taking Off Footwear  Assistance Level: Set-up  Skilled Clinical Factors: Declines TEDs at this time, utilizes figure 4 method to [employer]  Assistance Level: Stand by assist  Skilled Clinical Factors: SBA/close SUP during clothing management. Light Housekeeping  Light Housekeeping Level: Wheelchair  Light Housekeeping Level of Assistance: Modified independent  Light Housekeeping: Pt. completed self care set up and clean up from wheelchair level without safety concern. Able to safely retreive and transport items. Pt is able to safely complete organizational tasks and leisure activities of choice within room. Mobility     Sit to Stand  Assistance Level: Stand by assist  Skilled Clinical Factors: completed with no cuing. G safety noted. Stand to Sit  Assistance Level: Stand by assist  Skilled Clinical Factors: completed with no cuing. G safety noted. Functional Mobility  Device: Rolling walker; Wheelchair  Activity: To/From bathroom; Retrieve items;Transport items; To/From therapy gym  Assistance Level: Contact guard assist  Skilled Clinical Factors: CGA/SBA with use of rw. Pt. mod(I)for wheelchair mobility. OT Exercises  Motor Control/Coordination: Participation in several graded functional fine motor activities, challenging B hand control and coordination, generally R UE/hand/digits, to increase ability to assist L UE during self care object manipulation for most effective and indep performance. Assessment  Assessment  Activity Tolerance: Patient tolerated treatment well  Discharge Recommendations: Home with assist PRN;Outpatient OT    Patient Education  Education  Education Given To: Patient  Education Provided: Role of Therapy;Plan of Care;Safety; Mobility Training;Transfer Training;Visual Perceptual Function  Education Method: Verbal;Demonstration; Teach Back  Barriers to Learning: Vision  Education Outcome: Verbalized understanding;Continued education needed;Demonstrated understanding    OT Equipment Recommendations  Other: TBD    Safety Devices  Safety Devices in place: Yes  Type of devices:  All fall risk precautions in place     Goals  Patient Goals   Patient goals : \"To get back to those things (ADL, IADL, and [address] in [place]). \"  Short Term Goals  Time Frame for Short Term Goals: By 1 week  Short Term Goal 1: Pt will complete lower body dressing/bathing with CGA and Good safety with use of AE as needed  Short Term Goal 2: Pt will complete functional transfers/mobility during self care tasks with CGA and Good safety with no LOB  Short Term Goal 3: Pt will tolerate standing 7+ minutes during functional activity of choice with CGA and no LOB during functional activity of choice  Short Term Goal 4: Pt will verbalize/demonstrate Good understanding of AE/adaptive strategies/DME to increase safety and independence with self care and mobility  Short Term Goal 5: Pt will verbalize/demonstrate 3 visual compensatory strategies to increase safety with daily activities and increase quality of life  Short Term Goal 6: Pt will participate in 30+ minutes of therapeutic exercises/functional activities to increase safety and independence with self care and mobility    Long Term Goals  Time Frame for Long Term Goals : By discharge  Long Term Goal 1: Pt will complete BADLs with Mod I and Good safety with use of AE as needed  Long Term Goal 2: Pt will complete functional mobility during self care tasks with Mod I and Good safety with use of least restrictive device  Long Term Goal 3: Pt will tolerate standing 15+ minutes during functional activity of choice with Good safety  Long Term Goal 4: Pt will complete simple meal prep/light house keeping task with Supervision and Good safety  Long Term Goal 5: Pt will complete tub transfer with Supervision and Good safety with use of DME as needed  Long Term Goal 6: Pt will verbalize/demonstrate Good understanding of vision HEP to increase neurofacilitation of left side visual field to increase safety during daily activities  Long Term Goal 7: Pt will verbalize/demonstrate Good understanding of home safety/fall prevention strategies to increase safety and independence with self care and mobility  Long Term Goal 8: Pt will demonstrate increased DELTA [facility] during self care tasks as evident by 5 second improvement with LUE and 30 second improvement with RUE during 9 hole peg test    Plan  Occupational Therapy Plan  Times Per Week: 5-7  Times Per Day: Twice a day  Current Treatment Recommendations: Self-Care / ADL, Strengthening, Balance training, Functional mobility training, Endurance training, Neuromuscular re-education, Pain management, Safety education & training, Patient/Caregiver education & training, Equipment evaluation, education, & procurement, Home management training, Cognitive/Perceptual training, Coordination training       02/09/23 0753 02/09/23 1555   OT Individual Minutes   Time In 1103 1500   Time Out [id number]   [id number] 96 [id number]       Electronically signed by JASON Branham on 2/9/23 at 3:56 PM EST

## 2023-02-10 LAB
ABSOLUTE EOS #: 0.1 K/UL (ref 0–0.4)
ABSOLUTE LYMPH #: 1.9 K/UL (ref 1–4.8)
ABSOLUTE MONO #: 1 K/UL (ref 0.1–1.3)
ALT SERPL-CCNC: 33 U/L (ref 5–41)
ANION GAP SERPL CALCULATED.3IONS-SCNC: 8 MMOL/L (ref 9–17)
AST SERPL-CCNC: 18 U/L
BASOPHILS # BLD: 1 % (ref 0–2)
BASOPHILS ABSOLUTE: 0.1 K/UL (ref 0–0.2)
BUN SERPL-MCNC: 21 MG/DL (ref 6–20)
CA-I BLD-SCNC: 1.47 MMOL/L (ref 1.1–1.33)
CALCIUM SERPL-MCNC: 11.7 MG/DL (ref 8.6–10.4)
CHLORIDE SERPL-SCNC: 100 MMOL/L (ref 98–107)
CO2 SERPL-SCNC: 27 MMOL/L (ref 20–31)
CREAT SERPL-MCNC: 0.8 MG/DL (ref 0.7–1.2)
EOSINOPHILS RELATIVE PERCENT: 1 % (ref 0–4)
GFR SERPL CREATININE-BSD FRML MDRD: >60 ML/MIN/1.73M2
GLUCOSE SERPL-MCNC: 99 MG/DL (ref 70–99)
HCT VFR BLD AUTO: 46.7 % (ref 41–53)
HGB BLD-MCNC: 15.7 G/DL (ref 13.5–17.5)
LYMPHOCYTES # BLD: 21 % (ref 24–44)
MAGNESIUM SERPL-MCNC: 2.1 MG/DL (ref 1.6–2.6)
MCH RBC QN AUTO: 32.1 PG (ref 26–34)
MCHC RBC AUTO-ENTMCNC: 33.7 G/DL (ref 31–37)
MCV RBC AUTO: 95.4 FL (ref 80–100)
MONOCYTES # BLD: 11 % (ref 1–7)
PDW BLD-RTO: 13 % (ref 11.5–14.9)
PLATELET # BLD AUTO: 347 K/UL (ref 150–450)
PMV BLD AUTO: 8.2 FL (ref 6–12)
POTASSIUM SERPL-SCNC: 5.1 MMOL/L (ref 3.7–5.3)
RBC # BLD: 4.9 M/UL (ref 4.5–5.9)
SEG NEUTROPHILS: 66 % (ref 36–66)
SEGMENTED NEUTROPHILS ABSOLUTE COUNT: 5.7 K/UL (ref 1.3–9.1)
SODIUM SERPL-SCNC: 135 MMOL/L (ref 135–144)
WBC # BLD AUTO: 8.8 K/UL (ref 3.5–11)

## 2023-02-10 PROCEDURE — 97110 THERAPEUTIC EXERCISES: CPT

## 2023-02-10 PROCEDURE — 84450 TRANSFERASE (AST) (SGOT): CPT

## 2023-02-10 PROCEDURE — 97530 THERAPEUTIC ACTIVITIES: CPT

## 2023-02-10 PROCEDURE — 99232 SBSQ HOSP IP/OBS MODERATE 35: CPT | Performed by: PHYSICAL MEDICINE & REHABILITATION

## 2023-02-10 PROCEDURE — 6360000002 HC RX W HCPCS

## 2023-02-10 PROCEDURE — 80048 BASIC METABOLIC PNL TOTAL CA: CPT

## 2023-02-10 PROCEDURE — 85025 COMPLETE CBC W/AUTO DIFF WBC: CPT

## 2023-02-10 PROCEDURE — 97116 GAIT TRAINING THERAPY: CPT

## 2023-02-10 PROCEDURE — 97129 THER IVNTJ 1ST 15 MIN: CPT

## 2023-02-10 PROCEDURE — 92507 TX SP LANG VOICE COMM INDIV: CPT

## 2023-02-10 PROCEDURE — 97535 SELF CARE MNGMENT TRAINING: CPT

## 2023-02-10 PROCEDURE — 83970 ASSAY OF PARATHORMONE: CPT

## 2023-02-10 PROCEDURE — 84460 ALANINE AMINO (ALT) (SGPT): CPT

## 2023-02-10 PROCEDURE — 82330 ASSAY OF CALCIUM: CPT

## 2023-02-10 PROCEDURE — 36415 COLL VENOUS BLD VENIPUNCTURE: CPT

## 2023-02-10 PROCEDURE — 6370000000 HC RX 637 (ALT 250 FOR IP): Performed by: PHYSICAL MEDICINE & REHABILITATION

## 2023-02-10 PROCEDURE — 6370000000 HC RX 637 (ALT 250 FOR IP)

## 2023-02-10 PROCEDURE — 83735 ASSAY OF MAGNESIUM: CPT

## 2023-02-10 PROCEDURE — 82306 VITAMIN D 25 HYDROXY: CPT

## 2023-02-10 PROCEDURE — 97112 NEUROMUSCULAR REEDUCATION: CPT

## 2023-02-10 PROCEDURE — 1180000000 HC REHAB R&B

## 2023-02-10 RX ADMIN — PANTOPRAZOLE SODIUM 40 MG: 40 TABLET, DELAYED RELEASE ORAL at 06:17

## 2023-02-10 RX ADMIN — LISINOPRIL 40 MG: 20 TABLET ORAL at 09:51

## 2023-02-10 RX ADMIN — CARVEDILOL 12.5 MG: 12.5 TABLET, FILM COATED ORAL at 10:00

## 2023-02-10 RX ADMIN — THIAMINE HCL TAB 100 MG 100 MG: 100 TAB at 09:51

## 2023-02-10 RX ADMIN — TAMSULOSIN HYDROCHLORIDE 0.4 MG: 0.4 CAPSULE ORAL at 09:51

## 2023-02-10 RX ADMIN — CARVEDILOL 12.5 MG: 12.5 TABLET, FILM COATED ORAL at 17:35

## 2023-02-10 RX ADMIN — FOLIC ACID 1 MG: 1 TABLET ORAL at 09:51

## 2023-02-10 RX ADMIN — MINERAL OIL, PETROLATUM: 425; 568 OINTMENT OPHTHALMIC at 21:16

## 2023-02-10 RX ADMIN — ENOXAPARIN SODIUM 40 MG: 100 INJECTION SUBCUTANEOUS at 09:12

## 2023-02-10 ASSESSMENT — PAIN SCALES - GENERAL
PAINLEVEL_OUTOF10: 0

## 2023-02-10 NOTE — PROGRESS NOTES
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 Orientation: Oriented x4. Recall: Pt. Education provided re: compensatory strategies to facilitate recall (e.g., ID key words, repetition and rehearsal). Pt. Verbalized understanding and facilitating strategy to complete the following task:    Paragraph recall (4-7 sentences): 100% accuracy (I). Organization: n/a    Problem Solving/Reasoning: n/a    Speech: ST educated Pt re: oral motor exercises for dysarthria. Pt able to state strategies for over-articulation and slow rate (I), utilized appropriately t/o session MI. Pt able to complete alliteration drills task with very min A to use dysarthria strategies. Speech subjectively judged to to be 100% intelligible in conversation. Pt able to complete OMEs x5 sets in reps of 10 c mod cues. No movement noted on L side of face with labial retraction; minimal movement noted on L side with labial lateralization and protrusion. Encouraged Pt to continue practicing exercises and speech tasks throughout day (using mirror as form of visual feedback). Pt and wife verbalized understanding and agreeable. Other: Pt's wife present for session, providing encouragement throughout.       Plan:  [x] Continue  services    [] Discharge from :      Discharge recommendations: [] Inpatient Rehab   [] [place]   [] Outpatient Therapy  [] Follow up at trauma clinic   [] Other:       Treatment completed by:  Delmar Henderson M.A., CCC-SLP

## 2023-02-10 NOTE — PROGRESS NOTES
[address]    Acute Rehabilitation Occupational Therapy Daily Treatment Note    Date: 2/10/23  Patient Name: Elle Barr       Room: 4450/7073-44  MRN: [id number]  Account: [de-identified]   : 1980  (43 y.o.) Gender: male       Referring Practitioner: Ulysses Hones, MD  Diagnosis: Hemorrhagic CVA  Additional Pertinent Hx: Mr. Elle Barr is a 43 y.o. right handed male who was admitted to [facility] on 2023 with Extremity Weakness (Right sided weakness) and Facial Droop. Patient with R weakness, numbness and facial droop who fell when he went to bed then awoke with the symptoms. SBP was in 200s upon arrival to ED. CT confirmed hemorrhagic CVA posterior ramses and narrowing R P1 PCA. He was treated with Cardene for BP control. Neurosurgery managing conservatively. Pt admitted to ARU 23    Treatment Diagnosis: Impaired self care status    Past Medical History:  has a past medical history of Cerebral artery occlusion with cerebral infarction [facility]), History of heartburn, Hypertension, Neoplasm of unspecified nature of bone, soft tissue, and skin, and Research study patient. Past Surgical History:   has a past surgical history that includes Appendectomy (in 20's) and pre-malignant / benign skin lesion excision (Right, 2014). Restrictions  Restrictions/Precautions  Restrictions/Precautions: Fall Risk, General Precautions  Required Braces or Orthoses?: No  Implants present? :  (pt denies)     Position Activity Restriction  Other position/activity restrictions: Per Dr. Gibson Edouard OK to use Eye patch, alternate between eyes (to help with patient's double vision and balance)    Vitals  Vital Signs  BP Location: Left upper arm  O2 Device: None (Room air)     Subjective  Subjective  Subjective: AM: Pt seated in w/c. Spouse in room. Pt agreeable to participate in therapy. PM: Pt. in recliner chair in room upon arrival. Awake. Watching tv.  Pleasant and agreeable to participate in session. Pain: Pt denies pain at this time    Objective  Cognition  Overall Orientation Status: Within Functional Limits  Orientation Level: Oriented X4    Cognition  Overall Cognitive Status: Exceptions  Arousal/Alertness: Appropriate responses to stimuli  Following Commands: Follows multistep commands with increased time  Attention Span: Appears intact; Attends with cues to redirect  Memory: Appears intact  Safety Judgement: Decreased awareness of need for assistance;Decreased awareness of need for safety  Problem Solving: Assistance required to identify errors made;Assistance required to correct errors made  Insights: Decreased awareness of deficits  Initiation: Requires cues for some  Sequencing: Requires cues for some  Cognition Comment: pt can be impulsive at times    Activities of Daily Living  Feeding  Equipment Provided: Feeding utensils (Built up handles)  Assistance Level: Set-up  Skilled Clinical Factors: Pt. reporting he is getting better with packets, containers, and silverware. Stated sometimes he uses built up utensil- otherwise challenges self not to. Grooming/Oral Hygiene  Assistance Level: Stand by assist  Skilled Clinical Factors: SBA/close SUP- standing at sink side with rw for oral hygiene and shaving tasks. Upper Extremity Bathing  Assistance Level: Supervision  Skilled Clinical Factors: Seated on shower bench. Lower Extremity Bathing  Assistance Level: Supervision  Skilled Clinical Factors: Seated on shower bench. Upper Extremity Dressing  Assistance Level: Modified independent  Skilled Clinical Factors: Pt. was able to retreive, doff, and izabela UB clothing items with (I) from wheelchair level this date. Lower Extremity Dressing  Assistance Level: Stand by assist  Skilled Clinical Factors: Distant SUP provided during threading/unthreading while seated, SBA/Close SUP during clothing management while standing.     Putting On/Taking Off Footwear  Assistance Level: Set-up  Skilled Clinical Factors: Declines TEDs at this time, utilizes figure 4 method to izabela socks/shoes    Tub/Shower Transfers  Type: Shower  Transfer From: Rolling walker  Transfer To: Tub transfer bench  Additional Factors: Verbal cues; Increased time to complete  Assistance Level: Contact guard assist  Skilled Clinical Factors: CGA and v/c for safety over threashold d/t visual deficits with increased time. Mobility    Sit to Stand  Assistance Level: Stand by assist  Skilled Clinical Factors: cuing for hand placement and breaks placed on w/c for safety. fair return noted. Stand to Sit  Assistance Level: Stand by assist  Skilled Clinical Factors: cuing for hand placement and breaks placed on w/c for safety. fair return noted. 1 LOB noted    Functional Mobility  Device: Rolling walker; Wheelchair  Activity: To/From bathroom; Retrieve items;Transport items; To/From therapy gym  Assistance Level: Contact guard assist  Skilled Clinical Factors: CGA/SBA with use of rw. Pt. mod(I)for wheelchair mobility. OT Exercises  Motor Control/Coordination: Participation in several graded functional fine motor activities, challenging B hand control and coordination, generally R UE/hand/digits, to increase ability to assist L UE during self care object manipulation for most effective and indep performance. \"Get a  on patterns\", \"Peg it\", \"Perfection\", and placing golf tees in holes on a movable surface. Pt demo difficulty with controlling  and dropping. With increased activity, Pt demo greater control. Assessment  Assessment  Activity Tolerance: Patient tolerated treatment well  Discharge Recommendations: Home with assist PRN;Outpatient OT    Patient Education  Education  Education Given To: Patient; Family  Education Provided: Role of Therapy;Plan of Care;Safety; Mobility Training;Transfer Training;Visual Perceptual Function;DME/Home Modifications  Education Provided Comments: Writer faciliated discussion with pt and spouse regarding transfer tub bench. Both pt and spouse req seeing a transfer tub bench for a visual aid in determining if this would be a good DME/Modification to showering at home. Writer demo how to use the transfer tub bench and encouraged the pt to demo the same. Pt declined at this time. Pt and spouse agreed the transfer tub bench would be a good idea to remain safe when showering. Writer also edu/encouraged pt and spouse to consider the use of GB in the shower for additional safety. G return noted. Education Method: Verbal;Demonstration; Teach Back  Barriers to Learning: Vision  Education Outcome: Verbalized understanding;Continued education needed;Demonstrated understanding    OT Equipment Recommendations  Other: TBD    Safety Devices  Safety Devices in place: Yes  Type of devices: All fall risk precautions in place       Goals  Patient Goals   Patient goals : \"To get back to those things (ADL, IADL, and [facility]). \"  Short Term Goals  Time Frame for Short Term Goals: By 1 week  Short Term Goal 1: Pt will complete lower body dressing/bathing with CGA and Good safety with use of AE as needed  Short Term Goal 2: Pt will complete functional transfers/mobility during self care tasks with CGA and Good safety with no LOB  Short Term Goal 3: Pt will tolerate standing 7+ minutes during functional activity of choice with CGA and no LOB during functional activity of choice  Short Term Goal 4: Pt will verbalize/demonstrate Good understanding of AE/adaptive strategies/DME to increase safety and independence with self care and mobility  Short Term Goal 5: Pt will verbalize/demonstrate 3 visual compensatory strategies to increase safety with daily activities and increase quality of life  Short Term Goal 6: Pt will participate in 30+ minutes of therapeutic exercises/functional activities to increase safety and independence with self care and mobility    Long Term Goals  Time Frame for Long Term Goals : By discharge  Long Term Goal 1: Pt will complete BADLs with Mod I and Good safety with use of AE as needed  Long Term Goal 2: Pt will complete functional mobility during self care tasks with Mod I and Good safety with use of least restrictive device  Long Term Goal 3: Pt will tolerate standing 15+ minutes during functional activity of choice with Good safety  Long Term Goal 4: Pt will complete simple meal prep/light house keeping task with Supervision and Good safety  Long Term Goal 5: Pt will complete tub transfer with Supervision and Good safety with use of DME as needed  Long Term Goal 6: Pt will verbalize/demonstrate Good understanding of vision HEP to increase neurofacilitation of left side visual field to increase safety during daily activities  Long Term Goal 7: Pt will verbalize/demonstrate Good understanding of home safety/fall prevention strategies to increase safety and independence with self care and mobility  Long Term Goal 8: Pt will demonstrate increased [address] during self care tasks as evident by 5 second improvement with LUE and 30 second improvement with RUE during 9 hole peg test    Plan  Occupational Therapy Plan  Times Per Week: 5-7  Times Per Day: Twice a day  Current Treatment Recommendations: Self-Care / ADL, Strengthening, Balance training, Functional mobility training, Endurance training, Neuromuscular re-education, Pain management, Safety education & training, Patient/Caregiver education & training, Equipment evaluation, education, & procurement, Home management training, Cognitive/Perceptual training, Coordination training         02/10/23 0958 02/10/23 1000   OT Individual Minutes   Time In [id number]   Time Out [id number]   Minutes 60 33       Electronically signed by JASON Thomas on 2/10/23 at 6:33 PM EST

## 2023-02-10 NOTE — DISCHARGE SUMMARY
Neuro Critical Care   Discharge Summary        PATIENT NAME: Suzi Dill  YOB: 1980  MEDICAL RECORD NO. 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) I62.9         HOSPITAL COURSE      Suzi Dill is a 43 y.o. yo male who presented to [facility] on 1/26/2023  5:56 AM with dizziness, right-sided facial droop, right-sided weakness and right-sided numbness. Patient felt well when he went to bed 1/25, last known well 10 PM.  Medical history notable for hypertension, taking Coreg for this. Patient does not know his dose. EMS was called when patient woke up with these symptoms this morning. Systolic blood pressure was 200s on arrival to the ED. Stroke alert was called on arrival, CT of the head demonstrated 13 x 8 mm intraparenchymal hemorrhage of the posterior ramses. CTA of the head and neck showed focal mild narrowing of the right P1 PCA, no other significant stenosis, aneurysm, or arterial injury. Cardene was started for blood pressure control with goal blood pressure under 160. Patient's metabolic work-up unremarkable overall. Patient remains alert, oriented, able to provide history. He was describing double vision, sensation changes on the right and weakness. He was unable to walk when this happened. No other medical history. No known allergies. Patient does smoke. Patient was requiring Cardene to maintain BP goals. Repeat CT showed stable intraparenchymal hemorrhage without midline shift. Patient became agitated, confused and started having hallucinations. His wife reported daily ETOH use; ~2 vodka drinks per night. Repeat CT Head stable.   Started on Luminal 260 mg once, then 130 mg bid, IV Thiamine 108 mg tid, folic acid, and Seroquel 25 mg nightly PRN. Hypertensive; Lisinopril increased to 40mg daily. Transferred back to ICU for precedex drip. urinary retention requiring straight cath x2. Received phenobarbital and ativan via CIWA for withdrawal symptoms. Reportedly had a fall out of bed overnight. Patients states he moves around a lot in his sleep and rolled over and accidentally rolled out of bed. Denies striking his head, denies LOC. States he landed on his knees, denying any injuries. Patient had interval improvement on physical exam.  Full strength right upper and lower extremity, patient able to walk go to the bathroom unassisted and shower. Restarted on scheduled phenobarbital 130 mg twice daily for 3 days followed by 65 mg for 2 days for withdrawal symptoms. Labs and imaging were followed daily. At time of discharge, She Arias was tolerating a ADULT DIET; Regular, having bowel movements, and is in stable condition to be discharged to [address] [place] [address]. PROCEDURES:    Arterial Line 1/26/2023     PHYSICAL EXAMINATION         Discharge Vitals:  height is 6' (1.829 m) and weight is 200 lb (90.7 kg). His oral temperature is 98 °F (36.7 °C). His blood pressure is 149/110 (abnormal) and his pulse is 65. His respiration is 12 and oxygen saturation is 97%. CONSTITUTIONAL:  Well developed, well nourished, alert and oriented x 3, in no acute distress. GCS 15. Nontoxic. No dysarthria. No aphasia.    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 Left facial droop     Motor Exam:    Drift:  absent  Tone:  normal     Motor exam is symmetrical 5 out of 5 all extremities bilaterally     Sensory:    Touch:    Right Upper Extremity:  normal  Left Upper Extremity:  normal  Right Lower Extremity:  normal  Left Lower Extremity:  normal     Deep Tendon Reflexes:    Right Bicep:  2+  Left Bicep:  2+  Right Knee:  2+  Left Knee:  2+     Plantar Response:  Right:  downgoing  Left:  downgoing     Clonus:  absent  Kong's:  absent     Coordination/Dysmetria:  Heel to Shin:  Right:  abnormal -mild dysmetria  Left:  normal  Finger to Nose:   Right:  abnormal -mild dysmetria  Left:  normal         Gait:  ataxic         LABS/IMAGING           Recent Labs     01/31/23  0402 02/01/23  0416   WBC 7.6 8.0   HGB 14.7 15.1   HCT 44.8 45.6   PLT See Reflexed IPF Result 182    137   K 3.9 4.0    104   CO2 21 22   BUN 16 12   CREATININE 0.73 0.66*         CT HEAD WO CONTRAST     Result Date: 1/30/2023  EXAMINATION: CT OF THE HEAD WITHOUT CONTRAST  1/29/2023 3:02 pm TECHNIQUE: CT of the head was performed without the administration of intravenous contrast. Automated exposure control, iterative reconstruction, and/or weight based adjustment of the mA/kV was utilized to reduce the radiation dose to as low as reasonably achievable. COMPARISON: 1/27/2023 HISTORY: ORDERING SYSTEM PROVIDED HISTORY: Assess for extension of bleed TECHNOLOGIST PROVIDED HISTORY: Assess for extension of bleed Reason for Exam: Assess for extension of bleed FINDINGS: BRAIN/VENTRICLES: Acute left dorsal pontine intraparenchymal hemorrhage is unchanged. No new hemorrhage is identified. There is no herniation or hydrocephalus. ORBITS: The visualized portion of the orbits demonstrate no acute abnormality. SINUSES: The visualized paranasal sinuses and mastoid air cells demonstrate no acute abnormality.  SOFT TISSUES/SKULL:  No acute abnormality of the visualized skull or soft tissues. Unchanged pontine hemorrhage. No intraventricular extension. CT head without contrast     Result Date: 1/27/2023  EXAMINATION: CT OF THE HEAD WITHOUT CONTRAST  1/27/2023 5:37 am TECHNIQUE: CT of the head was performed without the administration of intravenous contrast. Automated exposure control, iterative reconstruction, and/or weight based adjustment of the mA/kV was utilized to reduce the radiation dose to as low as reasonably achievable. COMPARISON: CT head, CTA head 01/26/2023 HISTORY: ORDERING SYSTEM PROVIDED HISTORY: Intracerebral Hemorrhage TECHNOLOGIST PROVIDED HISTORY: Intracerebral Hemorrhage Decision Support Exception - unselect if not a suspected or confirmed emergency medical condition->Emergency Medical Condition (MA) Reason for Exam: Intracerebral Hemorrhage FINDINGS: BRAIN/VENTRICLES: Stable intraparenchymal hemorrhage centered in the ramses, 13 x 8 mm (2:18). The gray-white differentiation is maintained without evidence of an acute infarct. There is no evidence of hydrocephalus. ORBITS: The visualized portion of the orbits demonstrate no acute abnormality. SINUSES: The visualized paranasal sinuses and mastoid air cells demonstrate no acute abnormality. SOFT TISSUES/SKULL:  No acute abnormality of the visualized skull or soft tissues. Stable intraparenchymal hemorrhage. No midline shift. CT HEAD WO CONTRAST     Result Date: 1/26/2023  EXAMINATION: CT OF THE HEAD WITHOUT CONTRAST  1/26/2023 6:09 am TECHNIQUE: CT of the head was performed without the administration of intravenous contrast. Automated exposure control, iterative reconstruction, and/or weight based adjustment of the mA/kV was utilized to reduce the radiation dose to as low as reasonably achievable. COMPARISON: None.  HISTORY: ORDERING SYSTEM PROVIDED HISTORY: L facial droop, L weakness, N/V, htn TECHNOLOGIST PROVIDED HISTORY: L facial droop, L weakness, N/V, htn Decision Support Exception - unselect if not a suspected or confirmed emergency medical condition->Emergency Medical Condition (MA) Reason for Exam: L facial droop, L weakness, N/V, htn FINDINGS: BRAIN/VENTRICLES: 13 x 8 mm intraparenchymal hemorrhage posterior ramses (2:23). There is no midline shift. No abnormal extra-axial fluid collection. The gray-white differentiation is maintained without evidence of an acute infarct. There is no evidence of hydrocephalus. ORBITS: The visualized portion of the orbits demonstrate no acute abnormality. SINUSES: The visualized paranasal sinuses and mastoid air cells demonstrate no acute abnormality. SOFT TISSUES/SKULL:  No acute abnormality of the visualized skull or soft tissues. 13 x 8 mm intraparenchymal hemorrhage posterior ramses. Critical results were called by Dr. Jerry Mohan to Dr. Robin Kelley on 1/26/2023 at 06:37. CTA HEAD NECK W CONTRAST     Result Date: 1/26/2023  EXAMINATION: CTA OF THE HEAD AND NECK WITH CONTRAST 1/26/2023 6:09 am: TECHNIQUE: CTA of the head and neck was performed with the administration of intravenous contrast. Multiplanar reformatted images are provided for review. MIP images are provided for review. Stenosis of the internal carotid arteries measured using NASCET criteria. Automated exposure control, iterative reconstruction, and/or weight based adjustment of the mA/kV was utilized to reduce the radiation dose to as low as reasonably achievable. COMPARISON: None. HISTORY: ORDERING SYSTEM PROVIDED HISTORY: L facial droop, L weakness, N/V, htn TECHNOLOGIST PROVIDED HISTORY: L facial droop, L weakness, N/V, htn Decision Support Exception - unselect if not a suspected or confirmed emergency medical condition->Emergency Medical Condition (MA) Reason for Exam: L facial droop, L weakness, N/V, htn FINDINGS: CTA NECK: AORTIC ARCH/ARCH VESSELS: No dissection or arterial injury. No significant stenosis of the brachiocephalic or subclavian arteries.  CAROTID ARTERIES: No dissection, arterial injury, or hemodynamically significant stenosis by NASCET criteria. VERTEBRAL ARTERIES: No dissection, arterial injury, or significant stenosis. Left vertebral artery dominant. SOFT TISSUES: The lung apices are clear. No cervical or superior mediastinal lymphadenopathy. The larynx and pharynx are unremarkable. No acute abnormality of the salivary and thyroid glands. BONES: No acute osseous abnormality. CTA HEAD: ANTERIOR CIRCULATION: No significant stenosis of the intracranial internal carotid, anterior cerebral, or middle cerebral arteries. No aneurysm. POSTERIOR CIRCULATION: Focal mild narrowing right P1 (series 4, image 182) no significant stenosis of the vertebral, basilar, or posterior cerebral arteries. No aneurysm. The right vertebral artery terminates in the right PICA, a normal variant. OTHER: No dural venous sinus thrombosis on this non-dedicated study. BRAIN: Pontine intraparenchymal hemorrhage is redemonstrated. No mass effect or midline shift. No extra-axial fluid collection. The gray-white differentiation is maintained. Focal mild narrowing of the right P1 PCA. Otherwise no significant stenosis, aneurysm, or arterial injury in the CTA of the head and neck. Pontine intraparenchymal hemorrhage is redemonstrated. MRI BRAIN W WO CONTRAST     Result Date: 1/30/2023  EXAMINATION: MRI OF THE BRAIN WITHOUT AND WITH CONTRAST  1/28/2023 11:42 am TECHNIQUE: Multiplanar multisequence MRI of the head/brain was performed without and with the administration of intravenous contrast. COMPARISON: CT head from 01/27/2023 HISTORY: ORDERING SYSTEM PROVIDED HISTORY: r/o underlying mass TECHNOLOGIST PROVIDED HISTORY: r/o underlying mass What is the sedation requirement?->None Reason for Exam: r/o underlying mass Additional signs and symptoms: Pontine hemorrhage FINDINGS: INTRACRANIAL STRUCTURES/VENTRICLES:  13 mm left dorsal pontine intraparenchymal hematoma is unchanged in size.   There is mild mass effect on the fourth ventricle. There is mild surrounding edema. No new acute hemorrhage is identified. No herniation or hydrocephalus. There are approximately 10 additional punctate foci of susceptibility artifact in the ventral ramses which may represent chronic hemosiderin deposition. There is a single punctate focus in the left putamen. There are no cortical or cerebral hemisphere foci. There are minimal periventricular T2 hyperintense white matter lesions. There is a lacune in the right frontal periventricular white matter and a possible lacune in the left thalamus. No abnormal intracranial enhancement. No brain mass. ORBITS: The visualized portion of the orbits demonstrate no acute abnormality. SINUSES: The visualized paranasal sinuses and mastoid air cells demonstrate no acute abnormality. BONES/SOFT TISSUES: The bone marrow signal intensity appears normal. The soft tissues demonstrate no acute abnormality. Chronic pontine microhemorrhages in addition to the acute hematoma suggest hypertensive hemorrhage. No brain mass. DISCHARGE INSTRUCTIONS      Discharge Medications:         Medication List          ASK your doctor about these medications       omeprazole 10 MG delayed release capsule  Commonly known as: PRILOSEC                Diet: ADULT DIET;  Regular diet as tolerated  Activity: As tolerated  Follow-up: Follow-up with ophthalmology, Dr. Young Hanson in 1 week  Follow-up with [facility] neurology in 3 weeks, call to make an appointment  Time Spent for discharge: 27 minutes     Ana Ag MD  Neuro Critical Care  2/2/2023, 3:45 PM

## 2023-02-10 NOTE — CARE COORDINATION
ARU CASE MANAGEMENT NOTE:    Patient is alert and oriented x4. Spoke with patient regarding discharge plan and patient confirms plan is to discharge home with his wife and will have Outpatient therapies. Writer provided list of outpatient therapies for patient and his wife. Will speak with therapy to set up family training and coordinate a time. 1300: spoke with Mer Marina, OT, and family training is scheduled for this Sunday, 2/12 at 0800. Writer updated [employer], patient's wife. DME: bath and shower seat     Outside appointments: 2/13 with Dr. Azul High at 1100. Patient will need a CT prior to appointment. Writer informed Dr. Cullen Thurman.    11:20 Writer spoke with Karen Fong at [facility] office, to attempt to reschedule appointment until after patient discharges. Per [place], the next available appointment wouldn't be until March 31 st. Writer informed Karen Fong that we would just need to keep the appointment for the 13th, because patient needs to be seen sooner than that. 11:45 Belynda Dance, , received a call from [facility] office inquiring about appointment. Arrangements were made for appointment to be rescheduled for 2/22. Will continue to follow for additional discharge needs.     Electronically signed by Virginia Bronson RN on 2/10/2023 at 10:36 AM

## 2023-02-10 NOTE — PROGRESS NOTES
Physical Medicine & Rehabilitation  Progress Note    2/10/2023 3:00 PM     CC: Ambulatory and ADL dysfunction due to hemorrhagic pontine CVA    Subjective:   No complaints. Continues with diplopia using eye patch. Doing okay with decrease NicoDerm patch. .  Slide to floor this morning-no trauma not hit head. ROS:  Denies fevers, chills, sweats. No chest pain, palpitations, lightheadedness. Denies coughing, wheezing or shortness of breath. Denies abdominal pain, nausea, diarrhea or constipation. No new areas of joint pain. Denies new areas of numbness or weakness. Denies new anxiety or depression issues. No new skin problems. Rehabilitation:   PT:  Restrictions/Precautions: Fall Risk, General Precautions  Implants present? :  (pt denies)  Other position/activity restrictions: Per Dr. Erika Winston OK to use Eye patch, alternate between eyes (to help with patient's double vision and balance)   Transfers  Sit to Stand: Stand by assistance  Stand to Sit: Stand by assistance  Bed to Chair: Stand by assistance  Stand Pivot Transfers: Stand by assistance  Comment: COMPENSATION techniques demonstrated, Needs UE support to stablize to preform transfer with SBA. Patient also demonstrating ability to transfer with [facility], rollator, and no assistive devices with varied level of assist needed with visual barrier, double vision, and RLE foot position with turns. Cues for safe technique. Ambulation  Surface: Level tile, Ramp  Device: Rollator (bilateral UE support devices needed)  Assistance: Contact guard assistance (intermittent SBA-JOSE C)  Quality of Gait: unsteady, R lateral lean with path deviation, intermittent wide and narrow [place], ataxic, decreased motor control R LE. CGA for with intermittent Jose C required for right lateral LOB.   Gait Deviations: Slow Tess, Deviated path, Staggers, Decreased step length, Decreased step height  Distance: 283'  Comments: Increased time to complete with slowed pace for patient, BC Ataxia with poorly coordinated steps going from scissoring to Wide [place] with tendency to maintain R hip external rotation. LOB to right corrected with patient lateral steppage gait to maintain balance. More Ambulation?: Yes      OT:  ADL  Equipment Provided: Feeding equipment (Built up handles)  Feeding: Setup  Feeding Skilled Clinical Factors: Pt demonstrated increased difficulty with grasping silverwear during eating task. OT provided built up handles during task to increase ease of use during self feeding. Grooming: Minimal assistance  Grooming Skilled Clinical Factors: Min A for standing balance standing sink side with slight R side lean  UE Bathing: Stand by assistance  UE Bathing Skilled Clinical Factors: Sitting on tub bench  LE Bathing: Minimal assistance  LE Bathing Skilled Clinical Factors: LOB while standing in shower to dry bottom requiring A to right self  UE Dressing: Stand by assistance  LE Dressing: Minimal assistance  LE Dressing Skilled Clinical Factors: Pt demonstrated figure 4 technique to thread BLE. Min A while standing with unsteadiness while pulling up over hips  Toileting: Minimal assistance  Toileting Skilled Clinical Factors: A for standing balance with hygiene and clothing management  Additional Comments: OT facilitated pts engagement in full shower on this date with use of tub bench, grab bars, and hand held shower. Pt required A while standing due to intermittet LOB to right side. Pt currently limited due to decreased strength, balance, FMC, visual deficits, and activity tolerance impacting safety and independence with self care tasks         Balance  Sitting Balance: Stand by assistance  Standing Balance: Moderate assistance (CGA-Mod A with multiple LOB to right side)      Functional Mobility  Functional - Mobility Device: Rolling Walker  Activity: To/from bathroom  Assist Level:  Moderate assistance (CGA-Mod A with multiple LOB to right side)  Functional Mobility Comments: Verbal cues for hand placement and safety. Increased time needed with RLE. Multiple LOB to right side requiring A to right self     Bed mobility  Bridging: Modified independent   Rolling to Left: Modified independent  Rolling to Right: Modified independent  Supine to Sit: Modified independent  Sit to Supine: Modified independent  Scooting: Modified independent  Bed Mobility Comments: All bed mobility completed on flat surface without use of rails. Transfers  Sit to stand: Minimal assistance  Stand to sit: Minimal assistance  Transfer Comments: Verbal cues for hand placement and safety   Toilet Transfers  Toilet - Technique: Ambulating  Equipment Used: Grab bars  Toilet Transfer: Minimal assistance  Toilet Transfers Comments: Verbal cues for hand placement and safety. Slight unsteadiness with R side LOB     Shower Transfers  Shower - Transfer From: [employer] - Transfer Type: To and From  Shower - Transfer To: Transfer tub bench  Shower - Technique: Ambulating  Shower Transfers: Moderate assistance  Shower Transfers Comments: Verbal cues for hand placement, safety and RW management over threshold. LOB to right side with Mod A to right self. ST:     *shortened session d/t Pt returning to room late from PT         Subjective: [x] Alert     [x] Cooperative     [] Confused     [] Agitated    [] Lethargic        Objective/Assessment:     Attention: Sustained throughout, distractions minimized. Orientation: Oriented x4. Recall: Pt. Education provided re: compensatory strategies to facilitate recall (e.g., ID key words, repetition and rehearsal). Pt. Verbalized understanding and facilitating strategy to complete the following task:    Paragraph recall (4-7 sentences): 100% accuracy (I). Organization: n/a     Problem Solving/Reasoning: n/a     Speech: ST educated Pt re: oral motor exercises for dysarthria.   Pt able to state strategies for over-articulation and slow rate (I), utilized appropriately t/o session MI. Pt able to complete alliteration drills task with very min A to use dysarthria strategies. Speech subjectively judged to to be 100% intelligible in conversation. Pt able to complete OMEs x5 sets in reps of 10 c mod cues. No movement noted on L side of face with labial retraction; minimal movement noted on L side with labial lateralization and protrusion. Encouraged Pt to continue practicing exercises and speech tasks throughout day (using mirror as form of visual feedback). Pt and wife verbalized understanding and agreeable. Other: Pt's wife present for session, providing encouragement throughout. Objective:  BP (!) 139/99   Pulse 77   Temp 97.9 °F (36.6 °C)   Resp 16   Ht 6' (1.829 m)   Wt 205 lb 9.6 oz (93.3 kg)   SpO2 97%   BMI 27.88 kg/m²  I Body mass index is 27.88 kg/m². I   Wt Readings from Last 1 Encounters:   23 205 lb 9.6 oz (93.3 kg)      Temp (24hrs), Av °F (36.7 °C), Min:97.8 °F (36.6 °C), Max:98.2 °F (36.8 °C)         GEN: well developed, well nourished, no acute distress  HEENT: Normocephalic atraumatic, EOMI, mucous membranes pink and moist  CV: RRR, no murmurs, rubs or gallops  PULM: CTAB, no rales or rhonchi. Respirations WNL and unlabored  ABD: soft, NT, ND, +BS and equal  NEURO: A&O x3. Sensation decreased right upper and lower extremity compared to left, left facial droop, neuro exam no change  MSK: Legs 4+/5 upper and lower extremities  EXTREMITIES: No calf tenderness to palpation bilaterally. No edema BLEs range of motion upper lower extremity and palpation of joints and spine with no pain or discomfort  SKIN: warm dry and intact with good turgor  PSYCH: appropriately interactive. Affect WNL.           Medications   Scheduled Meds:   nicotine  1 patch TransDERmal Daily    artificial tears   Left Eye Nightly    carvedilol  12.5 mg Oral BID WC    enoxaparin  40 mg SubCUTAneous Daily    folic acid  1 mg Oral Daily    lisinopril  40 mg Oral Daily melatonin  3 mg Oral Nightly    pantoprazole  40 mg Oral QAM AC    tamsulosin  0.4 mg Oral Daily    thiamine  100 mg Oral Daily    polyethylene glycol  17 g Oral Daily     Continuous Infusions:  PRN Meds:. QUEtiapine, polyvinyl alcohol, acetaminophen, calcium carbonate, fluticasone, senna, bisacodyl     Diagnostics:     CBC:   Recent Labs     02/10/23  0625   WBC 8.8   RBC 4.90   HGB 15.7   HCT 46.7   MCV 95.4   RDW 13.0        BMP:   Recent Labs     02/10/23  0625      K 5.1      CO2 27   BUN 21*   CREATININE 0.80     BNP: No results for input(s): BNP in the last 72 hours. PT/INR: No results for input(s): PROTIME, INR in the last 72 hours. APTT: No results for input(s): APTT in the last 72 hours. CARDIAC ENZYMES: No results for input(s): CKMB, CKMBINDEX, TROPONINT in the last 72 hours. Invalid input(s): CKTOTAL;3  FASTING LIPID PANEL:No results found for: CHOL, HDL, TRIG  LIVER PROFILE:   Recent Labs     02/10/23  0625   AST 18   ALT 33          I/O (24Hr): No intake or output data in the 24 hours ending 02/10/23 1500    Glu last 24 hour  No results for input(s): POCGLU in the last 72 hours. No results for input(s): CLARITYU, COLORU, PHUR, SPECGRAV, PROTEINU, RBCUA, BLOODU, BACTERIA, NITRU, WBCUA, LEUKOCYTESUR, YEAST, GLUCOSEU, BILIRUBINUR in the last 72 hours. Impression/Plan:    Hemorrhagic pontine CVA:  PT/OT for gait, mobility, strengthening, endurance, ADLs, and self care. SLP for cognition, speech. Discharge plan 2/15  Slide to floor this morning-no trauma-monitor for residual  Diplopia, blurred vision:  Secondary to hemorrhagic stroke. Has eye patch - alternate use on both eyes. Added artificial tears as needed, artificial tears ointment nightly for left eye due to incomplete closing and burning of the eye.-Improved reviewed with wife neuro-ophthalmology evaluation as outpatient  Urinary retention:  Noted in acute care. On flomax.   Will monitor bladder scans, PVRs.-Appears to have improved  Alcohol withdrawal:  On phenobarbital wean (tcompleted on 2/6/23). On folate, thiamine supplementation. History of smoker-on NicoDerm patch- will decrease  HTN:  On carvedilol, lisinopril. Goal is SBP below 160 with gradual normalization of BP, per neurology. GERD:  On protonix  Bowel Management: Miralax daily, senokot prn, dulcolax prn. History of agitation-Seroquel at night, has not needed  Lab work done on admission 2/3 noted, order for weekly due again tomorrow  DVT Prophylaxis:  low molecular weight heparin, SCD's while in bed, and KP's during the day  Dr. Denise Lowe for medical management-  Follow up PCP 1-2 weeks, PM&R 4-6 weeks, Neurology 3 weeks, Neurosurgery 2 weeks from 1/29 - on 2/13(with repeat CT head)-, Neuro-ophthalmology - Dr. Félix Gomes. Johnny Slaughter MD       This note is created with the assistance of a speech recognition program.  While intending to generate a document that actually reflects the content of the visit, the document can still have some errors including those of syntax and sound a like substitutions which may escape proof reading.   In such instances, actual meaning can be extrapolated by contextual diversion

## 2023-02-10 NOTE — PLAN OF CARE
Problem: ABCDS Injury Assessment  Goal: Absence of physical injury  2/10/2023 0128 by Adriana Lui LPN  Outcome: Progressing  Flowsheets (Taken 2/10/2023 0126)  Absence of Physical Injury: Implement safety measures based on patient assessment     Problem: Safety - Adult  Goal: Free from fall injury  2/10/2023 0128 by Adriana Lui LPN  Outcome: Progressing     Problem: Skin/Tissue Integrity  Goal: Absence of new skin breakdown  Description: 1. Monitor for areas of redness and/or skin breakdown  2. Assess vascular access sites hourly  3. Every 4-6 hours minimum:  Change oxygen saturation probe site  4. Every 4-6 hours:  If on nasal continuous positive airway pressure, respiratory therapy assess nares and determine need for appliance change or resting period.   2/10/2023 0128 by Adriana Lui LPN  Outcome: Progressing     Problem: Pain  Goal: Verbalizes/displays adequate comfort level or baseline comfort level  2/10/2023 0128 by Adriana Lui LPN  Outcome: Progressing     Problem: Discharge Planning  Goal: Discharge to home or other facility with appropriate resources  2/10/2023 0128 by Adriana Lui LPN  Outcome: Progressing     Problem: Chronic Conditions and Co-morbidities  Goal: Patient's chronic conditions and co-morbidity symptoms are monitored and maintained or improved  2/10/2023 0128 by Adriana Lui LPN  Outcome: Progressing     Problem: Nutrition Deficit:  Goal: Optimize nutritional status  2/10/2023 0128 by Adriana Lui LPN  Outcome: Progressing

## 2023-02-10 NOTE — PROGRESS NOTES
Comprehensive Nutrition Assessment    Type and Reason for Visit:  Reassess    Nutrition Recommendations/Plan:   Continue current diet. Malnutrition Assessment:  Malnutrition Status: At risk for malnutrition (Comment) (02/03/23 1325)    Context:  Acute Illness     Findings of the 6 clinical characteristics of malnutrition:  Energy Intake:  No significant decrease in energy intake  Weight Loss:  No significant weight loss     Body Fat Loss:  No significant body fat loss     Muscle Mass Loss:  No significant muscle mass loss    Fluid Accumulation:  Mild Extremities   Strength:  Not Performed    Nutrition Assessment:    Pt states that he is eating well. Nutrition Related Findings:    No edema. Labs and meds reviewed. Wound Type: None       Current Nutrition Intake & Therapies:    Average Meal Intake: %     ADULT DIET; Regular    Anthropometric Measures:  Height: 6' (182.9 cm)  Ideal Body Weight (IBW): 178 lbs (81 kg)    Admission Body Weight: 209 lb (94.8 kg)  Current Body Weight: 205 lb 11 oz (93.3 kg), 117.4 % IBW. Weight Source: Bed Scale  Current BMI (kg/m2): 27.9                          BMI Categories: Overweight (BMI 25.0-29. 9)    Estimated Daily Nutrient Needs:  Energy Requirements Based On: Formula  Weight Used for Energy Requirements: Admission  Energy (kcal/day): [place] x 1-1.1= 3727-1808 kcal  Weight Used for Protein Requirements: Admission  Protein (g/day): 1.2g/kg= 115 g    Nutrition Diagnosis:   Overweight/Obese related to excessive energy intake as evidenced by BMI    Nutrition Interventions:   Food and/or Nutrient Delivery: Continue Current Diet  Nutrition Education/Counseling: No recommendation at this time  Coordination of Nutrition Care: Continue to monitor while inpatient       Goals:     Goals:  (Stable wt with no gain)       Nutrition Monitoring and Evaluation:      Food/Nutrient Intake Outcomes: Food and Nutrient Intake  Physical Signs/Symptoms Outcomes: Biochemical Data, GI Status, Fluid Status or Edema, Weight, Skin    Discharge Planning:    Continue current diet     Kika Garcia, LEYLA, LD  Contact: [phone]

## 2023-02-10 NOTE — PLAN OF CARE
Problem: ABCDS Injury Assessment  Goal: Absence of physical injury  2/10/2023 1413 by Asmita Peterson LPN  Outcome: Progressing  2/10/2023 0128 by Vinnie Lloyd LPN  Outcome: Progressing  Flowsheets (Taken 2/10/2023 0126)  Absence of Physical Injury: Implement safety measures based on patient assessment     Problem: Safety - Adult  Goal: Free from fall injury  2/10/2023 1413 by Asmita Petersno LPN  Outcome: Progressing  2/10/2023 0128 by Vinnie Lloyd LPN  Outcome: Progressing     Problem: Skin/Tissue Integrity  Goal: Absence of new skin breakdown  Description: 1. Monitor for areas of redness and/or skin breakdown  2. Assess vascular access sites hourly  3. Every 4-6 hours minimum:  Change oxygen saturation probe site  4. Every 4-6 hours:  If on nasal continuous positive airway pressure, respiratory therapy assess nares and determine need for appliance change or resting period.   2/10/2023 1413 by Asmita Peterson LPN  Outcome: Progressing  2/10/2023 0128 by Vinnie Lloyd LPN  Outcome: Progressing     Problem: Pain  Goal: Verbalizes/displays adequate comfort level or baseline comfort level  2/10/2023 1413 by Asmita Peterson LPN  Outcome: Progressing  2/10/2023 0128 by Vinnie Lloyd LPN  Outcome: Progressing     Problem: Discharge Planning  Goal: Discharge to home or other facility with appropriate resources  2/10/2023 1413 by Asmita Peterson LPN  Outcome: Progressing  Flowsheets (Taken 2/10/2023 0830)  Discharge to home or other facility with appropriate resources:   Identify barriers to discharge with patient and caregiver   Arrange for needed discharge resources and transportation as appropriate   Identify discharge learning needs (meds, wound care, etc)   Refer to discharge planning if patient needs post-hospital services based on physician order or complex needs related to functional status, cognitive ability or social support system  2/10/2023 0128 by Vinnie Lloyd LPN  Outcome: Progressing Problem: Chronic Conditions and Co-morbidities  Goal: Patient's chronic conditions and co-morbidity symptoms are monitored and maintained or improved  2/10/2023 1413 by Adonis Robbins LPN  Outcome: Progressing  Flowsheets (Taken 2/10/2023 0830)  Care Plan - Patient's Chronic Conditions and Co-Morbidity Symptoms are Monitored and Maintained or Improved: Monitor and assess patient's chronic conditions and comorbid symptoms for stability, deterioration, or improvement  2/10/2023 0128 by Marcial Mitchell LPN  Outcome: Progressing     Problem: Nutrition Deficit:  Goal: Optimize nutritional status  2/10/2023 1413 by Adonis Robbins LPN  Outcome: Progressing  2/10/2023 0128 by Marcial Mitchell LPN  Outcome: Progressing

## 2023-02-10 NOTE — PROGRESS NOTES
Pt fell out of w/c this am with wife at bedside. Pt attempted to stand from w/c with the brakes unlocked. No apparent injury. Vitals stable. Dr. Lori Heredia called and updated. Cont. To monitor pt.

## 2023-02-10 NOTE — PROGRESS NOTES
[id number] [place]      PROGRESS NOTE        Patient:  Harry Saxena  YOB: 1980    MRN: [id number]     Acct: [de-identified]     Admit date: 2/2/2023    Pt seen and Chart reviewed. Consultant notes reviewed and care evaluated. Subjective:Patient was seen in therapy is walking with assistance of physical therapist and using a cane he seems to be doing okay. He denies any pain. Reported that he fell. Initially want his room looking for him his wife was there she says he was trying to move from his wheelchair to chair in the wheelchair was not locked and he cannot look like he was falling but there was no head injury and no loss of consciousness and no complaint of any pain or injury. Diet:  ADULT DIET; Regular      Medications:Current Inpatient    Scheduled Meds:   nicotine  1 patch TransDERmal Daily    artificial tears   Left Eye Nightly    carvedilol  12.5 mg Oral BID WC    enoxaparin  40 mg SubCUTAneous Daily    folic acid  1 mg Oral Daily    lisinopril  40 mg Oral Daily    melatonin  3 mg Oral Nightly    pantoprazole  40 mg Oral QAM AC    tamsulosin  0.4 mg Oral Daily    thiamine  100 mg Oral Daily    polyethylene glycol  17 g Oral Daily     Continuous Infusions:  PRN Meds:QUEtiapine, polyvinyl alcohol, acetaminophen, calcium carbonate, fluticasone, senna, bisacodyl        Physical Exam:  Vitals: BP (!) 139/99   Pulse 77   Temp 97.9 °F (36.6 °C)   Resp 16   Ht 6' (1.829 m)   Wt 205 lb 9.6 oz (93.3 kg)   SpO2 97%   BMI 27.88 kg/m²   24 hour intake/output:No intake or output data in the 24 hours ending 02/10/23 0804  Last 3 weights:   Wt Readings from Last 3 Encounters:   02/08/23 205 lb 9.6 oz (93.3 kg)   01/26/23 200 lb (90.7 kg)   10/14/18 200 lb (90.7 kg)       Physical Examination:   General appearance - alert, well appearing, and in no distress  Mental status - alert, oriented to person, place, and time  oropharynx clear, no lesions  HEENT patient does have a drooping to the left is shows normal  Chest - clear to auscultation, no wheezes, rales or rhonchi, symmetric air entry  Heart - normal rate, regular rhythm, normal S1, S2, no murmurs, rubs, clicks or gallops  Abdomen - soft, nontender, nondistended, no masses or organomegaly  Neurological - alert, oriented, normal speech, no focal findings or movement disorder noted}  Extremities - peripheral pulses normal, no pedal edema, no clubbing or cyanosisWeakness her left his patient is normal  Skin - normal coloration and turgor, no rashes, no suspicious skin lesions noted   [facility] Case Orders]     Results     Component Value Units   Basic Metabolic Panel w/ Reflex to MG [[id number]] (Abnormal)    Collected: 02/10/23 8923    Updated: 02/10/23 0183    Specimen Source: Blood     Glucose 99 mg/dL    BUN 21 High  mg/dL    Creatinine 0.80 mg/dL    Est, Glom Filt Rate >60 mL/min/1.73m2    Comment:        These results are not intended for use in patients <25years of age. eGFR results are calculated without a race factor using the 2021 CKD-EPI equation. Careful clinical correlation is recommended, particularly when comparing to results   calculated using previous equations. The CKD-EPI equation is less accurate in patients with extremes of muscle mass, extra-renal   metabolism of creatine, excessive creatine ingestion, or following therapy that affects   renal tubular secretion.         Calcium 11.7 High  mg/dL    Sodium 135 mmol/L    Potassium 5.1 mmol/L    Chloride 100 mmol/L    CO2 27 mmol/L    Anion Gap 8 Low  mmol/L   Magnesium [[id number]]    Collected: 02/10/23 0625    Updated: 02/10/23 0653    Specimen Source: Blood     Magnesium 2.1 mg/dL   ALT [[id number]]    Collected: 02/10/23 0625    Updated: 02/10/23 0653     ALT 33 U/L   AST [[id number]]    Collected: 02/10/23 0625    Updated: 02/10/23 0653     AST 18 U/L   CBC auto differential [[id number]] (Abnormal) Collected: 02/10/23 0625    Updated: 02/10/23 1679    Specimen Source: Blood     WBC 8.8 k/uL    RBC 4.90 m/uL    Hemoglobin 15.7 g/dL    Hematocrit 46.7 %    MCV 95.4 fL    MCH 32.1 pg    MCHC 33.7 g/dL    RDW 13.0 %    Platelets 265 k/uL    MPV 8.2 fL    Seg Neutrophils 66 %    Lymphocytes 21 Low  %    Monocytes 11 High  %    Eosinophils % 1 %    Basophils 1 %    Segs Absolute 5.70 k/uL    Absolute Lymph # 1.90 k/uL    Absolute Mono # 1.00 k/uL    Absolute Eos # 0.10 k/uL    Basophils Absolute 0.10          Assessment:  Principal Problem:    Pontine hemorrhage (HCC)  Resolved Problems:    * No resolved hospital problems. 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    Pontine hemorrhage (HCC) I61.3    Intracranial hemorrhage (HCC) I62.9    Impending cerebrovascular accident ([facility] [address] [address] I63.9      Left-sided hemiparesis    Diplopia secondary to CVA       Pretension blood pressure coming down slowly we will monitor and adjust meds as needed  History of smoking on nicotine patch  History of alcohol use no evidence of withdrawal but he is on phenobarbital looks like bolus thiamine multivitamin  Episodes of fall no injuries     Plan:  Patient Is coming down slowly continue monitor on the regimen and he is taking and that he continues to be off all that maybe small dose of hydrochlorothiazide  Discussed that with his wife were some questions about his medications.     Continue to monitor and follow his progress before he goes home and    Once he also discussed with his wife to have him come in and see his PCP for follow-up    Fartun Bedoya DO [facility]            2/10/2023, 8:04 AM

## 2023-02-10 NOTE — PROGRESS NOTES
Physical Therapy  Facility/Department: [facility] ACUTE REHAB  Rehabilitation Physical Therapy Daily Treatment Note    NAME: Claudell Alas  : 1980 (43 y.o.)  MRN: [id number]  CODE STATUS: Full Code    Date of Service: 2/10/23    Chart Reviewed: Yes  Patient assessed for rehabilitation services?: Yes  Additional Pertinent Hx: Mr. Claudell Alas is a 43 y.o. right handed male who was admitted to [place] [address] on 2023 with Extremity Weakness (Left sided weakness) and Facial Droop     Patient with R weakness, numbness and facial droop who fell when he went to bed then awoke with the symptoms. SBP was in 200s upon arrival to ED. CT confirmed hemorrhagic CVA posterior ramses and narrowing R P1 PCA. He was treated with Cardene for BP control. Neurosurgery managing conservatively. Family / Caregiver Present: No  Referring Practitioner: Hema Benjamin MD  Referral Date : 23  Diagnosis: Hemorrhagic CVA    Restrictions:  Restrictions/Precautions: Fall Risk;General Precautions  Position Activity Restriction  Other position/activity restrictions: Per Dr. Bina Staples OK to use Eye patch, alternate between eyes (to help with patient's double vision and balance)     SUBJECTIVE  Subjective: Pt is pleasant and agreeable to PT. No new complaints noted. Pain: Pt states \"No complaints today\". OBJECTIVE  Vision  Vision: Impaired  Vision Exceptions: Wears glasses for reading    Hearing  Hearing: Exceptions to [facility]  Hearing Exceptions: Hard of hearing/hearing concerns    Cognition  Overall Cognitive Status: Exceptions  Arousal/Alertness: Appropriate responses to stimuli  Following Commands: Follows multistep commands with increased time  Attention Span: Appears intact; Attends with cues to redirect  Memory: Appears intact  Safety Judgement: Decreased awareness of need for assistance;Decreased awareness of need for safety  Problem Solving: Assistance required to identify errors made;Assistance required to correct errors made  Insights: Decreased awareness of deficits  Initiation: Requires cues for some  Sequencing: Requires cues for some  Cognition Comment: pt can be impulsive at times    Sensation  Overall Sensation Status: Impaired (R side of body)    Functional Mobility  Bed mobility  Bridging: Modified independent   Rolling to Left: Modified independent  Rolling to Right: Modified independent  Supine to Sit: Modified independent  Sit to Supine: Modified independent  Scooting: Modified independent  Bed Mobility Comments: All bed mobility completed on flat surface without use of rails. Transfers  Sit to Stand: Stand by assistance  Stand to Sit: Stand by assistance  Bed to Chair: Stand by assistance  Stand Pivot Transfers: Stand by assistance  Comment: COMPENSATION techniques demonstrated, Needs UE support to stablize to preform transfer with SBA. Patient also demonstrating ability to transfer with [facility] [place] [facility], and no assistive devices with varied level of assist needed with visual barrier, double vision, and RLE foot position with turns. Cues for safe technique. Balance  Posture: Fair  Sitting - Static: Fair;+  Sitting - Dynamic: Fair  Standing - Static: Fair;-  Standing - Dynamic: Poor;+  Comments: standing balance in parallel bars    Environmental Mobility  Ambulation  Surface: Level tile; Ramp  Device: Rollator (bilateral UE support devices needed)  Assistance: Contact guard assistance (intermittent SBA-SCAR)  Quality of Gait: unsteady, R lateral lean with path deviation, intermittent wide and narrow [place], ataxic, decreased motor control R LE. CGA for with intermittent Scar required for right lateral LOB. Gait Deviations: Slow Tess;Deviated path;Staggers;Decreased step length;Decreased step height  Distance: 283'  Comments: Increased time to complete with slowed pace for patient, RLE Ataxia with poorly coordinated steps going from scissoring to Wide IS[place] with tendency to maintain R hip external rotation.  LOB to right corrected with patient lateral steppage gait to maintain balance. More Ambulation?: Yes  Ambulation 2  Surface - 2: level tile  Device 2: Single point cane  Assistance 2: Moderate assistance;Minimal assistance (2nd person assist for safety with SBA-CGA needed with LOB)  Quality of Gait 2: Similar presentation of deviations. Pt initially uses SPC with RUE, however demos increase difficulty stabilizing SPC and himself. Edu/instructed pt to use SPC with LUE with improvements noted. Pt does continue an ataxic gait with varying step lengths with a right lateral lean. Gait Deviations: Slow Tess;Decreased step length;Decreased step height;Deviated path  Distance: 200'x1  Comments: Multiple LOB noted to right side. CGA-Mod A required. Stairs/Curb  Stairs?: Yes  Stairs  # Steps : 18 (preformed with varied method for neuro re ed and safety instructions for home)  Stairs Height: 8\" (times 2 different techniques)  Rails: Bilateral;Right ascending;Left ascending (alternating techniques)  Device: No Device  Assistance: Minimal assistance  Comment: patient able to place RLE with tendency to cross midline with RLE but able to correct with cueing. ALTERNATING STEPS TO CONTINUE TO FACILITATE NEURO RE-ED. STEP TO FOR HOME SAFETY. PT demos occassional diffuclties clearing RLE with each step. Cues to slow down in order to concentrate to improve safety.   Wheelchair Activities  Wheelchair Size: 20  Wheelchair Type: Standard  Wheelchair Cushion: Standard (Air waffle cushion)  Wheelchair Parts Management: Yes  Left Leg Rest Level of Assistance: Modified independent  Right Leg Rest Level of Assistance: Modified independent  Left Brakes Level of Assistance: Unable to assess(comment)  Right Brakes Level of Assistance: Modified independent  Propulsion: Yes  Propulsion 1  Propulsion: Manual  Level: Level Tile  Method: RLE;LLE;LUE  Level of Assistance: Modified independent  Description/ Details: Pt demo's ability to propel but difficulty with RUE coordination. Pt is able to maintain a straight path this date with occassionally veering. Distance: 250'x2    PT Exercises  Exercise Treatment: neuro bernardo and balance training with varied assistive devices and manage objects and distraction with repeative gait in therapy room. Head turns and eye gaze and tracking with and without eye patch use. A/AROM Exercises: AROM RLE: LAQs x 20 reps with 4 pound cuff wt, verbal cues for control/decreased speed of movement  Dynamic Standing Balance Exercises: Obstacle course: sm/lg hurdles, bosu shantel disc, foam with x4 cones. SLS and NBOS 2x3-30 sec on BOSU and shantel disc. SLS on blue foam with cones for LE coordination/balance. Pt requires 1-2 UE support on monge rail to complete. CGA-Mod A for safety. Breathing Techniques: pursed lip blow out while lifting 4 pound weight RLE  Standing Open/Closed Kinetic Chain Exercises: Standing BLE ex's with 3# and 0-2 UE support on // bars. Reps: 15 each. CGA-Mod A for safety. Multiple LOB noted, however improves with slower controlled movements. Mirror utilized for neuro feedback. (Increase time to complete for good technique.)    ASSESSMENT  Vitals  O2 Device: None (Room air)    Activity Tolerance  Activity Tolerance: Patient tolerated treatment well  Activity Tolerance Comments: Greatest limitation to activity tolerance is fear of movement with recent loss of coordination and impaired sensation on R side of body. Assessment  Assessment: The patient presents with R-sided sensation, proprioception, coordination, and kinesthetic deficits 2* hemorrhagic CVA impairing overall balance, safety, and independence with mobility. Pt continues to require x1 person assistance varying between CGA-Mod A for functional mobility. Intensive PT services are warranted to progress pt towards prior level of function and independence. Performance Deficits/Impairments: Decreased functional mobility ; Decreased body mechanics; Decreased safe awareness;Decreased balance;Decreased sensation;Decreased coordination;Decreased vision/visual deficit; Increased pain;Decreased posture  Treatment Diagnosis: Impaired balance and mobility 2* CVA  Therapy Prognosis: Good  Decision Making: Medium Complexity  Exam: ROM, MMT, Balance, and functional mobility assessments. PASS  Clinical Presentation: pt is alert, pleasant, and cooperative. very motivated to improve  Barriers to Learning: Vision, hearing  Treatment Initiated : Functional mobility assessments and training, safety awareness training  Discharge Recommendations: Therapy recommended at discharge; Patient would benefit from continued therapy after discharge  PT D/C Equipment  Equipment Needed:  (TBD)  PT Equipment Recommendations  Equipment Needed: No      GOALS  Patient Goals   Patient Goals : \"Practice going up/down a ladder\"  Short Term Goals  Time Frame for Short Term Goals: 7 days  Short Term Goal 1: pt to demo independent bed mobility on flat surface without use of rails per home set-up  Short Term Goal 2: pt to demo functional transfers with device and CGA  Short Term Goal 3: pt to ambulate 150' with device and MinAx1  Short Term Goal 4: pt to negotiate 10-12 steps with Single rail and device as needed demonstrating a landing pivot turn (per home set up stairs with landing) Benigno  Long Term Goals  Time Frame for Long Term Goals : Until D/C  Long Term Goal 1: Pt to improve Standing balance to FAIR + with use of device to dec risk for falls  Long Term Goal 2: pt to demo functional transfers with device Mod I  Long Term Goal 3: pt to ambulate 150' with device and supervision  Long Term Goal 4: pt to negotiate 2 steps with R rail and device and 10-12 successive steps with L rail and device as needed demonstrating a landing pivot turn (per home set up stairs with landing) CGA-SBA for stairs  Long Term Goal 5: pt to demo actual or simulated car transfer with SBA and device as needed  Long term goal 6: pt to improve on PASS score to 30/36 or greater to demonstrate improving balance, mobility, and independence  Long term goal 7: pt to demonstrate good technique for fall recovery with SBA  Long term goal 8: Pt to progress to step Ladder negotiation with CGA to allow for safe participation in home making/maintainance tasks with assist for safety. PLAN OF CARE  Physcial Therapy Plan  General Plan:  minutes of therapy at least 5 out of 7 days a week  Current Treatment Recommendations: Strengthening;Balance training;Functional mobility training;Transfer training;Cognitive/Perceptual training; Wheelchair mobility training;Gait training;Stair training;Neuromuscular re-education;Pain management;Home exercise program;Return to work related activity  Safety Devices  Type of Devices: Gait belt;Call light within reach  Restraints  Restraints Initially in Place: No    EDUCATION  Education  Education Given To: Patient  Education Provided:  Mobility Training;Transfer Training  Education Method: Demonstration;Verbal  Barriers to Learning: None  Education Outcome: Verbalized understanding;Demonstrated understanding    Therapy Time     02/10/23 0752 02/10/23 1338   PT Individual Minutes   Time In 0805 1338   Time Out [id number]   Minutes 62 [address] [place], [place], 02/10/23 at 2:47 PM

## 2023-02-11 LAB
25(OH)D3 SERPL-MCNC: 13.8 NG/ML
PTH-INTACT SERPL-MCNC: 104.8 PG/ML (ref 14–72)

## 2023-02-11 PROCEDURE — 6370000000 HC RX 637 (ALT 250 FOR IP): Performed by: STUDENT IN AN ORGANIZED HEALTH CARE EDUCATION/TRAINING PROGRAM

## 2023-02-11 PROCEDURE — 92507 TX SP LANG VOICE COMM INDIV: CPT

## 2023-02-11 PROCEDURE — 6370000000 HC RX 637 (ALT 250 FOR IP): Performed by: PHYSICAL MEDICINE & REHABILITATION

## 2023-02-11 PROCEDURE — 97110 THERAPEUTIC EXERCISES: CPT

## 2023-02-11 PROCEDURE — 99232 SBSQ HOSP IP/OBS MODERATE 35: CPT | Performed by: PHYSICAL MEDICINE & REHABILITATION

## 2023-02-11 PROCEDURE — 97530 THERAPEUTIC ACTIVITIES: CPT

## 2023-02-11 PROCEDURE — 1180000000 HC REHAB R&B

## 2023-02-11 PROCEDURE — 6370000000 HC RX 637 (ALT 250 FOR IP)

## 2023-02-11 PROCEDURE — 97116 GAIT TRAINING THERAPY: CPT

## 2023-02-11 PROCEDURE — 97129 THER IVNTJ 1ST 15 MIN: CPT

## 2023-02-11 PROCEDURE — 6360000002 HC RX W HCPCS

## 2023-02-11 RX ADMIN — TAMSULOSIN HYDROCHLORIDE 0.4 MG: 0.4 CAPSULE ORAL at 07:40

## 2023-02-11 RX ADMIN — CARVEDILOL 12.5 MG: 12.5 TABLET, FILM COATED ORAL at 07:40

## 2023-02-11 RX ADMIN — CARVEDILOL 12.5 MG: 12.5 TABLET, FILM COATED ORAL at 17:33

## 2023-02-11 RX ADMIN — ENOXAPARIN SODIUM 40 MG: 100 INJECTION SUBCUTANEOUS at 07:39

## 2023-02-11 RX ADMIN — POLYETHYLENE GLYCOL 3350 17 G: 17 POWDER, FOR SOLUTION ORAL at 09:35

## 2023-02-11 RX ADMIN — THIAMINE HCL TAB 100 MG 100 MG: 100 TAB at 07:40

## 2023-02-11 RX ADMIN — MINERAL OIL, PETROLATUM: 425; 568 OINTMENT OPHTHALMIC at 19:53

## 2023-02-11 RX ADMIN — PANTOPRAZOLE SODIUM 40 MG: 40 TABLET, DELAYED RELEASE ORAL at 05:41

## 2023-02-11 RX ADMIN — FOLIC ACID 1 MG: 1 TABLET ORAL at 07:39

## 2023-02-11 RX ADMIN — LISINOPRIL 40 MG: 20 TABLET ORAL at 07:39

## 2023-02-11 NOTE — PLAN OF CARE
Problem: ABCDS Injury Assessment  Goal: Absence of physical injury  Outcome: Progressing     Problem: Safety - Adult  Goal: Free from fall injury  Outcome: Progressing     Problem: Skin/Tissue Integrity  Goal: Absence of new skin breakdown  Description: 1. Monitor for areas of redness and/or skin breakdown  2. Assess vascular access sites hourly  3. Every 4-6 hours minimum:  Change oxygen saturation probe site  4. Every 4-6 hours:  If on nasal continuous positive airway pressure, respiratory therapy assess nares and determine need for appliance change or resting period.   Outcome: Progressing     Problem: Discharge Planning  Goal: Discharge to home or other facility with appropriate resources  Outcome: Progressing  Flowsheets (Taken 2/11/2023 0930)  Discharge to home or other facility with appropriate resources:   Identify barriers to discharge with patient and caregiver   Arrange for needed discharge resources and transportation as appropriate   Identify discharge learning needs (meds, wound care, etc)   Refer to discharge planning if patient needs post-hospital services based on physician order or complex needs related to functional status, cognitive ability or social support system     Problem: Chronic Conditions and Co-morbidities  Goal: Patient's chronic conditions and co-morbidity symptoms are monitored and maintained or improved  Outcome: Progressing  Flowsheets (Taken 2/11/2023 0930)  Care Plan - Patient's Chronic Conditions and Co-Morbidity Symptoms are Monitored and Maintained or Improved:   Monitor and assess patient's chronic conditions and comorbid symptoms for stability, deterioration, or improvement   Collaborate with multidisciplinary team to address chronic and comorbid conditions and prevent exacerbation or deterioration   Update acute care plan with appropriate goals if chronic or comorbid symptoms are exacerbated and prevent overall improvement and discharge     Problem: Pain  Goal: Verbalizes/displays adequate comfort level or baseline comfort level  Outcome: Progressing     Problem: Nutrition Deficit:  Goal: Optimize nutritional status  Outcome: Progressing

## 2023-02-11 NOTE — PROGRESS NOTES
[address]    Acute Rehabilitation Occupational Therapy Daily Treatment Note    Date: 23  Patient Name: Pamella Tian       Room: 6305/0017-04  MRN: [id number]  Account: [de-identified]   : 1980  (43 y.o.) Gender: male       Referring Practitioner: Shell Barry MD  Diagnosis: Hemorrhagic CVA  Additional Pertinent Hx: Mr. Pamella Tian is a 43 y.o. right handed male who was admitted to [facility] on 2023 with Extremity Weakness (Right sided weakness) and Facial Droop. Patient with R weakness, numbness and facial droop who fell when he went to bed then awoke with the symptoms. SBP was in 200s upon arrival to ED. CT confirmed hemorrhagic CVA posterior ramses and narrowing R P1 PCA. He was treated with Cardene for BP control. Neurosurgery managing conservatively. Pt admitted to ARU 23    Treatment Diagnosis: Impaired self care status    Past Medical History:  has a past medical history of Cerebral artery occlusion with cerebral infarction [facility]), History of heartburn, Hypertension, Neoplasm of unspecified nature of bone, soft tissue, and skin, and Research study patient. Past Surgical History:   has a past surgical history that includes Appendectomy (in 20's) and pre-malignant / benign skin lesion excision (Right, 2014). Restrictions  Restrictions/Precautions  Restrictions/Precautions: Fall Risk, General Precautions  Required Braces or Orthoses?: No  Implants present? :  (pt denies)     Position Activity Restriction  Other position/activity restrictions: Per Dr. Sarika Aldrich OK to use Eye patch, alternate between eyes (to help with patient's double vision and balance)        Subjective  Subjective  Subjective: PM: Agreeable to full shower/self care routine.   Pain: Pt denied pain       Objective  Cognition  Overall Orientation Status: Within Functional Limits  Orientation Level: Oriented X4    Cognition  Overall Cognitive Status: Exceptions  Arousal/Alertness: Appropriate responses to stimuli  Following Commands: Follows multistep commands with repitition  Attention Span: Attends with cues to redirect  Memory: Appears intact  Safety Judgement: Decreased awareness of need for assistance;Decreased awareness of need for safety  Problem Solving: Assistance required to generate solutions;Assistance required to implement solutions;Assistance required to identify errors made;Assistance required to correct errors made  Insights: Decreased awareness of deficits  Initiation: Requires cues for some  Sequencing: Requires cues for some    Activities of Daily Living  Feeding  Assistance Level: Set-up  Skilled Clinical Factors: Per pt report      Light Housekeeping  Light Housekeeping: Pt participated in cone retrieval within a kitchen environment, reaching outside of base of support to collect cones. No LOB noted with patient maintaining at least one UE support on countertop for stability. Educated on importance of placing rollator nearby and locking brakes. Pt verablized understanding  Mobility    Transfers  Surface: Wheelchair  Device: Walker  Sit to [employer] Level: Stand by assist  Skilled Clinical Factors: Good hand placement and locking brakes noted  Stand to Sit  Assistance Level: Stand by assist  Skilled Clinical Factors: Good hand placement noted          Functional Mobility  Device: Rolling walker; Wheelchair  Activity: To/From bathroom; Retrieve items;Transport items; To/From therapy gym  Assistance Level: Contact guard assist  Skilled Clinical Factors: CGA/SBA with use of rw. Pt. mod(I)for wheelchair mobility. OT Exercises  Dynamic Standing Balance Exercises: OT facilitated pt's engagement in dynamic standing task (playing corn hole) to improve standing balance/tolerance for improved functional use. One LOB noted with no assist to correct (CGA for safety).  Pt participated in activity for 10+ minutes with CGA for safety  Motor Control/Coordination: OT facilitated pt's engagement in various fine motor activities, including ADL boards, placing small pegs using tweezers, and coins into piggy bank, to address force modulation/coordination/in-hand manipulation. Fair tolerance overall with increased time to complete tasks. Pt dropped 2 pegs without using tweezer and 5+ pegs using tweezers in R hand    Assessment  Assessment  Activity Tolerance: Patient tolerated treatment well  Discharge Recommendations: Home with assist PRN;Outpatient OT    Patient Education  Education  Education Given To: Patient; Family  Education Provided: Role of Therapy;Plan of Care;Safety; Mobility Training;Transfer Training;Visual Perceptual Function;DME/Home Modifications  Education Method: Verbal;Demonstration; Teach Back  Barriers to Learning: Vision  Education Outcome: Verbalized understanding;Continued education needed;Demonstrated understanding    OT Equipment Recommendations  Other: TBD    Safety Devices  Safety Devices in place: Yes  Type of devices: All fall risk precautions in place       Goals  Patient Goals   Patient goals : \"To get back to those things (ADL, IADL, and [facility]). \"  Short Term Goals  Time Frame for Short Term Goals: By 1 week  Short Term Goal 1: Pt will complete lower body dressing/bathing with CGA and Good safety with use of AE as needed  Short Term Goal 2: Pt will complete functional transfers/mobility during self care tasks with CGA and Good safety with no LOB  Short Term Goal 3: Pt will tolerate standing 7+ minutes during functional activity of choice with CGA and no LOB during functional activity of choice  Short Term Goal 4: Pt will verbalize/demonstrate Good understanding of AE/adaptive strategies/DME to increase safety and independence with self care and mobility  Short Term Goal 5: Pt will verbalize/demonstrate 3 visual compensatory strategies to increase safety with daily activities and increase quality of life  Short Term Goal 6: Pt will participate in 30+ minutes of therapeutic exercises/functional activities to increase safety and independence with self care and mobility    Long Term Goals  Time Frame for Long Term Goals : By discharge  Long Term Goal 1: Pt will complete BADLs with Mod I and Good safety with use of AE as needed  Long Term Goal 2: Pt will complete functional mobility during self care tasks with Mod I and Good safety with use of least restrictive device  Long Term Goal 3: Pt will tolerate standing 15+ minutes during functional activity of choice with Good safety  Long Term Goal 4: Pt will complete simple meal prep/light house keeping task with Supervision and Good safety  Long Term Goal 5: Pt will complete tub transfer with Supervision and Good safety with use of DME as needed  Long Term Goal 6: Pt will verbalize/demonstrate Good understanding of vision HEP to increase neurofacilitation of left side visual field to increase safety during daily activities  Long Term Goal 7: Pt will verbalize/demonstrate Good understanding of home safety/fall prevention strategies to increase safety and independence with self care and mobility  Long Term Goal 8: Pt will demonstrate increased [address] during self care tasks as evident by 5 second improvement with LUE and 30 second improvement with RUE during 9 hole peg test    Plan  Occupational Therapy Plan  Times Per Week: 5-7  Times Per Day: Twice a day  Current Treatment Recommendations: Self-Care / ADL, Strengthening, Balance training, Functional mobility training, Endurance training, Neuromuscular re-education, Pain management, Safety education & training, Patient/Caregiver education & training, Equipment evaluation, education, & procurement, Home management training, Cognitive/Perceptual training, Coordination training      OT Individual Minutes  OT Individual Minutes  Time In: 1101  Time Out: 1201  Minutes: 60  Time Code Minutes   Timed Code Treatment Minutes: 60 Minutes         Electronically signed by Wilian Goodrich OTR/L on 2/11/23 at 3:43 PM EST

## 2023-02-11 NOTE — PROGRESS NOTES
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 Continues with diplopia using eye patch. ROS:  Denies fevers, chills, sweats. No chest pain, palpitations, lightheadedness. Denies coughing, wheezing or shortness of breath. Denies abdominal pain, nausea, diarrhea or constipation. No new areas of joint pain. Denies new areas of numbness or weakness. Denies new anxiety or depression issues. No new skin problems. Rehabilitation:   PT:  Restrictions/Precautions: Fall Risk, General Precautions  Implants present? :  (pt denies)  Other position/activity restrictions: Per Dr. Cheryl Lang OK to use Eye patch, alternate between eyes (to help with patient's double vision and balance)   Transfers  Sit to Stand: Stand by assistance  Stand to Sit: Stand by assistance  Bed to Chair: Stand by assistance  Stand Pivot Transfers: Stand by assistance  Comment: COMPENSATION techniques demonstrated, Needs UE support to stablize to preform transfer with SBA. Patient also demonstrating ability to transfer with [facility], rollator, and no assistive devices with varied level of assist needed with visual barrier, double vision, and RLE foot position with turns. Cues for safe technique. Ambulation  Surface: Level tile, Ramp  Device: Rollator (bilateral UE support devices needed)  Assistance: Contact guard assistance (intermittent SBA-JOSE C)  Quality of Gait: unsteady, R lateral lean with path deviation, intermittent wide and narrow ISRAEL, ataxic, decreased motor control R LE. CGA for with intermittent Jose C required for right lateral LOB.   Gait Deviations: Slow Tess, Deviated path, Staggers, Decreased step length, Decreased step height  Distance: 283'  Comments: Increased time to complete with slowed pace for patient, RLE Ataxia with poorly coordinated steps going from scissoring to Wide ISRAEL with tendency to maintain R hip external rotation. LOB to right corrected with patient lateral steppage gait to maintain balance. More Ambulation?: Yes      OT:  ADL  Equipment Provided: Feeding equipment (Built up handles)  Feeding: Setup  Feeding Skilled Clinical Factors: Pt demonstrated increased difficulty with grasping silverwear during eating task. OT provided built up handles during task to increase ease of use during self feeding. Grooming: Minimal assistance  Grooming Skilled Clinical Factors: Min A for standing balance standing sink side with slight R side lean  UE Bathing: Stand by assistance  UE Bathing Skilled Clinical Factors: Sitting on tub bench  LE Bathing: Minimal assistance  LE Bathing Skilled Clinical Factors: LOB while standing in shower to dry bottom requiring A to right self  UE Dressing: Stand by assistance  LE Dressing: Minimal assistance  LE Dressing Skilled Clinical Factors: Pt demonstrated figure 4 technique to thread BLE. Min A while standing with unsteadiness while pulling up over hips  Toileting: Minimal assistance  Toileting Skilled Clinical Factors: A for standing balance with hygiene and clothing management  Additional Comments: OT facilitated pts engagement in full shower on this date with use of tub bench, grab bars, and hand held shower. Pt required A while standing due to intermittet LOB to right side. Pt currently limited due to decreased strength, balance, FMC, visual deficits, and activity tolerance impacting safety and independence with self care tasks         Balance  Sitting Balance: Stand by assistance  Standing Balance: Moderate assistance (CGA-Mod A with multiple LOB to right side)      Functional Mobility  Functional - Mobility Device: Rolling Walker  Activity: To/from bathroom  Assist Level: Moderate assistance (CGA-Mod A with multiple LOB to right side)  Functional Mobility Comments: Verbal cues for hand placement and safety. Increased time needed with RLE.  Multiple LOB to right side requiring A to right self     Bed mobility  Bridging: Modified independent   Rolling to Left: Modified independent  Rolling to Right: Modified independent  Supine to Sit: Modified independent  Sit to Supine: Modified independent  Scooting: Modified independent  Bed Mobility Comments: All bed mobility completed on flat surface without use of rails. Transfers  Sit to stand: Minimal assistance  Stand to sit: Minimal assistance  Transfer Comments: Verbal cues for hand placement and safety   Toilet Transfers  Toilet - Technique: Ambulating  Equipment Used: Grab bars  Toilet Transfer: Minimal assistance  Toilet Transfers Comments: Verbal cues for hand placement and safety. Slight unsteadiness with R side LOB     Shower Transfers  Shower - Transfer From: Shay Mejía - Transfer Type: To and From  Shower - Transfer To: Transfer tub bench  Shower - Technique: Ambulating  Shower Transfers: Moderate assistance  Shower Transfers Comments: Verbal cues for hand placement, safety and RW management over threshold. LOB to right side with Mod A to right self. ST:    Subjective: [x] Alert     [x] Cooperative     [] Confused     [] Agitated    [] Lethargic        Objective/Assessment:     Attention: Sustained throughout, distractions minimized. Recall: n/a     Organization: Pt. Completed 5 step written ADL sequencing Vee     Problem Solving/Reasoning: n/a     Speech: ST educated Pt re: oral motor exercises for dysarthria. Pt able to state strategies for over-articulation and slow rate (I), utilized appropriately t/o session. Pt able to complete alliteration drills while Vee using dysarthria strategies. Speech subjectively judged to to be 100% intelligible in conversation. Pt able to complete OMEs x5 sets in reps of 15. No movement noted on L side of face. Passive ROM exercises completed to L side x4, 10 reps.    Encouraged Pt to continue practicing exercises,  PROM and speech tasks throughout day (using mirror as form of visual feedback). Pt verbalized understanding and agreeable. Other:      Plan:  [x] Continue  services    [] Discharge from :        Objective:  /81   Pulse 63   Temp 97.2 °F (36.2 °C)   Resp 16   Ht 6' (1.829 m)   Wt 205 lb 9.6 oz (93.3 kg)   SpO2 96%   BMI 27.88 kg/m²  I Body mass index is 27.88 kg/m². I   Wt Readings from Last 1 Encounters:   23 205 lb 9.6 oz (93.3 kg)      Temp (24hrs), Av.7 °F (36.5 °C), Min:97.2 °F (36.2 °C), Max:98.1 °F (36.7 °C)         GEN: well developed, well nourished, no acute distress  HEENT: Normocephalic atraumatic, EOMI, mucous membranes pink and moist  CV: RRR, no murmurs, rubs or gallops  PULM: CTAB, no rales or rhonchi. Respirations WNL and unlabored  ABD: soft, NT, ND, +BS and equal  NEURO: A&O x3. Sensation decreased right upper and lower extremity compared to left, left facial droop, neuro exam no change  MSK: Legs 4+/5 upper and lower extremities  EXTREMITIES: No calf tenderness to palpation bilaterally. No edema BLEs range of motion upper lower extremity and palpation of joints and spine with no pain or discomfort  SKIN: warm dry and intact with good turgor  PSYCH: appropriately interactive. Affect WNL. Medications   Scheduled Meds:   nicotine  1 patch TransDERmal Daily    artificial tears   Left Eye Nightly    carvedilol  12.5 mg Oral BID WC    enoxaparin  40 mg SubCUTAneous Daily    folic acid  1 mg Oral Daily    lisinopril  40 mg Oral Daily    melatonin  3 mg Oral Nightly    pantoprazole  40 mg Oral QAM AC    tamsulosin  0.4 mg Oral Daily    thiamine  100 mg Oral Daily    polyethylene glycol  17 g Oral Daily     Continuous Infusions:  PRN Meds:. QUEtiapine, polyvinyl alcohol, acetaminophen, calcium carbonate, fluticasone, senna, bisacodyl     Diagnostics:     CBC:   Recent Labs     02/10/23  0625   WBC 8.8   RBC 4.90   HGB 15.7   HCT 46.7   MCV 95.4   RDW 13.0          BMP:   Recent Labs     02/10/23  0675    K 5.1      CO2 27   BUN 21*   CREATININE 0.80       BNP: No results for input(s): BNP in the last 72 hours. PT/INR: No results for input(s): PROTIME, INR in the last 72 hours. APTT: No results for input(s): APTT in the last 72 hours. CARDIAC ENZYMES: No results for input(s): CKMB, CKMBINDEX, TROPONINT in the last 72 hours. Invalid input(s): CKTOTAL;3  FASTING LIPID PANEL:No results found for: CHOL, HDL, TRIG  LIVER PROFILE:   Recent Labs     02/10/23  0625   AST 18   ALT 33          I/O (24Hr): No intake or output data in the 24 hours ending 02/11/23 1139    Glu last 24 hour  No results for input(s): POCGLU in the last 72 hours. No results for input(s): CLARITYU, COLORU, PHUR, SPECGRAV, PROTEINU, RBCUA, BLOODU, BACTERIA, NITRU, WBCUA, LEUKOCYTESUR, YEAST, GLUCOSEU, BILIRUBINUR in the last 72 hours. Impression/Plan:    Hemorrhagic pontine CVA:  PT/OT for gait, mobility, strengthening, endurance, ADLs, and self care. SLP for cognition, speech. Discharge plan 2/15  Slide to floor t2/10-no trauma-monitor for residual  Diplopia, blurred vision:  Secondary to hemorrhagic stroke. Has eye patch - alternate use on both eyes. Added artificial tears as needed, artificial tears ointment nightly for left eye due to incomplete closing and burning of the eye.-Improved reviewed with wife neuro-ophthalmology evaluation as outpatient  Urinary retention:  Noted in acute care. On flomax. Will monitor bladder scans, PVRs. -Appears to have improved  Alcohol withdrawal:  On phenobarbital wean (tcompleted on 2/6/23). On folate, thiamine supplementation. History of smoker-on NicoDerm patch- will decrease  HTN:  On carvedilol, lisinopril. Goal is SBP below 160 with gradual normalization of BP, per neurology.   His calcium 1.47 and .8-defer to Dr. Pramod Patterson  Potassium 5.1 though increased from 2 recheck Monday-patient on lisinopril  GERD:  On protonix  Bowel Management: Miralax daily, senokot prn, dulcolax prn. History of agitation-Seroquel at night, has not needed  DVT Prophylaxis:  low molecular weight heparin, SCD's while in bed, and KP's during the day  Dr. Aranza Zuniga for medical management-  Follow up PCP 1-2 weeks, PM&R 4-6 weeks, Neurology 3 weeks, Neurosurgery 2 weeks from 1/29 - on 2/13(with repeat CT head)-, Neuro-ophthalmology - Dr. Jessie Rich. Izabella Chaudhari MD       This note is created with the assistance of a speech recognition program.  While intending to generate a document that actually reflects the content of the visit, the document can still have some errors including those of syntax and sound a like substitutions which may escape proof reading.   In such instances, actual meaning can be extrapolated by contextual diversion

## 2023-02-11 NOTE — PROGRESS NOTES
[facility]: DANAE GORDON   Acute Rehabilitation Occupational Therapy Daily Treatment Note    Date: 23  Patient Name: Gurdeep Mack       Room: 6547/2225-60  MRN: [id number]  Account: [de-identified]   : 1980  (43 y.o.) Gender: male       Referring Practitioner: Eloise Mendoza MD  Diagnosis: Hemorrhagic CVA  Additional Pertinent Hx: Mr. Gurdeep Mack is a 43 y.o. right handed male who was admitted to [facility] on 2023 with Extremity Weakness (Right sided weakness) and Facial Droop. Patient with R weakness, numbness and facial droop who fell when he went to bed then awoke with the symptoms. SBP was in 200s upon arrival to ED. CT confirmed hemorrhagic CVA posterior ramses and narrowing R P1 PCA. He was treated with Cardene for BP control. Neurosurgery managing conservatively. Pt admitted to ARU 23    Treatment Diagnosis: Impaired self care status    Past Medical History:  has a past medical history of Cerebral artery occlusion with cerebral infarction [facility]), History of heartburn, Hypertension, Neoplasm of unspecified nature of bone, soft tissue, and skin, and Research study patient. Past Surgical History:   has a past surgical history that includes Appendectomy (in 20's) and pre-malignant / benign skin lesion excision (Right, 2014). Restrictions  Restrictions/Precautions  Restrictions/Precautions: Fall Risk, General Precautions  Required Braces or Orthoses?: No  Implants present? :  (pt denies)     Position Activity Restriction  Other position/activity restrictions: Per Dr. Nabila CROWDER to use Eye patch, alternate between eyes (to help with patient's double vision and balance)     Vitals      Subjective  Subjective  Subjective: PM: Pt. in recliner chair resting. Easy to wake. Agreeable to participate. Pain: Denied pain.      Objective  Cognition  Overall Orientation Status: Within Functional Limits  Orientation Level: Oriented X4    Cognition  Overall Cognitive Status: Exceptions  Arousal/Alertness: Appropriate responses to stimuli  Following Commands: Follows multistep commands with repitition  Attention Span: Attends with cues to redirect  Memory: Appears intact  Safety Judgement: Decreased awareness of need for assistance;Decreased awareness of need for safety  Problem Solving: Assistance required to generate solutions;Assistance required to implement solutions;Assistance required to identify errors made;Assistance required to correct errors made  Insights: Decreased awareness of deficits  Initiation: Requires cues for some  Sequencing: Requires cues for some    Activities of Daily Living  Feeding  Assistance Level: Set-up  Skilled Clinical Factors: Per pt report    Putting On/Taking Off Footwear  Assistance Level: Modified independent  Skilled Clinical Factors: Utilizes figure 4 technique to izabela/doff socks and shoes. Mobility    Transfers  Surface: Wheelchair  Device: Walker  Sit to [employer] Level: Stand by assist  Skilled Clinical Factors: Good hand placement and locking brakes noted  Stand to Sit  Assistance Level: Stand by assist  Skilled Clinical Factors: Good hand placement noted    Functional Mobility  Device: Rolling walker; Wheelchair  Activity: To/From bathroom; Retrieve items;Transport items; To/From therapy gym  Assistance Level: Contact guard assist  Skilled Clinical Factors: CGA/SBA with use of rw. Pt. mod(I)for wheelchair mobility. OT Exercises    Motor Control/Coordination: Instruction and participation in more fine motor/object manipulation activities this afternoon at table side. Challenging patient ability to shuffle cards, flip cards, sort cards, and particpate in a leisure card game of choice. All to increase patients ability to effectively engage in daily leisure.     Assessment  Assessment  Activity Tolerance: Patient tolerated treatment well  Discharge Recommendations: Home with assist PRN;Outpatient OT    Patient Education  Education  Education Given To: Patient; Family  Education Provided: Role of Therapy;Plan of Care;Safety; Mobility Training;Transfer Training;Visual Perceptual Function;DME/Home Modifications  Education Method: Verbal;Demonstration; Teach Back  Barriers to Learning: Vision  Education Outcome: Verbalized understanding;Continued education needed;Demonstrated understanding    OT Equipment Recommendations  Other: TBD    Safety Devices  Safety Devices in place: Yes  Type of devices: All fall risk precautions in place     Goals  Patient Goals   Patient goals : \"To get back to those things (ADL, IADL, and [address] in [place]). \"  Short Term Goals  Time Frame for Short Term Goals: By 1 week  Short Term Goal 1: Pt will complete lower body dressing/bathing with CGA and Good safety with use of AE as needed  Short Term Goal 2: Pt will complete functional transfers/mobility during self care tasks with CGA and Good safety with no LOB  Short Term Goal 3: Pt will tolerate standing 7+ minutes during functional activity of choice with CGA and no LOB during functional activity of choice  Short Term Goal 4: Pt will verbalize/demonstrate Good understanding of AE/adaptive strategies/DME to increase safety and independence with self care and mobility  Short Term Goal 5: Pt will verbalize/demonstrate 3 visual compensatory strategies to increase safety with daily activities and increase quality of life  Short Term Goal 6: Pt will participate in 30+ minutes of therapeutic exercises/functional activities to increase safety and independence with self care and mobility    Long Term Goals  Time Frame for Long Term Goals : By discharge  Long Term Goal 1: Pt will complete BADLs with Mod I and Good safety with use of AE as needed  Long Term Goal 2: Pt will complete functional mobility during self care tasks with Mod I and Good safety with use of least restrictive device  Long Term Goal 3: Pt will tolerate standing 15+ minutes during functional activity of choice with Good safety  Long Term Goal 4: Pt will complete simple meal prep/light house keeping task with Supervision and Good safety  Long Term Goal 5: Pt will complete tub transfer with Supervision and Good safety with use of DME as needed  Long Term Goal 6: Pt will verbalize/demonstrate Good understanding of vision HEP to increase neurofacilitation of left side visual field to increase safety during daily activities  Long Term Goal 7: Pt will verbalize/demonstrate Good understanding of home safety/fall prevention strategies to increase safety and independence with self care and mobility  Long Term Goal 8: Pt will demonstrate increased [address] during self care tasks as evident by 5 second improvement with LUE and 30 second improvement with RUE during 9 hole peg test    Plan  Occupational Therapy Plan  Times Per Week: 5-7  Times Per Day: Twice a day  Current Treatment Recommendations: Self-Care / ADL, Strengthening, Balance training, Functional mobility training, Endurance training, Neuromuscular re-education, Pain management, Safety education & training, Patient/Caregiver education & training, Equipment evaluation, education, & procurement, Home management training, Cognitive/Perceptual training, Coordination training       02/11/23 1257   OT Individual Minutes   Time In 2554   Time Out 1330   Minutes 32       Electronically signed by JASON Huggins on 2/11/23 at 4:32 PM EST

## 2023-02-11 NOTE — PROGRESS NOTES
Physical Therapy  Facility/Department: [facility] ACUTE REHAB  Rehabilitation Physical Therapy Progress Note    NAME: Paloma Hilton  : 1980 (43 y.o.)  MRN: [id number]  CODE STATUS: Full Code    Date of Service: 23    Chart Reviewed: Yes  Patient assessed for rehabilitation services?: Yes  Additional Pertinent Hx: Mr. Paloma Hilton is a 43 y.o. right handed male who was admitted to [facility] on 2023 with Extremity Weakness (Left sided weakness) and Facial Droop     Patient with R weakness, numbness and facial droop who fell when he went to bed then awoke with the symptoms. SBP was in 200s upon arrival to ED. CT confirmed hemorrhagic CVA posterior ramses and narrowing R P1 PCA. He was treated with Cardene for BP control. Neurosurgery managing conservatively. Family / Caregiver Present: No  Referring Practitioner: Daljit Saucedo MD  Referral Date : 23  Diagnosis: Hemorrhagic CVA  General Comment  Comments: Pt is eager and very motivated to improve    Restrictions:  Restrictions/Precautions: Fall Risk;General Precautions  Position Activity Restriction  Other position/activity restrictions: Per Dr. Shiloh Dos Santos OK to use Eye patch, alternate between eyes (to help with patient's double vision and balance)     SUBJECTIVE  Subjective: Pt reports no pain however pt does relay muscle soreness from previous days activities. OBJECTIVE  Vision  Vision: Impaired  Vision Exceptions: Wears glasses for reading    Hearing  Hearing: Exceptions to [facility]  Hearing Exceptions: Hard of hearing/hearing concerns    Cognition  Overall Cognitive Status: Exceptions  Arousal/Alertness: Appropriate responses to stimuli  Following Commands: Follows multistep commands with increased time  Attention Span: Appears intact; Attends with cues to redirect  Memory: Appears intact  Safety Judgement: Decreased awareness of need for assistance;Decreased awareness of need for safety  Problem Solving: Assistance required to identify errors made;Assistance required to correct errors made  Insights: Decreased awareness of deficits  Initiation: Requires cues for some  Sequencing: Requires cues for some  Cognition Comment: pt can be impulsive at times       Sensation  Overall Sensation Status: Impaired (R side of body)    Functional Mobility  Bed mobility  Bridging: Modified independent   Rolling to Left: Modified independent  Rolling to Right: Modified independent  Supine to Sit: Modified independent  Sit to Supine: Modified independent  Scooting: Modified independent  Bed Mobility Comments: All bed mobility completed on flat surface without use of rails. Transfers  Sit to Stand: Stand by assistance  Stand to Sit: Stand by assistance  Bed to Chair: Stand by assistance  Stand Pivot Transfers: Stand by assistance  Comment: Slightly impulsive for transfers, cues to slow down and scan environment before proceeding. Pt's foot rest was still in the way as pt attempts to step around it. Balance  Posture: Fair  Sitting - Static: Good  Sitting - Dynamic: Good;-  Standing - Static: Good;-  Standing - Dynamic: Fair;+  Comments: standing without AD    Environmental Mobility  Ambulation  Surface: Level tile; Ramp  Device: Rollator (bilateral UE support devices needed)  Assistance: Stand by assistance  Quality of Gait: pace control improved, pt occasionally drags right LE but self corrects  Gait Deviations: Slow Tess;Deviated path;Staggers;Decreased step length;Decreased step height  Distance: 500ft  Comments: Working on pace control with alternating follow the leader on pace for integrating in crowds, pt has normal fast pace, cues to slow down for better control for straight paths and turns. More Ambulation?: Yes  Ambulation 2  Surface - 2: level tile  Device 2: Rollator  Assistance 2: Stand by assistance  Quality of Gait 2: improved control, still scooting right LE on occasion. Cues to  foot.   Gait Deviations: Slow Tess;Decreased step length;Decreased step height;Deviated path  Distance: 300ft  Comments: No LOB noted. Improved control  Stairs/Curb  Stairs?: Yes  Stairs  # Steps : 50 (preformed with varied method for neuro re ed and safety instructions for home)  Stairs Height: 8\"  Rails: Bilateral (hands gliding and not pulling to allow bilat LE to work harder with reciprocating movement, less use of UE)  Device: No Device  Assistance: Contact guard assistance  Comment: pt reports that he felt the stairs more in the glutes with using bilat LE more and less UE. Wheelchair Activities  Wheelchair Size: 20  Wheelchair Type: Standard  Wheelchair Cushion: Standard (Air waffle cushion)  Wheelchair Parts Management: Yes  Left Leg Rest Level of Assistance: Modified independent  Right Leg Rest Level of Assistance: Modified independent  Left Brakes Level of Assistance: Unable to assess(comment)  Right Brakes Level of Assistance: Modified independent  Propulsion: Yes  Propulsion 1  Propulsion: Manual  Level: Level Tile  Method: RLE;LLE;LUE  Level of Assistance: Modified independent  Description/ Details: pt able to propel self down for AM & PM tx  Distance: 250'x2    PT Exercises  Dynamic Standing Balance Exercises: Obstacle course: sm/lg hurdles, bosu shantel disc, foam with x4 cones. SLS and NBOS 2x3-30 sec on BOSU and shantel disc. SLS on blue foam with cones for LE coordination/balance. Pt requires 1-2 UE support on monge rail to complete. CGA-Mod A for safety. Breathing Techniques: pursed lip blow out while lifting 4 pound weight RLE  Standing Open/Closed Kinetic Chain Exercises: Standing on foam unstable surface 2min SLS R then L, Squats x7, SLS Squat x5 bilat with Handle/Wall to stabilize as needed (Increase time to complete for good technique.)  Exercise Equipment: Evermedeep Level 6, 20 min. Activity Tolerance  Activity Tolerance: Patient tolerated treatment well    Assessment  Performance Deficits/Impairments: Decreased functional mobility ; Decreased body mechanics; Decreased safe awareness;Decreased balance;Decreased sensation;Decreased coordination;Decreased vision/visual deficit; Increased pain;Decreased posture  Treatment Diagnosis: Impaired balance and mobility 2* CVA  Therapy Prognosis: Good  Barriers to Learning: Vision, hearing  Discharge Recommendations: Therapy recommended at discharge; Patient would benefit from continued therapy after discharge  PT D/C Equipment  Equipment Needed:  (TBD)  PT Equipment Recommendations  Equipment Needed: No         GOALS  Patient Goals   Patient Goals : \"Practice going up/down a ladder\"  Short Term Goals  Time Frame for Short Term Goals: 7 days  Short Term Goal 1: pt to demo independent bed mobility on flat surface without use of rails per home set-up  Short Term Goal 2: pt to demo functional transfers with device and CGA  Short Term Goal 3: pt to ambulate 150' with device and MinAx1  Short Term Goal 4: pt to negotiate 10-12 steps with Single rail and device as needed demonstrating a landing pivot turn (per home set up stairs with landing) Benigno  Long Term Goals  Time Frame for Long Term Goals : Until D/C  Long Term Goal 1: Pt to improve Standing balance to FAIR + with use of device to dec risk for falls  Long Term Goal 2: pt to demo functional transfers with device Mod I  Long Term Goal 3: pt to ambulate 150' with device and supervision  Long Term Goal 4: pt to negotiate 2 steps with R rail and device and 10-12 successive steps with L rail and device as needed demonstrating a landing pivot turn (per home set up stairs with landing) CGA-SBA for stairs  Long Term Goal 5: pt to demo actual or simulated car transfer with SBA and device as needed  Long term goal 6: pt to improve on PASS score to 30/36 or greater to demonstrate improving balance, mobility, and independence  Long term goal 7: pt to demonstrate good technique for fall recovery with SBA  Long term goal 8: Pt to progress to step Ladder negotiation with CGA to allow for safe participation in home making/maintainance tasks with assist for safety. PLAN OF CARE  Frequency: 1-2 treatment sessions per day, 5-7 days per week  Physcial Therapy Plan  General Plan:  minutes of therapy at least 5 out of 7 days a week  Current Treatment Recommendations: Strengthening;Balance training;Functional mobility training;Transfer training;Cognitive/Perceptual training; Wheelchair mobility training;Gait training;Stair training;Neuromuscular re-education;Pain management;Home exercise program;Return to work related activity  Safety Devices  Type of Devices: Gait belt;Call light within reach  Restraints  Restraints Initially in Place: No    EDUCATION  Education  Education Given To: Patient  Education Provided:  Mobility Training;Transfer Training  Education Method: Demonstration;Verbal  Barriers to Learning: None  Education Outcome: Verbalized understanding;Demonstrated understanding  Skilled Clinical Factors: Reminding pt to slow down to plan safe transfers           Therapy Time     02/11/23 0800 02/11/23 1404   PT Individual Minutes   Time In 0800 1404   Time Out 0900 1440   Minutes 61 [address] [place], [place], 02/11/23 at 3:46 PM

## 2023-02-11 NOTE — PROGRESS NOTES
[id number] [facility]      PROGRESS NOTE        Patient:  Murali Stewart  YOB: 1980    MRN: [id number]     Acct: [de-identified]     Admit date: 2/2/2023    Pt seen and Chart reviewed. Consultant notes reviewed and care evaluated. Subjective: She is doing okay he is coming along very good in therapy. He was getting occupational therapy in his room now. Denies any chest pain or shortness of breath or nausea or vomiting he says he feels very good meanwhile his Ionized calcium was slightly elevated as well as his PTH level. Diet:  ADULT DIET; Regular      Medications:Current Inpatient    Scheduled Meds:   nicotine  1 patch TransDERmal Daily    artificial tears   Left Eye Nightly    carvedilol  12.5 mg Oral BID WC    enoxaparin  40 mg SubCUTAneous Daily    folic acid  1 mg Oral Daily    lisinopril  40 mg Oral Daily    melatonin  3 mg Oral Nightly    pantoprazole  40 mg Oral QAM AC    tamsulosin  0.4 mg Oral Daily    thiamine  100 mg Oral Daily    polyethylene glycol  17 g Oral Daily     Continuous Infusions:  PRN Meds:QUEtiapine, polyvinyl alcohol, acetaminophen, calcium carbonate, fluticasone, senna, bisacodyl        Physical Exam:  Vitals: /81   Pulse 63   Temp 97.2 °F (36.2 °C)   Resp 16   Ht 6' (1.829 m)   Wt 205 lb 9.6 oz (93.3 kg)   SpO2 96%   BMI 27.88 kg/m²   24 hour intake/output:No intake or output data in the 24 hours ending 02/11/23 1308  Last 3 weights:   Wt Readings from Last 3 Encounters:   02/08/23 205 lb 9.6 oz (93.3 kg)   01/26/23 200 lb (90.7 kg)   10/14/18 200 lb (90.7 kg)       Physical Examination:   General appearance - alert, well appearing, and in no distress  Mental status - alert, oriented to person, place, and time  oropharynx clear, no lesions some drooping but speech is normal  Chest - clear to auscultation, no wheezes, rales or rhonchi, symmetric air entry  Heart - normal rate, regular rhythm, normal S1, S2, no murmurs, rubs, clicks or gallops  Abdomen - soft, nontender, nondistended, no masses or organomegaly  Neurological - alert, oriented, normal speech, no focal findings or movement disorder noted} troponin to the left side but speech is normal and his strength actually is very good  Extremities - peripheral pulses normal, no pedal edema, no clubbing or cyanosis  Skin - normal coloration and turgor, no rashes, no suspicious skin lesions noted      Component Value Units   PTH, Intact [[id number]] (Abnormal)    Collected: 02/10/23 1724    Updated: 02/11/23 0008    Specimen Source: Blood     Pth Intact 104.8 High  pg/mL    Comment: SAMPLES FROM PATIENTS ROUTINELY RECEIVING HIGH DOSE BIOTIN THERAPY MAY SHOW FALSELY   DEPRESSED RESULTS. ADDITIONAL INFORMATION MAY BE REQUIRED FOR DIAGNOSIS. Calcium, Ionized [[id number]] (Abnormal)    Collected: 02/10/23 1724    Updated: 02/10/23 1815    Specimen Source: Blood     Calcium, Ionized 1.47 High         Assessment:  Principal Problem:    Pontine hemorrhage (HCC)  Resolved Problems:    * No resolved hospital problems. 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    Intracranial hemorrhage (HCC) I62.9    Impending cerebrovascular accident ([address] I63.9      Left-sided hemiparesis    Diplopia secondary to CVA       Pretension blood pressure coming down slowly we will monitor and adjust meds as needed  History of smoking on nicotine patch  History of alcohol use no evidence of withdrawal but he is on phenobarbital looks like bolus thiamine multivitamin  Episodes of fall no injuries  Hypercalcemia with elevated ionized calcium  Per parathyroidism level  Documented blood pressure almost 24 hours in the acceptable range for now      Plan:   We will do parathyroid ultrasound    We will see if lab can add vitamin D level    Depending on the results further treatment and also can be followed outpatient    Patient has normal ophthalmology we will see if we can consult ophthalmology patient is bothered by his diplopia    Belinda Harris DO [facility]            2/11/2023, 1:08 PM

## 2023-02-11 NOTE — PROGRESS NOTES
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 Recall: n/a    Organization: Pt. Completed 5 step written ADL sequencing Vee    Problem Solving/Reasoning: n/a    Speech: ST educated Pt re: oral motor exercises for dysarthria. Pt able to state strategies for over-articulation and slow rate (I), utilized appropriately t/o session. Pt able to complete alliteration drills while Vee using dysarthria strategies. Speech subjectively judged to to be 100% intelligible in conversation. Pt able to complete OMEs x5 sets in reps of 15. No movement noted on L side of face. Passive ROM exercises completed to L side x4, 10 reps. Encouraged Pt to continue practicing exercises,  PROM and speech tasks throughout day (using mirror as form of visual feedback). Pt verbalized understanding and agreeable. Other:     Plan:  [x] Continue ST services    [] Discharge from ST:      Discharge recommendations: [] Inpatient Rehab   [] [place]   [] Outpatient Therapy  [] Follow up at trauma clinic   [] Other:       Treatment completed by: Siddharth Yeh A.CCC/SLP

## 2023-02-11 NOTE — PLAN OF CARE
Problem: ABCDS Injury Assessment  Goal: Absence of physical injury  2/11/2023 0034 by Benito Barger RN  Outcome: Progressing  2/11/2023 0031 by Benito Barger RN  Outcome: Progressing  2/10/2023 1413 by Lamont Peters LPN  Outcome: Progressing     Problem: Safety - Adult  Goal: Free from fall injury  2/11/2023 0034 by Benito Barger RN  Outcome: Progressing  2/11/2023 0031 by Benito Barger RN  Outcome: Progressing  2/10/2023 1413 by Lamont Peters LPN  Outcome: Progressing     Problem: Skin/Tissue Integrity  Goal: Absence of new skin breakdown  Description: 1. Monitor for areas of redness and/or skin breakdown  2. Assess vascular access sites hourly  3. Every 4-6 hours minimum:  Change oxygen saturation probe site  4. Every 4-6 hours:  If on nasal continuous positive airway pressure, respiratory therapy assess nares and determine need for appliance change or resting period.   2/11/2023 0034 by Benito Barger RN  Outcome: Progressing  2/11/2023 0031 by Benito Barger RN  Outcome: Progressing  2/10/2023 1413 by Lamont Peters LPN  Outcome: Progressing     Problem: Pain  Goal: Verbalizes/displays adequate comfort level or baseline comfort level  2/11/2023 0034 by Benito Barger RN  Outcome: Progressing  2/11/2023 0031 by Benito Barger RN  Outcome: Progressing  2/10/2023 1413 by Lamont Peters LPN  Outcome: Progressing     Problem: Discharge Planning  Goal: Discharge to home or other facility with appropriate resources  2/11/2023 0034 by Benito Barger RN  Outcome: Progressing  2/11/2023 0031 by Benito Barger RN  Outcome: Progressing  2/10/2023 1413 by Lamont Peters LPN  Outcome: Progressing  Flowsheets (Taken 2/10/2023 0830)  Discharge to home or other facility with appropriate resources:   Identify barriers to discharge with patient and caregiver   Arrange for needed discharge resources and transportation as appropriate   Identify discharge learning needs (meds, wound care, etc)   Refer to discharge planning if DISPLAY PLAN FREE TEXT patient needs post-hospital services based on physician order or complex needs related to functional status, cognitive ability or social support system     Problem: Chronic Conditions and Co-morbidities  Goal: Patient's chronic conditions and co-morbidity symptoms are monitored and maintained or improved  2/11/2023 0034 by Modesta Walter RN  Outcome: Progressing  2/11/2023 0031 by Modesta Walter RN  Outcome: Progressing  2/10/2023 1413 by Asmita Peterson LPN  Outcome: Progressing  Flowsheets (Taken 2/10/2023 0830)  Care Plan - Patient's Chronic Conditions and Co-Morbidity Symptoms are Monitored and Maintained or Improved: Monitor and assess patient's chronic conditions and comorbid symptoms for stability, deterioration, or improvement     Problem: Nutrition Deficit:  Goal: Optimize nutritional status  2/11/2023 0034 by Modesta Walter RN  Outcome: Progressing  2/11/2023 0031 by Modesta Walter RN  Outcome: Progressing  2/10/2023 1413 by Asmita Peterson LPN  Outcome: Progressing  Flowsheets (Taken 2/10/2023 1032 by Radha Garcia, RD, LD)  Nutrient intake appropriate for improving, restoring, or maintaining nutritional needs: Monitor oral intake, labs, and treatment plans DISPLAY PLAN FREE TEXT DISPLAY PLAN FREE TEXT DISPLAY PLAN FREE TEXT

## 2023-02-11 NOTE — PLAN OF CARE
Problem: ABCDS Injury Assessment  Goal: Absence of physical injury  2/11/2023 0031 by Yudelka Wagner RN  Outcome: Progressing  2/10/2023 1413 by Katie Lugo LPN  Outcome: Progressing     Problem: Safety - Adult  Goal: Free from fall injury  2/11/2023 0031 by Yudelka Wagner RN  Outcome: Progressing  2/10/2023 1413 by Katie Lugo LPN  Outcome: Progressing     Problem: Skin/Tissue Integrity  Goal: Absence of new skin breakdown  Description: 1. Monitor for areas of redness and/or skin breakdown  2. Assess vascular access sites hourly  3. Every 4-6 hours minimum:  Change oxygen saturation probe site  4. Every 4-6 hours:  If on nasal continuous positive airway pressure, respiratory therapy assess nares and determine need for appliance change or resting period.   2/11/2023 0031 by Yudelka Wagner RN  Outcome: Progressing  2/10/2023 1413 by Katie Lugo LPN  Outcome: Progressing     Problem: Pain  Goal: Verbalizes/displays adequate comfort level or baseline comfort level  2/11/2023 0031 by Yudelka Wagner RN  Outcome: Progressing  2/10/2023 1413 by Katie Lugo LPN  Outcome: Progressing     Problem: Discharge Planning  Goal: Discharge to home or other facility with appropriate resources  2/11/2023 0031 by Yudelka Wagner RN  Outcome: Progressing  2/10/2023 1413 by Katie Lugo LPN  Outcome: Progressing  Flowsheets (Taken 2/10/2023 0830)  Discharge to home or other facility with appropriate resources:   Identify barriers to discharge with patient and caregiver   Arrange for needed discharge resources and transportation as appropriate   Identify discharge learning needs (meds, wound care, etc)   Refer to discharge planning if patient needs post-hospital services based on physician order or complex needs related to functional status, cognitive ability or social support system     Problem: Chronic Conditions and Co-morbidities  Goal: Patient's chronic conditions and co-morbidity symptoms are monitored and maintained or improved  2/11/2023 0031 by Khadijah Guevara RN  Outcome: Progressing  2/10/2023 1413 by Robin Sahni LPN  Outcome: Progressing  Flowsheets (Taken 2/10/2023 0830)  Care Plan - Patient's Chronic Conditions and Co-Morbidity Symptoms are Monitored and Maintained or Improved: Monitor and assess patient's chronic conditions and comorbid symptoms for stability, deterioration, or improvement     Problem: Nutrition Deficit:  Goal: Optimize nutritional status  2/11/2023 0031 by Khadijah Guevara RN  Outcome: Progressing  2/10/2023 1413 by Robin Sahni LPN  Outcome: Progressing  Flowsheets (Taken 2/10/2023 1032 by Francisco Garcia, RD, LD)  Nutrient intake appropriate for improving, restoring, or maintaining nutritional needs: Monitor oral intake, labs, and treatment plans

## 2023-02-12 PROCEDURE — 6360000002 HC RX W HCPCS

## 2023-02-12 PROCEDURE — 97129 THER IVNTJ 1ST 15 MIN: CPT

## 2023-02-12 PROCEDURE — 99232 SBSQ HOSP IP/OBS MODERATE 35: CPT | Performed by: PHYSICAL MEDICINE & REHABILITATION

## 2023-02-12 PROCEDURE — 6370000000 HC RX 637 (ALT 250 FOR IP): Performed by: STUDENT IN AN ORGANIZED HEALTH CARE EDUCATION/TRAINING PROGRAM

## 2023-02-12 PROCEDURE — 6370000000 HC RX 637 (ALT 250 FOR IP): Performed by: FAMILY MEDICINE

## 2023-02-12 PROCEDURE — 6370000000 HC RX 637 (ALT 250 FOR IP): Performed by: PHYSICAL MEDICINE & REHABILITATION

## 2023-02-12 PROCEDURE — 97530 THERAPEUTIC ACTIVITIES: CPT

## 2023-02-12 PROCEDURE — 97110 THERAPEUTIC EXERCISES: CPT

## 2023-02-12 PROCEDURE — 97116 GAIT TRAINING THERAPY: CPT

## 2023-02-12 PROCEDURE — 92507 TX SP LANG VOICE COMM INDIV: CPT

## 2023-02-12 PROCEDURE — 1180000000 HC REHAB R&B

## 2023-02-12 PROCEDURE — 6370000000 HC RX 637 (ALT 250 FOR IP)

## 2023-02-12 RX ORDER — ERGOCALCIFEROL 1.25 MG/1
50000 CAPSULE ORAL WEEKLY
Status: DISCONTINUED | OUTPATIENT
Start: 2023-02-12 | End: 2023-02-15 | Stop reason: HOSPADM

## 2023-02-12 RX ADMIN — LISINOPRIL 40 MG: 20 TABLET ORAL at 07:30

## 2023-02-12 RX ADMIN — MINERAL OIL, PETROLATUM: 425; 568 OINTMENT OPHTHALMIC at 21:33

## 2023-02-12 RX ADMIN — CARVEDILOL 12.5 MG: 12.5 TABLET, FILM COATED ORAL at 17:38

## 2023-02-12 RX ADMIN — THIAMINE HCL TAB 100 MG 100 MG: 100 TAB at 07:30

## 2023-02-12 RX ADMIN — CARVEDILOL 12.5 MG: 12.5 TABLET, FILM COATED ORAL at 07:31

## 2023-02-12 RX ADMIN — PANTOPRAZOLE SODIUM 40 MG: 40 TABLET, DELAYED RELEASE ORAL at 06:05

## 2023-02-12 RX ADMIN — ERGOCALCIFEROL 50000 UNITS: 1.25 CAPSULE ORAL at 17:38

## 2023-02-12 RX ADMIN — POLYETHYLENE GLYCOL 3350 17 G: 17 POWDER, FOR SOLUTION ORAL at 07:30

## 2023-02-12 RX ADMIN — TAMSULOSIN HYDROCHLORIDE 0.4 MG: 0.4 CAPSULE ORAL at 07:31

## 2023-02-12 RX ADMIN — ENOXAPARIN SODIUM 40 MG: 100 INJECTION SUBCUTANEOUS at 07:30

## 2023-02-12 RX ADMIN — FOLIC ACID 1 MG: 1 TABLET ORAL at 07:31

## 2023-02-12 NOTE — PROGRESS NOTES
Physical Therapy        Physical Therapy Cancel Note      DATE: 2023    NAME: Isma Toure  MRN: [id number]   : 1980      Patient not seen this date for Physical Therapy due to:    Patient Declined: Pt politely declines this date as his family is in visiting.        Electronically signed by Johnny Mroan PT on 2023 at 3:44 PM

## 2023-02-12 NOTE — PLAN OF CARE
Problem: ABCDS Injury Assessment  Goal: Absence of physical injury  2/12/2023 0320 by Aubree Hyde RN  Outcome: Progressing     Problem: Safety - Adult  Goal: Free from fall injury  2/12/2023 0320 by Aubree Hyde RN  Outcome: Progressing     Problem: Skin/Tissue Integrity  Goal: Absence of new skin breakdown  Description: 1. Monitor for areas of redness and/or skin breakdown  2. Assess vascular access sites hourly  3. Every 4-6 hours minimum:  Change oxygen saturation probe site  4. Every 4-6 hours:  If on nasal continuous positive airway pressure, respiratory therapy assess nares and determine need for appliance change or resting period.   2/12/2023 0320 by Aubree Hyde RN  Outcome: Progressing     Problem: Pain  Goal: Verbalizes/displays adequate comfort level or baseline comfort level  2/12/2023 0320 by Aubree Hyde RN  Outcome: Progressing     Problem: Discharge Planning  Goal: Discharge to home or other facility with appropriate resources  2/12/2023 0320 by Aubree Hyde RN  Outcome: Progressing     Problem: Nutrition Deficit:  Goal: Optimize nutritional status  2/12/2023 0320 by Aubree Hyde RN  Outcome: Progressing     Problem: Chronic Conditions and Co-morbidities  Goal: Patient's chronic conditions and co-morbidity symptoms are monitored and maintained or improved  2/12/2023 0320 by Aubree Hyde RN  Outcome: Progressing

## 2023-02-12 NOTE — PROGRESS NOTES
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 Recall: 3 word alphabetical order- 100%    Organization: n/a    Problem Solving/Reasoning: n/a    Speech: ST educated Pt re: oral motor exercises for dysarthria while utilizing Vital Stim (one channel) on L oral sling at 4.0 mA for 15 mins. Pt able to complete OMEs x6 sets in reps of 15. No movement noted on L side of face. Pt. Wife educ. Re: Passive ROM exercises, ST  completed to L side x4, 10 reps. Pt. Wife verbalized understanding. Encouraged Pt to continue practicing exercises,  PROM and speech tasks throughout day (using mirror as form of visual feedback). Pt verbalized understanding and agreeable. Other:     Plan:  [x] Continue ST services    [] Discharge from ST:      Discharge recommendations: [] Inpatient Rehab   [] [place]   [] Outpatient Therapy  [] Follow up at trauma clinic   [] Other:       Treatment completed by: Jorge Luis Reina A.CCC/SLP

## 2023-02-12 NOTE — PROGRESS NOTES
Physical Therapy  Facility/Department: [facility] ACUTE REHAB  Rehabilitation Physical Therapy treatment    NAME: Suzi Dill  : 1980 (43 y.o.)  MRN: [id number]  CODE STATUS: Full Code    Date of Service: 23      Past Medical History:   Diagnosis Date    Cerebral artery occlusion with cerebral infarction [facility])     History of heartburn     takes omeprazole    Hypertension     Neoplasm of unspecified nature of bone, soft tissue, and skin 2009-present    lesions of right cheek x 2    Research study patient 2023    [id number]. Date of completion 23     Past Surgical History:   Procedure Laterality Date    APPENDECTOMY  in 's    PRE-MALIGNANT / BENIGN SKIN LESION EXCISION Right 2014    Excision lesion of right cheek. Dr. Kavitha Mehta. Chart Reviewed: (P) Yes    Restrictions:  Restrictions/Precautions: (P) Fall Risk;General Precautions     SUBJECTIVE  Subjective: (P) pt and spouse arrive to therapy gym with pt in [facility] for family training  Pain: (P) 0/10       Post Treatment Pain Screening                Functional Mobility  Bed mobility  Bed Mobility Comments: (P) pt arrives to therapy gym in chair  Transfers  Sit to Stand: (P) Stand by assistance  Stand to Sit: (P) Stand by assistance;Supervision  Bed to Chair: (P) Stand by assistance;Supervision  Stand Pivot Transfers: (P) Stand by assistance;Supervision  Comment: (P) Performed STS From [facility] And chairs with and without arm rests x10 reps throughout session, VC to bring rollator with pt all the way so squared with sitting surface. use of Rollator for STS  Balance  Sitting - Static: (P) Good  Sitting - Dynamic: (P) Good;-  Standing - Static: (P) Good;-  Standing - Dynamic: (P) Good; - (with rollator)    Environmental Mobility  Ambulation  Surface: (P) Level tile; Ramp  Device: (P) Rollator;Single point cane  Assistance: (P) Stand by assistance  Quality of Gait: (P) pace control improved, pt occasionally drags right LE but self corrects.  requires more assist (SBA/CGA) with use of straight cane for trial.  Pt gives VC for rollator use and upright posture. Pt performs ambulation trial down to main lobby and maneuvers variable surface/ doorway treads and is able to maneuver around tables with chairs to simulate going to a restaurant. Pts spouse shown good ways to assist spouse as needed and VC to give for safety/stability and to prevent fatigue.  Gait Deviations: (P) Slow Tess;Deviated path;Staggers;Decreased step length;Decreased step height (R toe out)  Distance: (P) 500ft, 50ft, 500ft  Comments: (P) performed short 50 ft trial with use of cane, CGA For stability with pt having greater stability with cane held in L hand.    PT Exercises  Exercise Treatment: (P) ambulation trials with rollator and cane and worked on maneuvering objects in monge. SPoke about ways to practice ambulation out in community with spouse's assistance. Pt performed multiple transfers from variable surfaces. Performed standing balloon toss x5 mins with SBA/CGA    ASSESSMENT       Activity Tolerance  Activity Tolerance: (P) Patient tolerated treatment well    Assessment  Assessment: (P) Completed family training session this morning with pts spouse, focused on return to home and community with pt being able to participate more and more independently.  Performance Deficits/Impairments: (P) Decreased functional mobility ;Decreased body mechanics;Decreased safe awareness;Decreased balance;Decreased sensation;Decreased coordination;Decreased vision/visual deficit;Increased pain;Decreased posture  PT D/C Equipment  Equipment Needed: (P) Yes  PT Equipment Recommendations  Equipment Needed: (P)  (Rollator)    CLINICAL IMPRESSION   (P) Completed family training session this morning with pts spouse, focused on return to home and community with pt being able to participate more and more independently.    GOALS  Patient Goals   Patient Goals : (P) \"Practice going up/down a ladder\"  Short Term  Goals  Time Frame for Short Term Goals: (P) 7 days  Short Term Goal 1: (P) pt to demo independent bed mobility on flat surface without use of rails per home set-up  Short Term Goal 2: (P) pt to demo functional transfers with device and CGA  Short Term Goal 3: (P) pt to ambulate 150' with device and MinAx1  Short Term Goal 4: (P) pt to negotiate 10-12 steps with Single rail and device as needed demonstrating a landing pivot turn (per home set up stairs with landing) Benigno  Long Term Goals  Time Frame for Long Term Goals : (P) Until D/C  Long Term Goal 1: (P) Pt to improve Standing balance to FAIR + with use of device to dec risk for falls  Long Term Goal 2: (P) pt to demo functional transfers with device Mod I  Long Term Goal 3: (P) pt to ambulate 150' with device and supervision  Long Term Goal 4: (P) pt to negotiate 2 steps with R rail and device and 10-12 successive steps with L rail and device as needed demonstrating a landing pivot turn (per home set up stairs with landing) CGA-SBA for stairs  Long Term Goal 5: (P) pt to demo actual or simulated car transfer with SBA and device as needed    PLAN OF CARE  Frequency: 1-2 treatment sessions per day, 5-7 days per week  Physcial Therapy Plan  General Plan: (P)  minutes of therapy at least 5 out of 7 days a week  Current Treatment Recommendations: (P) Strengthening;Balance training;Functional mobility training;Transfer training;Cognitive/Perceptual training; Wheelchair mobility training;Gait training;Stair training;Neuromuscular re-education;Pain management;Home exercise program;Return to work related activity  Safety Devices  Type of Devices: (P) Gait belt;Nurse notified (With SLP)  Restraints  Restraints Initially in Place: (P) No    EDUCATION   See flow sheet    ELOS:          Therapy Time   Individual Concurrent Group Co-treatment   Time In (P) 0805         Time Out (P) 0905         Minutes (P) 60                   Ca Sue, PT, 02/12/23 at 12:42 PM

## 2023-02-12 NOTE — PROGRESS NOTES
[address]    Acute Rehabilitation Occupational Therapy Daily Treatment Note    Date: 23  Patient Name: Si Sacks       Room: 9101/3353-52  MRN: [id number]  Account: [de-identified]   : 1980  (43 y.o.) Gender: male       Referring Practitioner: Darrion Solomon MD  Diagnosis: Hemorrhagic CVA  Additional Pertinent Hx: Mr. Si Sacks is a 43 y.o. right handed male who was admitted to [facility] on 2023 with Extremity Weakness (Right sided weakness) and Facial Droop. Patient with R weakness, numbness and facial droop who fell when he went to bed then awoke with the symptoms. SBP was in 200s upon arrival to ED. CT confirmed hemorrhagic CVA posterior ramses and narrowing R P1 PCA. He was treated with Cardene for BP control. Neurosurgery managing conservatively. Pt admitted to ARU 23    Treatment Diagnosis: Impaired self care status    Past Medical History:  has a past medical history of Cerebral artery occlusion with cerebral infarction [facility]), History of heartburn, Hypertension, Neoplasm of unspecified nature of bone, soft tissue, and skin, and Research study patient. Past Surgical History:   has a past surgical history that includes Appendectomy (in 20's) and pre-malignant / benign skin lesion excision (Right, 2014). Restrictions  Restrictions/Precautions  Restrictions/Precautions: Fall Risk, General Precautions  Required Braces or Orthoses?: No  Implants present? :  (pt denies)     Position Activity Restriction  Other position/activity restrictions: Per Dr. Katarina Vincent OK to use Eye patch, alternate between eyes (to help with patient's double vision and balance)          Subjective  Subjective  Subjective: \"It's a learning curve, but she will be right there\" Pt stated during family training. Pt's wife present and agreed with patient.  Pt's wife informed writer that she will be working from home to provide assistance as needed  Pain: Pt denied pain       Objective  Cognition  Overall Orientation Status: Within Functional Limits  Orientation Level: Oriented X4    Cognition  Arousal/Alertness: Appropriate responses to stimuli  Following Commands: Follows all commands without difficulty  Attention Span: Appears intact  Memory: Appears intact  Safety Judgement: Decreased awareness of need for safety  Problem Solving: Good awareness of errors made  Insights: Decreased awareness of deficits  Initiation: Does not require cues  Sequencing: Does not require cues    Activities of Daily Living  Feeding  Assistance Level: Independent  Skilled Clinical Factors: Per pt report- pt has not been using built-up utensils    Grooming/Oral Hygiene  Assistance Level: Independent    Upper Extremity Bathing  Assistance Level: Supervision  Skilled Clinical Factors: Per pt and pt's spouse- bathing tasks completed while seated on tub transfer bench    Lower Extremity Bathing  Assistance Level: Supervision  Skilled Clinical Factors: Per pt and pt's spouse- bathing tasks completed seated on bench, stood to complete periarea    Mobility      Functional Mobility  Assistance Level: Modified independent  Skilled Clinical Factors: Pt propelled self in w/c from room to therapy gym. OT Exercises  Resistive Exercises: OT faciltiated pt's engagement in hand strengthening/coordination activity, instructing pt to use hand gripper to place pegs onto board. Fair tolerance overall with cues to slow pace for improved accuracy. Motor Control/Coordination: Pt participated in clothespin activity to address hand strengthening and coordination, using black and blue resistive pins. Increased time to complete with good tolerance, one rest break needed. Next activity, pt engaged in using Nuñez box to addres fine motor/proprioception. Initially attempted to thread nut onto medium-sized screw, however pt unable to screw. Writer provided assist to initiate.  OT down graded task to larger screw, in which pt was able to complete              Assessment  Assessment  Activity Tolerance: Patient tolerated treatment well  Discharge Recommendations: Home with assist PRN;Outpatient OT    Patient Education  Education  Education Given To: Patient; Family  Education Provided: Role of Therapy;Plan of Care;Home Exercise Program;Precautions; Safety;ADL Function;IADL Function; Family Education; Fall Prevention Strategies  Education Provided Comments: Extensive family training prior to discharge. Emphasized importance of safety with transfers/mobility, especially during shower transfers/in the bathroom. Pt's spouse informed writer that they purchased a shower stool and suction grab bar. Pt stated that he has practiced shower transfers since admission, denied any concerns. Pt's spouse concurred, indicating no concerns with showers and shower transfer. Also discussed fall prevention (removing throw rugs, lit-up hallways, clear pathways), in which pt and spouse stated they will remove throw rug in bathroom and kitchen. Discussed safety with cooking and navigating kitchen, placing frequently used items on the counter. Pt enjoys cooking, so writer encouraged having support while making meals. Pt and pt's spouse verbalized understanding. Education Method: Verbal  Barriers to Learning: Vision  Education Outcome: Verbalized understanding;Demonstrated understanding         Safety Devices  Type of devices:  (Pt with wife at end of session)       Goals  Patient Goals   Patient goals : \"To get back to those things (ADL, IADL, and [facility] in [facility]). \"  Short Term Goals  Time Frame for Short Term Goals: By 1 week  Short Term Goal 1: Pt will complete lower body dressing/bathing with CGA and Good safety with use of AE as needed  Short Term Goal 2: Pt will complete functional transfers/mobility during self care tasks with CGA and Good safety with no LOB  Short Term Goal 3: Pt will tolerate standing 7+ minutes during functional activity of choice with CGA and no LOB during functional activity of choice  Short Term Goal 4: Pt will verbalize/demonstrate Good understanding of AE/adaptive strategies/DME to increase safety and independence with self care and mobility  Short Term Goal 5: Pt will verbalize/demonstrate 3 visual compensatory strategies to increase safety with daily activities and increase quality of life  Short Term Goal 6: Pt will participate in 30+ minutes of therapeutic exercises/functional activities to increase safety and independence with self care and mobility    Long Term Goals  Time Frame for Long Term Goals : By discharge  Long Term Goal 1: Pt will complete BADLs with Mod I and Good safety with use of AE as needed  Long Term Goal 2: Pt will complete functional mobility during self care tasks with Mod I and Good safety with use of least restrictive device  Long Term Goal 3: Pt will tolerate standing 15+ minutes during functional activity of choice with Good safety  Long Term Goal 4: Pt will complete simple meal prep/light house keeping task with Supervision and Good safety  Long Term Goal 5: Pt will complete tub transfer with Supervision and Good safety with use of DME as needed  Long Term Goal 6: Pt will verbalize/demonstrate Good understanding of vision HEP to increase neurofacilitation of left side visual field to increase safety during daily activities  Long Term Goal 7: Pt will verbalize/demonstrate Good understanding of home safety/fall prevention strategies to increase safety and independence with self care and mobility  Long Term Goal 8: Pt will demonstrate increased [address] during self care tasks as evident by 5 second improvement with LUE and 30 second improvement with RUE during 9 hole peg test    Plan  Occupational Therapy Plan  Times Per Week: 5-7  Times Per Day: Twice a day  Current Treatment Recommendations: Self-Care / ADL, Strengthening, Balance training, Functional mobility training, Endurance training, Neuromuscular re-education, Pain management, Safety education & training, Patient/Caregiver education & training, Equipment evaluation, education, & procurement, Home management training, Cognitive/Perceptual training, Coordination training      OT Individual Minutes  OT Individual Minutes  Time In: 1109  Time Out: 8646  Minutes: 46  Time Code Minutes   Timed Code Treatment Minutes: 46 Minutes         Electronically signed by DARCY Ramirez on 2/12/23 at 3:10 PM EST

## 2023-02-12 NOTE — PROGRESS NOTES
Physical Medicine & Rehabilitation  Progress Note    2/12/2023 3:35 PM     CC: Ambulatory and ADL dysfunction due to hemorrhagic pontine CVA    Subjective:   No complaints. Continues with diplopia using eye patch. ROS:  Denies fevers, chills, sweats. No chest pain, palpitations, lightheadedness. Denies coughing, wheezing or shortness of breath. Denies abdominal pain, nausea, diarrhea or constipation. No new areas of joint pain. Denies new areas of numbness or weakness. Denies new anxiety or depression issues. No new skin problems. Rehabilitation:   PT:  Restrictions/Precautions: Fall Risk, General Precautions  Implants present? :  (pt denies)  Other position/activity restrictions: Per Dr. Cheryl Lang OK to use Eye patch, alternate between eyes (to help with patient's double vision and balance)   Transfers  Sit to Stand: Stand by assistance  Stand to Sit: Stand by assistance, Supervision  Bed to Chair: Stand by assistance, Supervision  Stand Pivot Transfers: Stand by assistance, Supervision  Comment: Performed STS From [facility] And chairs with and without arm rests x10 reps throughout session, VC to bring rollator with pt all the way so squared with sitting surface. use of Rollator for STS  Ambulation  Surface: Level tile, Ramp  Device: Rollator, Single point cane  Assistance: Stand by assistance  Quality of Gait: pace control improved, pt occasionally drags right LE but self corrects. requires more assist (SBA/CGA) with use of straight cane for trial.  Pt gives VC for rollator use and upright posture. Pt performs ambulation trial down to main lobby and maneuvers variable surface/ doorway treads and is able to maneuver around tables with chairs to simulate going to a restaurant. Pts spouse shown good ways to assist spouse as needed and VC to give for safety/stability and to prevent fatigue.   Gait Deviations: Slow Tess, Deviated path, Staggers, Decreased step length, Decreased step height (R toe out)  Distance: 500ft, 50ft, 500ft  Comments: performed short 50 ft trial with use of cane, CGA For stability with pt having greater stability with cane held in L hand. More Ambulation?: Yes      OT:  ADL  Equipment Provided: Feeding equipment (Built up handles)  Feeding: Setup  Feeding Skilled Clinical Factors: Pt demonstrated increased difficulty with grasping silverwear during eating task. OT provided built up handles during task to increase ease of use during self feeding. Grooming: Minimal assistance  Grooming Skilled Clinical Factors: Min A for standing balance standing sink side with slight R side lean  UE Bathing: Stand by assistance  UE Bathing Skilled Clinical Factors: Sitting on tub bench  LE Bathing: Minimal assistance  LE Bathing Skilled Clinical Factors: LOB while standing in shower to dry bottom requiring A to right self  UE Dressing: Stand by assistance  LE Dressing: Minimal assistance  LE Dressing Skilled Clinical Factors: Pt demonstrated figure 4 technique to thread BLE. Min A while standing with unsteadiness while pulling up over hips  Toileting: Minimal assistance  Toileting Skilled Clinical Factors: A for standing balance with hygiene and clothing management  Additional Comments: OT facilitated pts engagement in full shower on this date with use of tub bench, grab bars, and hand held shower. Pt required A while standing due to intermittet LOB to right side. Pt currently limited due to decreased strength, balance, FMC, visual deficits, and activity tolerance impacting safety and independence with self care tasks         Balance  Sitting Balance: Stand by assistance  Standing Balance: Moderate assistance (CGA-Mod A with multiple LOB to right side)      Functional Mobility  Functional - Mobility Device: Rolling Walker  Activity: To/from bathroom  Assist Level: Moderate assistance (CGA-Mod A with multiple LOB to right side)  Functional Mobility Comments: Verbal cues for hand placement and safety. Increased time needed with RLE. Multiple LOB to right side requiring A to right self     Bed mobility  Bridging: Modified independent   Rolling to Left: Modified independent  Rolling to Right: Modified independent  Supine to Sit: Modified independent  Sit to Supine: Modified independent  Scooting: Modified independent  Bed Mobility Comments: pt arrives to therapy gym in chair  Transfers  Sit to stand: Minimal assistance  Stand to sit: Minimal assistance  Transfer Comments: Verbal cues for hand placement and safety   Toilet Transfers  Toilet - Technique: Ambulating  Equipment Used: Grab bars  Toilet Transfer: Minimal assistance  Toilet Transfers Comments: Verbal cues for hand placement and safety. Slight unsteadiness with R side LOB     Shower Transfers  Shower - Transfer From: Tam Son - Transfer Type: To and From  Shower - Transfer To: Transfer tub bench  Shower - Technique: Ambulating  Shower Transfers: Moderate assistance  Shower Transfers Comments: Verbal cues for hand placement, safety and RW management over threshold. LOB to right side with Mod A to right self. ST:       Treatment time: 0905-0933         Subjective: [x] Alert     [x] Cooperative     [] Confused     [] Agitated    [] Lethargic        Objective/Assessment:     Attention: Sustained throughout, distractions minimized. Recall: 3 word alphabetical order- 100%     Organization: n/a     Problem Solving/Reasoning: n/a     Speech: ST educated Pt re: oral motor exercises for dysarthria while utilizing Vital Stim (one channel) on L oral sling at 4.0 mA for 15 mins. Pt able to complete OMEs x6 sets in reps of 15. No movement noted on L side of face. Pt. Wife educ. Re: Passive ROM exercises, ST  completed to L side x4, 10 reps. Pt. Wife verbalized understanding. Encouraged Pt to continue practicing exercises,  PROM and speech tasks throughout day (using mirror as form of visual feedback).   Pt verbalized understanding and agreeable      Objective:  /84   Pulse 74   Temp 97.9 °F (36.6 °C) (Oral)   Resp 18   Ht 6' (1.829 m)   Wt 205 lb 9.6 oz (93.3 kg)   SpO2 98%   BMI 27.88 kg/m²  I Body mass index is 27.88 kg/m². I   Wt Readings from Last 1 Encounters:   23 205 lb 9.6 oz (93.3 kg)      Temp (24hrs), Av °F (36.7 °C), Min:97.9 °F (36.6 °C), Max:98.1 °F (36.7 °C)         GEN: well developed, well nourished, no acute distress  HEENT: Normocephalic atraumatic, EOMI, mucous membranes pink and moist  CV: RRR, no murmurs, rubs or gallops  PULM: CTAB, no rales or rhonchi. Respirations WNL and unlabored  ABD: soft, NT, ND, +BS and equal  NEURO: A&O x3. Sensation decreased right upper and lower extremity compared to left, left facial droop, neuro exam no change  MSK: Legs 4+/5 upper and lower extremities  EXTREMITIES: No calf tenderness to palpation bilaterally. No edema BLEs range of motion upper lower extremity and palpation of joints and spine with no pain or discomfort  SKIN: warm dry and intact with good turgor  PSYCH: appropriately interactive. Affect WNL. Medications   Scheduled Meds:   vitamin D  50,000 Units Oral Weekly    nicotine  1 patch TransDERmal Daily    artificial tears   Left Eye Nightly    carvedilol  12.5 mg Oral BID WC    enoxaparin  40 mg SubCUTAneous Daily    folic acid  1 mg Oral Daily    lisinopril  40 mg Oral Daily    melatonin  3 mg Oral Nightly    pantoprazole  40 mg Oral QAM AC    tamsulosin  0.4 mg Oral Daily    thiamine  100 mg Oral Daily    polyethylene glycol  17 g Oral Daily     Continuous Infusions:  PRN Meds:. QUEtiapine, polyvinyl alcohol, acetaminophen, calcium carbonate, fluticasone, senna, bisacodyl     Diagnostics:     CBC:   Recent Labs     02/10/23  0625   WBC 8.8   RBC 4.90   HGB 15.7   HCT 46.7   MCV 95.4   RDW 13.0          BMP:   Recent Labs     02/10/23  0625      K 5.1      CO2 27   BUN 21*   CREATININE 0.80       BNP: No results for input(s): BNP in the last 72 hours.  PT/INR: No results for input(s): PROTIME, INR in the last 72 hours.  APTT: No results for input(s): APTT in the last 72 hours.  CARDIAC ENZYMES: No results for input(s): CKMB, CKMBINDEX, TROPONINT in the last 72 hours.    Invalid input(s): CKTOTAL;3  FASTING LIPID PANEL:No results found for: CHOL, HDL, TRIG  LIVER PROFILE:   Recent Labs     02/10/23  0625   AST 18   ALT 33          I/O (24Hr):  No intake or output data in the 24 hours ending 02/12/23 1535    Glu last 24 hour  No results for input(s): POCGLU in the last 72 hours.    No results for input(s): CLARITYU, COLORU, PHUR, SPECGRAV, PROTEINU, RBCUA, BLOODU, BACTERIA, NITRU, WBCUA, LEUKOCYTESUR, YEAST, GLUCOSEU, BILIRUBINUR in the last 72 hours.      Impression/Plan:    Hemorrhagic pontine CVA:  PT/OT for gait, mobility, strengthening, endurance, ADLs, and self care.  SLP for cognition, speech.  Discharge plan 2/15  Slide to floor t2/10-no trauma-monitor for residual  Diplopia, blurred vision:  Secondary to hemorrhagic stroke.  Has eye patch - alternate use on both eyes.  Added artificial tears as needed, artificial tears ointment nightly for left eye due to incomplete closing and burning of the eye.-Improved reviewed with wife neuro-ophthalmology evaluation as outpatient-plan for parathyroid ultrasound tomorrow due to elevated PTH  Hypocalcemia-noted addition of vitamin D  Urinary retention:  Noted in acute care.  On flomax.  Will monitor bladder scans, PVRs.-Appears to have improved  Alcohol withdrawal:  On phenobarbital wean (tcompleted on 2/6/23).  On folate, thiamine supplementation.  History of smoker-on NicoDerm patch- will decrease  HTN:  On carvedilol, lisinopril.  Goal is SBP below 160 with gradual normalization of BP, per neurology.  Potassium 5.1 though increased from 2 recheck Monday-patient on lisinopril  GERD:  On protonix  Bowel Management: Miralax daily, senokot prn, dulcolax prn.  History of agitation-Seroquel at night,  has not needed  DVT Prophylaxis:  low molecular weight heparin, SCD's while in bed, and KP's during the day  Dr. Ashok Buerger for medical management-  Follow up PCP 1-2 weeks, PM&R 4-6 weeks, Neurology 3 weeks, Neurosurgery 2 weeks from 1/29 - on 2/13(with repeat CT head) re scheduled to 1 week later, will need CT order-, Neuro-ophthalmology - Dr. Wali Covarrubias. Heaven Hand MD       This note is created with the assistance of a speech recognition program.  While intending to generate a document that actually reflects the content of the visit, the document can still have some errors including those of syntax and sound a like substitutions which may escape proof reading.   In such instances, actual meaning can be extrapolated by contextual diversion

## 2023-02-12 NOTE — PROGRESS NOTES
[address] [employer]      PROGRESS NOTE        Patient:  Isma Toure  YOB: 1980    MRN: [id number]     Acct: [de-identified]     Admit date: 2/2/2023    Pt seen and Chart reviewed. Consultant notes reviewed and care evaluated. Subjective: Patient was in his room his to get his wife and his mom were visiting he states he is doing as good as expected feels very good getting stronger has no complaints. His vitamin D came at 13.8 which could explain his high PTH level will be replacing that. Discussed with him and his wife and his mom as well they could have him follow those labs with his PCP    Diet:  ADULT DIET; Regular      Medications:Current Inpatient    Scheduled Meds:   nicotine  1 patch TransDERmal Daily    artificial tears   Left Eye Nightly    carvedilol  12.5 mg Oral BID WC    enoxaparin  40 mg SubCUTAneous Daily    folic acid  1 mg Oral Daily    lisinopril  40 mg Oral Daily    melatonin  3 mg Oral Nightly    pantoprazole  40 mg Oral QAM AC    tamsulosin  0.4 mg Oral Daily    thiamine  100 mg Oral Daily    polyethylene glycol  17 g Oral Daily     Continuous Infusions:  PRN Meds:QUEtiapine, polyvinyl alcohol, acetaminophen, calcium carbonate, fluticasone, senna, bisacodyl        Physical Exam:  Vitals: /84   Pulse 74   Temp 97.9 °F (36.6 °C) (Oral)   Resp 18   Ht 6' (1.829 m)   Wt 205 lb 9.6 oz (93.3 kg)   SpO2 98%   BMI 27.88 kg/m²   24 hour intake/output:No intake or output data in the 24 hours ending 02/12/23 1338  Last 3 weights: Wt Readings from Last 3 Encounters:   02/08/23 205 lb 9.6 oz (93.3 kg)   01/26/23 200 lb (90.7 kg)   10/14/18 200 lb (90.7 kg)       Physical Examination:   General appearance - alert, well appearing, and in no distress  Mental status - alert, oriented to person, place, and time  neck supple with midline trachea  Drooping.   But speech is normal  Chest - clear to auscultation, no wheezes, rales or rhonchi, symmetric air entry  Heart - normal rate, regular rhythm, normal S1, S2, no murmurs, rubs, clicks or gallops  Abdomen - soft, nontender, nondistended, no masses or organomegaly  Neurological - alert, oriented, normal speech, no focal findings or movement disorder noted  Extremities - peripheral pulses normal, no pedal edema, no clubbing or cyanosis  Skin - normal coloration and turgor, no rashes, no suspicious skin lesions noted     Time Merlynn Raisin Orders]     Results     Component Value Units   Vitamin D 25 Hydroxy [[id number]] (Abnormal)    Collected: 02/10/23 0625    Updated: 02/11/23 2041    Specimen Source: Blood     Vit D, 25-Hydroxy 13.8 Low  ng/mL    Comment:     Reference Range:   Vitamin D status         Range    Deficiency              <20 ng/mL    Mild Deficiency       20-30 ng/mL    Sufficiency           ng/mL    Toxicity               >100 ng/mL           Assessment:  Principal Problem:    Pontine hemorrhage (HCC)  Resolved Problems:    * No resolved hospital problems. *    * No resolved hospital problems.  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    Intracranial hemorrhage (HCC) I62.9    Impending cerebrovascular accident ([address] I63.9      Left-sided hemiparesis    Diplopia secondary to CVA       Pretension blood pressure coming down slowly we will monitor and adjust meds as needed  History of smoking on nicotine patch  History of alcohol use no evidence of withdrawal but he is on phenobarbital looks like bolus thiamine multivitamin  Episodes of fall no injuries  Hypercalcemia with elevated ionized calcium  High parathyroidism level  Documented blood pressure almost 24 hours in the acceptable range for now  Vit D diff  Vitamin D could probably explain his parathyroid level    Plan:  Vitamin D 50,000 units weekly for about 16 weeks total    After he finishes he is to take 5000 IUs daily    He is to have his PCP follow-up with his PTH and vitamin D and calcium level in about 4 to 6 weeks    He is getting a parathyroid ultrasound tomorrow we will do that just to make sure he has no lesions on the gland  Patient and his wife we discussed yesterday about getting him ophthalmology consult did not know if he had one at [facility] but his wife states they just asked him to do 1 on their own we will consult Dr. Raymond Bell for or his partner Dr. Karon Wood for neuro-ophthalmology    Naresh Muñiz DO [facility]            2/12/2023, 1:38 PM

## 2023-02-13 LAB
ANION GAP SERPL CALCULATED.3IONS-SCNC: 9 MMOL/L (ref 9–17)
BUN SERPL-MCNC: 17 MG/DL (ref 6–20)
CALCIUM SERPL-MCNC: 11.5 MG/DL (ref 8.6–10.4)
CHLORIDE SERPL-SCNC: 101 MMOL/L (ref 98–107)
CO2 SERPL-SCNC: 26 MMOL/L (ref 20–31)
CREAT SERPL-MCNC: 0.72 MG/DL (ref 0.7–1.2)
GFR SERPL CREATININE-BSD FRML MDRD: >60 ML/MIN/1.73M2
GLUCOSE SERPL-MCNC: 100 MG/DL (ref 70–99)
POTASSIUM SERPL-SCNC: 5.1 MMOL/L (ref 3.7–5.3)
SODIUM SERPL-SCNC: 136 MMOL/L (ref 135–144)

## 2023-02-13 PROCEDURE — 97530 THERAPEUTIC ACTIVITIES: CPT

## 2023-02-13 PROCEDURE — 36415 COLL VENOUS BLD VENIPUNCTURE: CPT

## 2023-02-13 PROCEDURE — 97110 THERAPEUTIC EXERCISES: CPT

## 2023-02-13 PROCEDURE — 97112 NEUROMUSCULAR REEDUCATION: CPT

## 2023-02-13 PROCEDURE — 6370000000 HC RX 637 (ALT 250 FOR IP): Performed by: PHYSICAL MEDICINE & REHABILITATION

## 2023-02-13 PROCEDURE — 80048 BASIC METABOLIC PNL TOTAL CA: CPT

## 2023-02-13 PROCEDURE — 6370000000 HC RX 637 (ALT 250 FOR IP)

## 2023-02-13 PROCEDURE — 6360000002 HC RX W HCPCS

## 2023-02-13 PROCEDURE — 6370000000 HC RX 637 (ALT 250 FOR IP): Performed by: STUDENT IN AN ORGANIZED HEALTH CARE EDUCATION/TRAINING PROGRAM

## 2023-02-13 PROCEDURE — 97116 GAIT TRAINING THERAPY: CPT

## 2023-02-13 PROCEDURE — 97535 SELF CARE MNGMENT TRAINING: CPT

## 2023-02-13 PROCEDURE — 92526 ORAL FUNCTION THERAPY: CPT

## 2023-02-13 PROCEDURE — 97129 THER IVNTJ 1ST 15 MIN: CPT

## 2023-02-13 PROCEDURE — 1180000000 HC REHAB R&B

## 2023-02-13 PROCEDURE — 92507 TX SP LANG VOICE COMM INDIV: CPT

## 2023-02-13 PROCEDURE — 99232 SBSQ HOSP IP/OBS MODERATE 35: CPT | Performed by: PHYSICAL MEDICINE & REHABILITATION

## 2023-02-13 RX ADMIN — CARVEDILOL 12.5 MG: 12.5 TABLET, FILM COATED ORAL at 07:31

## 2023-02-13 RX ADMIN — CARVEDILOL 12.5 MG: 12.5 TABLET, FILM COATED ORAL at 16:56

## 2023-02-13 RX ADMIN — ACETAMINOPHEN 650 MG: 325 TABLET ORAL at 06:21

## 2023-02-13 RX ADMIN — ENOXAPARIN SODIUM 40 MG: 100 INJECTION SUBCUTANEOUS at 07:36

## 2023-02-13 RX ADMIN — POLYETHYLENE GLYCOL 3350 17 G: 17 POWDER, FOR SOLUTION ORAL at 07:32

## 2023-02-13 RX ADMIN — TAMSULOSIN HYDROCHLORIDE 0.4 MG: 0.4 CAPSULE ORAL at 07:31

## 2023-02-13 RX ADMIN — FOLIC ACID 1 MG: 1 TABLET ORAL at 07:31

## 2023-02-13 RX ADMIN — LISINOPRIL 40 MG: 20 TABLET ORAL at 07:31

## 2023-02-13 RX ADMIN — PANTOPRAZOLE SODIUM 40 MG: 40 TABLET, DELAYED RELEASE ORAL at 06:21

## 2023-02-13 RX ADMIN — THIAMINE HCL TAB 100 MG 100 MG: 100 TAB at 07:31

## 2023-02-13 RX ADMIN — MINERAL OIL, PETROLATUM: 425; 568 OINTMENT OPHTHALMIC at 19:45

## 2023-02-13 NOTE — PROGRESS NOTES
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 Recall: Word list retention (category inclusion, 4 units)- 100% accuracy (I). Organization: n/a    Problem Solving/Reasoning: n/a    Speech: ST educated Pt re: oral motor exercises for dysarthria while utilizing Vital Stim (one channel) on L oral sling at 4.5 mA for 15 mins. Pt able to complete OMEs x6 sets in reps of 20. No movement noted on L side of face. Encouraged Pt to continue practicing exercises,  PROM and speech tasks throughout day (using mirror as form of visual feedback). Pt verbalized understanding and agreeable. Other: No family present. Pt reports hopeful for anticipated d/c home this week. Discussed recommendation for continued ST following d/c. Pt verbalized understanding and agreeable. Plan:  [x] Continue ST services    [] Discharge from ST:      Discharge recommendations: [] Inpatient Rehab   [] [place]   [] Outpatient Therapy  [] Follow up at trauma clinic   [] Other:       Treatment completed by:  Laisha Paz M.A., CCC-SLP

## 2023-02-13 NOTE — PROGRESS NOTES
Physical Therapy  Facility/Department: [facility] ACUTE REHAB  Rehabilitation Physical Therapy Daily Treatment Note    NAME: Iglesia Quezada  : 1980 (43 y.o.)  MRN: [id number]  CODE STATUS: Full Code    Date of Service: 23          Chart Reviewed: Yes  Patient assessed for rehabilitation services?: Yes  Additional Pertinent Hx: Mr. Iglesia Quezada is a 43 y.o. right handed male who was admitted to [facility] on 2023 with Extremity Weakness (Left sided weakness) and Facial Droop     Patient with R weakness, numbness and facial droop who fell when he went to bed then awoke with the symptoms. SBP was in 200s upon arrival to ED. CT confirmed hemorrhagic CVA posterior ramses and narrowing R P1 PCA. He was treated with Cardene for BP control. Neurosurgery managing conservatively. Family / Caregiver Present: No  Referring Practitioner: Palak Watkins MD  Referral Date : 23  Diagnosis: Hemorrhagic CVA  General Comment  Comments: Pt is eager and very motivated to improve    Restrictions:  Restrictions/Precautions: Fall Risk;General Precautions  Position Activity Restriction  Other position/activity restrictions: Per Dr. Hill Marking OK to use Eye patch, alternate between eyes (to help with patient's double vision and balance)     SUBJECTIVE  Subjective: Pt is agreeable to PT. Pt reports he will be using a rollator and SPC at home for functional mobility. PM: Pt is pleasant and agreeable to PT. Pt is agreeable to amb outside, however declines wearing his eye patch. While outside pt reports \"Next time I need to wear my eye patch\". (Make sure to have pt's eye patch if going outside.)           OBJECTIVE  Vision  Vision: Impaired  Vision Exceptions: Wears glasses for reading    Hearing  Hearing: Exceptions to [facility]  Hearing Exceptions: Hard of hearing/hearing concerns    Cognition  Overall Cognitive Status: Exceptions  Arousal/Alertness: Appropriate responses to stimuli  Following Commands:  Follows all commands without difficulty  Attention Span: Appears intact  Memory: Appears intact  Safety Judgement: Decreased awareness of need for safety  Problem Solving: Good awareness of errors made  Insights: Decreased awareness of deficits  Initiation: Does not require cues  Sequencing: Does not require cues  Cognition Comment: pt can be impulsive at times      Sensation  Overall Sensation Status: Impaired (R side of body)    Functional Mobility  Bed mobility  Bridging: Modified independent   Rolling to Left: Modified independent  Rolling to Right: Modified independent  Supine to Sit: Modified independent  Sit to Supine: Modified independent  Scooting: Modified independent  Bed Mobility Comments: PT mat, with x2 pillows. No difficulties noted. Transfers  Sit to Stand: Stand by assistance  Stand to Sit: Stand by assistance;Supervision  Bed to Chair: Stand by assistance;Supervision  Stand Pivot Transfers: Stand by assistance;Supervision  Comment: Transfers completed with Rollator and SPC this date. Vc's for proper set up/brake management. Pt preforms poor set up with rollator for stand to sit transfer, however mentions right after the fact of what he did wrong. Pt demos and V good technique for all transfers with SPC/RW for the remainder of tx. Balance  Posture: Fair  Sitting - Static: Good  Sitting - Dynamic: Good;-  Standing - Static: Good;-  Standing - Dynamic: Good; - (with rollator)  Comments: standing with and without AD    Environmental Mobility  Ambulation  Surface: Level tile; Ramp  Device: Rollator  Assistance: Stand by assistance;Contact guard assistance  Quality of Gait: pace control improved, pt occasionally drags right LE but self corrects. requires more assist (SBA/CGA). VC for rollator use and upright posture. Pt requires cues to keep RLE inside rollator  to increase safety. Slightly more unsteady with descending ramp. Multple short standing rest breaks to restart proper gait pattern to improve stability.   Gait Deviations: Slow Tess;Deviated path;Staggers;Decreased step length;Decreased step height (R toe out)  Distance: 500+' with Rollator  Comments: performed short 50 ft trial with use of cane, CGA For stability with pt having greater stability with cane held in L hand. More Ambulation?: Yes  Ambulation 2  Surface - 2: level tile  Device 2: Single point cane  Assistance 2: Stand by assistance  Quality of Gait 2: improved control, still scooting right LE on occasion. Cues for RLE foot clearance. Pt reports \"I feel more comfortable with the cane on my right\". Occassionally pt's RUE wrist is lax resulting in instability with cane. Gait Deviations: Slow Tess;Decreased step length;Decreased step height;Deviated path  Distance: 50'x2  Comments: No LOB noted. Improved control  Ambulation 3  Surface - 3: level tile;uneven;carpet;outdoors;ramp  Device 3: Rollator  Assistance 3: Contact guard assistance;Minimal assistance  Quality of Gait 3: Pt continues with same ataxic gait pattern with RLE. Slightly unsteady with multiple LOB noted d/t unilateral UE support on rollator while attempting to manually blink left eye with LUE. Pt reports \"I should have worn my eye patch\". Gait Deviations: Decreased step length;Decreased step height;Decreased arm swing;Decreased head and trunk rotation;Deviated path  Distance: 1000'+ outside terrain. Stairs/Curb  Stairs?: Yes  Stairs  # Steps : 17 (x3; preformed with varied method for neuro re ed and safety instructions for home)  Stairs Height: 8\"  Rails: Right ascending (hands gliding and not pulling to allow bilat LE to work harder with reciprocating movement, less use of UE)  Curbs: 6\"  Device: No Device;4 wheeled walker  Assistance: Contact guard assistance  Comment: Pt completes with a step to gait pattern to increase neuro re-edu. Pt demos an occassional right lateral lean, however able to self correct. Pt does require short standing rest breaks at top/bottom of stairs.  Pt V good understanding of safe technique to complete stairs at home with love hand rails. PM: Pt is edu on proper technique to complete a curb step with a rollator. Good - carryover noted. Pt demos quick movements and requires cues for safety. Wheelchair Activities  Wheelchair Size: 20  Wheelchair Type: Standard  Wheelchair Cushion: Standard (Air waffle cushion)  Wheelchair Parts Management: Yes  Left Leg Rest Level of Assistance: Modified independent  Right Leg Rest Level of Assistance: Modified independent  Left Brakes Level of Assistance: Unable to assess(comment)  Right Brakes Level of Assistance: Modified independent  Propulsion: Yes  Propulsion 1  Propulsion: Manual  Level: Level Tile  Method: LUE;RUE  Level of Assistance: Modified independent  Description/ Details: pt able to propel self down for AM & PM tx  Distance: 250'x2    PT Exercises  Static Standing Balance Exercises: SLS: 30sec x5 each in // bars. CGA/Min A for safety. (noted unsteadiness)  Dynamic Standing Balance Exercises: dyn amb with yellow ball bounce. Distance: 100'x2 F/R. (Completed for neuro re-edu and improve coordination)  Disease-specific Exercises: Fall recovery preformed on red mat. Completed x2. Pt V/demos good understanding at this time and requires CGA/SBA for safety. Pt is able safely transition from chair to tall kneeling, to quadraped postion progressing to prone, then supine. Pt is able to transition from supine, to prone, to quadraped, to standing. ASSESSMENT  Vitals  O2 Device: None (Room air)    Activity Tolerance  Activity Tolerance: Patient tolerated treatment well  Activity Tolerance Comments: Greatest limitation to activity tolerance is fear of movement with recent loss of coordination and impaired sensation on R side of body. Assessment  Assessment: Pt is progressing well, however continues to require x1 person assistance for functional mobility to increase safety. Pt continues to be limited with visual/balance act/ex's. Continue POC to increase safety with strength, endurance, balance,  and safetey with functional mobility. Performance Deficits/Impairments: Decreased functional mobility ; Decreased body mechanics; Decreased safe awareness;Decreased balance;Decreased sensation;Decreased coordination;Decreased vision/visual deficit; Increased pain;Decreased posture  Treatment Diagnosis: Impaired balance and mobility 2* CVA  Therapy Prognosis: Good  Decision Making: Medium Complexity  Exam: ROM, MMT, Balance, and functional mobility assessments. PASS  Clinical Presentation: pt is alert, pleasant, and cooperative. very motivated to improve  Barriers to Learning: Vision, hearing  Treatment Initiated : Functional mobility assessments and training, safety awareness training  Discharge Recommendations: Therapy recommended at discharge; Patient would benefit from continued therapy after discharge  PT D/C Equipment  Equipment Needed: Yes  PT Equipment Recommendations  Equipment Needed:  (Rollator)    CLINICAL IMPRESSION   Pt is progressing well, however continues to require x1 person assistance for functional mobility to increase safety. Pt continues to be limited with visual/balance act/ex's. Continue POC to increase safety with strength, endurance, balance,  and safetey with functional mobility.     GOALS  Patient Goals   Patient Goals : \"Practice going up/down a ladder\"  Short Term Goals  Time Frame for Short Term Goals: 7 days  Short Term Goal 1: pt to demo independent bed mobility on flat surface without use of rails per home set-up  Short Term Goal 2: pt to demo functional transfers with device and CGA  Short Term Goal 3: pt to ambulate 150' with device and MinAx1  Short Term Goal 4: pt to negotiate 10-12 steps with Single rail and device as needed demonstrating a landing pivot turn (per home set up stairs with landing) Benigno  Long Term Goals  Time Frame for Long Term Goals : Until D/C  Long Term Goal 1: Pt to improve Standing balance to FAIR + with use of device to dec risk for falls  Long Term Goal 2: pt to demo functional transfers with device Mod I  Long Term Goal 3: pt to ambulate 150' with device and supervision  Long Term Goal 4: pt to negotiate 2 steps with R rail and device and 10-12 successive steps with L rail and device as needed demonstrating a landing pivot turn (per home set up stairs with landing) CGA-SBA for stairs  Long Term Goal 5: pt to demo actual or simulated car transfer with SBA and device as needed  Long term goal 6: pt to improve on PASS score to 30/36 or greater to demonstrate improving balance, mobility, and independence  Long term goal 7: pt to demonstrate good technique for fall recovery with SBA  Long term goal 8: Pt to progress to step Ladder negotiation with CGA to allow for safe participation in home making/maintainance tasks with assist for safety. PLAN OF CARE  Physcial Therapy Plan  General Plan:  minutes of therapy at least 5 out of 7 days a week  Current Treatment Recommendations: Strengthening;Balance training;Functional mobility training;Transfer training;Cognitive/Perceptual training; Wheelchair mobility training;Gait training;Stair training;Neuromuscular re-education;Pain management;Home exercise program;Return to work related activity  Safety Devices  Type of Devices: Gait belt;Nurse notified (With SLP)  Restraints  Restraints Initially in Place: No    EDUCATION  Education  Education Given To: Patient  Education Provided:  Mobility Training;Transfer Training  Education Method: Demonstration;Verbal  Barriers to Learning: None  Education Outcome: Verbalized understanding;Demonstrated understanding  Skilled Clinical Factors: Reminding pt to slow down to plan safe transfers    Therapy Time     02/13/23 0753 02/13/23 1330   PT Individual Minutes   Time In 0800 1330   Time Out 0901 1407   Minutes [phone], PTA, 02/13/23 at 2:24 PM

## 2023-02-13 NOTE — PROGRESS NOTES
Physical Medicine & Rehabilitation  Progress Note      Subjective:      43year-old male with hemorrhagic CVA. Patient is well, and has had no acute complaints or problems    ROS:  Denies fevers, chills, sweats. No chest pain, palpitations, lightheadedness. Denies coughing, wheezing or shortness of breath. Denies abdominal pain, nausea, diarrhea or constipation. No new areas of joint pain. Denies new areas of numbness or weakness. Denies new anxiety or depression issues. No new skin problems. Rehabilitation:   Progressing in therapies. PT:    Bed mobility  Bridging: Modified independent   Rolling to Left: Modified independent  Rolling to Right: Modified independent  Supine to Sit: Modified independent  Sit to Supine: Modified independent  Scooting: Modified independent  Bed Mobility Comments: PT mat, with x2 pillows. No difficulties noted. Transfers  Sit to Stand: Stand by assistance  Stand to Sit: Stand by assistance, Supervision  Bed to Chair: Stand by assistance, Supervision  Stand Pivot Transfers: Stand by assistance, Supervision  Comment: Transfers completed with Rollator and SPC this date. Vc's for proper set up/brake management. Pt preforms poor set up with rollator for stand to sit transfer, however mentions right after the fact of what he did wrong. Pt demos and V good technique for all transfers with SPC. Ambulation  Surface: Level tile, Ramp  Device: Rollator  Assistance: Stand by assistance, Contact guard assistance  Quality of Gait: pace control improved, pt occasionally drags right LE but self corrects. requires more assist (SBA/CGA). VC for rollator use and upright posture. Pt requires cues to keep RLE inside rollator  to increase safety. Slightly more unsteady with descending ramp. Multple short standing rest breaks to restart proper gait pattern to improve stability.   Gait Deviations: Slow Tess, Deviated path, Staggers, Decreased step length, Decreased step height (R toe out)  Distance: 500+' with Rollator  Comments: performed short 50 ft trial with use of cane, CGA For stability with pt having greater stability with cane held in L hand. More Ambulation?: Yes  Ambulation 2  Surface - 2: level tile  Device 2: Single point cane  Assistance 2: Stand by assistance  Quality of Gait 2: improved control, still scooting right LE on occasion. Cues for RLE foot clearance. Pt reports \"I feel more comfortable with the cane on my right\". Occassionally pt's RUE wrist is lax resulting in instability with cane. Gait Deviations: Slow Tess, Decreased step length, Decreased step height, Deviated path  Distance: 50'x2  Comments: No LOB noted. Improved control       OT:  Grooming/Oral Hygiene  Assistance Level: Independent  Skilled Clinical Factors: SBA/close SUP- standing at sink side with rw for oral hygiene and shaving tasks. Upper Extremity Bathing  Assistance Level: Supervision  Skilled Clinical Factors: Per pt and pt's spouse- bathing tasks completed while seated on tub transfer bench  Lower Extremity Bathing  Assistance Level: Supervision  Skilled Clinical Factors: Per pt and pt's spouse- bathing tasks completed seated on bench, stood to complete periarea  Upper Extremity Dressing  Assistance Level: Modified independent  Skilled Clinical Factors: Pt. was able to retreive, doff, and izabela UB clothing items with (I) from wheelchair level this date. Lower Extremity Dressing  Assistance Level: Stand by assist  Skilled Clinical Factors: Distant SUP provided during threading/unthreading while seated, SBA/Close SUP during clothing management while standing. Putting On/Taking Off Footwear  Assistance Level: Modified independent  Skilled Clinical Factors: Utilizes figure 4 technique to izabela/doff socks and shoes. Toileting  Assistance Level: Stand by assist  Skilled Clinical Factors: SBA/close SUP during clothing management.           SPEECH:  Subjective: [x] Alert     [x] Cooperative     [] Confused     [] Agitated    [] Lethargic        Objective/Assessment:     Attention: Sustained throughout, distractions minimized. Recall: Word list retention (category inclusion, 4 units)- 100% accuracy (I). Organization: n/a     Problem Solving/Reasoning: n/a     Speech: ST educated Pt re: oral motor exercises for dysarthria while utilizing Vital Stim (one channel) on L oral sling at 4.5 mA for 15 mins. Pt able to complete OMEs x6 sets in reps of 20. No movement noted on L side of face. Encouraged Pt to continue practicing exercises,  PROM and speech tasks throughout day (using mirror as form of visual feedback). Pt verbalized understanding and agreeable. Other: No family present. Pt reports hopeful for anticipated d/c home this week. Discussed recommendation for continued ST following d/c. Pt verbalized understanding and agreeable. Objective:  BP (!) 124/100   Pulse 71   Temp 97.3 °F (36.3 °C) (Oral)   Resp 17   Ht 6' (1.829 m)   Wt 205 lb 9.6 oz (93.3 kg)   SpO2 97%   BMI 27.88 kg/m²       GEN: well developed, well nourished, in NAD  HEENT: NCAT, PERRL, EOMI, mucous membranes pink and moist  CV: RRR, no murmurs, rubs or gallops  PULM: CTAB, no rales or rhonchi. Respirations WNL and unlabored  ABD: soft, NT, ND, BS+ and equal  NEURO: A&O x3 but with some intermittent confusion. Sensation intact to light touch. R CN III palsy, L CN VI palsy  MSK: Functional ROM 5/5 key muscles LUE and LLE . Strength 4+/5 key muscles RUE and RLE. EXTREMITIES: No calf tenderness to palpation bilaterally. No edema BLEs  SKIN: warm dry and intact with good turgor  PSYCH: appropriately interactive. Affect flat     Diagnostics:     CBC: No results for input(s): WBC, RBC, HGB, HCT, MCV, RDW, PLT in the last 72 hours. BMP:   Recent Labs     02/13/23  0659      K 5.1      CO2 26   BUN 17   CREATININE 0.72   GLUCOSE 100*     BNP: No results for input(s): BNP in the last 72 hours.   PT/INR: No results for input(s): PROTIME, INR in the last 72 hours. APTT: No results for input(s): APTT in the last 72 hours. CARDIAC ENZYMES: No results for input(s): CKMB, CKMBINDEX, TROPONINT in the last 72 hours. Invalid input(s): CKTOTAL;3 troponins   FASTING LIPID PANEL:No results found for: CHOL, HDL, TRIG  LIVER PROFILE: No results for input(s): AST, ALT, ALB, BILIDIR, BILITOT, ALKPHOS in the last 72 hours. Current Medications:   Current Facility-Administered Medications: vitamin D (ERGOCALCIFEROL) capsule 50,000 Units, 50,000 Units, Oral, Weekly  QUEtiapine (SEROQUEL) tablet 12.5 mg, 12.5 mg, Oral, Nightly PRN  nicotine (NICODERM CQ) 7 MG/24HR 1 patch, 1 patch, TransDERmal, Daily  polyvinyl alcohol (LIQUIFILM TEARS) 1.4 % ophthalmic solution 1 drop, 1 drop, Both Eyes, PRN  lubrifresh P.M. (artificial tears) ophthalmic ointment, , Left Eye, Nightly  acetaminophen (TYLENOL) tablet 650 mg, 650 mg, Oral, Q4H PRN  calcium carbonate (TUMS) chewable tablet 500 mg, 500 mg, Oral, TID PRN  carvedilol (COREG) tablet 12.5 mg, 12.5 mg, Oral, BID WC  enoxaparin (LOVENOX) injection 40 mg, 40 mg, SubCUTAneous, Daily  fluticasone (FLONASE) 50 MCG/ACT nasal spray 2 spray, 2 spray, Each Nostril, BID PRN  folic acid (FOLVITE) tablet 1 mg, 1 mg, Oral, Daily  lisinopril (PRINIVIL;ZESTRIL) tablet 40 mg, 40 mg, Oral, Daily  melatonin tablet 3 mg, 3 mg, Oral, Nightly  pantoprazole (PROTONIX) tablet 40 mg, 40 mg, Oral, QAM AC  tamsulosin (FLOMAX) capsule 0.4 mg, 0.4 mg, Oral, Daily  thiamine mononitrate tablet 100 mg, 100 mg, Oral, Daily  polyethylene glycol (GLYCOLAX) packet 17 g, 17 g, Oral, Daily  senna (SENOKOT) tablet 17.2 mg, 2 tablet, Oral, Daily PRN  bisacodyl (DULCOLAX) suppository 10 mg, 10 mg, Rectal, Daily PRN      Impression/Plan:   Impaired ADLs, gait, and mobility due to:      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 Diplopia/blurred vision:  HTN: on carvedilol, lisinopril. Goal SBP < 160 mm Hg  Hypocalcemia: Monitoring  Hyperkalemia: Monitoring  Urine retention: on Flomax. Alcohol withdrawal: completed phenobarbital 2/6/23. On thiamine, folate. Agitation/Insomnia: Improved. Seroquel prn but has not needed  GERD: on pantoprazole  Nicotine dependence: on Nicoderm  Bowel Management: Miralax daily, Senokot prn, Dulcolax prn  Internal medicine for medical management  DVT prophylaxis:  on Lovenox. Has TEDs during the day and EPC cuffs at night      Electronically signed by Be Price MD on 2/13/2023 at 10:54 AM      This note is created with the assistance of a speech recognition program.  While intending to generate a document that actually reflects the content of the visit, the document can still have some errors including those of syntax and sound a like substitutions which may escape proof reading. In such instances, actual meaning can be extrapolated by contextual diversion.

## 2023-02-13 NOTE — PROGRESS NOTES
[id number] [facility]      PROGRESS NOTE        Patient:  Paloma Hilton  YOB: 1980    MRN: [id number]     Acct: [de-identified]     Admit date: 2/2/2023    Pt seen and Chart reviewed. Consultant notes reviewed and care evaluated. Subjective: Patient is doing okay this morning feels very good. He has no pain he had liquid a headache last night he took Tylenol but this morning he feels okay. Diet:  ADULT DIET; Regular      Medications:Current Inpatient    Scheduled Meds:   vitamin D  50,000 Units Oral Weekly    nicotine  1 patch TransDERmal Daily    artificial tears   Left Eye Nightly    carvedilol  12.5 mg Oral BID WC    enoxaparin  40 mg SubCUTAneous Daily    folic acid  1 mg Oral Daily    lisinopril  40 mg Oral Daily    melatonin  3 mg Oral Nightly    pantoprazole  40 mg Oral QAM AC    tamsulosin  0.4 mg Oral Daily    thiamine  100 mg Oral Daily    polyethylene glycol  17 g Oral Daily     Continuous Infusions:  PRN Meds:QUEtiapine, polyvinyl alcohol, acetaminophen, calcium carbonate, fluticasone, senna, bisacodyl        Physical Exam:  Vitals: BP (!) 135/94   Pulse 77   Temp 97.5 °F (36.4 °C) (Oral)   Resp 16   Ht 6' (1.829 m)   Wt 205 lb 9.6 oz (93.3 kg)   SpO2 98%   BMI 27.88 kg/m²   24 hour intake/output:No intake or output data in the 24 hours ending 02/13/23 0746  Last 3 weights:   Wt Readings from Last 3 Encounters:   02/08/23 205 lb 9.6 oz (93.3 kg)   01/26/23 200 lb (90.7 kg)   10/14/18 200 lb (90.7 kg)       Physical Examination:   General appearance - alert, well appearing, and in no distress  Mental status - alert, oriented to person, place, and time  oropharynx clear, no lesions  Chest - clear to auscultation, no wheezes, rales or rhonchi, symmetric air entry  Heart - normal rate, regular rhythm, normal S1, S2, no murmurs, rubs, clicks or gallops  Abdomen - soft, nontender, nondistended, no masses or organomegaly  Neurological - alert, oriented, normal speech, no focal findings or movement disorder noted drooping noted on the left. His strength is good. Speech is normal  Extremities - peripheral pulses normal, no pedal edema, no clubbing or cyanosis  Skin - normal coloration and turgor, no rashes, no suspicious skin lesions noted     Results     Component Value Units   Basic Metabolic Panel [[id number]] (Abnormal)    Collected: 02/13/23 0659    Updated: 02/13/23 0737     Glucose 100 High  mg/dL    BUN 17 mg/dL    Creatinine 0.72 mg/dL    Est, Glom Filt Rate >60 mL/min/1.73m2    Comment:        These results are not intended for use in patients <25years of age. eGFR results are calculated without a race factor using the 2021 CKD-EPI equation. Careful clinical correlation is recommended, particularly when comparing to results   calculated using previous equations. The CKD-EPI equation is less accurate in patients with extremes of muscle mass, extra-renal   metabolism of creatine, excessive creatine ingestion, or following therapy that affects   renal tubular secretion. Calcium 11.5 High  mg/dL    Sodium 136 mmol/L    Potassium 5.1 mmol/L    Chloride 101 mmol/L    CO2 26 mmol/L    Anion Gap 9        Assessment:  Principal Problem:    Pontine hemorrhage (HCC)  Resolved Problems:    * No resolved hospital problems.  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    Intracranial hemorrhage (HCC) I62.9    Impending cerebrovascular accident ([facility] [address] I63.9      Left-sided hemiparesis    Diplopia secondary to CVA       Pretension blood pressure coming down slowly we will monitor and adjust meds as needed  History of smoking on nicotine patch  History of alcohol use no evidence of withdrawal but he is on phenobarbital looks like bolus thiamine multivitamin  Episodes of fall no injuries  Hypercalcemia with elevated ionized calcium  High parathyroidism level  Documented blood pressure almost 24 hours in the acceptable range for now  Vit D diff  Vitamin D could probably explain his parathyroid level      Plan:  Continue with the current treatment    We will continue to follow    Continue with his blood pressure medications    DO DAMIEN BucioFP            2/13/2023, 7:46 AM

## 2023-02-13 NOTE — PATIENT CARE CONFERENCE
[facility] Acute Inpatient Rehabilitation  TEAM CONFERENCE NOTE  Date: 23  Patient Name: Daryl Polanco       Room: 6621/3147-15  MRN: [id number]       : 1980  (43 y.o.)     Gender: male     Referring Practitioner: Lee Gómez MD     PRINCIPAL DIAGNOSIS: Pontine hemorrhage [facility])    NURSING    Bladder Continence  Always Continent  Bowel Continence Always Continent    Date of Last BM: 23    Bladder/Bowel Program Interventions: Both Bowel & Bladder Program In Place     Wounds/Incisions/Ulcers: No skin issues identified    Pain Control: No pain concerns to address    Pain Medication Regimen Usage Pattern: MAR reviewed and pain medications are being used at the following frequency (Specify Medication, # Doses Administered on average per day, identified patterns of use - for example: time of day, prior to activity/therapy)  Pt took Tylenol one time on 23    Fall Risk:  Falling star program initiated    Medication Education Program: Patient able to manage medications and being educated by nursing    Discharge Preparation Patient/Responsible Party Education In Progress:   No Educational Needs Identified    Nursing specific communication for TEAM: Additional information identified requiring communication to the interdisciplinary team as follows None    PHYSICAL THERAPY    Bed Mobility:        Bridging: Modified independent   Rolling to Left: Modified independent  Rolling to Right: Modified independent  Supine to Sit: Modified independent  Sit to Supine: Modified independent  Scooting: Modified independent  Bed Mobility Comments: PT mat, with x2 pillows. No difficulties noted. Transfers:  Sit to Stand: Stand by assistance  Stand to Sit: Stand by assistance;Supervision  Bed to Chair: Stand by assistance;Supervision    Ambulation  Surface: Level tile; Ramp  Device: Rollator  Assistance: Stand by assistance;Contact guard assistance  Quality of Gait: pace control improved, pt occasionally drags right LE but self corrects. requires more assist (SBA/CGA). VC for rollator use and upright posture. Pt requires cues to keep RLE inside rollator  to increase safety. Slightly more unsteady with descending ramp. Multple short standing rest breaks to restart proper gait pattern to improve stability. Gait Deviations: Slow Tess;Deviated path;Staggers;Decreased step length;Decreased step height (R toe out)  Distance: 500+' with Rollator  Comments: performed short 50 ft trial with use of cane, CGA For stability with pt having greater stability with cane held in L hand. More Ambulation?: Yes  Ambulation 2  Surface - 2: level tile  Device 2: Single point cane  Assistance 2: Stand by assistance  Quality of Gait 2: improved control, still scooting right LE on occasion. Cues for RLE foot clearance. Pt reports \"I feel more comfortable with the cane on my right\". Occassionally pt's RUE wrist is lax resulting in instability with cane. Gait Deviations: Slow Tess;Decreased step length;Decreased step height;Deviated path  Distance: 50'x2  Comments: No LOB noted. Improved control  Ambulation 3  Surface - 3: level tile;uneven;carpet;outdoors;ramp  Device 3: Rollator  Assistance 3: Contact guard assistance;Minimal assistance  Quality of Gait 3: Pt continues with same ataxic gait pattern with RLE. Slightly unsteady with multiple LOB noted d/t unilateral UE support on rollator while attempting to manually blink left eye with LUE. Pt reports \"I should have worn my eye patch\". Gait Deviations: Decreased step length;Decreased step height;Decreased arm swing;Decreased head and trunk rotation;Deviated path  Distance: 1000'+ outside terrain.     Ambulation:  # Steps : 17 (x3; preformed with varied method for neuro re ed and safety instructions for home)  Stairs Height: 8\"  Rails: Right ascending (hands gliding and not pulling to allow bilat LE to work harder with reciprocating movement, less use of UE)  Curbs: 6\"  Device: No Device;4 wheeled walker  Assistance: Contact guard assistance  Comment: Pt completes with a step to gait pattern to increase neuro re-edu. Pt demos an occassional right lateral lean, however able to self correct. Pt does require short standing rest breaks at top/bottom of stairs. Pt V good understanding of safe technique to complete stairs at home with love hand rails. PM: Pt is edu on proper technique to complete a curb step with a rollator. Good - carryover noted. Pt demos quick movements and requires cues for safety. Assistance Needed: Independent              Goals  Time Frame for Short Term Goals: 7 days  Short Term Goal 1: pt to demo independent bed mobility on flat surface without use of rails per home set-up  Short Term Goal 2: pt to demo functional transfers with device and CGA  Short Term Goal 3: pt to ambulate 150' with device and MinAx1  Short Term Goal 4: pt to negotiate 10-12 steps with Single rail and device as needed demonstrating a landing pivot turn (per home set up stairs with landing) Benigno                OCCUPATIONAL THERAPY  SELF CARE          Feeding  Assistance Level: Independent         Grooming/Oral Hygiene  Assistance Level: Modified independent     Upper Extremity Bathing  Assistance Level: Modified independent  Skilled Clinical Factors: Completed while seated on tub transfer bench   Lower Extremity Bathing  Assistance Level: Modified independent  Skilled Clinical Factors: completed seated on bench, utilizes GB when standing to complete norberto area and buttocks        Upper Extremity Dressing  Assistance Level: Modified independent  Skilled Clinical Factors: Pt. was able to retreive, doff, and izabela UB clothing items with (I) from wheelchair level this date.          Lower Extremity Dressing  Assistance Level: Modified independent  Skilled Clinical Factors: Completed threading while seated on shower bench and pulling up/down underwear and pants over hips while standing in shower utilizing GB. Putting On/Taking Off Footwear  Assistance Level: Modified independent  Skilled Clinical Factors: Utilizes figure 4 technique to izabela/doff socks and shoes. Toileting  Assistance Level: Supervision  Skilled Clinical Factors: SUP during clothing management. Toilet transfer  Assistance Level: Supervision    Short Term Goals  Time Frame for Short Term Goals: By 1 week  Short Term Goal 1: Pt will complete lower body dressing/bathing with CGA and Good safety with use of AE as needed  Short Term Goal 2: Pt will complete functional transfers/mobility during self care tasks with CGA and Good safety with no LOB  Short Term Goal 3: Pt will tolerate standing 7+ minutes during functional activity of choice with CGA and no LOB during functional activity of choice  Short Term Goal 4: Pt will verbalize/demonstrate Good understanding of AE/adaptive strategies/DME to increase safety and independence with self care and mobility  Short Term Goal 5: Pt will verbalize/demonstrate 3 visual compensatory strategies to increase safety with daily activities and increase quality of life  Short Term Goal 6: Pt will participate in 30+ minutes of therapeutic exercises/functional activities to increase safety and independence with self care and mobility         SPEECH THERAPY  Mod I for expression (dysarthria), problem solving and memory   Short Term Goal: Independent for expression, problem solving and memory  VitalStim (NMES) being utilized on facial muscles. NUTRITION  Weight: 205 lb 9.6 oz (93.3 kg) / Body mass index is 27.88 kg/m². Diet Rx: Regular. PO intake appears adequate. Please see nutrition note for details.     CASE MANAGEMENT ASSESSMENT    Discharge Disposition: Home   Family Support: wife    PCP Established: [x] Yes [] No (If No: Specify Status of Designation)    Services Being Followed In Preparation For Discharge: (Check All That Apply)  [] [address] [employer])  [x] Outpatient Therapy(Specify Location)  [] Dialysis   [] Hemodialysis (Specify Location/Days/Chair Times)   [] Peritoneal Dialysis  [] IV Infusion Services  [] Enteral Nutrition Services  [] Oxygen  [] Stoma/Ostomy  [] Catheter  [x] Lovenox    Patient Mood Interview PHQ-2 to 9 Score: 0    Pre-Admission Status:  Lives With: Spouse (Pt's Spouse Austin Aquino, has 1 step son (17 yo)that may move in with the family)  Type of Home: House  Home Layout: Two level, Bed/Bath upstairs, 1/2 bath on main level (Laundry on first level.)  Home Access: Stairs to enter with rails  Entrance Stairs - Number of Steps: 2 CHIRAG; 13 steps upstairs with with L HR  Entrance Stairs - Rails: Right  Bathroom Shower/Tub: Tub/Shower unit, Curtain  Bathroom Toilet: Standard (Windowsills nearby toilet in both bathrooms; bench storage unit in front of toilet in full bathroom that can be used as support)  Bathroom Accessibility: Edgard Reyes:  (Pt reports that his brother in law has equipment available for him to use such as RW, and Shower chair and he does not need it at this time)  Has the patient had two or more falls in the past year or any fall with injury in the past year?: No  Receives Help From: Family, Neighbor, Friend(s) (Sister, Brother in law, neighbors)  ADL Assistance: Independent  Homemaking Assistance: Independent  Homemaking Responsibilities: Yes  Meal Prep Responsibility: Primary  Laundry Responsibility: Primary  Cleaning Responsibility: Primary  Ambulation Assistance: Independent  Transfer Assistance: Independent  Active : Yes  Mode of Transportation: Truck, Car (Wife Drives [employer] car)  Occupation: Full time employment  Type of Occupation: operations for [employer]: watching history channel, fishing, spending time with kids, being outdoors  IADL Comments: Pt sleeps on flat bed. House is 50% carpet and 50% hardwood.   Additional Comments: Pt's wife is employed, M-F 9-5 shifts, Pt reports her schedule can be flexible and can work remotely. Pt reports neightbor, brother-in-law, and sister that are also able to assist as needed. Pt plans to return home and do OP therapies     Family Education: Family Education Completed    Percentage Risk for Readmission: Low 0 - 18%   Readmission Risk              Risk of Unplanned Readmission:  12       %    Critical Items: None       Problem / Barrier Intervention / Plan  Results   Cognitive impairment Cognitive retraining exercises     Dysarthria  OMEs, articulation drills, dysarthria compensatory strategy training, VitalStim (NMES)    Impaired mobility related to deficits from CVA Strengthening, balance activities, functional mobility training    Diplopia Eye patch    Altered ability to care for self Remediation and strategies to increase safety and independence with self care tasks    R UE deficits Neuro re-education strategies to increase functional use of RUE    Left side visual deficits in bilateral eyes Visual exercises/training and education in compensatory strategies to increase safety in daily activities      Total Self Care Score    Total Mobility Score  Admission Score:  24      Admission Score:  34  Goal:  42/42         Goal:  72/90    THERAPY MINUTE COMPLIANCE  Patient is participating and compliant with meeting therapy minutes as outlined in plan of care: Yes `    Discharge Plan   Estimated Discharge Date: 2/15/2023  Home evaluation needed?  No  Overnight or Day Pass: No  Factors facilitating achievement of predicted outcomes: Family support, Motivated, Cooperative, Pleasant, Good insight into deficits, and Has homemaker services  Barriers to the achievement of predicted outcomes: Decreased endurance, Lower extremity weakness, Medication managment, and Impaired balance    Functional Goals at discharge:  Predicted Outcome: Home with familyPATIENT'S LEVEL OF ASSISTANCE: Contact Jodie / Chris [address] and Stand By Assistance   Discharge therapy goals:  PT: Long Term Goals  Time Frame for Long Term Goals : Until D/C  Long Term Goal 1: Pt to improve Standing balance to FAIR + with use of device to dec risk for falls  Long Term Goal 2: pt to demo functional transfers with device Mod I  Long Term Goal 3: pt to ambulate 150' with device and supervision  Long Term Goal 4: pt to negotiate 2 steps with R rail and device and 10-12 successive steps with L rail and device as needed demonstrating a landing pivot turn (per home set up stairs with landing) CGA-SBA for stairs  Long Term Goal 5: pt to demo actual or simulated car transfer with SBA and device as needed  Long term goal 6: pt to improve on PASS score to 30/36 or greater to demonstrate improving balance, mobility, and independence  Long term goal 7: pt to demonstrate good technique for fall recovery with SBA  Long term goal 8: Pt to progress to step Ladder negotiation with CGA to allow for safe participation in home making/maintainance tasks with assist for safety.   OT:Long Term Goals  Time Frame for Long Term Goals : By discharge  Long Term Goal 1: Pt will complete BADLs with Mod I and Good safety with use of AE as needed  Long Term Goal 2: Pt will complete functional mobility during self care tasks with Mod I and Good safety with use of least restrictive device  Long Term Goal 3: Pt will tolerate standing 15+ minutes during functional activity of choice with Good safety  Long Term Goal 4: Pt will complete simple meal prep/light house keeping task with Supervision and Good safety  Long Term Goal 5: Pt will complete tub transfer with Supervision and Good safety with use of DME as needed  Long Term Goal 6: Pt will verbalize/demonstrate Good understanding of vision HEP to increase neurofacilitation of left side visual field to increase safety during daily activities  Long Term Goal 7: Pt will verbalize/demonstrate Good understanding of home safety/fall prevention strategies to increase safety and independence with self care and mobility  Long Term Goal 8: Pt will demonstrate increased [facility] during self care tasks as evident by 5 second improvement with LUE and 30 second improvement with RUE during 9 hole peg test  ST: Independent for expression, problem solving and memory     Participating Team Members:  /:  Rosanne White RN  Occupational Therapist: Reid Faulkner OT    Physical Therapist: Eden Gould PT  Speech Therapist:  Regla Cardenas, [address]   Nurse: Luis Torres RN    Dietary/Nutrition: Mervat Vargas RD, LD  Pastoral Care: Chaplain Noah Camp  CMG: Leon Brownlee, DOMO    I approve the established interdisciplinary plan of care as documented within the medical record of [facility].     Richard Diez MD

## 2023-02-13 NOTE — PLAN OF CARE
Problem: ABCDS Injury Assessment  Goal: Absence of physical injury  Outcome: Progressing     Problem: Safety - Adult  Goal: Free from fall injury  Outcome: Progressing     Problem: Skin/Tissue Integrity  Goal: Absence of new skin breakdown  Description: 1. Monitor for areas of redness and/or skin breakdown  2. Assess vascular access sites hourly  3. Every 4-6 hours minimum:  Change oxygen saturation probe site  4. Every 4-6 hours:  If on nasal continuous positive airway pressure, respiratory therapy assess nares and determine need for appliance change or resting period.   Outcome: Progressing     Problem: Pain  Goal: Verbalizes/displays adequate comfort level or baseline comfort level  Outcome: Progressing     Problem: Discharge Planning  Goal: Discharge to home or other facility with appropriate resources  Outcome: Progressing  Flowsheets (Taken 2/12/2023 2130)  Discharge to home or other facility with appropriate resources:   Identify barriers to discharge with patient and caregiver   Identify discharge learning needs (meds, wound care, etc)   Arrange for needed discharge resources and transportation as appropriate     Problem: Chronic Conditions and Co-morbidities  Goal: Patient's chronic conditions and co-morbidity symptoms are monitored and maintained or improved  Outcome: Progressing  Flowsheets (Taken 2/12/2023 2130)  Care Plan - Patient's Chronic Conditions and Co-Morbidity Symptoms are Monitored and Maintained or Improved:   Collaborate with multidisciplinary team to address chronic and comorbid conditions and prevent exacerbation or deterioration   Monitor and assess patient's chronic conditions and comorbid symptoms for stability, deterioration, or improvement   Update acute care plan with appropriate goals if chronic or comorbid symptoms are exacerbated and prevent overall improvement and discharge     Problem: Nutrition Deficit:  Goal: Optimize nutritional status  Outcome: Progressing

## 2023-02-13 NOTE — PROGRESS NOTES
[address]    Acute Rehabilitation Occupational Therapy Daily Treatment Note    Date: 23  Patient Name: Lizandro Nunez       Room: 7323/1100-50  MRN: [id number]  Account: [de-identified]   : 1980  (43 y.o.) Gender: male       Referring Practitioner: Whitney Beckman MD  Diagnosis: Hemorrhagic CVA  Additional Pertinent Hx: Mr. Lizandro Nunez is a 43 y.o. right handed male who was admitted to [place] [address] on 2023 with Extremity Weakness (Right sided weakness) and Facial Droop. Patient with R weakness, numbness and facial droop who fell when he went to bed then awoke with the symptoms. SBP was in 200s upon arrival to ED. CT confirmed hemorrhagic CVA posterior ramses and narrowing R P1 PCA. He was treated with Cardene for BP control. Neurosurgery managing conservatively. Pt admitted to ARU 23    Treatment Diagnosis: Impaired self care status    Past Medical History:  has a past medical history of Cerebral artery occlusion with cerebral infarction [facility]), History of heartburn, Hypertension, Neoplasm of unspecified nature of bone, soft tissue, and skin, and Research study patient. Past Surgical History:   has a past surgical history that includes Appendectomy (in 20's) and pre-malignant / benign skin lesion excision (Right, 2014). Restrictions  Restrictions/Precautions  Restrictions/Precautions: Fall Risk, General Precautions  Required Braces or Orthoses?: No  Implants present? :  (pt denies)     Position Activity Restriction  Other position/activity restrictions: Per Dr. Lucretia CROWDER to use Eye patch, alternate between eyes (to help with patient's double vision and balance)    Vitals  Vital Signs  BP Location: Left upper arm  O2 Device: None (Room air)     Subjective  Subjective  Subjective: \"Let's do this! \" Pt seated in w/c and ready for therapy.   Pain: Pt denied pain       Objective  Cognition  Overall Orientation Status: Within Functional Limits  Orientation Level: Oriented X4    Cognition  Overall Cognitive Status: Exceptions  Arousal/Alertness: Appropriate responses to stimuli  Following Commands: Follows all commands without difficulty  Attention Span: Appears intact  Memory: Appears intact  Safety Judgement: Decreased awareness of need for safety  Problem Solving: Good awareness of errors made  Insights: Decreased awareness of deficits  Initiation: Does not require cues  Sequencing: Does not require cues  Cognition Comment: pt can be impulsive at times    Activities of Daily Living    Feeding  Equipment Provided: Feeding utensils (Built up handles)  Assistance Level: Independent  Skilled Clinical Factors: Per pt report- pt has not been using built-up utensils    Upper Extremity Bathing  Assistance Level: Modified independent  Skilled Clinical Factors: Completed while seated on tub transfer bench    Lower Extremity Bathing  Assistance Level: Modified independent  Skilled Clinical Factors: completed seated on bench, utilizes GB when standing to complete norberto area and buttocks    Upper Extremity Dressing  Assistance Level: Modified independent  Skilled Clinical Factors: Pt. was able to retreive, doff, and izabela UB clothing items with (I) from wheelchair level this date. Lower Extremity Dressing  Assistance Level: Modified independent  Skilled Clinical Factors: Completed threading while seated on shower bench and pulling up/down underwear and pants over hips while standing in shower utilizing GB. Putting On/Taking Off Footwear  Assistance Level: Modified independent  Skilled Clinical Factors: Utilizes figure 4 technique to izabela/doff socks and shoes.     Mobility    Transfers  Surface: Wheelchair  Device: Walker  Sit to [employer] Level: Modified Independent  Skilled Clinical Factors: Good hand placement and locking brakes noted on R side only on this date  Stand to Sit  Assistance Level: Modified Independent  Skilled Clinical Factors: Good hand placement noted    Functional Mobility  Device: Rolling walker; Wheelchair  Activity: To/From bathroom; Retrieve items;Transport items; To/From therapy gym  Assistance Level: Modified independent  Skilled Clinical Factors: Pt propelled self in w/c from room to therapy gym. OT Exercises  Motor Control/Coordination: Pt participated in various [address] exercises with the use of RUE to improve [address] when performing self care and funcitional task with independence. Pt completed placing golf tees in holes on an rotating wheel, placing coins into a piggy bank, and placing key pegs into matching slots of varying configuration. Pt required increased time with each activity and demo improvement in dropping less objects. Pt demo the most improvement when using the pincher grasp to isolate one oleg, coin, or peg at a time. As a compensatory stradegy, pt holds pegs high and to the right in order to see the complete shape correctly. Assessment  Assessment  Activity Tolerance: Patient tolerated treatment well  Discharge Recommendations: Home with assist PRN;Outpatient OT    Patient Education  Education  Education Given To: Patient; Family  Education Provided: Role of Therapy;Plan of Care;Home Exercise Program;Precautions; Safety;ADL Function;IADL Function; Family Education; Fall Prevention Strategies  Education Method: Verbal  Barriers to Learning: Vision  Education Outcome: Verbalized understanding;Demonstrated understanding    OT Equipment Recommendations  Other: TBD    Safety Device: Call light within reach;Nurse notified;       Goals  Patient Goals   Patient goals : \"To get back to those things (ADL, IADL, and [address] in RUE). \"  Short Term Goals  Time Frame for Short Term Goals: By 1 week  Short Term Goal 1: Pt will complete lower body dressing/bathing with CGA and Good safety with use of AE as needed  Short Term Goal 2: Pt will complete functional transfers/mobility during self care tasks with CGA and Good safety with no LOB  Short Term Goal 3: Pt will tolerate standing 7+ minutes during functional activity of choice with CGA and no LOB during functional activity of choice  Short Term Goal 4: Pt will verbalize/demonstrate Good understanding of AE/adaptive strategies/DME to increase safety and independence with self care and mobility  Short Term Goal 5: Pt will verbalize/demonstrate 3 visual compensatory strategies to increase safety with daily activities and increase quality of life  Short Term Goal 6: Pt will participate in 30+ minutes of therapeutic exercises/functional activities to increase safety and independence with self care and mobility    Long Term Goals  Time Frame for Long Term Goals : By discharge  Long Term Goal 1: Pt will complete BADLs with Mod I and Good safety with use of AE as needed  Long Term Goal 2: Pt will complete functional mobility during self care tasks with Mod I and Good safety with use of least restrictive device  Long Term Goal 3: Pt will tolerate standing 15+ minutes during functional activity of choice with Good safety  Long Term Goal 4: Pt will complete simple meal prep/light house keeping task with Supervision and Good safety  Long Term Goal 5: Pt will complete tub transfer with Supervision and Good safety with use of DME as needed  Long Term Goal 6: Pt will verbalize/demonstrate Good understanding of vision HEP to increase neurofacilitation of left side visual field to increase safety during daily activities  Long Term Goal 7: Pt will verbalize/demonstrate Good understanding of home safety/fall prevention strategies to increase safety and independence with self care and mobility  Long Term Goal 8: Pt will demonstrate increased [address] during self care tasks as evident by 5 second improvement with LUE and 30 second improvement with RUE during 9 hole peg test    Plan  Occupational Therapy Plan  Times Per Week: 5-7  Times Per Day: Twice a day  Current Treatment Recommendations: Self-Care / ADL, Strengthening, Balance training, Functional mobility training, Endurance training, Neuromuscular re-education, Pain management, Safety education & training, Patient/Caregiver education & training, Equipment evaluation, education, & procurement, Home management training, Cognitive/Perceptual training, Coordination training         02/13/23 1002 02/13/23 1600   OT Individual Minutes   Time In [id number]   [facility] [id number]   [id number]       Electronically signed by JASON Gonsalez on 2/13/23 at 4:44 PM EST

## 2023-02-13 NOTE — CARE COORDINATION
ARU CASE MANAGEMENT NOTE:    Patient is alert and oriented x4. Spoke with patient regarding discharge plan and patient confirms plan is to discharge home    DME:bath and shower seat  submitted through [address] [place] [address]    Outside appointments: 2/22/2023 Altoliver Guevara    Will continue to follow for additional discharge needs.     Electronically signed by Aleksandar Amezcua RN on 2/13/2023 at 12:57 PM

## 2023-02-13 NOTE — PLAN OF CARE
Problem: Safety - Adult  Goal: Free from fall injury  2/13/2023 1342 by Osmin Alcantar RN  Outcome: Progressing  Flowsheets (Taken 2/13/2023 1342)  Free From Fall Injury: Instruct family/caregiver on patient safety  Note: Patient remains free of falls and injuries throughout shift. Bed remains in the lowest position, wheels locked, call light and bedside table are within reach. Problem: Skin/Tissue Integrity  Goal: Absence of new skin breakdown  Description: 1. Monitor for areas of redness and/or skin breakdown  2. Assess vascular access sites hourly  3. Every 4-6 hours minimum:  Change oxygen saturation probe site  4. Every 4-6 hours:  If on nasal continuous positive airway pressure, respiratory therapy assess nares and determine need for appliance change or resting period. 2/13/2023 1342 by Osmin Alcantar RN  Outcome: Progressing  Note: No signs of increased skin or tissue breakdown is noted. See Head to Toe/LDA assessments in flowsheets. Problem: Pain  Goal: Verbalizes/displays adequate comfort level or baseline comfort level  2/13/2023 1342 by Osmin Alcantar RN  Outcome: Progressing  Flowsheets (Taken 2/13/2023 1342)  Verbalizes/displays adequate comfort level or baseline comfort level:   Encourage patient to monitor pain and request assistance   Assess pain using appropriate pain scale   Administer analgesics based on type and severity of pain and evaluate response   Implement non-pharmacological measures as appropriate and evaluate response   Consider cultural and social influences on pain and pain management   Notify Licensed Independent Practitioner if interventions unsuccessful or patient reports new pain  Note: Patient's pain is well controlled with current regimen. See MAR.       Problem: Nutrition Deficit:  Goal: Optimize nutritional status  2/13/2023 1342 by Osmin Alcantar RN  Outcome: Progressing     Problem: Chronic Conditions and Co-morbidities  Goal: Patient's chronic conditions and co-morbidity symptoms are monitored and maintained or improved  2/13/2023 1342 by Nathan Bryant RN  Outcome: Progressing     Problem: Discharge Planning  Goal: Discharge to home or other facility with appropriate resources  2/13/2023 1342 by Nathan Bryant RN  Outcome: Progressing

## 2023-02-14 ENCOUNTER — APPOINTMENT (OUTPATIENT)
Dept: ULTRASOUND IMAGING | Age: 43
DRG: 057 | End: 2023-02-14
Attending: STUDENT IN AN ORGANIZED HEALTH CARE EDUCATION/TRAINING PROGRAM
Payer: COMMERCIAL

## 2023-02-14 PROCEDURE — 6370000000 HC RX 637 (ALT 250 FOR IP): Performed by: STUDENT IN AN ORGANIZED HEALTH CARE EDUCATION/TRAINING PROGRAM

## 2023-02-14 PROCEDURE — 97530 THERAPEUTIC ACTIVITIES: CPT

## 2023-02-14 PROCEDURE — 6370000000 HC RX 637 (ALT 250 FOR IP)

## 2023-02-14 PROCEDURE — 97129 THER IVNTJ 1ST 15 MIN: CPT

## 2023-02-14 PROCEDURE — 97110 THERAPEUTIC EXERCISES: CPT

## 2023-02-14 PROCEDURE — 6370000000 HC RX 637 (ALT 250 FOR IP): Performed by: PHYSICAL MEDICINE & REHABILITATION

## 2023-02-14 PROCEDURE — 97116 GAIT TRAINING THERAPY: CPT

## 2023-02-14 PROCEDURE — 1180000000 HC REHAB R&B

## 2023-02-14 PROCEDURE — 97112 NEUROMUSCULAR REEDUCATION: CPT

## 2023-02-14 PROCEDURE — 99233 SBSQ HOSP IP/OBS HIGH 50: CPT | Performed by: PHYSICAL MEDICINE & REHABILITATION

## 2023-02-14 PROCEDURE — 92507 TX SP LANG VOICE COMM INDIV: CPT

## 2023-02-14 PROCEDURE — 76536 US EXAM OF HEAD AND NECK: CPT

## 2023-02-14 PROCEDURE — 97535 SELF CARE MNGMENT TRAINING: CPT

## 2023-02-14 RX ORDER — MINERAL OIL AND WHITE PETROLATUM 150; 830 MG/G; MG/G
OINTMENT OPHTHALMIC NIGHTLY
Refills: 0 | COMMUNITY
Start: 2023-02-15

## 2023-02-14 RX ORDER — CALCIUM CARBONATE 200(500)MG
500 TABLET,CHEWABLE ORAL 3 TIMES DAILY PRN
COMMUNITY
Start: 2023-02-14 | End: 2023-03-16

## 2023-02-14 RX ORDER — ERGOCALCIFEROL 1.25 MG/1
50000 CAPSULE ORAL WEEKLY
Qty: 5 CAPSULE | Refills: 0 | Status: SHIPPED | OUTPATIENT
Start: 2023-02-19 | End: 2023-05-29

## 2023-02-14 RX ORDER — QUETIAPINE FUMARATE 25 MG/1
12.5 TABLET, FILM COATED ORAL NIGHTLY PRN
Qty: 60 TABLET | Refills: 3 | Status: SHIPPED | OUTPATIENT
Start: 2023-02-14

## 2023-02-14 RX ORDER — POLYETHYLENE GLYCOL 3350 17 G/17G
17 POWDER, FOR SOLUTION ORAL DAILY PRN
Qty: 527 G | Refills: 1 | COMMUNITY
Start: 2023-02-14 | End: 2023-03-16

## 2023-02-14 RX ORDER — CARVEDILOL 12.5 MG/1
12.5 TABLET ORAL 2 TIMES DAILY WITH MEALS
Qty: 60 TABLET | Refills: 3 | Status: SHIPPED | OUTPATIENT
Start: 2023-02-15

## 2023-02-14 RX ORDER — LISINOPRIL 40 MG/1
40 TABLET ORAL DAILY
Qty: 30 TABLET | Refills: 3 | Status: SHIPPED | OUTPATIENT
Start: 2023-02-15

## 2023-02-14 RX ORDER — FOLIC ACID 1 MG/1
1 TABLET ORAL DAILY
Qty: 30 TABLET | Refills: 3 | COMMUNITY
Start: 2023-02-15

## 2023-02-14 RX ORDER — LANOLIN ALCOHOL/MO/W.PET/CERES
3 CREAM (GRAM) TOPICAL NIGHTLY
Refills: 3 | COMMUNITY
Start: 2023-02-15

## 2023-02-14 RX ORDER — THIAMINE MONONITRATE (VIT B1) 100 MG
100 TABLET ORAL DAILY
Refills: 0 | COMMUNITY
Start: 2023-02-15

## 2023-02-14 RX ORDER — TAMSULOSIN HYDROCHLORIDE 0.4 MG/1
0.4 CAPSULE ORAL DAILY
Qty: 30 CAPSULE | Refills: 3 | Status: SHIPPED | OUTPATIENT
Start: 2023-02-15

## 2023-02-14 RX ORDER — FLUTICASONE PROPIONATE 50 MCG
2 SPRAY, SUSPENSION (ML) NASAL 2 TIMES DAILY PRN
Qty: 16 G | Refills: 3 | COMMUNITY
Start: 2023-02-14

## 2023-02-14 RX ORDER — POLYVINYL ALCOHOL 14 MG/ML
1 SOLUTION/ DROPS OPHTHALMIC PRN
Refills: 4 | COMMUNITY
Start: 2023-02-14 | End: 2023-03-16

## 2023-02-14 RX ADMIN — THIAMINE HCL TAB 100 MG 100 MG: 100 TAB at 07:45

## 2023-02-14 RX ADMIN — PANTOPRAZOLE SODIUM 40 MG: 40 TABLET, DELAYED RELEASE ORAL at 05:55

## 2023-02-14 RX ADMIN — TAMSULOSIN HYDROCHLORIDE 0.4 MG: 0.4 CAPSULE ORAL at 07:45

## 2023-02-14 RX ADMIN — POLYETHYLENE GLYCOL 3350 17 G: 17 POWDER, FOR SOLUTION ORAL at 07:45

## 2023-02-14 RX ADMIN — LISINOPRIL 40 MG: 20 TABLET ORAL at 07:45

## 2023-02-14 RX ADMIN — FOLIC ACID 1 MG: 1 TABLET ORAL at 07:45

## 2023-02-14 RX ADMIN — MINERAL OIL, PETROLATUM: 425; 568 OINTMENT OPHTHALMIC at 19:46

## 2023-02-14 RX ADMIN — CARVEDILOL 12.5 MG: 12.5 TABLET, FILM COATED ORAL at 17:24

## 2023-02-14 RX ADMIN — ACETAMINOPHEN 650 MG: 325 TABLET ORAL at 05:58

## 2023-02-14 RX ADMIN — CARVEDILOL 12.5 MG: 12.5 TABLET, FILM COATED ORAL at 07:45

## 2023-02-14 ASSESSMENT — 9 HOLE PEG TEST
TESTTIME_SECONDS: 114.4
TESTTIME_SECONDS: 28.64
TEST_RESULT: IMPAIRED
TEST_RESULT: IMPAIRED

## 2023-02-14 ASSESSMENT — PAIN DESCRIPTION - DESCRIPTORS: DESCRIPTORS: ACHING

## 2023-02-14 ASSESSMENT — PAIN DESCRIPTION - LOCATION: LOCATION: HEAD;JAW

## 2023-02-14 ASSESSMENT — PAIN SCALES - GENERAL: PAINLEVEL_OUTOF10: 4

## 2023-02-14 NOTE — PROGRESS NOTES
[address]    Acute Rehabilitation Occupational Therapy Daily Treatment Note    Date: 23  Patient Name: Robbi Oviedo       Room: 1137/7826-89  MRN: [id number]  Account: [de-identified]   : 1980  (43 y.o.) Gender: male       Referring Practitioner: Phil Ledezma MD  Diagnosis: Hemorrhagic CVA  Additional Pertinent Hx: Mr. Robbi Oviedo is a 43 y.o. right handed male who was admitted to [facility] on 2023 with Extremity Weakness (Right sided weakness) and Facial Droop. Patient with R weakness, numbness and facial droop who fell when he went to bed then awoke with the symptoms. SBP was in 200s upon arrival to ED. CT confirmed hemorrhagic CVA posterior ramses and narrowing R P1 PCA. He was treated with Cardene for BP control. Neurosurgery managing conservatively. Pt admitted to ARU 23    Treatment Diagnosis: Impaired self care status    Past Medical History:  has a past medical history of Cerebral artery occlusion with cerebral infarction [facility]), History of heartburn, Hypertension, Neoplasm of unspecified nature of bone, soft tissue, and skin, and Research study patient. Past Surgical History:   has a past surgical history that includes Appendectomy (in 20's) and pre-malignant / benign skin lesion excision (Right, 2014). Restrictions  Restrictions/Precautions  Restrictions/Precautions: Fall Risk, General Precautions  Required Braces or Orthoses?: No  Implants present? :  (pt denies)     Position Activity Restriction  Other position/activity restrictions: Per Dr. Иван Rios OK to use Eye patch, alternate between eyes (to help with patient's double vision and balance)          Vitals  Vital Signs  BP Location: Left upper arm  O2 Device: None (Room air)     Subjective  Subjective  Subjective: AM: Pt found in gym ready to participate in therapy. OT session ended early d/t parathyroid ultrasound PM: Pt found in room anxious to shower and shave.   Pain: Pt denied pain       Objective  Cognition  Overall Orientation Status: Within Functional Limits  Orientation Level: Oriented X4    Cognition  Overall Cognitive Status: Exceptions  Arousal/Alertness: Appropriate responses to stimuli  Following Commands: Follows all commands without difficulty  Attention Span: Appears intact  Memory: Appears intact  Safety Judgement: Decreased awareness of need for safety  Problem Solving: Good awareness of errors made  Insights: Decreased awareness of deficits  Initiation: Does not require cues  Sequencing: Does not require cues  Cognition Comment: pt can be impulsive at times    Activities of Daily Living  Feeding  Equipment Provided: Feeding utensils (Built up handles)  Assistance Level: Independent  Skilled Clinical Factors: Per pt report- pt has not been using built-up utensils    Grooming/Oral Hygiene  Assistance Level: Modified independent  Skilled Clinical Factors: Completed shaving while seated on shower bench. Upper Extremity Bathing  Assistance Level: Modified independent  Skilled Clinical Factors: Completed while seated on tub transfer bench    Lower Extremity Bathing  Assistance Level: Modified independent  Skilled Clinical Factors: completed seated on bench, utilizes GB when standing to complete norberto area and buttocks    Upper Extremity Dressing  Assistance Level: Modified independent  Skilled Clinical Factors: Pt. was able to retreive, doff, and izabela UB clothing items from wheelchair level . Lower Extremity Dressing  Assistance Level: Modified independent  Skilled Clinical Factors: Completed threading while seated on shower bench and pulling up/down underwear and pants over hips while standing in shower utilizing GB. Putting On/Taking Off Footwear  Assistance Level: Modified independent  Skilled Clinical Factors: Utilizes figure 4 technique to izabela/doff socks and shoes.     Tub/Shower Transfers  Type: Shower  Transfer From: Rolling walker  Transfer To: Tub transfer bench  Additional Factors: Increased time to complete  Assistance Level: Modified independent  Skilled Clinical Factors: Crossing threshold with RW and using G safety. Mobility    Transfers  Surface: Wheelchair  Device: Walker  Sit to [employer] Level: Stand by assist  Skilled Clinical Factors: Good hand placement  Stand to Sit  Assistance Level: Stand by assist  Skilled Clinical Factors: Good hand placement noted    Functional Mobility  Device: Rolling walker; Wheelchair  Activity: To/From bathroom; Retrieve items;Transport items; To/From therapy gym  Assistance Level: Modified independent  Skilled Clinical Factors: Pt propelled self in w/c from therapy gym to room and around room    OT Exercises  Motor Control/Coordination: Pt participated in [facility] exercises with the use of RUE to improve [facility] when performing self care and funcitional task with independence. Pt completed placing key pegs into matching slots of varying configuration. Pt required increased time and dropping pegs multiple times throughout. Pt participated in playing [employer] to increase fine motor control and bilateral coordination for performing functional tasks wtih independence. Pt demo slight difficulty when utilizing R hand and favored using L hand more often for better control. Fine Motor Skills  Left 9-Hole Peg Test: Impaired  Left 9 Hole Peg Test Time (secs): 28.64 (Norm: 17-25 seconds)  Right 9-Hole Peg Test: Impaired  Right 9 Hole Peg Test Time (secs): 114.4 (Norm: 17-25 seconds)     Assessment  Assessment: ALONSO/L facilitated 9 hole peg assessment  Activity Tolerance: Patient tolerated treatment well  Discharge Recommendations: Home with assist PRN;Outpatient OT    Patient Education  Education  Education Given To: Patient; Family  Education Provided: Role of Therapy;Plan of Care;Home Exercise Program;Precautions; Safety;ADL Function;IADL Function; Family Education; Fall Prevention Strategies  Education Method: Verbal  Barriers to Learning: Vision  Education Outcome: Verbalized understanding;Demonstrated understanding    OT Equipment Recommendations  Other: TBD    Safety Devices  Safety Devices in place: Yes  Type of devices: Call light within reach;Nurse notified       Goals  Patient Goals   Patient goals : \"To get back to those things (ADL, IADL, and [address] in RUE). \"  Short Term Goals  Time Frame for Short Term Goals: By 1 week  Short Term Goal 1: Pt will complete lower body dressing/bathing with CGA and Good safety with use of AE as needed  Short Term Goal 2: Pt will complete functional transfers/mobility during self care tasks with CGA and Good safety with no LOB  Short Term Goal 3: Pt will tolerate standing 7+ minutes during functional activity of choice with CGA and no LOB during functional activity of choice  Short Term Goal 4: Pt will verbalize/demonstrate Good understanding of AE/adaptive strategies/DME to increase safety and independence with self care and mobility  Short Term Goal 5: Pt will verbalize/demonstrate 3 visual compensatory strategies to increase safety with daily activities and increase quality of life  Short Term Goal 6: Pt will participate in 30+ minutes of therapeutic exercises/functional activities to increase safety and independence with self care and mobility    Long Term Goals  Time Frame for Long Term Goals : By discharge  Long Term Goal 1: Pt will complete BADLs with Mod I and Good safety with use of AE as needed  Long Term Goal 2: Pt will complete functional mobility during self care tasks with Mod I and Good safety with use of least restrictive device  Long Term Goal 3: Pt will tolerate standing 15+ minutes during functional activity of choice with Good safety  Long Term Goal 4: Pt will complete simple meal prep/light house keeping task with Supervision and Good safety  Long Term Goal 5: Pt will complete tub transfer with Supervision and Good safety with use of DME as needed  Long Term Goal 6: Pt will verbalize/demonstrate Good understanding of vision HEP to increase neurofacilitation of left side visual field to increase safety during daily activities  Long Term Goal 7: Pt will verbalize/demonstrate Good understanding of home safety/fall prevention strategies to increase safety and independence with self care and mobility  Long Term Goal 8: Pt will demonstrate increased [address] during self care tasks as evident by 5 second improvement with LUE and 30 second improvement with RUE during 9 hole peg test    Plan  Occupational Therapy Plan  Times Per Week: 5-7  Times Per Day: Twice a day  Current Treatment Recommendations: Self-Care / ADL, Strengthening, Balance training, Functional mobility training, Endurance training, Neuromuscular re-education, Pain management, Safety education & training, Patient/Caregiver education & training, Equipment evaluation, education, & procurement, Home management training, Cognitive/Perceptual training, Coordination training         02/14/23 1008 02/14/23 1458   OT Individual Minutes   Time In [id number]   BTKQ PJJ [id number]   [id number] 46 [id number]   Minute Variance   Variance 12  --    Reason Procedure  (parathyroid ultrasound)  --          Electronically signed by JASON Sousa on 2/14/23 at 5:09 PM EST

## 2023-02-14 NOTE — PLAN OF CARE
Problem: ABCDS Injury Assessment  Goal: Absence of physical injury  Outcome: Progressing  Flowsheets (Taken 2/14/2023 1507)  Absence of Physical Injury: Implement safety measures based on patient assessment     Problem: Safety - Adult  Goal: Free from fall injury  Outcome: Progressing  Flowsheets (Taken 2/14/2023 1507)  Free From Fall Injury:   Instruct family/caregiver on patient safety   Based on caregiver fall risk screen, instruct family/caregiver to ask for assistance with transferring infant if caregiver noted to have fall risk factors     Problem: Skin/Tissue Integrity  Goal: Absence of new skin breakdown  Description: 1. Monitor for areas of redness and/or skin breakdown  2. Assess vascular access sites hourly  3. Every 4-6 hours minimum:  Change oxygen saturation probe site  4. Every 4-6 hours:  If on nasal continuous positive airway pressure, respiratory therapy assess nares and determine need for appliance change or resting period.   Outcome: Progressing     Problem: Pain  Goal: Verbalizes/displays adequate comfort level or baseline comfort level  Outcome: Progressing     Problem: Discharge Planning  Goal: Discharge to home or other facility with appropriate resources  Outcome: Progressing  Flowsheets (Taken 2/14/2023 1020)  Discharge to home or other facility with appropriate resources: Identify barriers to discharge with patient and caregiver     Problem: Chronic Conditions and Co-morbidities  Goal: Patient's chronic conditions and co-morbidity symptoms are monitored and maintained or improved  Outcome: Progressing  Flowsheets (Taken 2/14/2023 1020)  Care Plan - Patient's Chronic Conditions and Co-Morbidity Symptoms are Monitored and Maintained or Improved:   Monitor and assess patient's chronic conditions and comorbid symptoms for stability, deterioration, or improvement   Collaborate with multidisciplinary team to address chronic and comorbid conditions and prevent exacerbation or deterioration Problem: Nutrition Deficit:  Goal: Optimize nutritional status  Outcome: Progressing

## 2023-02-14 NOTE — PROGRESS NOTES
[id number] [address]      PROGRESS NOTE        Patient:  Martin Lewis  YOB: 1980    MRN: [id number]     Acct: [de-identified]     Admit date: 2/2/2023    Pt seen and Chart reviewed. Consultant notes reviewed and care evaluated. Subjective; patient is doing okay he has no complaints he says he has no headaches he just feels he is grinding his teeth asked him to use a mouthpiece on his home he tells me he did not get the ultrasound yesterday not sure why no chest pain no nausea no vomiting no dysphagia    Diet:  ADULT DIET; Regular      Medications:Current Inpatient    Scheduled Meds:   vitamin D  50,000 Units Oral Weekly    nicotine  1 patch TransDERmal Daily    artificial tears   Left Eye Nightly    carvedilol  12.5 mg Oral BID WC    enoxaparin  40 mg SubCUTAneous Daily    folic acid  1 mg Oral Daily    lisinopril  40 mg Oral Daily    melatonin  3 mg Oral Nightly    pantoprazole  40 mg Oral QAM AC    tamsulosin  0.4 mg Oral Daily    thiamine  100 mg Oral Daily    polyethylene glycol  17 g Oral Daily     Continuous Infusions:  PRN Meds:QUEtiapine, polyvinyl alcohol, acetaminophen, calcium carbonate, fluticasone, senna, bisacodyl        Physical Exam:  Vitals: /83   Pulse 66   Temp 98.1 °F (36.7 °C)   Resp 16   Ht 6' (1.829 m)   Wt 205 lb 9.6 oz (93.3 kg)   SpO2 97%   BMI 27.88 kg/m²   24 hour intake/output:No intake or output data in the 24 hours ending 02/14/23 0714  Last 3 weights:   Wt Readings from Last 3 Encounters:   02/08/23 205 lb 9.6 oz (93.3 kg)   01/26/23 200 lb (90.7 kg)   10/14/18 200 lb (90.7 kg)       Physical Examination:   General appearance - alert, well appearing, and in no distress  Mental status - alert, oriented to person, place, and time  neck supple with midline trachea  Left cheek droop speech was normal  Chest - clear to auscultation, no wheezes, rales or rhonchi, symmetric air entry  Heart - normal rate, regular rhythm, normal S1, S2, no murmurs, rubs, clicks or gallops  Abdomen - soft, nontender, nondistended, no masses or organomegaly  Neurological - alert, oriented, normal speech, no focal findings or movement disorder noted}  Extremities - peripheral pulses normal, no pedal edema, no clubbing or cyanosis Fidencio weakness on the left but speech is normal  Skin - normal coloration and turgor, no rashes, no suspicious skin lesions noted   Results     Component Value Units   Basic Metabolic Panel [[id number]] (Abnormal)    Collected: 02/13/23 0659    Updated: 02/13/23 0737     Glucose 100 High  mg/dL    BUN 17 mg/dL    Creatinine 0.72 mg/dL    Est, Glom Filt Rate >60 mL/min/1.73m2    Comment:        These results are not intended for use in patients <25years of age. eGFR results are calculated without a race factor using the 2021 CKD-EPI equation. Careful clinical correlation is recommended, particularly when comparing to results   calculated using previous equations. The CKD-EPI equation is less accurate in patients with extremes of muscle mass, extra-renal   metabolism of creatine, excessive creatine ingestion, or following therapy that affects   renal tubular secretion. Calcium 11.5 High  mg/dL    Sodium 136 mmol/L    Potassium 5.1 mmol/L    Chloride 101 mmol/L    CO2 26 mmol/L    Anion Gap 9          Assessment:  Principal Problem:    Pontine hemorrhage (HCC)  Resolved Problems:    * No resolved hospital problems.  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    Intracranial hemorrhage (HCC) I62.9    Impending cerebrovascular accident ([facility] [address] I63.9      Left-sided hemiparesis    Diplopia secondary to CVA       Pretension blood pressure coming down slowly we will monitor and adjust meds as needed  History of smoking on nicotine patch  History of alcohol use no evidence of withdrawal but he is on phenobarbital looks like bolus thiamine multivitamin  Episodes of fall no injuries  Hypercalcemia with elevated ionized calcium  High parathyroidism level  Documented blood pressure almost 24 hours in the acceptable range for now  Vit D diff  Vitamin D could probably explain his parathyroid level  Blood pressure normalized      Plan:  Discussed with RN about the ultrasound of his parathyroid we will see if it can be done today or before he is discharged    Otherwise continue with his other meds and monitor his blood pressure    DANETTE Lindquist             2/14/2023, 7:14 AM

## 2023-02-14 NOTE — PROGRESS NOTES
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 Recall: Not addressed directly. Organization: Following written directions (1-2 step, 3-4 components), with eye-patch in place- 90% accuracy (I), 100% cued. Reviewed strategies (e.g., decreasing rate, rereading direction to ensure understanding, reading direction in its entirety prior to attempting to complete) to assist c completion of direction tasks in future (e.g., reading signs, filling out doctor appt intake forms, following directions on prescription bottles or tax forms). Pt verbalized understanding and agreeable. Problem Solving/Reasoning: n/a    Speech: ST educated Pt re: oral motor exercises for dysarthria while utilizing Vital Stim (one channel) on L oral sling at 4.5 mA for 15 mins. Pt able to complete OMEs x6 sets in reps of 20 and alliteration drills with min A. No movement noted on L side of face. Encouraged Pt to continue practicing exercises,  PROM and speech tasks throughout day and at home following d/c (using mirror as form of visual feedback). Pt verbalized understanding and agreeable. Other: No family present. Pt reports hopeful for anticipated d/c home this week.   Discussed recommendation for continued ST following d/c, continued OMEs/articulation practice for dysarthria and cognitive stimulation at home. Pt verbalized understanding and agreeable. Plan:  [x] Continue ST services    [] Discharge from ST:      Discharge recommendations: [] Inpatient Rehab   [] [place]   [] Outpatient Therapy  [] Follow up at trauma clinic   [] Other:       Treatment completed by:  Violeta Pérez M.A., CCC-SLP

## 2023-02-14 NOTE — PROGRESS NOTES
Call to Dr. Diaz List and message left with answer service regarding parathyroid ultrasound.  Order must state thyroid and not parathyroid

## 2023-02-14 NOTE — PROGRESS NOTES
Physical Medicine & Rehabilitation  Progress Note      Subjective:      43year-old male with hemorrhagic CVA. Patient is doing well today. No new issues with sleep, appetite, bowel, or bladder. ROS:  Denies fevers, chills, sweats. No chest pain, palpitations, lightheadedness. Denies coughing, wheezing or shortness of breath. Denies abdominal pain, nausea, diarrhea or constipation. No new areas of joint pain. Denies new areas of numbness or weakness. Denies new anxiety or depression issues. No new skin problems. Rehabilitation:   Progressing in therapies. PT:    Bed mobility  Bridging: Modified independent   Rolling to Left: Modified independent  Rolling to Right: Modified independent  Supine to Sit: Modified independent  Sit to Supine: Modified independent  Scooting: Modified independent  Bed Mobility Comments: PT mat, with x2 pillows. No difficulties noted. Transfers  Sit to Stand: Stand by assistance  Stand to Sit: Stand by assistance, Supervision  Bed to Chair: Stand by assistance, Supervision  Stand Pivot Transfers: Stand by assistance, Supervision  Comment: Transfers completed with Rollator and SPC this date. Vc's for proper set up/brake management. Pt preforms poor set up with rollator for stand to sit transfer, however mentions right after the fact of what he did wrong. Pt demos and V good technique for all transfers with SPC/RW for the remainder of tx. Ambulation  Surface: Level tile, Ramp  Device: Rollator  Assistance: Stand by assistance, Contact guard assistance  Quality of Gait: pace control improved, pt occasionally drags right LE but self corrects. requires more assist (SBA/CGA). VC for rollator use and upright posture. Pt requires cues to keep RLE inside rollator  to increase safety. Slightly more unsteady with descending ramp. Multple short standing rest breaks to restart proper gait pattern to improve stability.   Gait Deviations: Slow Tess, Deviated path, Staggers, Decreased step length, Decreased step height (R toe out)  Distance: 500+' with Rollator  Comments: performed short 50 ft trial with use of cane, CGA For stability with pt having greater stability with cane held in L hand. More Ambulation?: Yes  Ambulation 2  Surface - 2: level tile  Device 2: Single point cane  Assistance 2: Stand by assistance  Quality of Gait 2: improved control, still scooting right LE on occasion. Cues for RLE foot clearance. Pt reports \"I feel more comfortable with the cane on my right\". Occassionally pt's RUE wrist is lax resulting in instability with cane. Gait Deviations: Slow Tess, Decreased step length, Decreased step height, Deviated path  Distance: 50'x2  Comments: No LOB noted. Improved control       OT:  Grooming/Oral Hygiene  Assistance Level: Independent  Skilled Clinical Factors: SBA/close SUP- standing at sink side with rw for oral hygiene and shaving tasks. Upper Extremity Bathing  Assistance Level: Modified independent  Skilled Clinical Factors: Completed while seated on tub transfer bench  Lower Extremity Bathing  Assistance Level: Modified independent  Skilled Clinical Factors: completed seated on bench, utilizes GB when standing to complete norberto area and buttocks  Upper Extremity Dressing  Assistance Level: Modified independent  Skilled Clinical Factors: Pt. was able to retreive, doff, and izabela UB clothing items with (I) from wheelchair level this date. Lower Extremity Dressing  Assistance Level: Modified independent  Skilled Clinical Factors: Completed threading while seated on shower bench and pulling up/down underwear and pants over hips while standing in shower utilizing GB. Putting On/Taking Off Footwear  Assistance Level: Modified independent  Skilled Clinical Factors: Utilizes figure 4 technique to izabela/doff socks and shoes. Toileting  Assistance Level: Stand by assist  Skilled Clinical Factors: SBA/close SUP during clothing management. SPEECH:  Subjective: [x] Alert     [x] Cooperative     [] Confused     [] Agitated    [] Lethargic        Objective/Assessment:     Attention: Sustained throughout, distractions minimized. Recall: Not addressed directly. Organization: Following written directions (1-2 step, 3-4 components), with eye-patch in place- 90% accuracy (I), 100% cued. Reviewed strategies (e.g., decreasing rate, rereading direction to ensure understanding, reading direction in its entirety prior to attempting to complete) to assist c completion of direction tasks in future (e.g., reading signs, filling out doctor appt intake forms, following directions on prescription bottles or tax forms). Pt verbalized understanding and agreeable. Problem Solving/Reasoning: n/a     Speech: ST educated Pt re: oral motor exercises for dysarthria while utilizing Vital Stim (one channel) on L oral sling at 4.5 mA for 15 mins. Pt able to complete OMEs x6 sets in reps of 20 and alliteration drills with min A. No movement noted on L side of face. Encouraged Pt to continue practicing exercises,  PROM and speech tasks throughout day and at home following d/c (using mirror as form of visual feedback). Pt verbalized understanding and agreeable. Other: No family present. Pt reports hopeful for anticipated d/c home this week. Discussed recommendation for continued ST following d/c, continued OMEs/articulation practice for dysarthria and cognitive stimulation at home. Pt verbalized understanding and agreeable. Objective:  /83   Pulse 66   Temp 98.1 °F (36.7 °C)   Resp 16   Ht 6' (1.829 m)   Wt 205 lb 9.6 oz (93.3 kg)   SpO2 97%   BMI 27.88 kg/m²       GEN: well developed, well nourished, in NAD  HEENT: NCAT, PERRL, EOMI, mucous membranes pink and moist  CV: RRR, no murmurs, rubs or gallops  PULM: CTAB, no rales or rhonchi.  Respirations WNL and unlabored  ABD: soft, NT, ND, BS+ and equal  NEURO: A&O x3 but with some intermittent confusion. Sensation intact to light touch. R CN III palsy, L CN VI palsy  MSK: Functional ROM 5/5 key muscles LUE and LLE .  Strength 4+/5 key muscles RUE and RLE.  EXTREMITIES: No calf tenderness to palpation bilaterally. No edema BLEs  SKIN: warm dry and intact with good turgor  PSYCH: appropriately interactive. Affect flat     Diagnostics:     CBC: No results for input(s): WBC, RBC, HGB, HCT, MCV, RDW, PLT in the last 72 hours.  BMP:   Recent Labs     02/13/23  0659      K 5.1      CO2 26   BUN 17   CREATININE 0.72   GLUCOSE 100*     BNP: No results for input(s): BNP in the last 72 hours.  PT/INR: No results for input(s): PROTIME, INR in the last 72 hours.  APTT: No results for input(s): APTT in the last 72 hours.  CARDIAC ENZYMES: No results for input(s): CKMB, CKMBINDEX, TROPONINT in the last 72 hours.    Invalid input(s): CKTOTAL;3 troponins   FASTING LIPID PANEL:No results found for: CHOL, HDL, TRIG  LIVER PROFILE: No results for input(s): AST, ALT, ALB, BILIDIR, BILITOT, ALKPHOS in the last 72 hours.     US PARATHYROID 2/14/23  FINDINGS:   At the request of the referring physician the study is performed to assess   for possible parathyroid nodule only.     By the posteroinferior aspect of the right lobe of the thyroid gland there is   a slightly heterogeneous hypoechoic nodule measuring 14.1 x 7.0 x 19.0 mm   suspicious for possible parathyroid adenoma.  No nodule is seen superiorly on   the right.  No abnormality is seen on the left side in the expected location   of the parathyroid glands.  Visualized thyroid gland appears grossly   unremarkable.    Impression:     Possible parathyroid adenoma on the right measuring up to 1.4 x 0.7 x 1.9 cm.     Recommend nuclear medicine parathyroid scan for follow-up.        Current Medications:   Current Facility-Administered Medications: vitamin D (ERGOCALCIFEROL) capsule 50,000 Units, 50,000 Units, Oral, Weekly  QUEtiapine  (SEROQUEL) tablet 12.5 mg, 12.5 mg, Oral, Nightly PRN  nicotine (NICODERM CQ) 7 MG/24HR 1 patch, 1 patch, TransDERmal, Daily  polyvinyl alcohol (LIQUIFILM TEARS) 1.4 % ophthalmic solution 1 drop, 1 drop, Both Eyes, PRN  lubrifresh P.M. (artificial tears) ophthalmic ointment, , Left Eye, Nightly  acetaminophen (TYLENOL) tablet 650 mg, 650 mg, Oral, Q4H PRN  calcium carbonate (TUMS) chewable tablet 500 mg, 500 mg, Oral, TID PRN  carvedilol (COREG) tablet 12.5 mg, 12.5 mg, Oral, BID WC  enoxaparin (LOVENOX) injection 40 mg, 40 mg, SubCUTAneous, Daily  fluticasone (FLONASE) 50 MCG/ACT nasal spray 2 spray, 2 spray, Each Nostril, BID PRN  folic acid (FOLVITE) tablet 1 mg, 1 mg, Oral, Daily  lisinopril (PRINIVIL;ZESTRIL) tablet 40 mg, 40 mg, Oral, Daily  melatonin tablet 3 mg, 3 mg, Oral, Nightly  pantoprazole (PROTONIX) tablet 40 mg, 40 mg, Oral, QAM AC  tamsulosin (FLOMAX) capsule 0.4 mg, 0.4 mg, Oral, Daily  thiamine mononitrate tablet 100 mg, 100 mg, Oral, Daily  polyethylene glycol (GLYCOLAX) packet 17 g, 17 g, Oral, Daily  senna (SENOKOT) tablet 17.2 mg, 2 tablet, Oral, Daily PRN  bisacodyl (DULCOLAX) suppository 10 mg, 10 mg, Rectal, Daily PRN      Impression/Plan:   Impaired ADLs, gait, and mobility due to:      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 Diplopia/blurred vision:  HTN: on carvedilol, lisinopril. Goal SBP < 160 mm Hg  Hypocalcemia: Monitoring  Hyperkalemia: Monitoring  Urine retention: on Flomax. Alcohol withdrawal: completed phenobarbital 2/6/23. On thiamine, folate. Agitation/Insomnia: Improved. Seroquel prn but has not needed  GERD: on pantoprazole  Nicotine dependence: on Nicoderm  Bowel Management: Miralax daily, Senokot prn, Dulcolax prn  Internal medicine for medical management  DVT prophylaxis:  on Lovenox. Has TEDs during the day and EPC cuffs at night  Achieving therapy goals.  Anticipate discharge home tomorrow with outpatient therapy. Follow up PCP 1-2 weeks, Neurology 2-4 weeks, PM&R 4-6 weeks, neuro ophthalmology Dr. Rach Storm 2-4 weeks. Patient's care discussed in interdisciplinary team conference today. 50 minutes spent today in coordination of care, review of medical records including consulting services management and recommendations, medications, and lab results. Electronically signed by Magda Deleon MD on 2/14/2023 at 9:55 AM      This note is created with the assistance of a speech recognition program.  While intending to generate a document that actually reflects the content of the visit, the document can still have some errors including those of syntax and sound a like substitutions which may escape proof reading. In such instances, actual meaning can be extrapolated by contextual diversion.

## 2023-02-14 NOTE — PROGRESS NOTES
Physical Therapy  Facility/Department: [facility]  Rehabilitation Physical Therapy     NAME: Suzi Dill  : 1980 (43 y.o.)  MRN: [id number]  CODE STATUS: Full Code    Date of Service: 23      Past Medical History:   Diagnosis Date    Cerebral artery occlusion with cerebral infarction [facility])     History of heartburn     takes omeprazole    Hypertension     Neoplasm of unspecified nature of bone, soft tissue, and skin 2009-present    lesions of right cheek x 2    Research study patient 2023    [id number]. Date of completion 23     Past Surgical History:   Procedure Laterality Date    APPENDECTOMY  in 's    PRE-MALIGNANT / BENIGN SKIN LESION EXCISION Right 2014    Excision lesion of right cheek. Dr. Kavitha Mehta. Chart Reviewed: Yes  Patient assessed for rehabilitation services?: Yes  Additional Pertinent Hx: Mr. Suzi Dill is a 43 y.o. right handed male who was admitted to [facility] on 2023 with Extremity Weakness (Left sided weakness) and Facial Droop     Patient with R weakness, numbness and facial droop who fell when he went to bed then awoke with the symptoms. SBP was in 200s upon arrival to ED. CT confirmed hemorrhagic CVA posterior ramses and narrowing R P1 PCA. He was treated with Cardene for BP control. Neurosurgery managing conservatively. Family / Caregiver Present: No  Referring Practitioner: Misa Dc MD  Referral Date : 23  Diagnosis: Hemorrhagic CVA  General Comment  Comments: Pt is eager and very motivated to improve    Restrictions:  Restrictions/Precautions: Fall Risk;General Precautions  Position Activity Restriction  Other position/activity restrictions: Per Dr. Kylah Correa OK to use Eye patch, alternate between eyes (to help with patient's double vision and balance)     SUBJECTIVE  Subjective: Pt is agreeable to PT. Pt reports he will be using a rollator and SPC at home for functional mobility.  PM: Pt is pleasant and agreeable to PT. Pt is agreeable to amb outside, however declines wearing his eye patch. While outside pt reports \"Next time I need to wear my eye patch\". (Make sure to have pt's eye patch if going outside.)         OBJECTIVE                    Functional Mobility  Bed mobility  Bridging: Modified independent   Rolling to Left: Modified independent  Rolling to Right: Modified independent  Supine to Sit: Modified independent  Sit to Supine: Modified independent  Scooting: Modified independent  Bed Mobility Comments: PT mat, with x2 pillows. No difficulties noted. Transfers  Sit to Stand: Modified independent  Stand to Sit: Modified independent  Bed to Chair: Modified independent  Stand Pivot Transfers: Modified independent    Environmental Mobility  Ambulation  Surface: Level tile; Ramp  Device: Rollator  Assistance: Supervision;Modified Independent  Quality of Gait: pace control improved, pt occasionally drags right LE but self corrects. requires more assist (SBA/CGA). VC for rollator use and upright posture. Pt requires cues to keep RLE inside rollator  to increase safety. Slightly more unsteady with descending ramp. Multple short standing rest breaks to restart proper gait pattern to improve stability. Gait Deviations: Slow Tess;Deviated path;Staggers;Decreased step length;Decreased step height (R toe out)  Distance: 200 feet SBA, 50 feet SBA, 10 feet mod i; ramp SBA  Comments: performed short 50 ft trial with use of cane, CGA For stability with pt having greater stability with cane held in L hand. More Ambulation?: Yes  Ambulation 2  Surface - 2: level tile;uneven  Device 2: Single point cane  Assistance 2: Stand by assistance  Quality of Gait 2: improved control, still scooting right LE on occasion. Cues for RLE foot clearance. Pt reports \"I feel more comfortable with the cane on my right\". Occassionally pt's RUE wrist is lax resulting in instability with cane.   Gait Deviations: Slow Tess;Decreased step length;Decreased step height;Deviated path  Distance: 50'x2  Comments: No LOB noted. Improved control  Ambulation 3  Surface - 3: level tile;uneven  Device 3: No device  Assistance 3: Contact guard assistance  Quality of Gait 3: Pt continues with same ataxic gait pattern with RLE. Slightly unsteady with multiple LOB noted d/t unilateral UE support on rollator while attempting to manually blink left eye with LUE. Pt reports \"I should have worn my eye patch\". Gait Deviations: Decreased step length;Decreased step height;Decreased arm swing;Decreased head and trunk rotation;Deviated path  Distance: 150 feet  Stairs/Curb  Stairs?: Yes  Stairs  # Steps : 16  Stairs Height: 8\"  Rails: Left ascending  Curbs: 6\"  Device: No Device;4 wheeled walker  Assistance: Contact guard assistance;Stand by assistance  Comment: Pt completes with a step to gait pattern to increase neuro re-edu. Pt demos an occassional right lateral lean, however able to self correct. Pt does require short standing rest breaks at top/bottom of stairs. Pt V good understanding of safe technique to complete stairs at home with love hand rails. PM: Pt is edu on proper technique to complete a curb step with a rollator. Good - carryover noted. Pt demos quick movements and requires cues for safety. Wheelchair Activities  Wheelchair Size: 20  Wheelchair Type: Standard  Wheelchair Cushion: Standard (Air waffle cushion)  Wheelchair Parts Management: Yes  Left Leg Rest Level of Assistance: Modified independent  Right Leg Rest Level of Assistance: Modified independent  Left Brakes Level of Assistance: Unable to assess(comment)  Right Brakes Level of Assistance: Modified independent  Propulsion: Yes  Propulsion 1  Propulsion: Manual  Level: Level Tile  Method: LUE;RUE  Level of Assistance: Modified independent  Description/ Details: pt able to propel self down for AM & PM tx  Distance: 250'x2    PT Exercises  Dynamic Standing Balance Exercises: dyn amb with yellow ball bounce. Distance: 100'x2 F/R.   Motor Control/Coordination: step over step flight of stairs  Standing Open/Closed Kinetic Chain Exercises: Standing on foam unstable surface 2min SLS R then L, Squats x7, SLS Squat x5 bilat with Handle/Wall to stabilize as needed  Vestibular Exercises: focus eye gaze target gait 40 feet x6;    ASSESSMENT       Activity Tolerance  Activity Tolerance: Patient tolerated treatment well         CLINICAL IMPRESSION        GOALS  Patient Goals   Patient Goals : \"Practice going up/down a ladder\"  Short Term Goals  Time Frame for Short Term Goals: 7 days  Short Term Goal 1: pt to demo independent bed mobility on flat surface without use of rails per home set-up  Short Term Goal 2: pt to demo functional transfers with device and CGA  Short Term Goal 3: pt to ambulate 150' with device and MinAx1  Short Term Goal 4: pt to negotiate 10-12 steps with Single rail and device as needed demonstrating a landing pivot turn (per home set up stairs with landing) Benigno  Long Term Goals  Time Frame for Long Term Goals : Until D/C  Long Term Goal 1: Pt to improve Standing balance to FAIR + with use of device to dec risk for falls  Long Term Goal 2: pt to demo functional transfers with device Mod I  Long Term Goal 3: pt to ambulate 150' with device and supervision  Long Term Goal 4: pt to negotiate 2 steps with R rail and device and 10-12 successive steps with L rail and device as needed demonstrating a landing pivot turn (per home set up stairs with landing) CGA-SBA for stairs  Long Term Goal 5: pt to demo actual or simulated car transfer with SBA and device as needed  Long term goal 6: pt to improve on PASS score to 30/36 or greater to demonstrate improving balance, mobility, and independence  Long term goal 7: pt to demonstrate good technique for fall recovery with SBA  Long term goal 8: Pt to progress to step Ladder negotiation with CGA to allow for safe participation in home making/maintainance tasks with assist for safety. PLAN OF CARE  Frequency: 1-2 treatment sessions per day, 5-7 days per week       EDUCATION  Education  Education Provided:  Mobility Training;Transfer Training      Therapy Time   02/14/23 1113 02/14/23 1446   PT Individual Minutes   Time In 0802 1330   Time Out 0900 1405   Minutes 58 2810 Marcello Hanley PTA, 02/14/23 at 3:12 PM

## 2023-02-14 NOTE — CARE COORDINATION
ARU CASE MANAGEMENT NOTE:    Patient is alert and oriented x4. Spoke with patient regarding discharge plan and patient confirms plan is to discharge home will need to follow up with therapy that can provide the Neuro component patient requires     DME:bath and shower seat  submitted through [facility]     Outside appointments: 2/22/2023 Jannet Vasques     Will continue to follow for additional discharge needs.     Electronically signed by Oleg Gomez RN on 2/14/2023 at 12:21 PM

## 2023-02-14 NOTE — CARE COORDINATION
ARU CASE MANAGEMENT DISCHARGE NOTE    Patient discharged today to:Home/ spoke with patient and his wife. All appointments made for patient and are on AVS. Neuro Opthalmology 's office notified of an appointment needed. Left message and faxed clinicals. Transportation per: Family, car    IMM Letter Status: Not Applicable: Patient does not have Medicare as a payor. HHC: NA    OP Therapy: Patient to attend PT at [facility]    DME: Clabe Mark and Shower seat not covered by insurance. Patient will receive orders to obtain    Current reconciled medication list electronically sent:to the subsequent provider: Dr. Ivy Hanson    Confirmation Received: [facility]    Case Management Assessment: IRF JOSE E 4.0   If score is above 15, Notify PM&R Physician    Patient Health Questionnaire-9 (PHQ-2 to 9)   Over the last 2 weeks, how often have you been bothered by any of the following problems? 1. Little Interest or pleasure in doing things? Never or 1 Day - Score 0    2. Feeling down, depressed or hopeless? Never or 1 Day - Score 0    3. Trouble falling or staying asleep, or sleeping too much? Never or 1 Day - Score 0    4. Feeling tired or having little energy? Never or 1 Day - Score 0    5. Poor apettite or overeating? Never or 1 Day - Score 0    6. Feeling bad about yourself-or that you are a failure or have let yourself or your family down? Never or 1 Day - Score 0    7. Trouble concentrating on things, such as reading the newspaper or watching television? Never or 1 Day - Score 0    8. Moving or speaking so slowly that other people could have noticed? Or the opposite-being so fidgety or restless that you have been moving around a lot more than usual?   Never or 1 Day - Score 0    9. Thoughts that you would be better off dead or of hurting yourself in some way?    Never or 1 Day - Score 0    Total Score: 0    If you checked off any problems, how difficult have these problems made it for you to do your work, take care of things at home, or get along with other people? [x] Not difficult at all     [] Somewhat Difficult     [] Very Difficult     [] Extremely Difficult           Social Isolation     How often do you feel lonely or isolated from those around you? [x] Never  [] Rarely  [] Sometimes  [] Often  [] Always  [] Patient Unable to Respond       Access To Transportation     2. In the past six months to a year, has lack of transportation kept you from medical appointments or from getting your medications?  No    3. In the past six months to a year, has lack of transportation kept you from non-medical meetings, appointments, work, or from getting things that you need?  No

## 2023-02-14 NOTE — PROGRESS NOTES
Patient sitting in bedside recliner, shared with writer that he had a stroke during his sleep. Stated that he was thankful that his wife was home. Patient stated that he was blessed for so many reasons. Talked about his six sons, ages 9-25 years of age. Patient stated he has great support from his family, including his mother. Spiritual care will remain available as needed. 02/14/23 1130   Encounter Summary   Encounter Overview/Reason  Spiritual/Emotional Needs   Service Provided For: Patient   Referral/Consult From: [facility]   Support System Spouse; Children;Family members   Last Encounter  02/14/23   Complexity of Encounter Low   Begin Time 1130   End Time  1145   Total Time Calculated 15 min   Spiritual/Emotional needs   Type Spiritual Support   Assessment/Intervention/Outcome   Assessment Calm;Coping; Hopeful   Intervention Active listening;Discussed illness injury and its impact; Explored/Affirmed feelings, thoughts, concerns;Nurtured Hope;Prayer (assurance of)/[place];Read/Provided Scripture   Outcome Acceptance;Encouraged;Engaged in conversation;Expressed feelings, needs, and concerns;Expressed Gratitude;Receptive

## 2023-02-15 VITALS
TEMPERATURE: 97.3 F | WEIGHT: 205.6 LBS | BODY MASS INDEX: 27.85 KG/M2 | DIASTOLIC BLOOD PRESSURE: 94 MMHG | HEIGHT: 72 IN | SYSTOLIC BLOOD PRESSURE: 143 MMHG | RESPIRATION RATE: 16 BRPM | HEART RATE: 72 BPM | OXYGEN SATURATION: 96 %

## 2023-02-15 PROCEDURE — 97530 THERAPEUTIC ACTIVITIES: CPT

## 2023-02-15 PROCEDURE — 6370000000 HC RX 637 (ALT 250 FOR IP)

## 2023-02-15 PROCEDURE — 97129 THER IVNTJ 1ST 15 MIN: CPT

## 2023-02-15 PROCEDURE — 6370000000 HC RX 637 (ALT 250 FOR IP): Performed by: PHYSICAL MEDICINE & REHABILITATION

## 2023-02-15 PROCEDURE — 97116 GAIT TRAINING THERAPY: CPT

## 2023-02-15 PROCEDURE — 92507 TX SP LANG VOICE COMM INDIV: CPT

## 2023-02-15 RX ADMIN — PANTOPRAZOLE SODIUM 40 MG: 40 TABLET, DELAYED RELEASE ORAL at 06:02

## 2023-02-15 RX ADMIN — TAMSULOSIN HYDROCHLORIDE 0.4 MG: 0.4 CAPSULE ORAL at 07:40

## 2023-02-15 RX ADMIN — THIAMINE HCL TAB 100 MG 100 MG: 100 TAB at 07:40

## 2023-02-15 RX ADMIN — FOLIC ACID 1 MG: 1 TABLET ORAL at 07:40

## 2023-02-15 RX ADMIN — LISINOPRIL 40 MG: 20 TABLET ORAL at 07:40

## 2023-02-15 RX ADMIN — CARVEDILOL 12.5 MG: 12.5 TABLET, FILM COATED ORAL at 07:40

## 2023-02-15 NOTE — PROGRESS NOTES
[id number] [employer]      PROGRESS NOTE        Patient:  Cal Mead  YOB: 1980    MRN: [id number]     Acct: [de-identified]     Admit date: 2/2/2023    Pt seen and Chart reviewed. Consultant notes reviewed and care evaluated. Subjective: Patient is doing very good he is ready to go home he is actually had all his clothes back ready to go he says. He feels good he has no chest pain shortness of breath or any problems he did have the ultrasound yesterday his parathyroid they are questioning a an adenoma about 1.4 cm requesting nuclear scan we will order that this morning if he is able to get it before he is discharged otherwise we will follow-up on an outpatient    Diet:  ADULT DIET; Regular      Medications:Current Inpatient    Scheduled Meds:   vitamin D  50,000 Units Oral Weekly    nicotine  1 patch TransDERmal Daily    artificial tears   Left Eye Nightly    carvedilol  12.5 mg Oral BID WC    enoxaparin  40 mg SubCUTAneous Daily    folic acid  1 mg Oral Daily    lisinopril  40 mg Oral Daily    melatonin  3 mg Oral Nightly    pantoprazole  40 mg Oral QAM AC    tamsulosin  0.4 mg Oral Daily    thiamine  100 mg Oral Daily    polyethylene glycol  17 g Oral Daily     Continuous Infusions:  PRN Meds:QUEtiapine, polyvinyl alcohol, acetaminophen, calcium carbonate, fluticasone, senna, bisacodyl        Physical Exam:  Vitals: BP (!) 143/94   Pulse 72   Temp 97.3 °F (36.3 °C)   Resp 16   Ht 6' (1.829 m)   Wt 205 lb 9.6 oz (93.3 kg)   SpO2 96%   BMI 27.88 kg/m²   24 hour intake/output:No intake or output data in the 24 hours ending 02/15/23 0737  Last 3 weights:   Wt Readings from Last 3 Encounters:   02/08/23 205 lb 9.6 oz (93.3 kg)   01/26/23 200 lb (90.7 kg)   10/14/18 200 lb (90.7 kg)       Physical Examination:   General appearance - alert, well appearing, and in no distress and oriented to person, place, and time  Mental status - alert, oriented to person, place, and time  oropharynx clear, no lesions drooping on the left side  Chest - clear to auscultation, no wheezes, rales or rhonchi, symmetric air entry  Heart - normal rate, regular rhythm, normal S1, S2, no murmurs, rubs, clicks or gallops  Abdomen - soft, nontender, nondistended, no masses or organomegaly  Neurological - alert, oriented, normal speech, no focal findings or movement disorder noted but strong in the strength walking he has down and down his gait secondary to CVA which is normal  Extremities - peripheral pulses normal, no pedal edema, no clubbing or cyanosis  Skin - normal coloration and turgor, no rashes, no suspicious skin lesions noted      Write Handoff     Time Alexandra Mejia Orders]     Results     Component Value Units   US HEAD NECK SOFT TISSUE THYROID [[id number]]    Collected: 02/14/23 1113    Updated: 02/14/23 1236    Narrative:     EXAMINATION:   HEAD AND NECK SOFT TISSUE ULTRASOUND     2/14/2023     COMPARISON:   None. HISTORY:   ORDERING SYSTEM PROVIDED HISTORY: elevated labs, please focus on parathyroid   TECHNOLOGIST PROVIDED HISTORY:     elevated labs, please focus on parathyroid     FINDINGS:   At the request of the referring physician the study is performed to assess   for possible parathyroid nodule only. By the posteroinferior aspect of the right lobe of the thyroid gland there is   a slightly heterogeneous hypoechoic nodule measuring 14.1 x 7.0 x 19.0 mm   suspicious for possible parathyroid adenoma. No nodule is seen superiorly on   the right. No abnormality is seen on the left side in the expected location   of the parathyroid glands. Visualized thyroid gland appears grossly   unremarkable. Impression:     Possible parathyroid adenoma on the right measuring up to 1.4 x 0.7 x 1.9 cm. Recommend nuclear medicine parathyroid scan for follow-up.         Assessment:  Principal Problem:    Pontine hemorrhage ([address]  Resolved Problems:    * No resolved hospital problems. 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    Intracranial hemorrhage (HCC) I62.9    Impending cerebrovascular accident ([facility] [address] I63.9      Left-sided hemiparesis    Diplopia secondary to CVA       Pretension blood pressure coming down slowly we will monitor and adjust meds as needed  History of smoking on nicotine patch  History of alcohol use no evidence of withdrawal but he is on phenobarbital looks like bolus thiamine multivitamin  Episodes of fall no injuries  Hypercalcemia with elevated ionized calcium  High parathyroidism level  Documented blood pressure almost 24 hours in the acceptable range for now  Vit D diff  Vitamin D could probably explain his parathyroid level  Blood pressure normalized  Questionable parathyroid adenoma            Plan:   We will order nuclear scan for his parathyroid if he is able to get that before discharge if it there and able to get him we will plan on having him come back outpatient to get that done    Did run into his wife who is coming to see him and also informed her of that information    He is to follow-up with his PCP  patient and to call if any problems at home especially with his blood pressure    Krysten Mckenzie DO [facility]            2/15/2023, 7:37 AM

## 2023-02-15 NOTE — PROGRESS NOTES
ACUTE INPATIENT [address]    Patient Name: Murali Stewart  MRN: [id number]     Patient discharged in stable condition as per order of attending physician. AVS provided by nurse at time of discharge, which includes all necessary medical information pertaining to the patients current course of illness, treatment, medications, post-discharge goals of care, and treatment preferences. Provision of Current Reconciled Medication List to Patient at Discharge   Indicate the route(s) of transmission of the current reconciled medication list to the patient/family/caregiver. Paper Based (e.g. fax, copies, print outs)     Availability of \"My Chart\" offered to patient as a tool for updated health record. Steps for activation discussed with patient as mentioned on AVS.      Patient/responsible party verbalize understanding of discharge plan and are in agreement with goal/plan/treatment preferences. Belongings including None sent with patient/responsible party. Home medications sent home with patient/responsible party N/A    Car Transfer   Level of assistance required for patient transfer into vehicle:   INDEPENDENT: Patient completes the activity by him/herself with no assistance from a helper     High-Risk Drug Classes: Use and Indication   Check if the patient is taking any medications by pharmacological classification If yes, check if there is an indication noted for all meds in the drug class   Antipsychotic No If yes: indication noted? [] If no indication noted, follow up with provider for order clarification   Anticoagulant Yes If yes: indication noted? []    Antibiotic No If yes: indication noted? []    Opioid No If yes: indication noted? []    Antiplatelet No If yes: indication noted? []    Hypoglycemic (Including Insulin) No If yes: indication noted?   []        Pain Assessment   Over the past 5 days, how much of the time has pain made it hard for you to sleep at night? Rarely or not at all   Over the past 5 days, how often have you limited your participation in rehabilitation therapy sessions due to pain? Rarely or not at all   Over the past 5 days, how often have you limited your day-to-day activities (excluding rehabilitation therapy session)? Rarely or not at all     Special Treatments, Procedures, and Programs   Check all of the following treatments, procedures, and programs that apply on admission.     Cancer Treatments   Chemotherapy No   If Yes, Check All That Apply   []IV Chemotherapy    []Oral Chemotherapy    [] Chemotherapy    Radiation No   Respiratory Therapies   Oxygen Therapy No  If Yes, Check All That Apply   []Continuous   []Intermittent    []High-Concentration    Suctioning No  If Yes, Check All That Apply   []Scheduled   []As Needed   Tracheostomy Care No   Invasive Mechanical Ventilator   (Ventilator or Respirator) No  If Yes, Check All That Apply   [] Non-invasive Mechanical Ventilator   []BiPAP   []CPAP   Other   IV Medications No  If Yes, Check All That Apply   [] IV Vasoactive Medications   []IV Antibiotics   []IV Anticoagulation   [] IV Medications   Transfusions No   Dialysis No  If Yes, Check All That Apply   []Hemodialysis   [] Dialysis   IV Access No  If Yes, Check All That Apply   []Peripheral   []Midline   [] (PICC, tunneled, port)

## 2023-02-15 NOTE — PROGRESS NOTES
[address]    Acute Rehabilitation Occupational Therapy Daily Treatment Note    Date: 2/15/23  Patient Name: Iglesia Quezada       Room: 4086/8456-02  MRN: [id number]  Account: [de-identified]   : 1980  (43 y.o.) Gender: male       Referring Practitioner: Palak Watkins MD  Diagnosis: Hemorrhagic CVA  Additional Pertinent Hx: Mr. Iglesia Quezada is a 43 y.o. right handed male who was admitted to [facility] on 2023 with Extremity Weakness (Right sided weakness) and Facial Droop. Patient with R weakness, numbness and facial droop who fell when he went to bed then awoke with the symptoms. SBP was in 200s upon arrival to ED. CT confirmed hemorrhagic CVA posterior ramses and narrowing R P1 PCA. He was treated with Cardene for BP control. Neurosurgery managing conservatively. Pt admitted to ARU 23    Treatment Diagnosis: Impaired self care status    Past Medical History:  has a past medical history of Cerebral artery occlusion with cerebral infarction [facility]), History of heartburn, Hypertension, Neoplasm of unspecified nature of bone, soft tissue, and skin, and Research study patient. Past Surgical History:   has a past surgical history that includes Appendectomy (in 20's) and pre-malignant / benign skin lesion excision (Right, 2014). Restrictions  Restrictions/Precautions  Restrictions/Precautions: Fall Risk, General Precautions  Required Braces or Orthoses?: No  Implants present? :  (pt denies)     Position Activity Restriction  Other position/activity restrictions: Per Dr. Hill Marking OK to use Eye patch, alternate between eyes (to help with patient's double vision and balance)    Vitals      Subjective  Subjective  Subjective: AM: Pt. in recliner chair in room upon arrival. Packed and ready for discharge to home today. Pt. very pleasant and thankful for the help he's gotten here.  Self care had already been completed this AM. Pt. stating \"I am just waiting to see the doctor.\" Short discussion/education session with wife and patient.  Pain: No c/o any pain.     Objective  Cognition  Overall Orientation Status: Within Functional Limits  Orientation Level: Oriented X4    Activities of Daily Living     ADLs had already been completed upon arrival. See previous note for current ADL status.    Mobility    Assessment  Assessment  Activity Tolerance: Patient tolerated treatment well  Discharge Recommendations: Home with assist PRN;Outpatient OT    Patient Education  Education  Education Given To: Patient;Family  Education Provided: Role of Therapy;Plan of Care;Home Exercise Program;Precautions;Safety;ADL Function;IADL Function;Family Education;Fall Prevention Strategies  Education Provided Comments: Short discussion/education session with patient and wife regarding safety awareness and fall prevention strategies at home. G understanding and return from both. Verbalizing zero concerns about returning home.  Education Method: Verbal  Barriers to Learning: Vision  Education Outcome: Verbalized understanding;Demonstrated understanding    OT Equipment Recommendations  Other: TBD    Safety Devices  Safety Devices in place: Yes  Type of devices: Call light within reach;Nurse notified     Goals  Patient Goals   Patient goals : \"To get back to those things (ADL, IADL, and FMC in RUE).\"  Short Term Goals  Time Frame for Short Term Goals: By 1 week  Short Term Goal 1: Pt will complete lower body dressing/bathing with CGA and Good safety with use of AE as needed  Short Term Goal 2: Pt will complete functional transfers/mobility during self care tasks with CGA and Good safety with no LOB  Short Term Goal 3: Pt will tolerate standing 7+ minutes during functional activity of choice with CGA and no LOB during functional activity of choice  Short Term Goal 4: Pt will verbalize/demonstrate Good understanding of AE/adaptive strategies/DME to increase safety and independence with self care and  mobility  Short Term Goal 5: Pt will verbalize/demonstrate 3 visual compensatory strategies to increase safety with daily activities and increase quality of life  Short Term Goal 6: Pt will participate in 30+ minutes of therapeutic exercises/functional activities to increase safety and independence with self care and mobility    Long Term Goals  Time Frame for Long Term Goals : By discharge  Long Term Goal 1: Pt will complete BADLs with Mod I and Good safety with use of AE as needed  Long Term Goal 2: Pt will complete functional mobility during self care tasks with Mod I and Good safety with use of least restrictive device  Long Term Goal 3: Pt will tolerate standing 15+ minutes during functional activity of choice with Good safety  Long Term Goal 4: Pt will complete simple meal prep/light house keeping task with Supervision and Good safety  Long Term Goal 5: Pt will complete tub transfer with Supervision and Good safety with use of DME as needed  Long Term Goal 6: Pt will verbalize/demonstrate Good understanding of vision HEP to increase neurofacilitation of left side visual field to increase safety during daily activities  Long Term Goal 7: Pt will verbalize/demonstrate Good understanding of home safety/fall prevention strategies to increase safety and independence with self care and mobility  Long Term Goal 8: Pt will demonstrate increased [facility] during self care tasks as evident by 5 second improvement with LUE and 30 second improvement with RUE during 9 hole peg test    Plan  Occupational Therapy Plan  Times Per Week: 5-7  Times Per Day: Twice a day  Current Treatment Recommendations: Self-Care / ADL, Strengthening, Balance training, Functional mobility training, Endurance training, Neuromuscular re-education, Pain management, Safety education & training, Patient/Caregiver education & training, Equipment evaluation, education, & procurement, Home management training, Cognitive/Perceptual training, Coordination training         02/15/23 0739   OT Individual Minutes   Time In 5101   Time Out 1005   Minutes 15         Electronically signed by JASON Sheehan on 2/15/23 at 12:39 PM EST

## 2023-02-15 NOTE — PLAN OF CARE
Problem: ABCDS Injury Assessment  Goal: Absence of physical injury  2/15/2023 0353 by Sheba Cuevas RN  Outcome: Progressing  Flowsheets (Taken 2/15/2023 0351)  Absence of Physical Injury: Implement safety measures based on patient assessment     Problem: Safety - Adult  Goal: Free from fall injury  2/15/2023 0353 by Sheba Cuevas RN  Outcome: Progressing     Problem: Skin/Tissue Integrity  Goal: Absence of new skin breakdown  Description: 1. Monitor for areas of redness and/or skin breakdown  2. Assess vascular access sites hourly  3. Every 4-6 hours minimum:  Change oxygen saturation probe site  4. Every 4-6 hours:  If on nasal continuous positive airway pressure, respiratory therapy assess nares and determine need for appliance change or resting period.   2/15/2023 0353 by Sheba Cuevas RN  Outcome: Progressing     Problem: Pain  Goal: Verbalizes/displays adequate comfort level or baseline comfort level  2/15/2023 0353 by Sheba Cuevas RN  Outcome: Progressing     Problem: Discharge Planning  Goal: Discharge to home or other facility with appropriate resources  2/15/2023 0353 by Sheba Cuevas RN  Outcome: Progressing     Problem: Chronic Conditions and Co-morbidities  Goal: Patient's chronic conditions and co-morbidity symptoms are monitored and maintained or improved  2/15/2023 0353 by Sheba Cuevas RN  Outcome: Progressing     Problem: Nutrition Deficit:  Goal: Optimize nutritional status  2/15/2023 0353 by Sheba Cuevas RN  Outcome: Progressing

## 2023-02-15 NOTE — PROGRESS NOTES
Speech Language Pathology  Speech Language Pathology  [facility] Acute Rehab Unit at [facility]    Cognitive/Speech Treatment Note    Date: 2/15/2023  Patients Name: Gurdeep Mack  MRN: [id number]  Diagnosis:   Patient Active Problem List   Diagnosis Code    Neoplasm of unspecified nature of bone, soft tissue, and skin D49.2    Hemangioma of skin D18.01    Pontine hemorrhage (HCC) I61.3    Intracranial hemorrhage (HCC) I62.9    Impending cerebrovascular accident ([address] I63.9       Pain: 0/10    Cognitive/Speech Treatment    Treatment time:8306-4852      Subjective: [x] Alert [x] Cooperative     [] Confused     [] Agitated    [] Lethargic      Objective/Assessment:    Attention: Sustained throughout, distractions minimized. Recall: Not addressed directly. Organization: Word building from letter grid: min A for spelling; 10 words in 10 minutes. .     Problem Solving/Reasoning: Deduction puzzles: 95% min A to recognize and correct errors. Speech: ST provided handout of oral motor exercises for dysarthria for Pt to take home. ST instructed Pt in continuation of OMEX program and speaking strategies. Pt/spouse verbalized comprehension. Other: Spouse present. ST provided handout and education re: strategies and activities to foster memory, safety, and independence upon return home. Pt to d/c home this date.      Plan:  [] Continue ST services    [] Discharge from ST:      Discharge recommendations: [] Inpatient Rehab   [] [place]   [] Outpatient Therapy  [] Follow up at trauma clinic   [] Other:       Treatment completed by: .sig

## 2023-02-15 NOTE — DISCHARGE SUMMARY
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 No difficulties noted. Transfers:   Transfers  Sit to Stand: Modified independent  Stand to Sit: Modified independent  Bed to Chair: Modified independent  Stand Pivot Transfers: Modified independent  Comment: Transfers completed with Rollator and SPC this date. Vc's for proper set up/brake management. Pt preforms poor set up with rollator for stand to sit transfer, however mentions right after the fact of what he did wrong. Pt demos and V good technique for all transfers with SPC/RW for the remainder of tx. Mobility:   Ambulation  Surface: Level tile, Ramp  Device: Rollator  Assistance: Supervision, Modified Independent  Quality of Gait: pace control improved, pt occasionally drags right LE but self corrects. requires more assist (SBA/CGA). VC for rollator use and upright posture. Pt requires cues to keep RLE inside rollator  to increase safety. Slightly more unsteady with descending ramp.  Multple short standing rest breaks to restart proper gait pattern to improve stability. Gait Deviations: Slow Tess, Deviated path, Staggers, Decreased step length, Decreased step height (R toe out)  Distance: 200 feet SBA, 50 feet SBA, 10 feet mod i; ramp SBA  Comments: performed short 50 ft trial with use of cane, CGA For stability with pt having greater stability with cane held in L hand. More Ambulation?: Yes  Ambulation 2  Surface - 2: level tile, uneven  Device 2: Single point cane  Assistance 2: Stand by assistance  Quality of Gait 2: improved control, still scooting right LE on occasion. Cues for RLE foot clearance. Pt reports \"I feel more comfortable with the cane on my right\". Occassionally pt's RUE wrist is lax resulting in instability with cane. Gait Deviations: Slow Tess, Decreased step length, Decreased step height, Deviated path  Distance: 50'x2  Comments: No LOB noted. Improved control         Occupational Therapy:   Grooming/Oral Hygiene  Assistance Level: Modified independent  Skilled Clinical Factors: Completed shaving while seated on shower bench. Upper Extremity Bathing  Assistance Level: Modified independent  Skilled Clinical Factors: Completed while seated on tub transfer bench  Lower Extremity Bathing  Assistance Level: Modified independent  Skilled Clinical Factors: completed seated on bench, utilizes GB when standing to complete norberto area and buttocks  Upper Extremity Dressing  Assistance Level: Modified independent  Skilled Clinical Factors: Pt. was able to retreive, doff, and izabela UB clothing items from wheelchair level . Lower Extremity Dressing  Assistance Level: Modified independent  Skilled Clinical Factors: Completed threading while seated on shower bench and pulling up/down underwear and pants over hips while standing in shower utilizing GB. Putting On/Taking Off Footwear  Assistance Level: Modified independent  Skilled Clinical Factors: Utilizes figure 4 technique to izabela/doff socks and shoes.   Toileting  Assistance Level: Stand by assist  Skilled Clinical Factors: SBA/close SUP during clothing management. Speech Therapy:  Comprehension: 7 - Patient understands complex ideas (math/planning)  Expression: 6 - Device used to express complex ideas/needs  Social Interaction: 7 - Patient has appropriate behavior/relations 100% of the time  Problem Solvin - Independent with device (e.g. notes, schedules)  Memory: 6 - Patient requires device to recall (e.g. memory book)      Inpatient Rehabilitation Course:   Lizandro Nunez is a 43 y.o. 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 INITIAL HPI:  He was originally admitted to [facility] on 23. He initially presented with right-sided weakness, numbness, and facial droop upon waking. He was noted to have systolic BP in the 086T on arrival to the ED. CT head showed intraparenchymal hemorrhage in the posterior ramses. He was started on cardene drip for BP control. He did not require any surgical intervention. He was treated for alcohol withdrawal with CIWA protocol, precedex, and phenobarbital.    Patient evaluated today:  GEN: well developed, well nourished, in NAD  HEENT: NCAT, PERRL, EOMI, mucous membranes pink and moist  CV: RRR, no murmurs, rubs or gallops  PULM: CTAB, no rales or rhonchi. Respirations WNL and unlabored  ABD: soft, NT, ND, BS+ and equal  NEURO: A&O x3 but with some intermittent confusion. Sensation intact to light touch. R CN III palsy, L CN VI palsy  MSK: Functional ROM 5/5 key muscles LUE and LLE . Strength 4+/5 key muscles RUE and RLE. EXTREMITIES: No calf tenderness to palpation bilaterally. No edema BLEs  SKIN: warm dry and intact with good turgor  PSYCH: appropriately interactive. Affect flat     Rehab course:   Patient's blood pressure was controlled with oral medications. Electrolytes were stable and WNL during admission.  His urine retention improved and he was continued on Flomax. He completed course of phenobarbital 2/6/23 for alcohol withdrawal. He was maintained on thiamine/folate. His agitation/insomnia resolved and he did not require the prn Seroquel that was available. He had US head/neck after CT scan findings of soft tissue abnormality in neck. Nuclear medicine scan follow up of parathyroid abnormality to be done as outpatient. Chronic conditions were stable on home medications. The patient participated in an aggressive multidisciplinary inpatient rehabilitation program involving 3 hours per day, 5 days per week of rehabilitation. Patient benefited from inpatient rehab and was discharged in good stable condition. Consults:   Family Medicine    Significant Diagnostics:   CBC:   Lab Results   Component Value Date/Time    WBC 8.8 02/10/2023 06:25 AM    RBC 4.90 02/10/2023 06:25 AM    HGB 15.7 02/10/2023 06:25 AM    HCT 46.7 02/10/2023 06:25 AM    MCV 95.4 02/10/2023 06:25 AM    MCH 32.1 02/10/2023 06:25 AM    MCHC 33.7 02/10/2023 06:25 AM    RDW 13.0 02/10/2023 06:25 AM     02/10/2023 06:25 AM    MPV 8.2 02/10/2023 06:25 AM     CMP:    Lab Results   Component Value Date/Time     02/13/2023 06:59 AM    K 5.1 02/13/2023 06:59 AM     02/13/2023 06:59 AM    CO2 26 02/13/2023 06:59 AM    BUN 17 02/13/2023 06:59 AM    CREATININE 0.72 02/13/2023 06:59 AM    LABGLOM >60 02/13/2023 06:59 AM    GLUCOSE 100 02/13/2023 06:59 AM    PROT 7.2 02/03/2023 06:49 AM    LABALBU 4.2 02/03/2023 06:49 AM    CALCIUM 11.5 02/13/2023 06:59 AM    BILITOT 0.4 02/03/2023 06:49 AM    ALKPHOS 56 02/03/2023 06:49 AM    AST 18 02/10/2023 06:25 AM    ALT 33 02/10/2023 06:25 AM     US HEAD NECK SOFT TISSUE 2/14/23  FINDINGS:   At the request of the referring physician the study is performed to assess   for possible parathyroid nodule only.        By the posteroinferior aspect of the right lobe of the thyroid gland there is   a slightly heterogeneous hypoechoic nodule measuring 14.1 x 7.0 x 19.0 mm   suspicious for possible parathyroid adenoma. No nodule is seen superiorly on   the right. No abnormality is seen on the left side in the expected location   of the parathyroid glands. Visualized thyroid gland appears grossly   unremarkable. Impression   Possible parathyroid adenoma on the right measuring up to 1.4 x 0.7 x 1.9 cm. Recommend nuclear medicine parathyroid scan for follow-up. Patient Instructions:    No driving until cleared by a physician. Medications, precautions and follow up reviewed with patient and family    Follow-up visits: See after visit summary from hospitalization PCP 1-2 weeks, Neurology 2-4 weeks, PM&R 4-6 weeks, neuro ophthalmology Dr. Lisa Agustin 2-4 weeks.      Disposition:  Home with outpatient therapies    Discharge Medications:         Medication List        START taking these medications      calcium carbonate 500 MG chewable tablet  Commonly known as: TUMS  Take 1 tablet by mouth 3 times daily as needed for Heartburn     carvedilol 12.5 MG tablet  Commonly known as: COREG  Take 1 tablet by mouth 2 times daily (with meals)     fluticasone 50 MCG/ACT nasal spray  Commonly known as: FLONASE  2 sprays by Each Nostril route 2 times daily as needed for Rhinitis or Allergies     folic acid 1 MG tablet  Commonly known as: FOLVITE  Take 1 tablet by mouth daily     Handicap Placard Misc  by Does not apply route Duration: 6 months / Dx: Hemorrhagic Stroke    Unable to walk > 200 feet without assistive device     lisinopril 40 MG tablet  Commonly known as: PRINIVIL;ZESTRIL  Take 1 tablet by mouth daily     lubrifresh P.M. ophthalmic ointment  Place into the left eye at bedtime     melatonin 3 MG Tabs tablet  Take 1 tablet by mouth at bedtime     nicotine 7 MG/24HR  Commonly known as: NICODERM CQ  Place 1 patch onto the skin daily     polyethylene glycol 17 g packet  Commonly known as: GLYCOLAX  Take 17 g by mouth daily as needed for Constipation     polyvinyl alcohol 1.4 % ophthalmic solution  Commonly known as: LIQUIFILM TEARS  Place 1 drop into both eyes as needed for Dry Eyes     QUEtiapine 25 MG tablet  Commonly known as: SEROQUEL  Take 0.5 tablets by mouth nightly as needed for Agitation     tamsulosin 0.4 MG capsule  Commonly known as: FLOMAX  Take 1 capsule by mouth daily     vitamin B-1 100 MG tablet  Commonly known as: THIAMINE  Take 1 tablet by mouth daily     Vitamin D (Ergocalciferol) 24357 units Caps  Take 50,000 Units by mouth once a week for 15 doses  Start taking on: February 19, 2023            Red Brambila taking these medications      omeprazole 10 MG delayed release capsule  Commonly known as: PRILOSEC               Where to Get Your Medications        These medications were sent to [address] [place] [address], [address] [facility] [address]      Phone: [phone]   carvedilol 12.5 MG tablet  lisinopril 40 MG tablet  nicotine 7 MG/24HR  QUEtiapine 25 MG tablet  tamsulosin 0.4 MG capsule  Vitamin D (Ergocalciferol) 96251 units Caps       You can get these medications from any pharmacy    Bring a paper prescription for each of these medications  Handicap Placard Misc  You don't need a prescription for these medications  calcium carbonate 500 MG chewable tablet  fluticasone 50 MCG/ACT nasal spray  folic acid 1 MG tablet  lubrifresh P.M. ophthalmic ointment  melatonin 3 MG Tabs tablet  polyethylene glycol 17 g packet  polyvinyl alcohol 1.4 % ophthalmic solution  vitamin B-1 100 MG tablet            Discharge summary forwarded to PCP: Lillie Blackmon DO     Time spent: 35 minutes      Niki Tinoco MD

## 2023-02-15 NOTE — PROGRESS NOTES
Physical Therapy  Facility/Department: [facility] ACUTE REHAB  Rehabilitation Physical Therapy     NAME: Dominique Perkins  : 1980 (43 y.o.)  MRN: [id number]  CODE STATUS: Full Code    Date of Service: 2/15/23      Past Medical History:   Diagnosis Date    Cerebral artery occlusion with cerebral infarction [facility])     History of heartburn     takes omeprazole    Hypertension     Neoplasm of unspecified nature of bone, soft tissue, and skin 2009-present    lesions of right cheek x 2    Research study patient 2023    [id number]. Date of completion 23     Past Surgical History:   Procedure Laterality Date    APPENDECTOMY  in     PRE-MALIGNANT / BENIGN SKIN LESION EXCISION Right 2014    Excision lesion of right cheek. Dr. Komal Rucker. Chart Reviewed: Yes  Family / Caregiver Present: Yes (spouse)  Diagnosis: Hemorrhagic CVA  Restrictions/Precautions: Fall Risk;General Precautions  Position Activity Restriction  Other position/activity restrictions: Per Dr. Cheryl Lang OK to use Eye patch, alternate between eyes (to help with patient's double vision and balance)     SUBJECTIVE  Subjective: patient and spouse eager for patient discharge to home today. Spouse reports she is comfortable with patient mobility in home and enter exit home with SBA. patient reporting continued therapy at outpatient. Spouse and patient interested in therapy session in am to continue with community re-entry task and go to cafeteria to get breakfast with spouse. OBJECTIVE                    Functional Mobility  Bed mobility  Bridging: Modified independent   Rolling to Left: Modified independent  Rolling to Right: Modified independent  Supine to Sit: Modified independent  Sit to Supine: Modified independent  Scooting: Modified independent  Bed Mobility Comments: flat bed no rails.   Transfers  Sit to Stand: Modified independent  Stand to Sit: Modified independent  Bed to Chair: Modified independent  Stand Pivot Transfers: Modified independent  Comment: preformed task on varied surfaces and surface heights with and without chair arm support. Patient required compensation techniques with BUE support for steady assist.    Environmental Mobility  Ambulation  Surface: Level tile; Ramp  Device: Rollator  Assistance: Modified Independent;Supervision  Quality of Gait: pace control improved, pt occasionally drags right LE but self corrects. requires more assist (SBA/CGA). VC for rollator use and upright posture. Pt requires cues to keep RLE inside rollator  to increase safety. Slightly more unsteady with descending ramp. Multple short standing rest breaks to restart proper gait pattern to improve stability. Gait Deviations: Slow Tess;Deviated path;Staggers;Decreased step length;Decreased step height (R toe out)  Distance: 200 feet SBA, 50 feet SBA, 10 feet mod i; ramp SBA  Comments: patient did not need intervention with mobility but patient demonstrated outside ISRAEL of walker at times with turns and with distraction with head turns. Safety concerns addressed with patient and spouse. Discussed devices for home safety and recommendation to continue with rollator at this time for safety. Patient with limited ability to correct LOB with cane only at this time. Spouse comfortable with patient use of rollator. Least restrictive devices continue to be addressed with therapy sessions discussed and added as appropriate with outpatient Therapist recommended at this time. More Ambulation?: Yes  Ambulation 2  Surface - 2: level tile;uneven  Device 2: Single point cane  Assistance 2: Contact guard assistance  Quality of Gait 2: improved control, still scooting right LE on occasion. Cues for RLE foot clearance. Pt reports \"I feel more comfortable with the cane on my right\".  .  Gait Deviations: Slow Tess;Decreased step length;Decreased step height;Deviated path  Distance: 50 feet  Comments: Occassionally pt's RUE wrist is lax and appropriate placement of cane tip resulting in instability with cane use for reaction to prevent fall risk at this time. Ambulation 3  Surface - 3: level tile  Device 3: No device  Assistance 3: Contact guard assistance;Stand by assistance  Quality of Gait 3: Pt continues with same ataxic gait pattern with RLE. Gait Deviations: Decreased step length;Decreased step height;Decreased arm swing;Decreased head and trunk rotation;Deviated path; Increased ISRAEL  Distance: 50 feet  Comments: patient with wide ISRAEL and mid guard of UE. Stairs/Curb  Stairs?: Yes  Stairs  # Steps : 16  Stairs Height: 8\"  Rails: Left ascending  Curbs: 6\"  Device: No Device;4 wheeled walker  Assistance: Contact guard assistance;Stand by assistance  Comment: Pt and spouse edu on proper technique to complete a steps for home safety and curb step with a rollator. Good - carryover noted. Pt demos quick movements and requires cues for safety. Wheelchair Activities  Wheelchair Cushion:  (Air waffle cushion)    PT Exercises  Exercise Treatment: therapeutic activities included community gait through hospital departments, on/off elevator and scanning informational signage to find cafeteria and return ARU department/room. Patient managing activity with distractions audible and visual stimulations.           GOALS  Patient Goals   Patient Goals : \"Practice going up/down a ladder\"  Short Term Goals  Time Frame for Short Term Goals: 7 days  Short Term Goal 1: pt to demo independent bed mobility on flat surface without use of rails per home set-up  Short Term Goal 2: pt to demo functional transfers with device and CGA  Short Term Goal 3: pt to ambulate 150' with device and MinAx1  Short Term Goal 4: pt to negotiate 10-12 steps with Single rail and device as needed demonstrating a landing pivot turn (per home set up stairs with landing) Benigno  Long Term Goals  Time Frame for Long Term Goals : Until D/C  Long Term Goal 1: Pt to improve Standing balance to FAIR + with use of device to dec risk for falls  Long Term Goal 2: pt to demo functional transfers with device Mod I  Long Term Goal 3: pt to ambulate 150' with device and supervision  Long Term Goal 4: pt to negotiate 2 steps with R rail and device and 10-12 successive steps with L rail and device as needed demonstrating a landing pivot turn (per home set up stairs with landing) CGA-SBA for stairs  Long Term Goal 5: pt to demo actual or simulated car transfer with SBA and device as needed  Long term goal 6: pt to improve on PASS score to 30/36 or greater to demonstrate improving balance, mobility, and independence  Long term goal 7: pt to demonstrate good technique for fall recovery with SBA  Long term goal 8: Pt to progress to step Ladder negotiation with CGA to allow for safe participation in home making/maintainance tasks with assist for safety. PLAN OF CARE  Frequency: 1-2 treatment sessions per day, 5-7 days per week  Safety Devices  Type of Devices: Gait belt;Nurse notified (With SLP)  Restraints  Restraints Initially in Place: No    EDUCATION  Education  Education Given To: Patient; Family (spouse)  Education Provided: Transfer Training;Family Education; Safety;DME/Home Modifications; Visual Perceptual Function; Fall Prevention Strategies; Home Exercise Program  Education Provided Comments: home program to include normal playing activities with patient kids that include tracking and balance with in limits of safety in home. Spouse and Pt report appropriate activities such as legos, puzzles, ballon/ball catching with table counter support.   Education Method: Demonstration;Verbal;Teach Back  Barriers to Learning: Vision  Education Outcome: Verbalized understanding;Demonstrated understanding  Skilled Clinical Factors: Reminding pt to slow down to plan safe transfers      Therapy Time   Individual Concurrent Group Co-treatment   Time In 0800         Time Out 0832         Minutes 0603 [facility] [place], [facility], 02/15/23 at 12:07 PM

## 2023-02-20 ENCOUNTER — HOSPITAL ENCOUNTER (OUTPATIENT)
Dept: OCCUPATIONAL THERAPY | Age: 43
Setting detail: THERAPIES SERIES
Discharge: HOME | End: 2023-02-20
Payer: COMMERCIAL

## 2023-02-20 PROCEDURE — 97167 OT EVAL HIGH COMPLEX 60 MIN: CPT

## 2023-02-20 ASSESSMENT — 9 HOLE PEG TEST
TEST_RESULT: FUNCTIONAL
TEST_RESULT: IMPAIRED
TESTTIME_SECONDS: 54
TESTTIME_SECONDS: 29

## 2023-02-21 ENCOUNTER — HOSPITAL ENCOUNTER (OUTPATIENT)
Dept: OCCUPATIONAL THERAPY | Age: 43
Setting detail: THERAPIES SERIES
Discharge: HOME | End: 2023-02-21
Payer: COMMERCIAL

## 2023-02-21 PROCEDURE — 97112 NEUROMUSCULAR REEDUCATION: CPT

## 2023-02-21 PROCEDURE — 97110 THERAPEUTIC EXERCISES: CPT

## 2023-02-21 NOTE — PROGRESS NOTES
[facility]         Phone: [phone]       Fax: [phone]     Occupational Therapy Daily Treatment note     Occupational Therapy: Daily Note   Patient: Meron Krause (70 y.o. male)   Examination Date: 2023  No data recorded  No data recorded   :  1980 # of Visits since [facility]:   2   MRN: [id number]  CSN: [id number] Start of Care Date:    Insurance: Payor: Mickey Mcdaniel / Plan: Aletta Heaps NAP CHOICE POS II / Product Type: *No Product type* /   Insurance ID: [id number] - (Commercial) Secondary Insurance (if applicable): Insurance Information:     Referring Physician: Jaky Fajardo MD     PCP: Ayaka Marino, DO Visits to Date/Visits Approved:     No Show/Cancelled Appts:   /       Medical Diagnosis: Intracranial hemorrhage ([facility] [address] [I62.9]  Pontine hemorrhage ([facility] [address] [I61.3]    Treatment Diagnosis: Rt UE weakness, impaired coordination,impaired sensation   (ICD-10 codes: M62.81, R27.9, R20.2)        SUBJECTIVE EXAMINATION   Pain Level: Pain Screening  Patient Currently in Pain: No    Patient Comments: Subjective: \"There are two things that I want out of OT.   I want to be comfortable driving and doing fine motor stuff with this hand (R hand)\"    HEP Compliance:  N/A  Any changes to allergies, medications, or have you had any medical procedures/ER visits since your last visit?: Yes     OBJECTIVE EXAMINATION   Restrictions: Restrictions/Precautions: Fall Risk (No falls since being at home)    TREATMENT     Exercises:     Treatment Reasoning    OT Exercise 1: Small pegs x20, place on board with R hand, then place marbles on top (placed ~5 before knocking them off), removed pegs with R hand  OT Exercise 2: Stereognosis activity - items placed in pillowcase and pt asked to reach in with R hand to retrieve items in a pattern: 1\" block, velcro block, EZ peg (x5 each), marbles placed in bag as a distractor  OT Exercise 3: PROM RUE - all joints, all planes, minimal tightness noted in IR/ER and horizontal ABD/ADD  OT Exercise 4: [place] 4: Unscrew/screw back on  x3 large and  x3 small using R hand  OT Exercise 5: Open/close jars x10: Holding with L hand and turning lid with R hand    Limitations addressed: Strength, Coordination  Limitations addressed: Sensation/Stereogonosis  Therapist provided: Verbal cuing, Task modification  Therapist provided: Attempted placing marbles on top of small pegs, but pt was having difficulty and getting frustrated. OT discontinued this task. Patient Education: OT Education: Plan of Care, ADL Adaptive Strategies, Family Education, Home Exercise Program  Patient Education: Discussed potential modifications for cooking/food prep tasks to increase independence and safety; Also discussed fine motor tasks that he could complete at home to further progress, no formal HEP provided at this session  Current home exercise program (HEP): Suggested stereognosis tasks or practicing threading/unthreading nut and bolt       ASSESSMENT     Assessment: Assessment: OT initiated treatment today with focus on addressing RUE fine motor and sensation deficits - see OT exercises for detailed report. Pt tolerated treatment well with no increase in c/o pain. Pt did report occasional shoulder fatigue but able to continue after short rest break. Pt reports that he has been home from [facility] for less than a week, but that he has been trying to do what he can around the house. Pt enjoys cooking but reports that he has had to ask his spouse to A with cutting food. OT made suggestion for modified cutting board and pt verbalized awareness of them and how to get one if he \"decides to go that route\".   Performance deficits / Impairments: Decreased strength, Decreased high-level IADLs, Decreased vision/visual deficit, Decreased sensation, Decreased fine motor control, Decreased coordination    Post-Treatment Pain Level:      Prognosis: Good    Activity Tolerance: Patient tolerated treatment well       Barriers impacting rehab : Other (comment) (Double vision - wears patch on L eye)  Measures taken to address barrier(s): Education to caregiver, See Patient Education     GOALS   Patient Goals   Patient goals : To improve functioning (coordination) rt arm, be able to RTW. Short Term Goals Completed by   Current Status Goal Status   Pt will demo & report modified independence with rt UE HEP       Pt will demo  & report improving rt UE manual dexterity with home activities (tying shoes). Pt will complete MROM placing test 30 seconds quicker than eval       Pt will demo improved rt hand fine motor dexterity by completing 9 hole peg test by 10 seconds       Increase rt hand tip pinch by 2# for fine motor ADLs. Long Term Goals Completed by 20 visits Current Status Goal Status           Increase rt UE strength to 4/5 muscle grade (shoulder - flexors, abductors, IR, ER0, & hand extensors to improve strength for housework, cleaning, ball toss. Compared to eval increase rt hand manual dexterity- pt will complete MROM placing test 45 seconds quicker using rt hand. Compared to eval pt will report improved rt hand fine motor skills with daily activities. Pt will complete 9 hole peg test 20 seconds quicker than eval.       Compared to eval increase rt  hand tip pinch by 4# to improve pinch for fine motor ADLs.             TREATMENT PLAN   OT Equipment Recommendations  Equipment Needed: No  REQUIRES OT FOLLOW-UP: Yes  Type: Outpatient       Therapy Time  Individual Time In: 4803  Individual Time Out: 7084  Minutes: 49  Timed Code Treatment Minutes: 49 Minutes       Electronically signed by Marilee Harp  on 2/21/2023 at 4:08 PM       Treatment Charges: Mins Units Time In/Out   [] Evaluation       []  Low       []  Moderate       []  High      []  Electrical Stim      [] Iontophoresis      []  Paraffin      []  Ultrasound        []  Masage      [x]  Neuromuscular Re-ed 15 1 [id number]   []  ADL      [x]  Ther Exercise 34 2 [id number]   []  Ther Activities      []  Neuro Re-Ed      []  Splinting      []  Other      Total Treatment time 49 min 3          POC NOTE   Electronically signed by MASON Sanchez/L on 2/21/2023 at 4:10 PM

## 2023-02-21 NOTE — PROGRESS NOTES
Occupational Therapy: Initial Evaluation   Patient: Leon Garcia (89 y.o. male)   Examination Date:   Plan of Care Certification Period: 2023 to        :  1980  MRN: [id number]  CSN: [id number]   Insurance: Payor: Prachi Rutherford / Plan: Prachi Rutherford NAP CHOICE POS II / Product Type: *No Product type* /   Insurance ID: [id number] - (Commercial) Secondary Insurance (if applicable): Insurance Information: Aetna (60 visits combined PT/OT /speech) HARD MAX . Benefit period  to 3/31   Referring Physician: Karey Clifton, MD Dr Valentino Medicus   PCP: Megan Saavedra DO Visits to Date/Visits Approved:     No Show/Cancelled Appts:   /       Medical Diagnosis: Intracranial hemorrhage ([facility] [I62.9]  Pontine hemorrhage ([facility] [I61.3] Intracranial hemorrhage (I62.9 - ICD -10 code) pontine hemorrhage (i61.3 - ICD-10 code)  Treatment Diagnosis: Rt UE weakness, impaired coordination,impaired sensation   (ICD-10 codes: M62.81, R27.9, R20.2)         [facility]   Patient assessed for rehabilitation services?: Yes  Self reported health status[de-identified] Good    Medical History:     Past Medical History:   Diagnosis Date    Cerebral artery occlusion with cerebral infarction ([facility]     History of heartburn     takes omeprazole    Hypertension     Neoplasm of unspecified nature of bone, soft tissue, and skin -present    lesions of right cheek x 2    Research study patient 2023    [id number]. Date of completion 23     Surgical History:   Past Surgical History:   Procedure Laterality Date    APPENDECTOMY  in     PRE-MALIGNANT / BENIGN SKIN LESION EXCISION Right 2014    Excision lesion of right cheek. Dr. Jayce Delgado.        Medications:   Current Outpatient Medications:     Handicap Placard MISC, by Does not apply route Duration: 6 months / Dx: Hemorrhagic Stroke  Unable to walk > 200 feet without assistive device, Disp: 1 each, Rfl: 0    calcium carbonate (TUMS) 500 MG chewable tablet, Take 1 tablet by mouth 3 times daily as needed for Heartburn, Disp: , Rfl:     lisinopril (PRINIVIL;ZESTRIL) 40 MG tablet, Take 1 tablet by mouth daily, Disp: 30 tablet, Rfl: 3    QUEtiapine (SEROQUEL) 25 MG tablet, Take 0.5 tablets by mouth nightly as needed for Agitation, Disp: 60 tablet, Rfl: 3    carvedilol (COREG) 12.5 MG tablet, Take 1 tablet by mouth 2 times daily (with meals), Disp: 60 tablet, Rfl: 3    fluticasone (FLONASE) 50 MCG/ACT nasal spray, 2 sprays by Each Nostril route 2 times daily as needed for Rhinitis or Allergies, Disp: 16 g, Rfl: 3    folic acid (FOLVITE) 1 MG tablet, Take 1 tablet by mouth daily, Disp: 30 tablet, Rfl: 3    polyethylene glycol (GLYCOLAX) 17 g packet, Take 17 g by mouth daily as needed for Constipation, Disp: 527 g, Rfl: 1    melatonin 3 MG TABS tablet, Take 1 tablet by mouth at bedtime, Disp: , Rfl: 3    tamsulosin (FLOMAX) 0.4 MG capsule, Take 1 capsule by mouth daily, Disp: 30 capsule, Rfl: 3    nicotine (NICODERM CQ) 7 MG/24HR, Place 1 patch onto the skin daily, Disp: 30 patch, Rfl: 3    lubrifresh P.M. (ARTIFICIAL TEARS) ophthalmic ointment, Place into the left eye at bedtime, Disp: , Rfl: 0    polyvinyl alcohol (LIQUIFILM TEARS) 1.4 % ophthalmic solution, Place 1 drop into both eyes as needed for Dry Eyes, Disp: , Rfl: 4    thiamine mononitrate (THIAMINE) 100 MG tablet, Take 1 tablet by mouth daily, Disp: , Rfl: 0    Ergocalciferol (VITAMIN D) 19982 units CAPS, Take 50,000 Units by mouth once a week for 15 doses, Disp: 5 capsule, Rfl: 0    omeprazole (PRILOSEC) 10 MG capsule, Take 10 mg by mouth daily. , Disp: , Rfl:   Allergies: Patient has no known allergies. SUBJECTIVE EXAMINATION      ,      Family/Caregiver Present: Yes (Pt's spouse)    Subjective History: Onset Date: 01/26/23  Subjective: Pt states that he woke up & couldn't move his rt side then he was transported to the hospital. Pt states he fell out of bed in ICU & had a controlled fall in rehab.  Pt  was admitted to [facility] on Feb 2, 2023,  discharded from [facility]  on Feb 15th, 2023. Per pt's wife their insurance will change in March. Pt states he had hearing problems for years. Pt states he hears better in his lt ear. Pt states he doesn't use a device at home unless alone then he uses RW. Pt's spouse able to work from home. Currently pt & spouse report that he has trouble walking, talking, vision , rt hand coordination.  Additional Pertinent Hx (if applicable):       Prior diagnostic testing:: MRI, CT Scan      Learning/Language: Learning  Does the patient/guardian have any barriers to learning?: Visual, Hearing (Pt wears eye patch on lt eye.Pt has bilateral [place] prior to his illness. Pt states he hears better on lt side & doesn't need any assistance.)  What is the preferred language of the patient/guardian?: English  How does the patient/guardian prefer to learn new concepts?: Demonstration (Use of his hands)     Pain Screening    Pain Screening  Patient Currently in Pain: No    Functional Status    Dominant Hand: : Right    Social History:    Social History  Lives With: Spouse (2-6 kids may be at home)  Type of Home: House  Home Layout: Bed/Bath upstairs, Two level, 1/2 bath on main level (Pt takes shower upstairs)  Home Access: Stairs to enter with rails (13 steps to upstairs  (rail on lt side  going up))  Entrance Stairs - Rails: Right  Entrance Stairs - Number of Steps: rail on rt going up 2 outside front steps or 2 back steps.  Bathroom Shower/Tub: Tub/Shower unit  Bathroom Equipment: Shower chair (Pt uses for safety.)  Home Equipment: Cane, Walker, rolling (rollator.)    Occupation/Interests:   Type of Occupation: Pt is an  for [employer]. - Pt has been off work since he went into the hospital. Expected RTW date is 6-    Prior Level of Function:   Pt was independent with ADL/IADLls Independent        Current Level of Function:   See ADLS    IADL Comments: See  IADLS. Receives Help From: Family  Ambulation Assistance:  (Pt walks unsteady SBA)  Transfer Assistance: Independent  Active :  (Pt hasn't driven since his illness.)  Mode of Transportation: SUV, Truck    ADLs:   ADL  Feeding: Modified independent  (It takes longer using rt hand for eating.)  Grooming: Modified independent   UE Bathing: Modified independent   LE Bathing: Modified independent   UE Dressing: Modified independent   LE Dressing: Modified independent   Toileting: Modified independent   Additional Comments: Pt states that he does ADLs slower now. OBJECTIVE EXAMINATION   Restrictions:   Restrictions/Precautions: Fall Risk (No falls since being at home)        Functional Mobility -Pt SBA to CGA walking to OT area & back to lobby, cues to walk slow. Review of Systems (including Visual Assessment if applicable):  Vision  Vision: Impaired  Visual Deficits:  (Double vision (lt eye patch). )  Hearing  Hearing: Exceptions to [facility]  Hearing Exceptions: Hard of hearing/hearing concerns    Cognition/Perception: Pt wears patch over lt eye d/t double vision     Left AROM  Right AROM      LUE AROM (degrees)  LUE AROM : WFL  Left Hand AROM (degrees)  Left Hand AROM: WFL RUE AROM (degrees)  RUE AROM : WFL  Right Hand AROM (degrees)  Right Hand AROM: WFL  Right Hand General AROM: Rt hand flexion/extension. extension, abduct/adduction/  R Thumb Opposition: wfl     Left Strength  Right Strength      LUE Strength  L Shoulder Flex: 5/5  L Shoulder ABduction: 5/5  L Elbow Flex: 5/5  L Elbow Ext: 5/5  L Wrist Flex: 5/5  L Wrist Ext: 5/5    RUE Strength  R Shoulder Flex: 3+/5  R Shoulder ABduction: 3+/5  R Shoulder Int Rotation: 4-/5  R Shoulder Ext Rotation: 4-/5  R Elbow Flex: 4/5  R Elbow Ext: 4/5  R Forearm Pron: 5/5  R Forearm Sup: 5/5  R Wrist Flex: 5/5  R Wrist Ext: 5/5  R Hand General:  (extension 4-/5 , flexion 5/5)        Hand Dominance: Right   Left  Right     Strength  Handle Setting 3: average 126.67#  Trial 1: 140  Trial 2: 120  Trial 3: 120  Average: 126.67 Handle Setting 3: average 133.33# (Pt in the 75th -90th percentile for norms)  Trial 1: 130  Trial 2: 140  Trial 3: 130  Average: 133.33   Pinch Strength (if applicable) Left Hand Strength - Lateral Pinch (lbs)  Trial 1: 24  Trial 2: 21  Trial 3: 22  Average: 22.33  Left Hand Strength - Johnson (lbs)  Trial 1: 22  Trial 2: 22  Trial 3: 22  Average: 22  Left Hand Strength - Tip (lbs)  Trial 1: 18  Trial 2: 22  Trial 3: 13  Average: 17.67 Right Hand Strength - Lateral Pinch (lbs)  Trial 1: 25  Trial 2: 25 (Pt in 50th percentile for norms)  Trial 3: 21  Average: 23.67  Right Hand Strength - Johnson (lbs)  Trial 1: 23  Trial 2: 21 (Pt in 50th percentile for norms)  Trial 3: 19  Average: 21  Right Hand Strength - Tip  (lbs)  Trial 1: 14  Trial 2: 15 (Pt in 10th percentile for norms)  Trial 3: 15  Average: 14.67     Fine Motor Skills:   Fine Motor Skills  [place] Rate of Manipulation: MROM placing test: rt UE  2 minutes 17 seconds, lt UE   1  minutes 28   seconds  Left 9-Hole Peg Test: Functional  Left 9 Hole Peg Test Time (secs): 29 (lt eye patch on)  Right 9-Hole Peg Test: Impaired  Right 9 Hole Peg Test Time (secs): 54    02/20/23 1035   Tone   RUE Tone Normotonic   LUE Tone Normotonic   Shoulder subluxation No subluxation present   Coordination   Movements Are Fluid And Coordinated No   Coordination and Movement Description Fine motor impairments;Gross motor impairments;Right UE;Decreased accuracy; Decreased speed     Outcome Measure(s) Completed:   Upper Extremity Functional Index   Today, do you or would you have any difficulty at all with:   Any of your usual work, housework, or school activities[de-identified] A little bit of difficulty  Your usual hobbies, re creational or sporting activities[de-identified] Quite a bit of difficulty (Throwing, cooking.)  Lifting a bag of groceries to waist level[de-identified] No difficulty  Lifting a bag of groceries above your head[de-identified] No difficulty  Grooming your hair:: No difficulty  Pushing up on your hands (eg from bathtub or chair):: No difficulty  Preparing food (eg peeling, cutting):: Moderate difficulty  Driving:: Extreme difficulty or unable to perform activity (not doing since illness)  Vacuuming, sweeping or raking:: A little bit of difficulty (vacuum, sweep broom)  Dressing:: A little bit of difficulty  Doing up buttons:: Moderate difficulty  Using tools or appliances:: Quite a bit of difficulty (horrible fine motor skills with tools)  Opening doors:: No difficulty  Cleaning:: A little bit of difficulty  Tying or lacing shoes:: Quite a bit of difficulty  Sleeping:: Moderate difficulty (Dental issues with broken tooth,)  Laundering clothes (eg washing, ironing, folding):: No difficulty  Opening a jar:: No difficulty  Throwing a ball:: Quite a bit of difficulty  Carrying a small suitcase with your affected limb:: No difficulty  UEFI Total Score: 54  UEFI Total Percentage: 67.5 %     Total Score (out of 80) - if applicable: 54   Current Percentage of Function: 67.5 % % (Date: 2/21/2023)    Interpretation of Score: The final UEFI score ranges between 0 and 80 points. Scores closer to 0 indicate severe limitation whilst scores closer to 80 indicate very little to no limitation. The significant change (MDC) between two subsequent evaluations is set at 9 points. Higher Score indicates less disability, more function.        ASSESSMENT     Impression: Assessment: Pt's gait unsteady at times walking to & from OT needing SBA to CGA ( pt will have PT eval next week). Pt presents with vision issues (double vision) which today he had patch on lt eye. Pt presents with impaired rt UE coordination(manual & fine motor dexterity),  rt shoulder weakness, rt hand extensor weakness, & rt tip pinch weakness. Pt't total score on UEFI is 54 out of possible 80 points, with difficulty doing some ADL/IADLs using rt UE. Pt will benefit from skilled OT to provide  therapeutic exercises (61751), therapeutic activities (87720), & neuromuscular re-education (14486) to facilitate improved rt UE strength, coordination, sensory awareness, & less difficulty performing ADL/IADL. Performance Deficits/Impairments: Decreased strength, Decreased high-level IADLs, Decreased vision/visual deficit, Decreased sensation, Decreased fine motor control, Decreased coordination    Statement of Medical Necessity: Occupational Therapy is both indicated and medically necessary as outlined in the POC to increase the likelihood of meeting the functionally related goals stated below. Patient's Activity Tolerance: Patient tolerated evaluation without incident      Patient's rehabilitation potential/prognosis is considered to be: Good    Factors which may impact rehabilitation potential include: Other (comment) (Pt's vision (double vision, pt wears eye patch on lt eye))        GOALS     Patient Goal(s): Patient goals : To improve functioning (coordination) rt arm, be able to RTW. Short Term Goals Completed by 10 visits Goal Status   Pt will demo & report modified independence with rt UE HEP     Pt will demo  & report improving rt UE manual dexterity with home activities (tying shoes). Pt will complete MROM placing test 30 seconds quicker than eval     Pt will demo improved rt hand fine motor dexterity by completing 9 hole peg test by 10 seconds     Increase rt hand tip pinch by 2# for fine motor ADLs. Long Term Goals Completed by 20 visits Goal Status   Using the UEFI Pt will increase total score by 10 points using rt UE. Pt will reports less difficulty by 1 level  using rt UE to do hobbies, meal prep/cut/peel food, vacuum sweep, get dressed, manipulate buttons, tie shoe laces, toss ball, clean, do housework.      Increase rt UE strength to 4/5 muscle grade (shoulder - flexors, abductors, IR, ER0, & hand extensors to improve strength for housework, cleaning, ball toss. Compared to eval increase rt hand manual dexterity- pt will complete MROM placing test 45 seconds quicker using rt hand. Compared to eval pt will report improved rt hand fine motor skills with daily activities. Pt will complete 9 hole peg test 20 seconds quicker than eval.     Compared to eval increase rt  hand tip pinch by 4# to improve pinch for fine motor ADLs. Pt will demo 75% accuracy with stereognosis activities using rt hand                                    TREATMENT PLAN       REQUIRES OT FOLLOW-UP: Yes  Type: Outpatient  Additional Comments: continue OT    Pt. and/or family actively involved in establishing Plan of Care and Goals: Yes   Patient/ Caregiver education and instruction: OT Role, Plan of Care, Family Education Patient Education: Discussed OT POC with pt and spouse. HEP:       Treatment may include any combination of the following: Current Treatment Recommendations: Strengthening, ROM, Neuromuscular re-education, Patient/Caregiver education & training, Coordination training (Sensory)     Frequency / Duration:  Patient to be seen 2-3 x/week for 20 visits weeks      Eval Complexity:   Decision Making: High Complexity  Occupational Profile/History : High  Assessment(s) that identifies: High  Exam: 6 performance deficits  Assistance Modification: High  Assistance / Modification: See UEFI    OT Treatment Completed:  N/A - Evaluation Only  Therapy Time  Individual Time In: 5738  Individual Time Out: [address]  Minutes: 72      Therapist Signature: Lorri Anderson OT    Date: 1/85/6519     I certify that the above Occupational Therapy Services are being furnished while the patient is under my care. I agree with the treatment plan and certify that this therapy is necessary.       Physician's Signature:  ___________________________   Date:_______                                                                   Cecilia Bolton MD        Physician Comments: _______________________________________________    Please sign and return to [address] [place] [address]. Please fax to the location listed below.  [facility] YOU for this referral!    [address] [facility] OT  [address], [address] [facility]  Dept: [phone]  Dept Fax: [phone]  Loc: [phone]       POC NOTE      Treatment Charges: Mins Units Time In/Out   [] Evaluation       []  Low       []  Moderate       [x]  High 65 3 10:35-11:40   []  Electrical Stim      []  Iontophoresis      []  Paraffin      []  Ultrasound        []  Masage      []  Neuromuscular Re-ed      []  ADL      []  Ther Exercise      []  Ther Activities      []  Neuro Re-Ed      []  Splinting      []  Other      Total  time 65 min

## 2023-02-23 ENCOUNTER — HOSPITAL ENCOUNTER (OUTPATIENT)
Dept: NUCLEAR MEDICINE | Age: 43
Discharge: HOME | End: 2023-02-25
Payer: COMMERCIAL

## 2023-02-23 DIAGNOSIS — E21.5 PARATHYROID ABNORMALITY (HCC): ICD-10-CM

## 2023-02-23 PROCEDURE — A9500 TC99M SESTAMIBI: HCPCS | Performed by: FAMILY MEDICINE

## 2023-02-23 PROCEDURE — 2580000003 HC RX 258: Performed by: FAMILY MEDICINE

## 2023-02-23 PROCEDURE — 3430000000 HC RX DIAGNOSTIC RADIOPHARMACEUTICAL: Performed by: FAMILY MEDICINE

## 2023-02-23 PROCEDURE — 78071 PARATHYRD PLANAR W/WO SUBTRJ: CPT

## 2023-02-23 RX ORDER — TECHNETIUM TC-99M SESTAMIBI 1 MG/10ML
19 INJECTION INTRAVENOUS
Status: COMPLETED | OUTPATIENT
Start: 2023-02-23 | End: 2023-02-23

## 2023-02-23 RX ORDER — SODIUM CHLORIDE 0.9 % (FLUSH) 0.9 %
10 SYRINGE (ML) INJECTION PRN
Status: DISCONTINUED | OUTPATIENT
Start: 2023-02-23 | End: 2023-02-26 | Stop reason: HOSPADM

## 2023-02-23 RX ADMIN — TETRAKIS(2-METHOXYISOBUTYLISOCYANIDE)COPPER(I) TETRAFLUOROBORATE 19 MILLICURIE: 1 INJECTION, POWDER, LYOPHILIZED, FOR SOLUTION INTRAVENOUS at 09:15

## 2023-02-23 RX ADMIN — SODIUM CHLORIDE, PRESERVATIVE FREE 10 ML: 5 INJECTION INTRAVENOUS at 09:15

## 2023-02-27 ENCOUNTER — HOSPITAL ENCOUNTER (OUTPATIENT)
Dept: OCCUPATIONAL THERAPY | Age: 43
Setting detail: THERAPIES SERIES
Discharge: HOME | End: 2023-02-27
Payer: COMMERCIAL

## 2023-02-27 ENCOUNTER — HOSPITAL ENCOUNTER (OUTPATIENT)
Dept: PHYSICAL THERAPY | Age: 43
Setting detail: THERAPIES SERIES
Discharge: HOME | End: 2023-02-27
Payer: COMMERCIAL

## 2023-02-27 PROCEDURE — 97162 PT EVAL MOD COMPLEX 30 MIN: CPT

## 2023-02-27 PROCEDURE — 97110 THERAPEUTIC EXERCISES: CPT

## 2023-02-27 PROCEDURE — 97112 NEUROMUSCULAR REEDUCATION: CPT

## 2023-02-27 PROCEDURE — 97530 THERAPEUTIC ACTIVITIES: CPT

## 2023-02-27 NOTE — PROGRESS NOTES
[facility] [address] [place] [address]. [address]         Phone: [phone]       Fax: [phone]     Occupational Therapy Daily Treatment note     Occupational Therapy: Daily Note   Patient: Cynthia Kang (62 y.o. male)   Examination Date: 2023  No data recorded  No data recorded   :  1980 # of Visits since [facility]:   3   MRN: [id number]  CSN: [id number] Start of Care Date: 2023   Insurance: Payor: Zena Thomas / Plan: Zena Thomas NAP CHOICE POS II / Product Type: *No Product type* /   Insurance ID: [id number] - (Commercial) Secondary Insurance (if applicable): Insurance Information: Aetna (60 visits combined PT/OT /speech) HARD MAX . Benefit period  to 3/31   Referring Physician: Duke Dao, MD Dr Collette Hurst   PCP: Claudia Chavez DO Visits to Date/Visits Approved: 3 / 20    No Show/Cancelled Appts:   /       Medical Diagnosis: Intracranial hemorrhage ([facility] [I62.9]  Pontine hemorrhage ([facility] [I61.3] Intracranial hemorrhage (I62.9 - ICD -10 code) pontine hemorrhage (i61.3 - ICD-10 code)  Treatment Diagnosis: Rt UE weakness, impaired coordination,impaired sensation   (ICD-10 codes: M62.81, R27.9, R20.2)        SUBJECTIVE EXAMINATION   Pain Level: Pain Screening  Patient Currently in Pain: No    Patient Comments: Subjective: Pt reports no pain. Pt states he couldn't get his jacket zipped today. Pt states sometimes he can't feel whats in his rt hand. HEP Compliance: Good.  Pt states he does rom exercises at home & picks up small objects with rt hand        OBJECTIVE EXAMINATION   Restrictions: Restrictions/Precautions: Fall Risk (double vision)          TREATMENT     Exercises:     Treatment Reasoning    OT Exercise 1: Small pegs x25, place on board with R hand, then place 15 marbles on top (placed ), removed 15 marbles & held in rt hand,removed  25  pegs  & hold in R hand  OT Exercise 3: PROM RUE - all joints, all planes, minimal tightness noted in IR/ER and horizontal ABD/ADD, manual resistance elbow UE pushing out, bilateral UE alternating AROM 10 sets shoulder flexion/extension, bilateral UE AROM alternating UE horizontal abduction/adduction 10 sets  OT Exercise 6: BITS (bioness integrated system)  - using rt UE to touch dots (reaching in all directions)  2 minutes (68.92% accuracy), 1 minute ( 85.29 %  accuracy  ), touch  26 alphabet letters in order (59.09%, 81.25%)., tracing over 6 lines (3  curved, 3 zagged)- 78% coverage, 43.87%), jack board connect the dots patterns 1-6,  OT Exercise 7: ADLcloth- pt unzip/zip zipper, unbutton/button buttons    Limitations addressed: Strength, Coordination (rom)  Therapist provided: Verbal cuing, Task modification  Progressed: Complexity of movement  Progressed: Begun BITs for rt UE coordination, rom, endurance                     Patient Education: OT Education: Family Education  Patient Education: Pt's spouse asked whether getting pt a massage for his shoulder/upper body tightness would be good. OT told pt that if she got a massage for pt make sure to check & see it they work with neuro people ( people that have had strokes)       ASSESSMENT     Assessment: Assessment: Pt doesn't wear patch during OT. Pt states he has the double vision . Begun BITS for rt UE coordination,rom, strength & endurance with pt keeping his rt eye closed when completing items(seeOT exercises).Pt has some difficulty tracing over curved & zig zag lines & connecting dots. When pt picks up the small colored pegs OT told pt that he had 2 pegs in his rt hand so thats why he probably couldn't place peg, then pt said he couldn't feel he had 2 pegs in rt hand.Pt practiced button & zipper manipulation on ADL cloth during OT & was able to complete task, then pt tried to zip his jacket & couldn't. Pt states he could do the ADL cloth because the lighting was good.  Pt's spouse present near end of OT. OT reviewed pt's tx  with spouse. See OT exercises for details. Pt will benefit from skilled OT to provide therapeutic exercises ([id number]), therapeutic activities (94997), & neuromuscular re-education (70812) to facilitate improved rt UE strength, coordination, sensory awareness, & less difficulty performing ADL/IADL. Performance deficits / Impairments: Decreased strength, Decreased high-level IADLs, Decreased vision/visual deficit, Decreased sensation, Decreased fine motor control, Decreased coordination    Post-Treatment Pain Level:  no pain    Prognosis: Good    Activity Tolerance: Patient tolerated treatment well             GOALS      02/27/23 1305   Patient Goals    Patient goals  To improve functioning (coordination) rt arm, be able to RTW. Short Term Goals   Time Frame for Short Term Goals 10 visits   Short Term Goal 1 Pt will demo & report modified independence with rt UE HEP   Short Term Goal 2 Pt will demo  & report improving rt UE manual dexterity with home activities (tying shoes). Pt will complete MROM placing test 30 seconds quicker than eval   Short Term Goal 3 Pt will demo improved rt hand fine motor dexterity by completing 9 hole peg test by 10 seconds   Short Term Goal 4 Increase rt hand tip pinch by 2# for fine motor ADLs. Long Term Goals   Time Frame for Long Term Goals  20 visits   Long Term Goal 1 Using the UEFI Pt will increase total score by 10 points using rt UE. Pt will reports less difficulty by 1 level  using rt UE to do hobbies, meal prep/cut/peel food, vacuum sweep, get dressed, manipulate buttons, tie shoe laces, toss ball, clean, do housework. Long Term Goal 2 Increase rt UE strength to 4/5 muscle grade (shoulder - flexors, abductors, IR, ER0, & hand extensors to improve strength for housework, cleaning, ball toss. Long Term Goal 3 Compared to eval increase rt hand manual dexterity- pt will complete MROM placing test 45 seconds quicker using rt hand.    Long Term Goal 4 Compared to eval pt will report improved rt hand fine motor skills with daily activities.  Pt will complete 9 hole peg test 20 seconds quicker than eval.   Long Term Goal 6 Pt will demo 75% accuracy with stereognosis activities using rt hand     TREATMENT PLAN   REQUIRES OT FOLLOW-UP: Yes  Type: Outpatient  Plan  Plan Frequency: 2-3 x/week  Plan Weeks: 20 visits  Current Treatment Recommendations: Strengthening, ROM, Neuromuscular re-education, Patient/Caregiver education & training, Coordination training (sensory)  Additional Comments: continue OT       Therapy Time  Individual Time In: 2812  Individual Time Out: 1400  Minutes: 55  Timed Code Treatment Minutes: 55 Minutes       Electronically signed by Amari Richardson OT  on 2/27/2023 at 2:21 PM       Treatment Charges: Mins Units Time In/Out   [] Evaluation       []  Low       []  Moderate       []  High      []  Electrical Stim      []  Iontophoresis      []  Paraffin      []  Ultrasound        []  Masage 15 1 13:05-13:20   [x]  Neuromuscular Re-ed      []  ADL      [x]  Ther Exercise 40 3 13:20-14:00   []  Ther Activities      []  Neuro Re-Ed      []  Splinting      []  Other      Total Treatment time 55 min 4          POC NOTE

## 2023-02-27 NOTE — THERAPY EVALUATION
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 : 3/9/23 Neurosurgery    Precautions: [place] - hears best in L ear; double vision when looking with both eyes     Subjective:   Pt arrives to clinic ambulatory with wife- [place]. Pt is agreeable to PT evaluation. Pt notes he had L sided deficits that caused him to go the hospital on 2023. 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 He was then in ARU 23 -2/15/23. Pt notes he is still dealing with R sided coordination deficits, double vision (wears eye patch to help), L facial weakness, some dizziness, and R sided numbness. He has now been at home for ~1 week. Notes most difficulties with R hand coordination & difficulty navigating small spaces. He is walking without AD and denies any falls. He did have some falls with mobility in the hospital. Pt notes he wants to be able to get back to doing things with his kids and get back to work. Per chart review: \"He was originally admitted to [facility]  on 23. He initially presented with right-sided weakness, numbness, and facial droop upon waking. He was noted to have systolic BP in the 270S on arrival to the ED. CT head showed intraparenchymal hemorrhage in the posterior ramses. He was started on cardene drip for BP control. He did not require any surgical intervention.   He was treated for alcohol withdrawal with CIWA protocol, precedex, and phenobarbital\"    Falls history: denies any falls      Patient stated Goals: To be able to ambulate and do things with his kids     Pain:  [] Yes  [x] No   Location: denies    Pain Rating: (0-10 scale) denies/10    Previous PT hx:   ARU 2/2/23 - 2/15/23       PMHx: [] Unremarkable [] Diabetes [] HTN  [] Pacemaker   [] MI/Heart Problems [] Cancer [] Arthritis [] Other:              [x] Refer to full medical chart  In EPIC   Past Medical History:   Diagnosis Date    Cerebral artery occlusion with cerebral infarction [facility])     History of heartburn     takes omeprazole    Hypertension     Neoplasm of unspecified nature of bone, soft tissue, and skin 2009-present    lesions of right cheek x 2    Research study patient 01/26/2023    [id number]. Date of completion 1/28/23      Past Surgical History:   Procedure Laterality Date    APPENDECTOMY  in 20's    PRE-MALIGNANT / BENIGN SKIN LESION EXCISION Right 9/29/2014    Excision lesion of right cheek. Dr. Kunal Nielsen. Comorbidities:   [x] Obesity [] Dialysis  [] Other:   [] Asthma/COPD [] Dementia [] Other:   [x] Stroke (Jan 2023)  [] Sleep apnea [] Other:   [] Vascular disease [] Rheumatic disease [] Other:     Tests/Imaging: MRI 1/27/21  Impression   Chronic pontine microhemorrhages in addition to the acute hematoma suggest   hypertensive hemorrhage. No brain mass.          Medications: [x] Refer to full medical record [] None [] Other:  Allergies:       [x] Refer to full medical record [] None [] Other:  Preferred Language:   [x] Foreign [place]brahim           [] Other:    Function:  Hand Dominance  [x] Right  [] Left    Home Environment:   Patient lives with:  Spouse -- 6 kids    In what type of home []  One story   [x] Two story   [] Split level  Bed an bath on 2nd floor   Number of stairs to enter  2          Handrail:    [x]  Right to enter   [] Left to enter    Stairs within the home   13          Handrails: []  Right to ascending  [x] Left to ascending   Bathroom has a []  Tub only  [x] Tub/shower combo   [] Walk in shower    []  Grab bars       * pt using shower chair -- has not been using a shower chair for about 1 wk    Washing machine is on [x]  Main level   [] Second level   [] Basement   Employer/Job   at  -- currently off work but plans to return 6/30/23   Work activities/duties Climbing ladders, steps, walking    [employer] things with the kids and being outside       [employer] Insurance:  Current DME available: none     Prior Level of Function: IND at community level      ADL/IADL Previous level of function Current level of function Who currently assists the patient with task/Comments   Bathing  [x] Independent  [] Assist [x] Independent  [] Assist    Dress/grooming [x] Independent  [] Assist [x] Independent  [] Assist    Transfer/ bed mobility [x] Independent  [] Assist [x] Independent  [] Assist    Feeding [x] Independent  [] Assist [x] Independent  [] Assist    Toileting [x] Independent  [] Assist [] Independent  [x] Assist    Driving [x] Independent  [] Assist [] Independent  [x] Assist Not currently    Housekeeping [x] Independent  [] Assist [x] Independent  [] Assist Takes a little longer    Grocery shop/meal prep [x] Independent  [] Assist [x] Independent  [] Assist    Ambulation [x] Independent  [] Assist  [x] Independent  [] Assist         Objective:    POSTURE No deficit Deficit Not Tested Comments   Kyphosis [] [x] []    Scoliosis [x] [] []    Forward Head [] [x] []    Rounded Shldrs [] [x] []    Slumped Sitting [x] [] []    Skin Integrity [x] [] []    Facial symmetry  x  L facial droop   Downward sloping eye socket   Pelvic alignment x      Hip alignment x      Knee alignment x      Ankle alignment x             NEUROLOGICAL       Reflexes [] [] [x]    Sensation [] [x] [] Decreased sensation through R esteban body    Bladder/Bowel [x] [] []    Coordination [] [x] [] LESA foot taps -- WNL   Heel to shin -- WNL   Incoordination noted with walking & 4 square stepping    Tone [x] [] [] Clonus [x] [] []    Vision   x  diplopia   Hearing   x  [place] at baseline    Cognition  x  Difficulty with processing many things    Tremors [x] [] []        General Observations:    - Gross LE strength 5/5 bilaterally       Vestibulo-occular screening:   - L abducens impaired   - static: double vision   - saccades: mildly slow, does create sense of dizziness   -VOR cancellation: (+)   - VOR: normal  - head impulse test: (+) R & L horizontal   - test of skew negative        02/27/23 1452   Modified CTSIB    Standing on level surface, NBOS, EO 30 seconds   Standing on level surface, NBOS, EC 30 seconds   Standing on Airex, EO 30 seconds   Standing on Airex, EC 5 seconds   --- vestibular impairment           FUNCTION Level of assistance Comments/observations   Transfers     -sit to stand IND     -sliding board     -pivot     Balance     Sitting  IND    Standing  Supervision     Balance reactions  Decreased trunk strategies    Single Limb stance  Difficult on R     Ambulation Mod IND -- mild imbalance     Stairs  Mod IND       Functional outcome measures:     Functional Patient Reported Outcome Assessment Used:  LEFI   Current Status Score: 56/80  Goal Status Score: 65/80      Outcome Measures 2/27/23     PACHECO     5xSTS     10mWT     4 square step test 12.5s     TUG      FGA 18/30     MiniBEST          Assessment:    Pt presents with deficits following posterior ramses CVA in Jan 2023. Due to this CVA pt has L facial weakness, impaired L eye abduction (CN VI), R esteban-body incoordination, R esteban-body sensory changes, and central vestibular dysfunction. This leads to decreased balance especially with vestibular system challenged, gait deviations with wide [place], and increased fall risk per FGA & 4 square step test. This is limiting this patient from being active with his kids and being able to do work duties as an .  Feel that static double vision is a significant factor and that improvements in balance and mobility with be related to vision improvement. Overall feel has the ability to progress with PT. This patient  would benefit from skilled physical therapy services in order to: increase balance, improve vestibular system function, improve gait, and work on higher level mobility to allow him to continue to be active with his kids and potentially return to work. STG: (to be met in 8 treatments)  Pt will be able to navigate curbs, uneven surfaces, and inclines/declines IND  to ensure he can safely navigate the community & get outdoors more. Pt will increase gait speed to >/=  1.2 m/s IND to improve ability to navigate as a community ambulator & cross streets. Pt will score above 24/30 on functional gait assessment to decrease overall fall risk with community level mobility &  demonstrate improved functional mobility   Pt will improve time on 4 square step test to < 12 seconds to demonstrate improved coordination and change of directions to be able to navigate small spaces. Pt will improve balance on foam with vision limited to >15 seconds to demonstrate improved vestibular system function to decrease fall risk. LTG: (to be met in 16 treatments)  Pt will increase score on LEFI to >65/80 to demonstrate meaningful change in functional mobility to be able to increased mobility. Pt will be able to complete a motor task of balance task with a UE reaction time to <0.75s to demonstrate improved reaction time to be able to progress to driving. Pt will be able to coordinate UE & LE reciprocally to be able to climb a ladder to allow him to return to work. Pt will be independent with home program addressing strength, flexibility and balance to maximize gains made in therapy to improve overall functional capacity for mobility. Pt will report no falls for x6 weeks demonstrating improved safety with mobility and implementation of fall prevention strategies.           Rehab Potential:  [x] Good  [] Fair  [] Poor   Suggested Professional Referral:  [] No  [x] Yes: Neuro-opthamology   Barriers to Goal Achievement[de-identified]  [] No  [x] Yes: vision if there is no improvement   Domestic Concerns:  [x] No  [] Yes:    Pt. Education:  [x] Plans/Goals, Risks/Benefits discussed  [] Home exercise program  Method of Education: [x] Verbal  [x] Demo  [] Written  Comprehension of Education:  [x] Verbalizes understanding. [x] Demonstrates understanding. [] Needs Review. [] Demonstrates/verbalizes understanding of HEP/Ed previously given. Treatment Plan:  [x] Therapeutic Exercise   59083  [] Iontophoresis: 4 mg/mL Dexamethasone Sodium Phosphate  mAmin  38845   [x] Therapeutic Activity  52201 [] Vasopneumatic cold with compression  66846    [x] Gait Training   75124 [] Ultrasound   04658   [x] Neuromuscular Re-education  71397 [] Electrical Stimulation Unattended  90059   [] Manual Therapy  76011 [] Electrical Stimulation Attended  83401   [x] Instruction in HEP  [] Dry Needling   [] Aquatic Therapy   04947 [] Cold/hotpack    [] Wheelchair Training   08057 [x] PT re-evaluation (if needed)   66019         Frequency: 2 x/weeks for 16 visits    [address] Treatment:  Home program/Patient education:   2/27: educated on expectations for recovery. Discussed anatomy of deficits and how to improve safety with mobility. Discussed ways to work on eye muscles to improve L eye abduct & pencil push ups to work on his vision.        Specific Instructions for next treatment: high level balance with vision limited, coordination ladder      Evaluation Complexity:  History (Personal factors, comorbidities) [] 0 [x] 1-2 [] 3+   Exam (limitations, restrictions) [] 1-2 [] 3 [] 4+   Clinical presentation (progression) [] Stable [x] Evolving  [] Unstable   Decision Making [] Low [x] Moderate [] High    [] Low Complexity [x] Moderate Complexity [] High Complexity       Treatment Charges: Mins Units   [x] Evaluation       []  Low       [x]  Moderate       [] High 44 1   []  Modalities     []  Ther Exercise     []  Manual Therapy     [x]  Ther Activities 10    []  Gait Training      []  Vasocompression     []  Other       TOTAL TREATMENT TIME: 54      (Billed timed treatment: 10)     Time In: 2:30    Time Out: 3:24     Electronically signed by:   Ce Bragg PT, DPT   #[id number]

## 2023-02-28 ENCOUNTER — HOSPITAL ENCOUNTER (OUTPATIENT)
Dept: SPEECH THERAPY | Age: 43
Setting detail: THERAPIES SERIES
Discharge: HOME | End: 2023-02-28
Payer: COMMERCIAL

## 2023-02-28 PROCEDURE — 92523 SPEECH SOUND LANG COMPREHEN: CPT

## 2023-02-28 NOTE — PROGRESS NOTES
[employer] [facility]  [address]  [place], [address] [facility]  Office: [phone]        Speech Language Pathology  Initial Assessment      NAME: Shelly Evans  : 1980  MRN: [id number]    Date of Eval: 2023  Evaluating Therapist: Khadiajh Sumner M.A. CCC-SLP    Past Medical History: has a past medical history of Cerebral artery occlusion with cerebral infarction [facility]), History of heartburn, Hypertension, Neoplasm of unspecified nature of bone, soft tissue, and skin, and Research study patient. Past Surgical History:  has a past surgical history that includes Appendectomy (in 20's) and pre-malignant / benign skin lesion excision (Right, 2014). Primary Complaint: Patient a 43year old male seen for initial speech and language evaluation. Pt notes he had L sided deficits that caused him to go the hospital on 2023. 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 He was then in ARU 23 -2/15/23. Pt notes he is still dealing with R sided coordination deficits, double vision (wears eye patch to help), L facial weakness, some dizziness, and R sided numbness. He has now been at home for about 2 weeks. Per chart review: \"He was originally admitted to [facility] on 23. He initially presented with right-sided weakness, numbness, and facial droop upon waking. He was noted to have systolic BP in the 642Q on arrival to the ED. CT head showed intraparenchymal hemorrhage in the posterior ramses. He was started on cardene drip for BP control. He did not require any surgical intervention. He was treated for alcohol withdrawal with CIWA protocol, precedex, and phenobarbital\"    Prior to CVA, patient worked at  as a . He works swing shift, 12 hour shifts. Patient wants to get back to work. He was also driving prior to CVA.   Patient had various job responsibilities, but included training employees, speaking to large groups of people, talking over radios a lot of times for communication. Patient completed portions of the SLUMS examination (unable to complete written portion due to vision deficits). Pt able to recall 5/5 words immediately. Recalled 3/5 with 5 minute delay. Patient with decreased thought organization, difficulty with divided/sustained attention, and decreased word finding. Patient also presents with increased rate of speech and L side facial weakness/droop, which decreases overall speech intelligibility. Patient states he often has to repeat himself, especially when talking over the phone. Patient would benefit from use of vital stim and motor speech exercises. Pain:  Pain Assessment  Pain Assessment: None - Denies Pain    Assessment:  Cognitive Diagnosis: I69.31  Speech Diagnosis: R52.454         Subjective:   Previous level of function and limitations:  Independent, driving, working full tie  General  Chart Reviewed: Yes  Family / Caregiver Present: Yes (Wife Jennifer Valerio)  Subjective  Subjective: Patient seen for initial speech and language evaluation  Pain Assessment  Pain Assessment: None - Denies Pain  Social/Functional History  Vocational: Full time employment (Works swing shift as  (12 hours shifts))     Vision  Vision: Impaired  Vision Exceptions: Wears glasses for reading (Julian Blade in both eyes)  Hearing  Hearing: Exceptions to [facility]  Hearing Exceptions: Hard of hearing/hearing concerns           Objective:     Oral/Motor  Oral Motor: Exceptions to [facility]  Labial ROM: Reduced left  Labial Symmetry: Abnormal symmetry left  Labial Strength: Reduced  Labial Sensation: Reduced  Labial Coordination: Reduced  Lingual ROM: Reduced left  Lingual Symmetry: Abnormal symmetry left  Lingual Strength: Reduced  Lingual Sensation: Reduced  Lingual Coordination: Reduced  Auditory Comprehension  Comprehension: Exceptions  Multistep Commands: Mild  Complex/Abstract Commands: Mild  Reading Comprehension  Reading Status: Unable to assess (NICOLE due to double vision)  Expression  Primary Mode of Expression: Verbal  Verbal Expression  Verbal Expression: Exceptions to functional limits  Confrontation: Mild  Convergent: Mild  Divergent: Mild  Responsive: Mild  Conversation: Mild  Interfering Components: Attention; Impaired thought organization  Written Expression  Dominant Hand: Right  Written Expression: Unable to assess  Motor Speech  Motor Speech: Exceptions to [facility]  Intelligibility: Moderate  Dysarthria : Moderate          Cognition  Orientation  Overall Orientation Status: Within Functional Limits  Attention  Attention: Exceptions to [facility]  Alternating Attention: Moderate  Divided Attention: Moderate  Selective Attention: Moderate  Sustained Attention: Moderate  Memory  Memory: Exceptions to [facility]  Short-term Memory: Moderate  Working Memory: Moderate  Problem Solving  Problem Solving: Exceptions to [facility]  Verbal Reasoning Skills: Mild  Sequencing: Mild  Executive Function Skills: Mild  Numeric Reasoning  Numeric Reasoning: Exceptions to [facility]   Calculations: Mild  Abstract Reasoning  Abstract Reasoning: [facility]  Convergent Thinking: Mild  Divergent Thinking: Moderate  Safety/Judgment  Safety/Judgment: Exceptions to [facility]  Insight: Mild    Impulsive:  Moderate      Plan:    Goals:   Short Term Goals  Time Frame for Short Term Goals: 10 visits  Goal 1: Patient will complete oral motor strengthening/coordination exercises to improve overall oral motor strength and coordination  Goal 2: Patient will complete verbalizations of increasingly complexity with 100% intelligibility with use of dysarthria compensations  Goal 3: Patient will complete higher level/complex naming tasks to increase word retrieval with 90% accuracy  Goal 4: Patient will complete recall tasks with and without delay with 90% accuracy  Goal 5: Patient will complete math/$/time tasks with 90% accuracy  Speech Therapy Prognosis  Prognosis: Good  Prognosis Considerations: Age, Motivation, Previous Level of Function, Family/Community Support  Duration/Frequency of Treatment  Duration of Treatment: 10 visits  Frequency of Treatment: 1-2x a week  Recommendations  Requires SLP Intervention: Yes  Patient Education: Patient and wife education on POC and goals  Patient Education Response: Verbalizes understanding  Requires SLP Intervention: Yes  Patient/family involved in developing goals and treatment plan: Yes    Time In: 4:40 PM   Time Out: 5:24 PM      Thank you for this referral,    Bella Denton M.A.  CCC-SLP

## 2023-03-01 ENCOUNTER — HOSPITAL ENCOUNTER (OUTPATIENT)
Dept: CT IMAGING | Age: 43
Discharge: HOME | End: 2023-03-03
Payer: COMMERCIAL

## 2023-03-01 DIAGNOSIS — I62.9 INTRACRANIAL HEMORRHAGE (HCC): ICD-10-CM

## 2023-03-01 PROCEDURE — 70450 CT HEAD/BRAIN W/O DYE: CPT

## 2023-03-07 ENCOUNTER — HOSPITAL ENCOUNTER (OUTPATIENT)
Dept: OCCUPATIONAL THERAPY | Age: 43
Setting detail: THERAPIES SERIES
Discharge: HOME | End: 2023-03-07
Payer: COMMERCIAL

## 2023-03-07 ENCOUNTER — HOSPITAL ENCOUNTER (OUTPATIENT)
Dept: PHYSICAL THERAPY | Age: 43
Setting detail: THERAPIES SERIES
Discharge: HOME | End: 2023-03-07
Payer: COMMERCIAL

## 2023-03-07 PROCEDURE — 97112 NEUROMUSCULAR REEDUCATION: CPT

## 2023-03-07 PROCEDURE — 97110 THERAPEUTIC EXERCISES: CPT

## 2023-03-07 NOTE — FLOWSHEET NOTE
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 : 3/9/23 Neurosurgery  Visit# / total visits:   Cancels/No Shows: 0/0    Precautions: [place] - hears best in L ear; double vision when looking with both eyes     Subjective:  Pt arrives noting no falls and improving double vision.         Pain:  [] Yes  [x] No Location:  N/A Pain Rating: (0-10 scale) denies /10  Pain altered Tx:  [] No  [] Yes  Action:  Comments:    Objective:  INTERVENTIONS  INTERVENTIONS  Reps/ Time Weight/ Level Completed  Today Comments   Coordination ladder        Fwd 1 in ea 6x  x    Fwd 2 in ea 3x ea foot lead  x    Lateral 2 in ea  3x ea foot lead  x Trouble seeing with going to the L    Forward in & out  6x  x    Lateral in & out 3x ea foot lead  x           Dual tasking        Ball bounces  4x50ft  x    Ball tosses  4x50ft   x 1 instance of loosing the ball    Altn bounce & toss  4x50ft   x    Walking with rotational passes  4x50ft   x Passing btwn pt and therapist    March with same arm reach  4x50ft   x    March with opposite arm reach  4x50ft   x Moderate instability    Lateral Cross & uncross with arms same  2x50ft  ea  x    Forward kick with opposite arm reach  4x50ft  x    Other:    Patient Education:   -3/7: educated on doing march with opposite arm reach in place for home program      Specific Instructions for next treatment add more balance on foam working on core control. Rocker board, coordination, dual tasking        Assessment: [x] Progressing toward goals. Initial session from du completed. Focused on coordination and dual tasking which challenges control and balance. With coordination ladder just worked on getting patterns and stepping control. Will progress speeds in subsequent sessions. Most difficulty with going to L side due to limited abduction of the L eye reducing peripheral vision. With dual tasking with object added the benefit of gaze stability to track object. Has most difficulty with whole body coordination activities with moderate imbalance. Tries to move too quickly causing LOB. Overall pt is appropriately fatigued and challenged by session.      [] No change.     [] Other:    [x] Patient would continue to benefit from skilled physical therapy services in order to: increase balance, improve vestibular system function, improve gait, and work on higher level mobility to allow him to continue to be active with his kids and potentially return to work.      GOALS:   STG: (to be met in 8 treatments)  Pt will be able to navigate curbs, uneven surfaces, and inclines/declines IND  to ensure he can safely navigate the community & get outdoors more.    Pt will increase gait speed to >/=  1.2 m/s IND to improve ability to navigate as a community ambulator & cross streets.   Pt will score above 24/30 on functional gait assessment to decrease overall fall risk with community level mobility &  demonstrate improved functional mobility   Pt will improve time on 4 square step test to < 12 seconds to demonstrate improved coordination and change of directions to be able to navigate small spaces.   Pt will improve balance on foam with vision limited to >15 seconds to demonstrate improved vestibular system function to decrease fall risk.   LTG: (to be met in 16 treatments)  Pt will increase score on LEFI to >65/80 to demonstrate meaningful change in functional mobility to be  able to increased mobility. Pt will be able to complete a motor task of balance task with a UE reaction time to <0.75s to demonstrate improved reaction time to be able to progress to driving. Pt will be able to coordinate UE & LE reciprocally to be able to climb a ladder to allow him to return to work. Pt will be independent with home program addressing strength, flexibility and balance to maximize gains made in therapy to improve overall functional capacity for mobility. Pt will report no falls for x6 weeks demonstrating improved safety with mobility and implementation of fall prevention strategies. Pt. Education:  [x] Yes  [] No  [] Reviewed Prior HEP/Ed  Method of Education: [] Verbal  [] Demo  [] Written  Comprehension of Education:  [] Verbalizes understanding. [] Demonstrates understanding. [] Needs review. [] Demonstrates/verbalizes HEP/Ed previously given. Plan: [x] Continue per plan of care.    [] Other:      Treatment Charges: Mins Units   []  Modalities     []  Ther Exercise     []  Manual Therapy     []  Ther Activities     []  Aquatics     [x]  Neuromuscular 47 3   [] Vasocompression     [] Gait Training     [] Dry needling        [] 1 or 2 muscles        [] 3 or more muscles     []  Other     Total Treatment time 47 3     Time In:1433            Time Out: 1520    Electronically signed by:  Ramiro Duncan PT

## 2023-03-07 NOTE — PROGRESS NOTES
[address]         Phone: [phone]       Fax: [phone]     Occupational Therapy Daily Treatment note     Occupational Therapy: Daily Note   Patient: Harry Saxena (16 y.o. male)   Examination Date: 2023  No data recorded  No data recorded   :  1980 # of Visits since [facility]:   4   MRN: [id number]  CSN: [id number] Start of Care Date:    Insurance: Payor: [employer] / Plan: [employer] NAP CHOICE POS II / Product Type: *No Product type* /   Insurance ID: [id number] - (Commercial) Secondary Insurance (if applicable): Insurance Information:     Referring Physician: José Miguel Schrader MD     PCP: Pito Jennings, DO Visits to Date/Visits Approved:     No Show/Cancelled Appts:   /       Medical Diagnosis: No admission diagnoses are documented for this encounter.     Treatment Diagnosis: Rt UE weakness, impaired coordination,impaired sensation   (ICD-10 codes: M62.81, R27.9, R20.2)        SUBJECTIVE EXAMINATION   Pain Level: Pain Screening  Patient Currently in Pain: No    Patient Comments: Subjective: \"I guess it is getting better\" (Referring to vision when OT pointed out that he completed BITS with both eyes open and improved accuracy)    HEP Compliance: Good  Any changes to allergies, medications, or have you had any medical procedures/ER visits since your last visit?: No     OBJECTIVE EXAMINATION   Restrictions: Restrictions/Precautions: Fall Risk (double vision)      TREATMENT     Exercises:     Treatment Reasoning    OT Exercise 1: Small pegs x25, place on board with R hand, then place 15 marbles on top (placed ), removed 15 marbles & held in rt hand,removed  25  pegs  & hold in R hand  OT Exercise 2: Sterognosis - pt observed reaching into pocket with R hand and retrieving a cough drop without difficulty (demonstrates improvement from previous sessions)  OT Exercise 6: BITS (bioness integrated system)  - using rt UE to touch dots (reaching in all directions)  2 minutes (95.95% accuracy); touch 26 alphabet letters in order (81.25%, 1:02), Visual pursuit/Rotator (15 numbers in sequence, CW/CCW, 93.75% accuracy, 24 seconds)  OT Exercise 8: [address] [place] dexterity - place/remove 10 pegs    Limitations addressed: Strength, Coordination (Vision)  Limitations addressed: Sensation/Stereogonosis, FMC, visual impairments/double vision  Therapist provided: Verbal cuing, Task modification  Therapist provided: Increased difficulty of BITS programs for advancement  Progressed: Complexity of movement  Progressed: Added visual pursuit/rotator to challenge vision and coordination together                     Patient Education: OT Education: Evaluative findings  Patient Education: Pt educated on observed improvements in stereognosis, FM coordination, and vision during therapy activities. Pt initially reported that he didn't feel anything had changed, but was appreciative of OT's observations and verbalized realization that he was making improvements. ASSESSMENT     Assessment: Assessment: OT treatment focused on increasing coordination of RUE and addressing visual deficits - see OT exercises for detailed report. Pt demo'd improved performance on all BITS activities and was able to complete with both eyes open. OT also added visual pursuit/rotator to further challenge coordination and vision - pt completed with 93% accuracy. Recommend continuing next session with increased speed/# of targets for higher challenge. Pt tolerated treatment well and continues to demonstrate progress towards goals.   Performance deficits / Impairments: Decreased strength, Decreased high-level IADLs, Decreased vision/visual deficit, Decreased sensation, Decreased fine motor control, Decreased coordination    Post-Treatment Pain Level:  0/10    Prognosis: Good    Activity Tolerance: Patient tolerated treatment well Barriers impacting rehab :  (No longer wearing eye patch, but reports intermittent double vision)  Measures taken to address barrier(s): See Patient Education     GOALS   Patient Goals   Patient goals : To improve functioning (coordination) rt arm, be able to RTW. Short Term Goals Completed by 10 visits Current Status Goal Status   Pt will demo & report modified independence with rt UE HEP       2. Pt will demo  & report improving rt UE manual dexterity with home activities (tying shoes). Pt will complete MROM placing test 30 seconds quicker than eval       3. Pt will demo improved rt hand fine motor dexterity by completing 9 hole peg test by 10 seconds       4. Increase rt hand tip pinch by 2# for fine motor ADLs. Long Term Goals Completed by 20 visits Current Status Goal Status   Using the UEFI Pt will increase total score by 10 points using rt UE. Pt will reports less difficulty by 1 level  using rt UE to do hobbies, meal prep/cut/peel food, vacuum sweep, get dressed, manipulate buttons, tie shoe laces, toss ball, clean, do housework. 2. Increase rt UE strength to 4/5 muscle grade (shoulder - flexors, abductors, IR, ER0, & hand extensors to improve strength for housework, cleaning, ball toss. 3. Compared to eval increase rt hand manual dexterity- pt will complete MROM placing test 45 seconds quicker using rt hand. 4. Compared to eval pt will report improved rt hand fine motor skills with daily activities. Pt will complete 9 hole peg test 20 seconds quicker than eval.       5. Compared to eval increase rt  hand tip pinch by 4# to improve pinch for fine motor ADLs.          6. Pt will demo 75% accuracy with stereognosis activities using rt hand                     TREATMENT PLAN   OT Equipment Recommendations  Equipment Needed: No  REQUIRES OT FOLLOW-UP: Yes  Type: Outpatient       Therapy Time  Individual Time In: 6848  Individual Time Out: 1431  Minutes: 46  Timed Code Treatment Minutes: 55 Minutes       Electronically signed by Octavia Garcia  on 3/7/2023 at 4:36 PM       Treatment Charges: Mins Units Time In/Out   [] Evaluation       []  Low       []  Moderate       []  High      []  Electrical Stim      []  Iontophoresis      []  Paraffin      []  Ultrasound        []  Masage      [x]  Neuromuscular Re-ed 30 2 1026-7045   []  ADL      [x]  Ther Exercise 16 1 3680-8087   []  Ther Activities      []  Neuro Re-Ed      []  Splinting      []  Other      Total Treatment time 46 min           POC NOTE   Electronically signed by MASON Méndez/L on 3/7/2023 at 4:36 PM

## 2023-03-08 ENCOUNTER — HOSPITAL ENCOUNTER (OUTPATIENT)
Dept: SPEECH THERAPY | Age: 43
Setting detail: THERAPIES SERIES
Discharge: HOME | End: 2023-03-08
Payer: COMMERCIAL

## 2023-03-08 ENCOUNTER — HOSPITAL ENCOUNTER (OUTPATIENT)
Dept: PHYSICAL THERAPY | Age: 43
Setting detail: THERAPIES SERIES
Discharge: HOME | End: 2023-03-08
Payer: COMMERCIAL

## 2023-03-08 ENCOUNTER — HOSPITAL ENCOUNTER (OUTPATIENT)
Dept: OCCUPATIONAL THERAPY | Age: 43
Setting detail: THERAPIES SERIES
Discharge: HOME | End: 2023-03-08
Payer: COMMERCIAL

## 2023-03-08 PROCEDURE — 97112 NEUROMUSCULAR REEDUCATION: CPT

## 2023-03-08 PROCEDURE — 97110 THERAPEUTIC EXERCISES: CPT

## 2023-03-08 PROCEDURE — 92507 TX SP LANG VOICE COMM INDIV: CPT

## 2023-03-08 PROCEDURE — 97129 THER IVNTJ 1ST 15 MIN: CPT

## 2023-03-08 NOTE — FLOWSHEET NOTE
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 : 3/9/23 Neurosurgery  Visit# / total visits: 3/16  Cancels/No Shows: 0/0    Precautions: [place] - hears best in L ear; double vision when looking with both eyes     Subjective:  Pt notes his R leg is sore and heavy from last session. Denies any changes.        Pain:  [] Yes  [x] No Location:  N/A Pain Rating: (0-10 scale) denies /10  Pain altered Tx:  [] No  [] Yes  Action:  Comments:    Objective:  INTERVENTIONS  INTERVENTIONS  Reps/ Time Weight/ Level Completed  Today Comments   Coordination ladder        Fwd 1 in ea 6x      Fwd 2 in ea 3x ea foot lead      Lateral 2 in ea  3x ea foot lead   Trouble seeing with going to the L    Forward in & out  6x      Lateral in & out 3x ea foot lead             Dual tasking        Ball bounces  4x50ft      Ball tosses  4x50ft    1 instance of loosing the ball    Altn bounce & toss  4x50ft       Walking with rotational passes  4x50ft    Passing btwn pt and therapist    March with same arm reach  4x50ft       March with opposite arm reach  4x50ft    Moderate instability    Lateral Cross & uncross with arms same  2x50ft  ea      Forward kick with opposite arm reach  4x50ft             BITS     Standing on foam    Visual pursuit - rotat/multi color  20x2 Speed 7  x Reaching with R and L   All red, all white, all blue    Visual pursuit - rotator/multi color  20x2 Speed 7  x Reaching with R and L   red,white,blue    Charts - peripheral match  3x 10 sec x 3:15 total time, 4 misses    Charts peripheral match 3x 6 sec x 3:05 total time, 3 misses   Could challenge further in future with less target time          With balance platform  MED  Level 2    Static- complex array sequence 20x CF x 56 seconds total   OPTO- static- complex array sequence 20x CF x 1:16 seconds total, 50% CF    Dynamic balance- balance reaction, user paced  3x1 min  x Trial 1: 6 // Trial 2: 6 // Trial 3: 5           Balance        Tandem on foam, EO 2x30s ea  x    Narrow on foam, VORx1, vert & horizontal 3x30s  ea  x                                Other:    Patient Education:   -3/7: educated on doing march with opposite arm reach in place for home program      Specific Instructions for next treatment add more balance on foam working on core control. Rocker board, coordination, dual tasking        Assessment: [x] Progressing toward goals. With session focused more on balance control with visual stimuli. Initially began with foam on BITS but needed to progress to get better balance work. Completed peripheral charts with pt needing to use convergent and divergent vision to build that occular capacity. Tried to progress the time the visual target is present with good success. Progressed to balance platform as this is more unstable to challenge weigh shifting and trunk strategies. Even with optokinetic challenge pt does well with only 20 second difference. Utilized dynamic balance program to improve weight shifting awareness. Pt is still challenged in various stances and with VOR practice. Has multiple slips off target with gaze stability even at slow speed. This will be important to improve. Overall pt is challenged and does well with session     [] No change.      [] Other:    [x] Patient would continue to benefit from skilled physical therapy services in order to: increase balance, improve vestibular system function, improve gait, and work on higher level mobility to allow him to continue to be active with his kids and potentially return to work. GOALS:   STG: (to be met in 8 treatments)  Pt will be able to navigate curbs, uneven surfaces, and inclines/declines IND  to ensure he can safely navigate the community & get outdoors more. Pt will increase gait speed to >/=  1.2 m/s IND to improve ability to navigate as a community ambulator & cross streets. Pt will score above 24/30 on functional gait assessment to decrease overall fall risk with community level mobility &  demonstrate improved functional mobility   Pt will improve time on 4 square step test to < 12 seconds to demonstrate improved coordination and change of directions to be able to navigate small spaces. Pt will improve balance on foam with vision limited to >15 seconds to demonstrate improved vestibular system function to decrease fall risk. LTG: (to be met in 16 treatments)  Pt will increase score on LEFI to >65/80 to demonstrate meaningful change in functional mobility to be able to increased mobility. Pt will be able to complete a motor task of balance task with a UE reaction time to <0.75s to demonstrate improved reaction time to be able to progress to driving. Pt will be able to coordinate UE & LE reciprocally to be able to climb a ladder to allow him to return to work. Pt will be independent with home program addressing strength, flexibility and balance to maximize gains made in therapy to improve overall functional capacity for mobility. Pt will report no falls for x6 weeks demonstrating improved safety with mobility and implementation of fall prevention strategies. Pt. Education:  [x] Yes  [] No  [] Reviewed Prior HEP/Ed  Method of Education: [] Verbal  [] Demo  [] Written  Comprehension of Education:  [] Verbalizes understanding. [] Demonstrates understanding. [] Needs review.   [] Demonstrates/verbalizes HEP/Ed previously given.     Plan: [x] Continue per plan of care.    [] Other:      Treatment Charges: Mins Units   []  Modalities     []  Ther Exercise     []  Manual Therapy     []  Ther Activities     []  Aquatics     [x]  Neuromuscular 43 3   [] Vasocompression     [] Gait Training     [] Dry needling        [] 1 or 2 muscles        [] 3 or more muscles     []  Other     Total Treatment time 43 3     Time In: 1500            Time Out: 0724    Electronically signed by:  Yogesh Corona PT

## 2023-03-08 NOTE — PROGRESS NOTES
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 Pt introduced to oral motor exercises to increase L side strength and coordination. Labial retraction and protrusion-10x  Lingual elevation, depression, lateralization 10x. Goal 2: Patient will complete verbalizations of increasingly complexity with 100% intelligibility with use of dysarthria compensations. Conversational speech:  mod cues for patient to slow down rate of speech and increase vocal volume. Goal 3: Patient will complete higher level/complex naming tasks to increase word retrieval with 90% accuracy. Word association task: 85% independently. Goal 4: Patient will complete recall tasks with and without delay with 90% accuracy  NT    Goal 5: Patient will complete math/$/time tasks with 90% accuracy  NT    Other: NA    Plan:  [x] Continue ST services    [] Discharge from ST:        Treatment completed by: Erika Mark M.A.  CCC-SLP

## 2023-03-08 NOTE — PROGRESS NOTES
[facility] [address] [place] [address]. [address]         Phone: [phone]       Fax: [phone]     Occupational Therapy Daily Treatment note     Occupational Therapy: Daily Note   Patient: Luis Manuel De La Garza (48 y.o. male)   Examination Date: 2023  No data recorded  No data recorded   :  1980 # of Visits since [facility]:   5   MRN: [id number]  CSN: [id number] Start of Care Date: 2023   Insurance: Payor: Manual Rout / Plan: Manual Rout NAP CHOICE POS II / Product Type: *No Product type* /   Insurance ID: [id number] - (Commercial) Secondary Insurance (if applicable): Insurance Information: Aetna (60 visits combined PT/OT /speech) HARD MAX . Benefit period  to 3/31   Referring Physician: MD Dr Be Ross   PCP: Chad Merrill DO Visits to Date/Visits Approved:     No Show/Cancelled Appts:   /       Medical Diagnosis: No admission diagnoses are documented for this encounter. Intracranial hemorrhage (I62.9 - ICD -10 code) pontine hemorrhage (i61.3 - ICD-10 code)  Treatment Diagnosis: Rt UE weakness, impaired coordination,impaired sensation   (ICD-10 codes: M62.81, R27.9, R20.2)        SUBJECTIVE EXAMINATION   Pain Level:      Patient Comments: Subjective: Pt states he is getting use to his double vision & it may be improving. Pt states he will see the neurologist soon. Pt reports having dental appt tomorrow. HEP Compliance: Good        OBJECTIVE EXAMINATION   Restrictions: Restrictions/Precautions: Fall Risk (double vision)        TREATMENT     Exercises:     Treatment Reasoning    OT Exercise 2: Sterognosis - pt reached into pillow case using rt hand to retrieve  & identify items with eyes closed (pt correctly identify 6 items( wt, easy  peg, marker, lid, clothespin, pen cap) out of 9 items).  Pt unable to indentify 3 items (rubber glove, paper clip, 1/2\" x 3 1/4\" cylindrical peg) for 66.66% accuracy  OT Exercise 3: PROM RUE - all joints, all planes  OT Exercise 4: Valpar 4: Unscrew/screw back on 8 small (1/4\" diameter)  using R hand  OT Exercise 8: Rd judahezer dexterity - place/remove 20 pegs  OT Exercise 9: yellow therapy ball on wall for UE stretch 10 sets (rolling up for  shoulder flexion hold 5 seconds, roll ball down hold for 5 seconds), with therapy ball on wall alternate rt/lt UE pushing into ball 20 sets  OT Exercise 10: BTE tool 181 T = 15 lb alternate rt/lt UE pulling down 70 seconds, pulling up  OT Exercise 11: BTE tool 162 rt hand pinching T = 45 inlb 90 seconds  OT Exercise 12: 2# pyloball for bilateral UE closed chain exercise : triangle 12 x each way (cw & ccw) , square 12 x each way (cw & ccw )    Limitations addressed: Strength, Coordination (vision)  Limitations addressed: Sensation/Stereogonosis, FMC, visual impairments/double vision  Therapist provided: Verbal cuing, Task modification  Progressed: Complexity of movement  Progressed: Begun BTE tool 181 for UE strengthening & coordination, & BTE tool 162 for improving rt hand pinching. Begun therapy ball on wall exercise for rom & wt bearing, & pyloball for UE closed chain exercises. Patient Education. OT educated pt about sensory changes in rt hand    ASSESSMENT     Assessment: Assessment: Pt's spouse encourages him during tx. Pt's wife says that they can practice putting objects in pillow case & have pt try to identify them. Pt participates in rt hand sensory activities . Pt states that his fingers on rt hand feel fat & OT informed pt that is form his impaired sensation. Pt demo improved accuracy with stereognosis exercises with pt reporting that it is hard to know if he has something in rt hand or not. Progressed pt with rt UE strengthening exercises on BTE,UE closed chain exercises, & therapy ball exercises for rom, stretching, & wt bearing. .See OT exercises for details.  Pt will benefit from skilled OT to provide therapeutic exercises (28684), therapeutic activities (55624), & neuromuscular re-education (05723) to facilitate improved rt UE strength, coordination, sensory awareness, & less difficulty performing ADL/IADL. Performance deficits / Impairments: Decreased strength, Decreased high-level IADLs, Decreased vision/visual deficit, Decreased sensation, Decreased fine motor control, Decreased coordination    Post-Treatment Pain Level:  no pain    Prognosis: Good    Activity Tolerance: Patient tolerated treatment well             GOALS   Patient Goals   Patient goals : To improve functioning (coordination) rt arm, be able to RTW. Short Term Goals Completed by 10 visits Current Status Goal Status   Pt will demo & report modified independence with rt UE HEP       2. Pt will demo  & report improving rt UE manual dexterity with home activities (tying shoes). Pt will complete MROM placing test 30 seconds quicker than eval       3. Pt will demo improved rt hand fine motor dexterity by completing 9 hole peg test by 10 seconds       4. Increase rt hand tip pinch by 2# for fine motor ADLs. Long Term Goals Completed by 20 visits Current Status Goal Status   Using the UEFI Pt will increase total score by 10 points using rt UE. Pt will reports less difficulty by 1 level  using rt UE to do hobbies, meal prep/cut/peel food, vacuum sweep, get dressed, manipulate buttons, tie shoe laces, toss ball, clean, do housework. 2. Increase rt UE strength to 4/5 muscle grade (shoulder - flexors, abductors, IR, ER0, & hand extensors to improve strength for housework, cleaning, ball toss. 3. Compared to eval increase rt hand manual dexterity- pt will complete MROM placing test 45 seconds quicker using rt hand. 4. Compared to eval pt will report improved rt hand fine motor skills with daily activities.  Pt will complete 9 hole peg test 20 seconds quicker than eval.       5. Compared to eval increase rt  hand tip pinch by 4# to improve pinch for fine motor ADLs.          6. Pt will demo 75% accuracy with stereognosis activities using rt hand                  TREATMENT PLAN   REQUIRES OT FOLLOW-UP: Yes  Type: Outpatient  Plan  Plan Frequency: 2-3 x/week  Plan Weeks: 20 visits  Current Treatment Recommendations: Strengthening, ROM, Neuromuscular re-education, Patient/Caregiver education & training, Coordination training (sensory)  Additional Comments: continue OT       Therapy Time  Individual Time In: 2062  Individual Time Out: 8269  Minutes: 50  Timed Code Treatment Minutes: 50 Minutes       Electronically signed by Vonda Davis OT  on 3/8/2023 at 5:32 PM       Treatment Charges: Mins Units Time In/Out   [] Evaluation       []  Low       []  Moderate       []  High      []  Electrical Stim      []  Iontophoresis      []  Paraffin      []  Ultrasound        []  Masage      [x]  Neuromuscular Re-ed 20 1 16:22-16:42   []  ADL      [x]  Ther Exercise 30 2 16:42-17:12   []  Ther Activities      []  Neuro Re-Ed      []  Splinting      []  Other      Total Treatment time 50 min 3          POC NOTE

## 2023-03-10 ENCOUNTER — TELEPHONE (OUTPATIENT)
Dept: NEUROSURGERY | Age: 43
End: 2023-03-10

## 2023-03-10 NOTE — TELEPHONE ENCOUNTER
Please reassure that repeat CT scan shows no areas of new bleeding and resolution of previous bleeding. Is patient following up with PMR and/or neurology as well?

## 2023-03-10 NOTE — TELEPHONE ENCOUNTER
Patient wife called in said yesterday they had issues with logging into [facility] appt. Would like to know when youd be able to see them in person willing to travel to [place] locations. Next avail is mid may, he is following up for a stroke. Please advise with when youd like to schedule. Thank you!

## 2023-03-14 ENCOUNTER — HOSPITAL ENCOUNTER (OUTPATIENT)
Dept: OCCUPATIONAL THERAPY | Age: 43
Setting detail: THERAPIES SERIES
Discharge: HOME | End: 2023-03-14
Payer: COMMERCIAL

## 2023-03-14 ENCOUNTER — HOSPITAL ENCOUNTER (OUTPATIENT)
Dept: PHYSICAL THERAPY | Age: 43
Setting detail: THERAPIES SERIES
Discharge: HOME | End: 2023-03-14
Payer: COMMERCIAL

## 2023-03-14 ENCOUNTER — HOSPITAL ENCOUNTER (OUTPATIENT)
Dept: SPEECH THERAPY | Age: 43
Setting detail: THERAPIES SERIES
Discharge: HOME | End: 2023-03-14
Payer: COMMERCIAL

## 2023-03-14 PROCEDURE — 97112 NEUROMUSCULAR REEDUCATION: CPT

## 2023-03-14 PROCEDURE — 97110 THERAPEUTIC EXERCISES: CPT

## 2023-03-14 PROCEDURE — 92507 TX SP LANG VOICE COMM INDIV: CPT

## 2023-03-14 NOTE — PROGRESS NOTES
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 Labial retraction and protrusion-10x  Lingual elevation, depression, lateralization 10x. Goal 2: Patient will complete verbalizations of increasingly complexity with 100% intelligibility with use of dysarthria compensations. Tongue twisters: patient repeated 5-6 word tongue twisters aloud with 75% accuracy which increased to 85% with repetition and slowing down rate of speech. Goal 3: Patient will complete higher level/complex naming tasks to increase word retrieval with 90% accuracy. NT    Goal 4: Patient will complete recall tasks with and without delay with 90% accuracy  Recall of recent events over the weekend: pt required verbal reminder of what appts he had as well as exercises completed over the weekend. Goal 5: Patient will complete math/$/time tasks with 90% accuracy  NT    Other: NA    Plan:  [x] Continue ST services    [] Discharge from ST:        Treatment completed by: Roni Valerio M.A., CCC-SLP

## 2023-03-14 NOTE — FLOWSHEET NOTE
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 : 3/9/23 Neurosurgery  Visit# / total visits:   Cancels/No Shows: 0/0    Precautions: [place] - hears best in L ear; double vision when looking with both eyes     Subjective:   Pt notes he is doing well. He was able to see neuro-opthamology  and is getting prism glasses.      Pain:  [] Yes  [x] No Location:  N/A Pain Rating: (0-10 scale) denies /10  Pain altered Tx:  [] No  [] Yes  Action:  Comments:    Objective:  INTERVENTIONS  INTERVENTIONS  Reps/ Time Weight/ Level Completed  Today Comments   Coordination ladder        Fwd 1 in ea 6x      Fwd 2 in ea 3x ea foot lead  x    Lateral 2 in ea  3x ea foot lead  x Trouble seeing with going to the L    Forward in & out  6x  x    Lateral in & out 3x ea foot lead  x           Dual tasking        Ball bounces  4x50ft      Ball tosses  4x50ft    1 instance of loosing the ball    Altn bounce & toss  4x50ft   x    Walking ball toss to therapist  4x50ft   x    Walking with rotational passes  4x50ft    Passing btwn pt and therapist    March with same arm reach  4x50ft   x    March with opposite arm reach  4x50ft   x Moderate instability    Lateral Cross & uncross with arms same  2x50ft  ea  x    Lateral cross & uncross with arms opposite  2x50 ft ea  x    [place]  2x50ft   x    Forward kick with opposite arm reach  4x50ft  x    Backwards with ball toss  4x50ft   x BITS     Standing on foam    Visual pursuit - rotat/multi color  20x2 Speed 7   Reaching with R and L   All red, all white, all blue    Visual pursuit - rotator/multi color  20x2 Speed 7   Reaching with R and L   red,white,blue    Charts - peripheral match  3x 10 sec  3:15 total time, 4 misses    Charts peripheral match 3x 6 sec  3:05 total time, 3 misses   Could challenge further in future with less target time          With balance platform  MED  Level 2    Static- complex array sequence 20x CF  56 seconds total   OPTO- static- complex array sequence 20x CF  1:16 seconds total, 50% CF    Dynamic balance- balance reaction, user paced  3x1 min   Trial 1: 6 // Trial 2: 6 // Trial 3: 5           Balance        Tandem on foam, EO 2x30s ea      Narrow on foam, VORx1, vert & horizontal 3x30s  ea                                  Other:    Patient Education:   -3/7: educated on doing march with opposite arm reach in place for home program      Specific Instructions for next treatment: alternating with walking/higher level balance; BITS --add more balance on foam working on core control. Rocker board, coordination, dual tasking        Assessment: [x] Progressing toward goals. Focused on coordination and dual tasking which challenges control and balance. With coordination ladder just worked on getting patterns and stepping control with progressing speed. Continued with dual tasking with a ball for divided attention, anticipatory, and reactive balance challenge. Worked on whole body coordination with arm movements that correlated with leg/stepping patterns. Improving in stability but pt still very cognitive and needing many cues for controlling the patterns given. Pt has minor LOBS that he self recovers without assist. Is progressing well. [] No change.      [] Other:    [x] Patient would continue to benefit from skilled physical therapy services in order to: increase balance, improve vestibular system function, improve gait, and work on higher level mobility to allow him to continue to be active with his kids and potentially return to work. GOALS:   STG: (to be met in 8 treatments)  Pt will be able to navigate curbs, uneven surfaces, and inclines/declines IND  to ensure he can safely navigate the community & get outdoors more. Pt will increase gait speed to >/=  1.2 m/s IND to improve ability to navigate as a community ambulator & cross streets. Pt will score above 24/30 on functional gait assessment to decrease overall fall risk with community level mobility &  demonstrate improved functional mobility   Pt will improve time on 4 square step test to < 12 seconds to demonstrate improved coordination and change of directions to be able to navigate small spaces. Pt will improve balance on foam with vision limited to >15 seconds to demonstrate improved vestibular system function to decrease fall risk. LTG: (to be met in 16 treatments)  Pt will increase score on LEFI to >65/80 to demonstrate meaningful change in functional mobility to be able to increased mobility. Pt will be able to complete a motor task of balance task with a UE reaction time to <0.75s to demonstrate improved reaction time to be able to progress to driving. Pt will be able to coordinate UE & LE reciprocally to be able to climb a ladder to allow him to return to work. Pt will be independent with home program addressing strength, flexibility and balance to maximize gains made in therapy to improve overall functional capacity for mobility. Pt will report no falls for x6 weeks demonstrating improved safety with mobility and implementation of fall prevention strategies. Pt. Education:  [] Yes  [x] No  [] Reviewed Prior HEP/Ed  Method of Education: [] Verbal  [] Demo  [] Written  Comprehension of Education:  [] Verbalizes understanding. [] Demonstrates understanding. [] Needs review.   [] Demonstrates/verbalizes HEP/Ed previously given.     Plan: [x] Continue per plan of care.    [] Other:      Treatment Charges: Mins Units   []  Modalities     []  Ther Exercise     []  Manual Therapy     []  Ther Activities     []  Aquatics     [x]  Neuromuscular 42 3   [] Vasocompression     [] Gait Training     [] Dry needling        [] 1 or 2 muscles        [] 3 or more muscles     []  Other     Total Treatment time 42 3     Time In: 1:46p         Time Out: 2:28     Electronically signed by:  Lina Mathur PT

## 2023-03-14 NOTE — PROGRESS NOTES
[facility] [address] [place] [address]. [address]         Phone: [phone]       Fax: [phone]     Occupational Therapy Daily Treatment note     Occupational Therapy: Daily Note   Patient: Saintclair Michaels (36 y.o. male)   Examination Date: 2023  No data recorded  No data recorded   :  1980 # of Visits since [facility]:   6   MRN: [id number]  CSN: [id number] Start of Care Date: 2023   Insurance: Payor: Patrina Schirmer / Plan: Patrina Schirmer NAP CHOICE POS II / Product Type: *No Product type* /   Insurance ID: [id number] - (Commercial) Secondary Insurance (if applicable): Insurance Information: Aetna (60 visits combined PT/OT /speech) HARD MAX . Benefit period  to 3/31   Referring Physician: MD Dr Robina Faust   PCP: Mindy Redman DO Visits to Date/Visits Approved:     No Show/Cancelled Appts:   /       Medical Diagnosis: No admission diagnoses are documented for this encounter. Intracranial hemorrhage (I62.9 - ICD -10 code) pontine hemorrhage (i61.3 - ICD-10 code)  Treatment Diagnosis: Rt UE weakness, impaired coordination,impaired sensation   (ICD-10 codes: M62.81, R27.9, R20.2)        SUBJECTIVE EXAMINATION   Pain Level: Pain Screening  Patient Currently in Pain: No    Patient Comments: Subjective: Pt reports no pain. Pt states he got in  recently to see eye MD.Pt states he will get eye glasses with bifocal & removeable prism lens, so as his vision changes the prism part can be changed.     HEP Compliance: Good        OBJECTIVE EXAMINATION   Restrictions: Restrictions/Precautions: Fall Risk (double vision)          TREATMENT     Exercises:     Treatment Reasoning    OT Exercise 1: Small pegs x25, place on board with R hand, then place 15 marbles on top (placed ), removed 25 marbles & held in rt hand,removed  25  pegs using R hand  OT Exercise 3: PROM RUE - all joints, all planes  OT Exercise 4: Valpar 4: Unscrew/screw back on 8 large  (1\" diameter)  using R hand  OT Exercise 10: BTE tool 181 T = 15 lb alternate rt/lt UE pulling down 75 seconds, pulling up alternating rt/lt UE 75 seconds  OT Exercise 11: BTE tool 162 rt hand pinching T = 50 inlb 90 seconds, BTE tool 162 rt hand gripping T = 60 inlb 90 seconds  OT Exercise 13: Yellow therapy ball: bilateral UE 2 sets 10x (raise ball up/down for shoulder flex/extension, shoulder horizontal abduction/adduction)    Limitations addressed: Strength, Coordination (rom)  Limitations addressed: ROM, FMC, visual impairments/double vision  Therapist provided: Verbal cuing  Progressed: Complexity of movement, Repetitions  Progressed: Begun UE rom using therapy ball. Increase time on BTE tool 181. Increase torque BTE tool 162 pinch. Begun BTE tool 162 gripping. Pt manipulates more marbles onto pegs today. Patient Education: OT Education: Home Exercise Program  Patient Education: OT educated pt that he can use any size ball & do rom exercises ( raising ball up/down for UE flexion/extension, moving ball side to side for UE horizontal abduct/adduction)       ASSESSMENT     Assessment: Assessment: Pt states he is cooking more meals at home. OT educated pt on bilateral UE therapy ball exercises for rom,coordination, & strength. Pt states his rt arm felt tired after therapy ball exercise. OT upgraded pt's HEP. Pt's tx focus on rt UE gross motor,manual dexterity,fine motor manipulation,rom, & strengthening exercises. Pt needs set up & initial cues for BTE UE strengthening exercises. See OT exercises for details. Pt will benefit from skilled OT to provide therapeutic exercises [phone]), therapeutic activities (60176), & neuromuscular re-education (06052) to facilitate improved rt UE strength, coordination, sensory awareness, & less difficulty performing ADL/IADL.   Performance deficits / Impairments: Decreased strength, Decreased high-level IADLs, Decreased vision/visual deficit, Decreased sensation, Decreased fine motor control, Decreased coordination    Post-Treatment Pain Level:  no pain    Prognosis: Good    Activity Tolerance: Patient tolerated treatment well       Barriers impacting rehab :  (Pt not wearing eye patch. Intermittent double vision)     GOALS   Patient Goals   Patient goals : To improve functioning (coordination) rt arm, be able to RTW. Short Term Goals Completed by 10 visits Current Status Goal Status   Pt will demo & report modified independence with rt UE HEP       2. Pt will demo  & report improving rt UE manual dexterity with home activities (tying shoes). Pt will complete MROM placing test 30 seconds quicker than eval       3. Pt will demo improved rt hand fine motor dexterity by completing 9 hole peg test by 10 seconds       4. Increase rt hand tip pinch by 2# for fine motor ADLs. Long Term Goals Completed by 20 visits Current Status Goal Status   Using the UEFI Pt will increase total score by 10 points using rt UE. Pt will reports less difficulty by 1 level  using rt UE to do hobbies, meal prep/cut/peel food, vacuum sweep, get dressed, manipulate buttons, tie shoe laces, toss ball, clean, do housework. 2. Increase rt UE strength to 4/5 muscle grade (shoulder - flexors, abductors, IR, ER0, & hand extensors to improve strength for housework, cleaning, ball toss. 3. Compared to eval increase rt hand manual dexterity- pt will complete MROM placing test 45 seconds quicker using rt hand. 4. Compared to eval pt will report improved rt hand fine motor skills with daily activities. Pt will complete 9 hole peg test 20 seconds quicker than eval.       5. Compared to eval increase rt  hand tip pinch by 4# to improve pinch for fine motor ADLs.          6. Pt will demo 75% accuracy with stereognosis activities using rt hand                                 TREATMENT PLAN   REQUIRES OT FOLLOW-UP: Yes  Type: Outpatient  Plan  Plan Frequency: 2-3 x/week  Plan Weeks: 20 visits  Current Treatment Recommendations: Strengthening, ROM, Neuromuscular re-education, Patient/Caregiver education & training, Coordination training (sensory)  Additional Comments: continue OT       Therapy Time  Individual Time In: 5934  Individual Time Out: 2138  Minutes: 44  Timed Code Treatment Minutes: 44 Minutes       Electronically signed by Scott Horta OT  on 3/14/2023 at 5:46 PM       Treatment Charges: Mins Units Time In/Out   [] Evaluation       []  Low       []  Moderate       []  High      []  Electrical Stim      []  Iontophoresis      []  Paraffin      []  Ultrasound        []  Reny Campa      []  Neuromuscular Re-ed      []  ADL      [x]  Ther Exercise 44 3    []  Ther Activities      []  Neuro Re-Ed      []  Splinting      []  Other      Total Treatment time 44 min 3          POC NOTE

## 2023-03-16 ENCOUNTER — HOSPITAL ENCOUNTER (OUTPATIENT)
Dept: PHYSICAL THERAPY | Age: 43
Setting detail: THERAPIES SERIES
Discharge: HOME | End: 2023-03-16
Payer: COMMERCIAL

## 2023-03-16 ENCOUNTER — HOSPITAL ENCOUNTER (OUTPATIENT)
Dept: SPEECH THERAPY | Age: 43
Setting detail: THERAPIES SERIES
Discharge: HOME | End: 2023-03-16
Payer: COMMERCIAL

## 2023-03-16 ENCOUNTER — HOSPITAL ENCOUNTER (OUTPATIENT)
Dept: OCCUPATIONAL THERAPY | Age: 43
Setting detail: THERAPIES SERIES
Discharge: HOME | End: 2023-03-16
Payer: COMMERCIAL

## 2023-03-16 PROCEDURE — 97129 THER IVNTJ 1ST 15 MIN: CPT

## 2023-03-16 PROCEDURE — 97110 THERAPEUTIC EXERCISES: CPT

## 2023-03-16 PROCEDURE — 97112 NEUROMUSCULAR REEDUCATION: CPT

## 2023-03-16 PROCEDURE — 92507 TX SP LANG VOICE COMM INDIV: CPT

## 2023-03-16 NOTE — FLOWSHEET NOTE
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 : 3/9/23 Neurosurgery  Visit# / total visits:   Cancels/No Shows: 0/0    Precautions: [place] - hears best in L ear; double vision when looking with both eyes     Subjective:   Pt notes no issues today. Denies any changes/falls since last visit.       Pain:  [] Yes  [x] No Location:  N/A Pain Rating: (0-10 scale) denies /10  Pain altered Tx:  [] No  [] Yes  Action:  Comments:    Objective:  INTERVENTIONS  INTERVENTIONS  Reps/ Time Weight/ Level Completed  Today Comments   Coordination ladder        Fwd 1 in ea 6x      Fwd 2 in ea 3x ea foot lead  x    Lateral 2 in ea  3x ea foot lead  x Trouble seeing with going to the L    Forward in & out  6x  x    Lateral in & out 3x ea foot lead  x    Retro 1 in ea 4x  x    Lateral 1 in ea 3x  x           Dual tasking        Ball bounces  4x50ft      Ball tosses  4x50ft    1 instance of loosing the ball    Altn bounce & toss  4x50ft   x    Walking ball toss to therapist  4x50ft   x    Walking with rotational passes  4x50ft    Passing btwn pt and therapist    March with same arm reach  4x50ft   x    March with opposite arm reach  4x50ft   x Moderate instability    Lateral Cross & uncross with arms same  2x50ft  ea  x    Lateral cross & uncross with arms opposite  2x50 ft ea  x    [place]  2x50ft   x    Forward kick with opposite arm reach  4x50ft  x    Backwards with ball toss  4x50ft   x           BITS     Standing on foam    Visual pursuit - rotat/multi color  20x2 Speed 7   Reaching with R and L   All red, all white, all blue    Visual pursuit - rotator/multi color  20x2 Speed 7   Reaching with R and L   red,white,blue    Charts - peripheral match  3x 10 sec  3:15 total time, 4 misses    Charts peripheral match 3x 6 sec  3:05 total time, 3 misses   Could challenge further in future with less target time          With balance platform  MED  Level 2    Static- complex array sequence 20x CF  56 seconds total   OPTO- static- complex array sequence 20x CF  1:16 seconds total, 50% CF    Dynamic balance- balance reaction, user paced  3x1 min   Trial 1: 6 // Trial 2: 6 // Trial 3: 5           Balance        Tandem on foam, EO 2x30s ea  x    Narrow on foam, VORx1, vert & horizontal 3x30s  ea  x    Twist w/ ball handing to therapist 10x ea dir Blue foam x    Squat w/ ball Blue foam 15 reps  x Touch floor then up over head eyes on ball throughout   Semi-tandem  w/ ball toss 10 reps x 2 ea  Blue foam x basketball          Other:    Patient Education:   -3/7: educated on doing march with opposite arm reach in place for home program      Specific Instructions for next treatment: alternating with walking/higher level balance; BITS --add more balance on foam working on core control. Rocker board, coordination, dual tasking        Assessment: [x] Progressing toward goals.   Continued with dynamic balance and gait training.  Tends to drift right with more challenging/coordination gait activities.  Increased difficulty with increased speed and complexity of patterns.  No LOB but multiple stopping and resetting when distracted or losses pattern.  Increased difficulty with balance on foam and dual tasking.  Only able to complete 10 reps of semi-tandem stance with ball toss before loosing balance.  More difficulty with alteral movements to the left while on coordination ladder due to double vision  when looking to left. No assist needed throughout, able to self correct mostly with stepping strategies and high guard when loosing balance    [] No change. [] Other:    [x] Patient would continue to benefit from skilled physical therapy services in order to: increase balance, improve vestibular system function, improve gait, and work on higher level mobility to allow him to continue to be active with his kids and potentially return to work. GOALS:   STG: (to be met in 8 treatments)  Pt will be able to navigate curbs, uneven surfaces, and inclines/declines IND  to ensure he can safely navigate the community & get outdoors more. Pt will increase gait speed to >/=  1.2 m/s IND to improve ability to navigate as a community ambulator & cross streets. Pt will score above 24/30 on functional gait assessment to decrease overall fall risk with community level mobility &  demonstrate improved functional mobility   Pt will improve time on 4 square step test to < 12 seconds to demonstrate improved coordination and change of directions to be able to navigate small spaces. Pt will improve balance on foam with vision limited to >15 seconds to demonstrate improved vestibular system function to decrease fall risk. LTG: (to be met in 16 treatments)  Pt will increase score on LEFI to >65/80 to demonstrate meaningful change in functional mobility to be able to increased mobility. Pt will be able to complete a motor task of balance task with a UE reaction time to <0.75s to demonstrate improved reaction time to be able to progress to driving. Pt will be able to coordinate UE & LE reciprocally to be able to climb a ladder to allow him to return to work. Pt will be independent with home program addressing strength, flexibility and balance to maximize gains made in therapy to improve overall functional capacity for mobility.     Pt will report no falls for x6 weeks demonstrating improved safety with mobility and implementation of fall prevention strategies. Pt. Education:  [] Yes  [x] No  [] Reviewed Prior HEP/Ed  Method of Education: [] Verbal  [] Demo  [] Written  Comprehension of Education:  [] Verbalizes understanding. [] Demonstrates understanding. [] Needs review. [] Demonstrates/verbalizes HEP/Ed previously given. Plan: [x] Continue per plan of care.    [] Other:      Treatment Charges: Mins Units   []  Modalities     []  Ther Exercise     []  Manual Therapy     []  Ther Activities     []  Aquatics     [x]  Neuromuscular 42 3   [] Vasocompression     [] Gait Training     [] Dry needling        [] 1 or 2 muscles        [] 3 or more muscles     []  Other     Total Treatment time 42 3     Time In: 6268         Time Out: 5288    Electronically signed by:  Frankey Quest, PTA

## 2023-03-16 NOTE — FLOWSHEET NOTE
[] [place]       Occupational Therapy            1st floor       [address] [place], [place]         Phone: [phone]       Fax: [phone] [] [facility]  [address] [place], [place]  Phone: [phone]  Fax: [phone]  [x] [facility] ([facility]) @ [facility]  [address] [facility] [address]  [place], [address] [place] [address]  Phone [phone]  Fax [phone]     Occupational Therapy Daily Treatment Note    Date:  3/16/2023  Patient Name:  Caity Martinez    :  1980  MRN: [id number]  Physician: Yudith Dee MD     Insurance: [address] [place] [address]  (48 visits combined PT/OT Sonya Nguyen) Mirela Friedman Benefit period  to 3/31  Diagnosis: No admission diagnoses are documented for this encounter. Intracranial hemorrhage (I62.9 - ICD -10 code) pontine hemorrhage (i61.3 - ICD-10 code)   Onset Date: 23   Next Dr. Pankaj Zhang: TBD  Visit# / total visits: ; Progress note for Medicare patient due at visit 8-9    Cancels/No Shows: 0/0      Subjective:    Pain:  [] Yes  [x] No Location:  N/A Pain Rating: (0-10 scale) 0/10  Pain altered Tx:  [x] No  [] Yes  Action:  Pt Comments: Pt reports he is doing well. He states he cont to have tingling in his R UE. Objective:  Modalities:  Precautions:  Exercises:  EXERCISE    REPS/     TIME  WEIGHT/    LEVEL COMMENTS Completed   Flexbar  Wrist pronation  Wrist supination 3x10 Green  X   Shoulder extension  3x10 7lbs  X   Row 3x10 35lbs  X   Tricep push down 3x10 55lbs  X   Gripping 3x10 50lbs  X   valpar 6 large nuts  6 small nuts   X   Sterognosis        Other:      Treatment Charges: Mins Units   []  Modalities:      []  Ultrasound     [x]  Ther Exercise 35 2   []  Manual Therapy     []  Ther Activities     []  Orthotic fit/train     []  Orthotic recheck     []  Other     Total Treatment time 35          Assessment: [x] Progressing toward goals. Pt is doing well w/ OT. Completed UE strengthening.  Pt tolerated this well w/ min report of fatigue in R UE. Completed [address] exercises and stereognosis w/  87% correctness w/ identifying objects w/ R hand. Pt tolerated tx well on this date. [] No change. [] Other     [x] Patient would continue to benefit from skilled occupational therapy services in order to: Occupational Therapy is both indicated and medically necessary as outlined in the POC to increase the likelihood of meeting the functionally related goals stated below. STG/LTG  Short Term Goals  (10 visits)  Pt will demo & report modified independence with rt UE HEP   Pt will demo  & report improving rt UE manual dexterity with home activities (tying shoes). Pt will complete MROM placing test 30 seconds quicker than eval   Pt will demo improved rt hand fine motor dexterity by completing 9 hole peg test by 10 seconds   Increase rt hand tip pinchx by 2# for fine motor ADLs     Long Term Goals   (20 visits)   Using the UEFI Pt will increase total score by 10 points using rt UE. Pt will reports less difficulty by 1 level  using rt UE to do hobbies, meal prep/cut/peel food, vacuum sweep, get dressed, manipulate buttons, tie shoe laces, toss ball, clean, do housework. Increase rt UE strength to 4/5 muscle grade (shoulder - flexors, abductors, IR, ER0, & hand extensors to improve strength for housework, cleaning, ball toss. Compared to eval increase rt hand manual dexterity- pt will complete MROM placing test 45 seconds quicker using rt hand. Compared to eval pt will report improved rt hand fine motor skills with daily activities. Pt will complete 9 hole peg test 20 seconds quicker than eval.   Compared to eval increase rt  hand tip pinch by 4# to improve pinch for fine motor ADLs. Pt will demo 75% accuracy with stereognosis activities using rt hand         Pt.  Education:  [] Yes  [x] No  [] Reviewed Prior HEP/Ed  Method of Education: [] Verbal  [] Demo  [] Written  Re:  Comprehension of Education:  [] Verbalizes understanding. [] Demonstrates understanding. [] Needs review. [] Demonstrates/verbalizes HEP/Ed previously given. Plan: [x] Continue current frequency toward short and long term goals.   [x] Specific Instructions for subsequent treatments: cont [facility]   [] Other:       Time In: 1330  Time Out: 2130        Electronically signed by:  MASON Ngo/MINDY

## 2023-03-16 NOTE — PROGRESS NOTES
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 Labial retraction and protrusion-12x  Lingual elevation, depression, lateralization 12x. Pt with significantly improved L side facial movement and decreased droop. Goal 2: Patient will complete verbalizations of increasingly complexity with 100% intelligibility with use of dysarthria compensations. Conversational speech tasks: pt with 85-90% speech with min cues to slow rate of speech down. Goal 3: Patient will complete higher level/complex naming tasks to increase word retrieval with 90% accuracy. Word finding in conversational speech word finding: min cues. Educated patient on word finding strategies including semantic mapping and      Goal 4: Patient will complete recall tasks with and without delay with 90% accuracy  Recall of recent events over the weekend: pt required verbal reminder of what appts he had as well as exercises completed over the weekend. Goal 5: Patient will complete math/$/time tasks with 90% accuracy  Functional math problem solving involving time: 85% accuracy.     Other: NA    Plan:  [x] Continue ST services    [] Discharge from :        Treatment completed by: Peng Montgomery M.A.  CCC-SLP

## 2023-03-21 ENCOUNTER — APPOINTMENT (OUTPATIENT)
Dept: OCCUPATIONAL THERAPY | Age: 43
End: 2023-03-21
Payer: COMMERCIAL

## 2023-03-21 ENCOUNTER — APPOINTMENT (OUTPATIENT)
Dept: SPEECH THERAPY | Age: 43
End: 2023-03-21
Payer: COMMERCIAL

## 2023-03-21 ENCOUNTER — APPOINTMENT (OUTPATIENT)
Dept: PHYSICAL THERAPY | Age: 43
End: 2023-03-21
Payer: COMMERCIAL

## 2023-03-21 PROCEDURE — 97129 THER IVNTJ 1ST 15 MIN: CPT

## 2023-03-21 PROCEDURE — 92507 TX SP LANG VOICE COMM INDIV: CPT

## 2023-03-21 PROCEDURE — 97110 THERAPEUTIC EXERCISES: CPT

## 2023-03-21 PROCEDURE — 97112 NEUROMUSCULAR REEDUCATION: CPT

## 2023-03-21 ASSESSMENT — 9 HOLE PEG TEST
TEST_RESULT: IMPAIRED
TESTTIME_SECONDS: 48

## 2023-03-21 NOTE — FLOWSHEET NOTE
[facility]   [facility]   Phone: [phone]   Fax: [phone]    Physical Therapy Daily Treatment Note    Date:  3/21/2023  Patient Name:  Herb Minor    :  1980  MRN: [id number]  Physician: Darby Luu MD                               Insurance: Patrick Coon -- 61 visits combined -3/31  Medical Diagnosis:   I62.9 (ICD-10-CM) - Intracranial hemorrhage ([address]   I61.3 (ICD-10-CM) - Pontine hemorrhage ([address]                 Rehab Codes: R26.89 gait abnormality, R27.8 lack of coordination  Date of symptom onset: 23  Next 's appt. : 3/9/23 Neurosurgery  Visit# / total visits:   Cancels/No Shows: 0/0    Precautions: [place] - hears best in L ear; double vision when looking with both eyes     Subjective:   Pt notes he is doing better with vision and balance.  He feels only getting double vision with looking too far to the L.     Pain:  [] Yes  [x] No Location:  N/A Pain Rating: (0-10 scale) denies /10  Pain altered Tx:  [] No  [] Yes  Action:  Comments:    Objective:  INTERVENTIONS  INTERVENTIONS  Reps/ Time Weight/ Level Completed  Today Comments   Coordination ladder        Fwd 1 in ea 6x      Fwd 2 in ea 3x ea foot lead      Lateral 2 in ea  3x ea foot lead   Trouble seeing with going to the L    Forward in & out  6x      Lateral in & out 3x ea foot lead      Retro 1 in ea 4x      Lateral 1 in ea 3x             Dual tasking        Ball bounces  4x50ft      Ball tosses  4x50ft    1 instance of loosing the ball    Altn bounce & toss  4x50ft       Walking ball toss to therapist  4x50ft       Walking with rotational passes  4x50ft    Passing btwn pt and therapist    March with same arm reach  4x50ft       March with opposite arm reach  4x50ft    Moderate instability    Lateral Cross & uncross with arms same  2x50ft  ea      Lateral cross & uncross with arms opposite  2x50 ft ea      [place]  2x50ft       Forward kick with opposite arm

## 2023-03-21 NOTE — PROGRESS NOTES
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 Labial retraction and protrusion-12x  Lingual elevation, depression, lateralization 12x. Pt with continued significantly improved L side facial movement and decreased droop. Goal 2: Patient will complete verbalizations of increasingly complexity with 100% intelligibility with use of dysarthria compensations. Conversational speech tasks: pt with 85-90% speech with min cues to slow rate of speech down. Goal 3: Patient will complete higher level/complex naming tasks to increase word retrieval with 90% accuracy. Word finding in conversational speech word finding: min cues. Educated patient on word finding strategies including semantic mapping and      Goal 4: Patient will complete recall tasks with and without delay with 90% accuracy  NT    Goal 5: Patient will complete math/$/time tasks with 90% accuracy  Functional math problem solving involving time: 85% accuracy. GOAL 6 ADDED: Patient will complete sustained/alternating attention tasks with focus on vision with 90% accuracy. Patient given a sequence of 4 letters (Rosalba Campbell).   Pt required to find

## 2023-03-21 NOTE — PROGRESS NOTES
attempt to increase his tolerance as well as to practice mouse/keyboard skills. OT also introduced some games for phone/table that could work on increasing tolerance for screen time, visual deficits, and FM skills. Pt verbalized that he could see himself engaging in these games and spouse supportive of acquiring a tablet for him to do so. Overall pt tolerated treatment well with no c/o pain or fatigue. Performance deficits / Impairments: Decreased strength, Decreased high-level IADLs, Decreased vision/visual deficit, Decreased sensation, Decreased fine motor control, Decreased coordination    Post-Treatment Pain Level:      Prognosis: Good    Activity Tolerance: Patient tolerated treatment well       Barriers impacting rehab : Medical co-morbidities (Visual deficits)  Measures taken to address barrier(s): See Patient Education, Education to caregiver     GOALS   Patient Goals   Patient goals : To improve functioning (coordination) rt arm, be able to RTW. Short Term Goals Completed by   Current Status Goal Status   Pt will demo & report modified independence with rt UE HEP       2. Pt will demo  & report improving rt UE manual dexterity with home activities (tying shoes). Pt will complete MROM placing test 30 seconds quicker than eval       3. Pt will demo improved rt hand fine motor dexterity by completing 9 hole peg test by 10 seconds       4. Increase rt hand tip pinch by 2# for fine motor ADLs. Long Term Goals Completed by 20 visits Current Status Goal Status   Using the UEFI Pt will increase total score by 10 points using rt UE. Pt will reports less difficulty by 1 level  using rt UE to do hobbies, meal prep/cut/peel food, vacuum sweep, get dressed, manipulate buttons, tie shoe laces, toss ball, clean, do housework. 2. Increase rt UE strength to 4/5 muscle grade (shoulder - flexors, abductors, IR, ER0, & hand extensors to improve strength for housework, cleaning, ball toss.        3.

## 2023-03-22 RX ORDER — ERGOCALCIFEROL 1.25 MG/1
CAPSULE ORAL
Qty: 5 CAPSULE | Refills: 0 | OUTPATIENT
Start: 2023-03-22

## 2023-03-23 ENCOUNTER — APPOINTMENT (OUTPATIENT)
Dept: SPEECH THERAPY | Age: 43
End: 2023-03-23
Payer: COMMERCIAL

## 2023-03-23 ENCOUNTER — APPOINTMENT (OUTPATIENT)
Dept: PHYSICAL THERAPY | Age: 43
End: 2023-03-23
Payer: COMMERCIAL

## 2023-03-23 ENCOUNTER — APPOINTMENT (OUTPATIENT)
Dept: OCCUPATIONAL THERAPY | Age: 43
End: 2023-03-23
Payer: COMMERCIAL

## 2023-03-23 PROCEDURE — 97129 THER IVNTJ 1ST 15 MIN: CPT

## 2023-03-23 PROCEDURE — 97530 THERAPEUTIC ACTIVITIES: CPT

## 2023-03-23 PROCEDURE — 97110 THERAPEUTIC EXERCISES: CPT

## 2023-03-23 PROCEDURE — 92507 TX SP LANG VOICE COMM INDIV: CPT

## 2023-03-23 PROCEDURE — 97112 NEUROMUSCULAR REEDUCATION: CPT

## 2023-03-23 NOTE — PROGRESS NOTES
seconds  OT Exercise 11: BTE tool 162 rt hand pinching T = 50 inlb 90 seconds, BTE tool 162 rt hand gripping T = 60 inlb 90 seconds  OT Exercise 13: 2# pyloball ball: bilateral UE 2 sets 10x (raise ball up/down for shoulder flex/extension, shoulder horizontal abduction/adduction)  OT Exercise 14: Green Flexbar : bilateral UE   2 sets x15 reps( make  U, upside-down U)  OT Exercise 17: bilateral UE with 2# dumbbell 2 sets 10x  (alternate rt/lt UE shoulder flex/extension, alternate rt/lt UE shoulder horizontal abduction/adduction)    Limitations addressed: Strength, Coordination  Limitations addressed: ROM, FMC, visual impairments/double vision  Therapist provided: Verbal cuing  Progressed: Complexity of movement, Resistance, Repetitions  Progressed: Bilteral UE strengthening with alternating motions. Progressed to 2# pyloball for UE exercises. Increase reps theraband flexbar                     Patient Education: Specific Instructions for Next Treatment: take measurements next visit       ASSESSMENT     Assessment: Assessment: Pt states he cooks a lot at home. OT tx focus on therapeutic exercises to improve rt UE gross motor & fine motor coordination, & strength. Progressed to alternating UE exercises with wts, & pyloball exercises to improve strength & coordination. Set up for UE strengthening exercises on BTE. Pt manipulates small nuts using rt hand taking increase time. Pt takes rest break when rt arm tires See OT exercises for details. Pt will benefit from continued skilled OT to facilitate increase rt UE strength,coordination, sensory awareness rt hand, to increase his independence & decrease difficulty using rt UE for ADLs.   Performance deficits / Impairments: Decreased strength, Decreased high-level IADLs, Decreased vision/visual deficit, Decreased sensation, Decreased fine motor control, Decreased coordination    Post-Treatment Pain Level:  0    Prognosis: Good    Activity Tolerance: Patient tolerated

## 2023-03-23 NOTE — PROGRESS NOTES
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 Labial retraction and protrusion-12x  Lingual elevation, depression, lateralization 12x. Pt with continued significantly improved L side facial movement and decreased droop. Tolerated NME's duration of session. Goal 2: Patient will complete verbalizations of increasingly complexity with 100% intelligibility with use of dysarthria compensations. Conversational speech tasks: pt with 85-90% speech with min cues to slow rate of speech down. Goal 3: Patient will complete higher level/complex naming tasks to increase word retrieval with 90% accuracy. Word finding in conversational speech word finding: min cues. Educated patient on word finding strategies including semantic mapping and      Goal 4: Patient will complete recall tasks with and without delay with 90% accuracy  Patient given 12 pictures to arrange and organize into like categories. Pt able to organize and categorize with 100% accuracy.   Delayed recall (2 minute) Pt able to recall 12/12 pictures    Goal 5: Patient will complete math/$/time tasks with 90% accuracy  Pt completed selecting

## 2023-03-28 ENCOUNTER — APPOINTMENT (OUTPATIENT)
Dept: SPEECH THERAPY | Age: 43
End: 2023-03-28
Payer: COMMERCIAL

## 2023-03-28 ENCOUNTER — APPOINTMENT (OUTPATIENT)
Dept: PHYSICAL THERAPY | Age: 43
End: 2023-03-28
Payer: COMMERCIAL

## 2023-03-28 ENCOUNTER — APPOINTMENT (OUTPATIENT)
Dept: OCCUPATIONAL THERAPY | Age: 43
End: 2023-03-28
Payer: COMMERCIAL

## 2023-03-28 PROCEDURE — 97530 THERAPEUTIC ACTIVITIES: CPT

## 2023-03-28 PROCEDURE — 97112 NEUROMUSCULAR REEDUCATION: CPT

## 2023-03-28 PROCEDURE — 97129 THER IVNTJ 1ST 15 MIN: CPT

## 2023-03-28 PROCEDURE — 97110 THERAPEUTIC EXERCISES: CPT

## 2023-03-28 PROCEDURE — 92507 TX SP LANG VOICE COMM INDIV: CPT

## 2023-03-28 ASSESSMENT — 9 HOLE PEG TEST
TESTTIME_SECONDS: 23
TESTTIME_SECONDS: 42

## 2023-03-28 NOTE — FLOWSHEET NOTE
Walking with rotational passes  4x50ft    Passing btwn pt and therapist    Balance beam F/B  5 passes   Scar for backwards    Lateral+ squat on balance beam 5 passes    CGA           March with same arm reach  4x30ft    Cues for eyes fwd    March with opposite arm reach  6x30ft    Moderate instability    Lateral Cross & uncross with arms same  3x30ft  ea      Lateral cross & uncross with arms opposite  3x30 ft ea      Soccer dribble  6x30ft       [place]  2x50ft       Forward kick with opposite arm reach  4x50ft      Backwards with ball toss  4x50ft              BITS     Standing on foam    With balance platform  MED  Level 2    Static- complex array sequence (Reverse) 20x 2 CF  50 seconds total-- 50% CF   40 seconds -- 100 %CF    OPTO- static- complex array sequence (reverse 20x2 CF  1:37 seconds total, 100% CF   52 seconds   Dynamic balance- balance reaction, user paced  3x1 min   Trial 1: 4 // Trial 2: 5 // Trial 3: 5          Balance        Tandem on foam, EO 2x30s ea  x    Balance on foam with head turns: H & V  30s EO   2x30s  EC   x Moderate instability but able to control with EC    Narrow on foam, VORx1, vert & horizontal 3x30s  ea   Moderate  instability    Rocker board taps F/B & S/S 2x30 ea      Rocker board holds F/B & S/S 2x30s ea      Twist w/ ball handing to therapist 10x ea dir Blue foam     Squat w/ overhead press on foam 10x EO  10x EC   x Touch floor then up over head eyes on ball throughout   Semi-tandem on foam with ball bounce 20x ea  Blue foam x basketball          Other:    Patient Education:   -3/7: educated on doing march with opposite arm reach in place for home program      Outcome Measures 2/27/23   3/28/23    PACHECO       5xSTS       10mWT    6.82s >> 1.46 m/s    4 square step test 12.5s   8.82s    TUG        FGA 18/30 27/30   Balance on foam with EC    MET         Specific Instructions for next treatment: alternating with walking/higher level balance; BITS --add more balance on

## 2023-03-28 NOTE — PROGRESS NOTES
Continue ST services    [] Discharge from ST:      Treatment completed by: Ariana Odonnell M.A.  CCC-SLP

## 2023-03-28 NOTE — PROGRESS NOTES
housework, cleaning, ball toss. Met shoulder (flexion, IR & ER). Goal Partially Met /goal ongoing shoulder abductors. Not Met /boal ongoing hand extensors. Met, Partially met, Not Met, In progress   3. Compared to eval increase rt hand manual dexterity- pt will complete MROM placing test 45 seconds quicker using rt hand. In progress   4. Compared to eval pt will report improved rt hand fine motor skills with daily activities. Pt will complete 9 hole peg test 20 seconds quicker than eval.   In progress   5. Compared to eval increase rt  hand tip pinch by 4# to improve pinch for fine motor ADLs. In progress     6. Pt will demo 75% accuracy with stereognosis activities using rt hand            Not tested today.                 TREATMENT PLAN   REQUIRES OT FOLLOW-UP: Yes  Type: Outpatient  Plan  Plan Frequency: 2-3 x/week  Plan Weeks: 20 visits  Current Treatment Recommendations: Strengthening, ROM, Neuromuscular re-education, Patient/Caregiver education & training, Coordination training (sensory)  Additional Comments: continue OT       Therapy Time  Individual Time In: 1013  Individual Time Out: 1100  Minutes: 47  Timed Code Treatment Minutes: 37 Minutes       Electronically signed by Mili Arzola OT  on 3/28/2023 at 11:24 AM       Treatment Charges: Mins Units Time In/Out   [] Evaluation       []  Low       []  Moderate       []  High      []  Electrical Stim      []  Iontophoresis      []  Paraffin      []  Ultrasound        []  Loletta Loosen      []  Neuromuscular Re-ed      []  ADL      [x]  Ther Exercise 37 2 10:23-11:00   []  Ther Activities      []  Neuro Re-Ed      []  Splinting      [x]  measurements  10 0 10:13-10:23   Total  time 47 min           POC NOTE

## 2023-03-30 ENCOUNTER — APPOINTMENT (OUTPATIENT)
Dept: OCCUPATIONAL THERAPY | Age: 43
End: 2023-03-30
Payer: COMMERCIAL

## 2023-03-30 ENCOUNTER — APPOINTMENT (OUTPATIENT)
Dept: SPEECH THERAPY | Age: 43
End: 2023-03-30
Payer: COMMERCIAL

## 2023-03-30 ENCOUNTER — APPOINTMENT (OUTPATIENT)
Dept: PHYSICAL THERAPY | Age: 43
End: 2023-03-30
Payer: COMMERCIAL

## 2023-03-30 PROCEDURE — 97110 THERAPEUTIC EXERCISES: CPT

## 2023-03-30 PROCEDURE — 97112 NEUROMUSCULAR REEDUCATION: CPT

## 2023-03-30 NOTE — FLOWSHEET NOTE
Needs review. [] Demonstrates/verbalizes HEP/Ed previously given. Plan: [x] Continue per plan of care.    [] Other:      Treatment Charges: Mins Units   []  Modalities     []  Ther Exercise     []  Manual Therapy     []  Ther Activities     []  Aquatics     [x]  Neuromuscular 40 3   [] Vasocompression     [] Gait Training     [] Dry needling        [] 1 or 2 muscles        [] 3 or more muscles     []  Other     Total Treatment time 40 3     Time In:  418 AM     Time Out: 3407 AM    Electronically signed by:  Flora Will PTA

## 2023-03-30 NOTE — PROGRESS NOTES
rt UE reaching in all directions; touch 26 alphabet letters in order ( 86.67 accuracy in 55 seconds), patterns ),tracing over lines : zigzag, curved (2minutes 74.41 % accuracy (4 patterns)-2  minutes 71.7 %  accuracy  (6 patterns)), connect dots to copy geometric patterns (1-12) - pt stands  &  uses rt UE with 2# wrist wt. OT Exercise 8: Rd judahezer dexterity - place/remove 25 pegs  OT Exercise 20: small colored pegs : using rt hand place pegs every other hole (25 pegs) then put marbles on top, remove marbles & pegs  & hold in rt hand (pt hold 15 marblesm hold 10 marbles)    Limitations addressed: Strength, Coordination  Limitations addressed: ROM, FMC, visual impairments/double vision  Therapist provided: Verbal cuing  Progressed: Complexity of movement, Resistance  Progressed: BITS with pt using 2# wrist wt for rt UE coordination                     Patient Education:  No exercises added to HEP. ASSESSMENT     Assessment: Assessment: Pt reports that he continues to do cooking at home. OT tx focus on rt hand sensory stereognosis & rt UE coordination (gross motor & fine motor) using the BITSwith 2# wrist wt on rt UE for strengthening, & small object manipulation. Pt has increase accuracy identifying objects using rt hand for stereognosis. See OT exercises for details. Pt will benefit from continued skilled OT to facilitate increase rt UE strength, fine motor & manual coordination, sensory awareness rt hand, to increase his independence & decrease difficulty using rt UE for ADLs/IADLs. Performance deficits / Impairments: Decreased strength, Decreased high-level IADLs, Decreased vision/visual deficit, Decreased sensation, Decreased fine motor control, Decreased coordination    Post-Treatment Pain Level:   no pain     Prognosis: Good    Activity Tolerance: Patient tolerated treatment well             GOALS   Patient Goals   Patient goals : To improve functioning (coordination) rt arm, be able to RTW.     Short

## 2023-04-04 ENCOUNTER — HOSPITAL ENCOUNTER (OUTPATIENT)
Dept: PHYSICAL THERAPY | Age: 43
Setting detail: THERAPIES SERIES
Discharge: HOME | End: 2023-04-04
Payer: COMMERCIAL

## 2023-04-04 ENCOUNTER — HOSPITAL ENCOUNTER (OUTPATIENT)
Dept: SPEECH THERAPY | Age: 43
Setting detail: THERAPIES SERIES
Discharge: HOME | End: 2023-04-04
Payer: COMMERCIAL

## 2023-04-04 PROCEDURE — 97129 THER IVNTJ 1ST 15 MIN: CPT

## 2023-04-04 PROCEDURE — 97530 THERAPEUTIC ACTIVITIES: CPT

## 2023-04-04 PROCEDURE — 92507 TX SP LANG VOICE COMM INDIV: CPT

## 2023-04-04 PROCEDURE — 97112 NEUROMUSCULAR REEDUCATION: CPT

## 2023-04-04 NOTE — PROGRESS NOTES
with 90% accuracy. Min cues for redirection on this date. Other: NA    Plan:  [x] Continue ST services    [] Discharge from ST:      Treatment completed by: Sandra Cook M.A., CCC-SLP

## 2023-04-04 NOTE — FLOWSHEET NOTE
[facility]   [address]   Phone: [phone]   Fax: [phone]    Physical Therapy Daily Treatment Note    Date:  2023  Patient Name:  Juan Jose Fry    :  1980  MRN: [id number]  Physician: Yoel Hunt MD                               Insurance: Lolis Herring -- 60 visits combined -3/31  Medical Diagnosis:   I62.9 (ICD-10-CM) - Intracranial hemorrhage ([address]   I61.3 (ICD-10-CM) - Pontine hemorrhage ([facility] [address]                 Rehab Codes: R26.89 gait abnormality, R27.8 lack of coordination  Date of symptom onset: 23  Next 's appt.: Camryn Hall 23  Visit# / total visits: 10/16  Cancels/No Shows: 0/0    Precautions: [place] - hears best in L ear; double vision when looking with both eyes     Subjective:   No new complaints since last session. He notes R arm has been painful as he felt he slept on it. Pt notes he was busy with cleaning out his garage.       Pain:  [x] Yes  [] No Location: R pec Pain Rating: (0-10 scale) 0 /10 at rest, more pain at night but unable to replicate   Pain altered Tx:  [] No  [] Yes  Action:  Comments:    Objective:  INTERVENTIONS  INTERVENTIONS  Reps/ Time Weight/ Level Completed  Today Comments   Coordination ladder        Fwd 1 in ea 6x      Fwd 2 in ea 3x ea foot lead      Lateral 2 in ea  3x ea foot lead   Trouble seeing with going to the L    Forward in & out  6x      Lateral in & out 3x ea foot lead      Retro 1 in ea 4x      Lateral 1 in ea 3x             Work tasks               Circuit 2  3x 30s  Fatigue: 6/10    Ladder climb    x    High step ups   10 in x    Modified plank row R & L   8#  x           Circuit 1  3 x 30 sec  Fatigue: 6/10    Squat with overhead press  8# x    Sled push   35#  x    [place] carry R   15# KB x    [place] carry L   15# KB x           Dual tasking        Ball bounces  4x50ft      Ball tosses  4x50ft    1 instance of loosing the ball    Altn bounce & toss  4x50ft

## 2023-04-13 ENCOUNTER — APPOINTMENT (OUTPATIENT)
Dept: SPEECH THERAPY | Age: 43
End: 2023-04-13
Payer: COMMERCIAL

## 2023-04-13 ENCOUNTER — APPOINTMENT (OUTPATIENT)
Dept: PHYSICAL THERAPY | Age: 43
End: 2023-04-13
Payer: COMMERCIAL

## 2023-04-13 ENCOUNTER — APPOINTMENT (OUTPATIENT)
Dept: OCCUPATIONAL THERAPY | Age: 43
End: 2023-04-13
Payer: COMMERCIAL

## 2023-04-18 ENCOUNTER — HOSPITAL ENCOUNTER (OUTPATIENT)
Dept: PHYSICAL THERAPY | Age: 43
Setting detail: THERAPIES SERIES
Discharge: HOME | End: 2023-04-18
Payer: COMMERCIAL

## 2023-04-18 ENCOUNTER — HOSPITAL ENCOUNTER (OUTPATIENT)
Dept: SPEECH THERAPY | Age: 43
Setting detail: THERAPIES SERIES
Discharge: HOME | End: 2023-04-18
Payer: COMMERCIAL

## 2023-04-18 ENCOUNTER — HOSPITAL ENCOUNTER (OUTPATIENT)
Dept: OCCUPATIONAL THERAPY | Age: 43
Setting detail: THERAPIES SERIES
Discharge: HOME | End: 2023-04-18
Payer: COMMERCIAL

## 2023-04-18 PROCEDURE — 97110 THERAPEUTIC EXERCISES: CPT

## 2023-04-18 PROCEDURE — 92507 TX SP LANG VOICE COMM INDIV: CPT

## 2023-04-18 PROCEDURE — 97129 THER IVNTJ 1ST 15 MIN: CPT

## 2023-04-18 PROCEDURE — 97112 NEUROMUSCULAR REEDUCATION: CPT

## 2023-04-18 ASSESSMENT — 9 HOLE PEG TEST: TESTTIME_SECONDS: 33

## 2023-04-18 NOTE — FLOWSHEET NOTE
[address] [facility] Outpatient Physical Therapy   [address]   Phone: [phone]   Fax: [phone]    Physical Therapy Daily Treatment Note    Date:  2023  Patient Name:  Urbano Francisco    :  1980  MRN: [id number]  Physician: Margret Cadena MD                               Insurance: Angelique Dean -- 60 visits combined -3/31  Medical Diagnosis:   I62.9 (ICD-10-CM) - Intracranial hemorrhage ([address]   I61.3 (ICD-10-CM) - Pontine hemorrhage ([facility] [address]                 Rehab Codes: R26.89 gait abnormality, R27.8 lack of coordination  Date of symptom onset: 23  Next 's appt.: Brittney Ramsey 23  Visit# / total visits:   Cancels/No Shows: 0/0    Precautions: [place] - hears best in L ear; double vision when looking with both eyes     Subjective:   Pt arrives noting no changes since last session. He notes he has questions about his schedule. He is to see Dr. Brittney Ramsey tomorrow. Still having difficulty with vision and coordination with his R hand to nail and screw things in his chicken coop.      Pain:  [] Yes  [x] No Location: none  Pain Rating: (0-10 scale) 0 /10 at rest  Pain altered Tx:  [] No  [] Yes  Action:  Comments:    Objective:  INTERVENTIONS  INTERVENTIONS  Reps/ Time Weight/ Level Completed  Today Comments   Coordination ladder        Fwd 1 in ea 6x      Fwd 2 in ea 3x ea foot lead      Lateral 2 in ea  3x ea foot lead   Trouble seeing with going to the L    Forward in & out  6x      Lateral in & out 3x ea foot lead      Retro 1 in ea 4x      Lateral 1 in ea 3x             Work tasks               Circuit 2  3x 30s  Fatigue: 6/10    Ladder climb        High step ups   10 in     Modified plank row R & L   8#             Circuit 1  3 x 45sec  Fatigue: 5/10    Squat with high pull  15#     Sled pull retro  50#     [place] carry R - arm at 90/90  15# KB     [place] carry L - arm at 90/90  15# KB            Dual tasking        Balance beam Fwd with toe tap 3 Laps   Scar for

## 2023-04-18 NOTE — PROGRESS NOTES
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 Ongoing as home exercise program.  Pt independent with exercises. Tolerated NME's duration of session. Pt with increased feeling in facial muscles, significantly improved facial symmetry. Pt only able to tolerate 3.0 mA. Goal 2: Patient will complete verbalizations of increasingly complexity with 100% intelligibility with use of dysarthria compensations. Conversational speech tasks: pt with 95% speech with min cues to slow rate of speech down. Goal 3: Patient will complete higher level/complex naming tasks to increase word retrieval with 90% accuracy. GOAL MET    Goal 4: Patient will complete recall tasks with and without delay with 90% accuracy  NT    Goal 5: Patient will complete math/$/time tasks with 90% accuracy  NT    GOAL 6:  Patient will complete sustained/alternating attention tasks with focus on vision with 90% accuracy. Min cues for redirection on this date. Other: NA    Plan:  [x] Continue ST services    [] Discharge from ST:      Treatment completed by: Carlee Thomas M.A., CCC-SLP

## 2023-04-18 NOTE — PROGRESS NOTES
To improve functioning (coordination) rt arm, be able to RTW. Short Term Goals Completed by 10 visits Current Status Goal Status   Pt will demo & report modified independence with rt UE HEP   Met   2. Pt will demo  & report improving rt UE manual dexterity with home activities (tying shoes). Pt will complete MROM placing test 30 seconds quicker than eval Goal Met tying shoes - pt reports a little difficulty now. Goal Met -MROM placing test with pt 40 seconds quicker than eval. Met   3. Pt will demo improved rt hand fine motor dexterity by completing 9 hole peg test by 10 seconds Pt 21 seconds quicker than eval. Met   4. Increase rt hand tip pinch by 2# for fine motor ADLs. Increase tip pinch  average by 3.66# compared to eval. Met     Long Term Goals Completed by 20 visits Current Status Goal Status   Using the UEFI Pt will increase total score by 10 points using rt UE. Pt will reports less difficulty by 1 level  using rt UE to do hobbies, meal prep/cut/peel food, vacuum sweep, get dressed, manipulate buttons, tie shoe laces, toss ball, clean, do housework. 3- Pt will increase UEFI total score by 10 points compared to today & report less difficulty with daily home activities. UEFI total score is 68 out of possible 80 points. Pt had 14 point increase in score compared to eval (that goal Met) . Pt had 3 point increase compared to March 28,2023 - the updated  goal Partially Met Partially met, Met   2. Increase rt UE strength to 4/5 muscle grade (shoulder - flexors, abductors, IR, ER), & hand extensors to improve strength for housework, cleaning, ball toss. Goal Met shoulder (flexion,abduction, IR & ER). Not Met /Goal ongoing rt hand extensor strength for home activities. Met, Not Met, In progress   3. Compared to eval increase rt hand manual dexterity- pt will complete MROM placing test 45 seconds quicker using rt hand. MROM placing test with pt 40 seconds quicker than eval. Partially met, In progress   4.

## 2023-04-19 ENCOUNTER — OFFICE VISIT (OUTPATIENT)
Dept: PHYSICAL MEDICINE AND REHAB | Age: 43
End: 2023-04-19
Payer: COMMERCIAL

## 2023-04-19 VITALS
HEIGHT: 72 IN | TEMPERATURE: 97.9 F | BODY MASS INDEX: 29.42 KG/M2 | HEART RATE: 73 BPM | SYSTOLIC BLOOD PRESSURE: 161 MMHG | DIASTOLIC BLOOD PRESSURE: 110 MMHG | WEIGHT: 217.2 LBS

## 2023-04-19 DIAGNOSIS — I61.3 PONTINE HEMORRHAGE (HCC): Primary | ICD-10-CM

## 2023-04-19 PROCEDURE — 99212 OFFICE O/P EST SF 10 MIN: CPT | Performed by: PHYSICAL MEDICINE & REHABILITATION

## 2023-04-19 RX ORDER — AMOXICILLIN AND CLAVULANATE POTASSIUM 875; 125 MG/1; MG/1
1 TABLET, FILM COATED ORAL DAILY
COMMUNITY
Start: 2023-04-06

## 2023-04-19 NOTE — PROGRESS NOTES
generated using voice recognition Dragon dictation software. Although everyeffort was made to ensure the accuracy of this automated transcription, some errorsin transcription may have occurred.

## 2023-04-20 ENCOUNTER — APPOINTMENT (OUTPATIENT)
Dept: SPEECH THERAPY | Age: 43
End: 2023-04-20
Payer: COMMERCIAL

## 2023-04-20 ENCOUNTER — HOSPITAL ENCOUNTER (OUTPATIENT)
Dept: OCCUPATIONAL THERAPY | Age: 43
Setting detail: THERAPIES SERIES
Discharge: HOME | End: 2023-04-20
Payer: COMMERCIAL

## 2023-04-20 PROCEDURE — 97110 THERAPEUTIC EXERCISES: CPT

## 2023-04-20 NOTE — PROGRESS NOTES
Vicki Bermeo      []  Neuromuscular Re-ed      []  ADL      [x]  Ther Exercise 46 3    []  Ther Activities      []  Neuro Re-Ed      []  Splinting      []  Other      Total Treatment time 46 min 3          POC NOTE

## 2023-04-24 DIAGNOSIS — I61.3 PONTINE HEMORRHAGE (HCC): Primary | ICD-10-CM

## 2023-04-25 ENCOUNTER — HOSPITAL ENCOUNTER (OUTPATIENT)
Dept: PHYSICAL THERAPY | Age: 43
Setting detail: THERAPIES SERIES
Discharge: HOME | End: 2023-04-25
Payer: COMMERCIAL

## 2023-04-25 ENCOUNTER — HOSPITAL ENCOUNTER (OUTPATIENT)
Dept: SPEECH THERAPY | Age: 43
Setting detail: THERAPIES SERIES
Discharge: HOME | End: 2023-04-25
Payer: COMMERCIAL

## 2023-04-25 ENCOUNTER — HOSPITAL ENCOUNTER (OUTPATIENT)
Dept: OCCUPATIONAL THERAPY | Age: 43
Setting detail: THERAPIES SERIES
Discharge: HOME | End: 2023-04-25
Payer: COMMERCIAL

## 2023-04-25 PROCEDURE — 97110 THERAPEUTIC EXERCISES: CPT

## 2023-04-25 PROCEDURE — 92507 TX SP LANG VOICE COMM INDIV: CPT

## 2023-04-25 PROCEDURE — 97112 NEUROMUSCULAR REEDUCATION: CPT

## 2023-04-25 PROCEDURE — 97129 THER IVNTJ 1ST 15 MIN: CPT

## 2023-04-25 NOTE — FLOWSHEET NOTE
[facility] Outpatient Physical Therapy   [address]   Phone: [phone]   Fax: [phone]    Physical Therapy Daily Treatment Note    Date:  2023  Patient Name:  Nabeel Keyes    :  1980  MRN: [id number]  Physician: Amrit Davis MD                               Insurance: [facility] -- 60 visits combined -3/31  Medical Diagnosis:   I62.9 (ICD-10-CM) - Intracranial hemorrhage ([facility] [address]   I61.3 (ICD-10-CM) - Pontine hemorrhage ([facility] [address]                 Rehab Codes: R26.89 gait abnormality, R27.8 lack of coordination  Date of symptom onset: 23  Next 's appt.: Raisa Castro 23  Visit# / total visits:   Cancels/No Shows: 0/0    Precautions: [place] - hears best in L ear; double vision when looking with both eyes     Subjective:   Pt denies any issues in the past week and denies any falls. Notes wanting to limited squatting today due to stomach hurting.     Pain:  [] Yes  [x] No Location: none  Pain Rating: (0-10 scale) 0 /10 at rest  Pain altered Tx:  [] No  [] Yes  Action:  Comments:    Objective:  INTERVENTIONS  INTERVENTIONS  Reps/ Time Weight/ Level Completed  Today Comments   Coordination ladder        Fwd 1 in ea 6x      Fwd 2 in ea 3x ea foot lead      Lateral 2 in ea  3x ea foot lead   Trouble seeing with going to the L    Forward in & out  6x      Lateral in & out 3x ea foot lead      Retro 1 in ea 4x      Lateral 1 in ea 3x             Work tasks               Circuit 2  3x 30s  Fatigue: 6/10    Ladder climb        High step ups   10 in     Modified plank row R & L   8#             Circuit 1  3 x 45sec  Fatigue: 5/10    Squat with high pull  15#     Sled pull retro  50#     [place] carry R - arm at 90/90  15# KB     [place] carry L - arm at 90/90  15# KB            Dual tasking        Balance beam Fwd with toe tap 3 Laps   Scar for backwards    Balance Beam Retro 3 Laps      Lateral + squat EC lowerer/rising on balance beam 1 Lap

## 2023-04-25 NOTE — PROGRESS NOTES
[facility] [address] [place] [address]. [address]         Phone: [phone]       Fax: [phone]     Occupational Therapy Daily Treatment note     Occupational Therapy: Daily Note   Patient: Esteban Alvarez (40 y.o. male)   Examination Date: 2023  No data recorded  No data recorded   :  1980 # of Visits since [facility]:   15   MRN: [id number]  CSN: [id number] Start of Care Date: 2023   Insurance: Payor: Maeve Cuevas / Plan: Maeve Cuevas NAP CHOICE POS II / Product Type: *No Product type* /   Insurance ID: [id number] - (Commercial) Secondary Insurance (if applicable): Insurance Information: [employer] (60 visits combined PT/OT /speech) MARCELLO MAX . Benefit period  to 3/31   Referring Physician: MD Dr Bettina Saldana   PCP: Noel Whaley DO Visits to Date/Visits Approved: 15 / 20    No Show/Cancelled Appts:   /       Medical Diagnosis: No admission diagnoses are documented for this encounter. Intracranial hemorrhage (I62.9 - ICD -10 code) pontine hemorrhage (i61.3 - ICD-10 code)  Treatment Diagnosis: Rt UE weakness, impaired coordination,impaired sensation   (ICD-10 codes: M62.81, R27.9, R20.2)        SUBJECTIVE EXAMINATION   Pain Level: Pain Screening  Patient Currently in Pain: No    Patient Comments: Subjective: Pt reports his rt shoulder is tight today. Pt states he is scheduled for FCE on 2023. Pt states that today was his last visit with speech therapy. Pt states after this week he  wants to decrease therapy to 1x/week.     HEP Compliance: Good        OBJECTIVE EXAMINATION   Restrictions: Restrictions/Precautions: Fall Risk (double vision far off to lt side.)        TREATMENT     Exercises:     Treatment Reasoning    OT Exercise 3: PROM RUE (shoulder) - all joints, all planes  OT Exercise 8: Rd tweezer dexterity - place/remove 50 pegs using rt hand  OT Exercise 14: Green Flexbar : bilateral UE   2 sets

## 2023-04-25 NOTE — PROGRESS NOTES
[facility]) @ [facility]  [address] [facility] [address]  [place], [address] [place] [facility]  Phone [phone]  Fax [phone]    Speech Therapy Discharge Note      Date: 2023      Patient: Leandro Diaz  : 1980 MRN: [id number]   Physician: Alicia Kong     Diagnosis: Posterior Radha CVA Onset Date: 2023   Rehab Codes: I69.31, I69.322  Total visits attended: 10  Cancels/No shows: 0  Date of initial visit: 2023             [x] Patient recovered from conditions. Treatment goals were met. [x] Patient to continue exercise/home instructions independently. [x] Patient has completed prescribed number of treatment sessions. Treatment Summary:  Patient has made excellent progress on all speech therapy goals. Neuromuscular stimulator used for L side facial weakness. Patient is also independent with oral motor speech exercises. Patient has improved his speech intelligibility and fluency to 100% with use of dysarthria strategies. Patient has also improved his short term recall with strategies as well as his sustained and alternating attention. Patient is managing his own medications and driving short distances. Patient has FCE scheduled and hopes to return to work soon. Recommendations/Comments:   Patient to be discharged from speech therapy services at this time. Recommended patient continue to implement dysarthria strategies    Treatment Included:     [x] Speech tx  35651 [x] First 15 min cognitive tx  83894   [] Feed/swallow/dysphagia tx  16023 [] Subsequent 15 mins cognitive tx  [phone]                            If you have any questions or concerns regarding this patient's care, please contact us. Thank you for your referral.      Electronically signed by: Radha Wisdom M.A.  [facility]   Outpatient Rehabilitation & Therapy  [address] [facility]  P: [phone]   F:

## 2023-04-27 ENCOUNTER — APPOINTMENT (OUTPATIENT)
Dept: OCCUPATIONAL THERAPY | Age: 43
End: 2023-04-27
Payer: COMMERCIAL

## 2023-04-27 ENCOUNTER — HOSPITAL ENCOUNTER (OUTPATIENT)
Dept: PHYSICAL THERAPY | Age: 43
Setting detail: THERAPIES SERIES
End: 2023-04-27
Payer: COMMERCIAL

## 2023-04-27 ENCOUNTER — APPOINTMENT (OUTPATIENT)
Dept: SPEECH THERAPY | Age: 43
End: 2023-04-27
Payer: COMMERCIAL

## 2023-05-02 ENCOUNTER — HOSPITAL ENCOUNTER (OUTPATIENT)
Dept: OCCUPATIONAL THERAPY | Age: 43
Setting detail: THERAPIES SERIES
Discharge: HOME | End: 2023-05-02
Payer: COMMERCIAL

## 2023-05-02 PROCEDURE — 97110 THERAPEUTIC EXERCISES: CPT

## 2023-05-02 PROCEDURE — 97530 THERAPEUTIC ACTIVITIES: CPT

## 2023-05-02 NOTE — PROGRESS NOTES
built up handles)    Limitations addressed: Strength, Coordination  Limitations addressed: ROM, [address], visual impairments/double vision  Therapist provided: Verbal cuing  Progressed: Complexity of movement  Progressed: Practiced functional tasks of typing, using computer mouse, and handwriting  Functional ability(s) targeted: Return to work tasks  Functional ability(s) targeted: Computer skills and handwriting                     Patient Education: OT Education: Home Exercise Program, Work related activity  Patient Education: Discussed need to practice computer skills and handwriting at home. Current home exercise program (HEP): Discussed typing a \"To Do\" list each morning to work on computer skills, and putting his stroke workbook pages on the list to remind him to do them for 5-10 minutes daily to work on handwriting skills. ASSESSMENT     Assessment: Assessment: OT treatment focused on increasing ROM, strength, and coordination of RUE for improved functional performance of daily tasks - see OT exercises for detailed report. Pt reported increased tightness throughout RUE because he slept in a \"bad position\". PROM and stretching with large therapy ball completed to loosen up RUE prior to completing strengthening on FreeMotion machine. OT also introduced computer programs and had patient practice typing on Word and use point-and-click mouse skills on Excel. Pt reports that he has been putting off getting on his computer, but that he actually did better than he thought he would. Pt also practiced handwriting skills using standard pen, wide body pen, and standard pen with built-up  on it. Pt's handwriting legible with all 3 writing utensils, but he stated that it was easier with the built-up . OT and pt discussed practical ways to practice these skills at home 2* them being necessary for his return to work.   Pt agreeable to typing out a To Do list each day and making sure to put his Stroke Workbook

## 2023-05-09 ENCOUNTER — HOSPITAL ENCOUNTER (OUTPATIENT)
Dept: OCCUPATIONAL THERAPY | Age: 43
Setting detail: THERAPIES SERIES
Discharge: HOME | End: 2023-05-09
Payer: COMMERCIAL

## 2023-05-09 ENCOUNTER — HOSPITAL ENCOUNTER (OUTPATIENT)
Dept: PHYSICAL THERAPY | Age: 43
Setting detail: THERAPIES SERIES
Discharge: HOME | End: 2023-05-09
Payer: COMMERCIAL

## 2023-05-09 PROCEDURE — 97112 NEUROMUSCULAR REEDUCATION: CPT

## 2023-05-09 PROCEDURE — 97140 MANUAL THERAPY 1/> REGIONS: CPT

## 2023-05-09 PROCEDURE — 97530 THERAPEUTIC ACTIVITIES: CPT

## 2023-05-09 PROCEDURE — 97110 THERAPEUTIC EXERCISES: CPT

## 2023-05-09 ASSESSMENT — PAIN SCALES - GENERAL: PAINLEVEL_OUTOF10: 1

## 2023-05-09 ASSESSMENT — PAIN DESCRIPTION - LOCATION: LOCATION: NECK

## 2023-05-09 ASSESSMENT — PAIN DESCRIPTION - ORIENTATION: ORIENTATION: POSTERIOR

## 2023-05-09 ASSESSMENT — PAIN DESCRIPTION - PAIN TYPE: TYPE: ACUTE PAIN

## 2023-05-09 ASSESSMENT — PAIN DESCRIPTION - DESCRIPTORS: DESCRIPTORS: SORE

## 2023-05-09 NOTE — PROGRESS NOTES
[facility] Outpatient Physical Therapy   [address]   Phone: [phone]   Fax: [phone]    Physical Therapy Daily Treatment Note/Progress Note     Date:  2023  Patient Name:  Alex Tejada    :  1980  MRN: [id number]  Physician: Alma Santillan MD                               Insurance: Xi Perales -- 60 visits combined -3/31  Medical Diagnosis:   I62.9 (ICD-10-CM) - Intracranial hemorrhage ([address]   I61.3 (ICD-10-CM) - Pontine hemorrhage ([facility]                 Rehab Codes: R26.89 gait abnormality, R27.8 lack of coordination  Date of symptom onset: 23  Next 's appt.: Cherry Delgadillo 23  Visit# / total visits: 14/  Cancels/No Shows: 0/0  Date of initial visit: 23        Date of PN: 23 (visit 14)  Formal progress note reporting period:  23 - 23     Precautions: [place] - hears best in L ear; double vision when looking with both eyes     Subjective:   Pt denies any issues in the past week and denies any falls. Notes he is being active at home with no real difficulties. He is scheduled for an FCE  in  and will then talk with PMR about return to work. Pt does not feel there are any physical limitations at this time that would prevent him from working. Notes he has improved endurance & balance. Denies any dizziness with mobility.      Pain:  [x] Yes  [] No Location: neck  Pain Rating: (0-10 scale) 0 /10 pain - felt more as spasm  Pain altered Tx:  [] No  [] Yes  Action:  Comments:    Objective:  INTERVENTIONS  INTERVENTIONS  Reps/ Time Weight/ Level Completed  Today Comments   Coordination ladder        Fwd 1 in ea 6x      Fwd 2 in ea 3x ea foot lead      Lateral 2 in ea  3x ea foot lead   Trouble seeing with going to the L    Forward in & out  6x      Lateral in & out 3x ea foot lead      Retro 1 in ea 4x      Lateral 1 in ea 3x             Work tasks               Circuit 2  3x 30s  Fatigue: 6/10    Ladder climb        High step ups   10 in

## 2023-05-09 NOTE — PROGRESS NOTES
[address]         Phone: [phone]       Fax: [phone]     Occupational Therapy Daily Treatment note     Occupational Therapy: Daily Note   Patient: Lili Garibay (25 y.o. male)   Examination Date: 2023  No data recorded  No data recorded   :  1980 # of Visits since [facility]:   16   MRN: [id number]  CSN: [id number] Start of Care Date: 2023   Insurance: Payor: Eleazar Paul / Plan: Eleazar Paul NAP CHOICE POS II / Product Type: *No Product type* /   Insurance ID: [id number] - (Commercial) Secondary Insurance (if applicable): Insurance Information: [employer] (60 visits combined PT/OT /speech) HARD MAX . Benefit period  to 3/31   Referring Physician: MD Dr Koko Lemus   PCP: Mohit Rodriguez DO Visits to Date/Visits Approved:     No Show/Cancelled Appts:   /       Medical Diagnosis: No admission diagnoses are documented for this encounter. Intracranial hemorrhage (I62.9 - ICD -10 code) pontine hemorrhage (i61.3 - ICD-10 code)  Treatment Diagnosis: Rt UE weakness, impaired coordination,impaired sensation   (ICD-10 codes: M62.81, R27.9, R20.2)        SUBJECTIVE EXAMINATION   Pain Level: Pain Screening  Patient Currently in Pain: Yes  Pain Assessment: 0-10  Pain Level: 1  Post Treatment Pain Level: 1  Pain Type: Acute pain  Pain Location: Neck  Pain Orientation: Posterior  Pain Descriptors: Sore    Patient Comments: Subjective: Pt reports pain in back of neck/upper back the other day from doing his normal activity. Pt states he has FCE in .     HEP Compliance: Fair/Good        OBJECTIVE EXAMINATION   Restrictions: Restrictions/Precautions: -- (double vision far off to lt side)                  TREATMENT     Exercises:     Treatment Reasoning    OT Exercise 3: PROM RUE (shoulder) - all joints, all planes  OT Exercise 6: BITS (bioness integrated system)  - using rt UE

## 2023-05-16 ENCOUNTER — HOSPITAL ENCOUNTER (OUTPATIENT)
Dept: OCCUPATIONAL THERAPY | Age: 43
Setting detail: THERAPIES SERIES
Discharge: HOME | End: 2023-05-16
Payer: COMMERCIAL

## 2023-05-16 PROCEDURE — 97110 THERAPEUTIC EXERCISES: CPT

## 2023-05-16 ASSESSMENT — PAIN SCALES - GENERAL: PAINLEVEL_OUTOF10: 0

## 2023-05-16 NOTE — PROGRESS NOTES
0    Prognosis: Good    Activity Tolerance: Patient tolerated treatment well             GOALS   Patient Goals   Patient goals : To improve functioning (coordination) rt arm, be able to RTW. Short Term Goals Completed by 10 visits Current Status Goal Status   Pt will demo & report modified independence with rt UE HEP       2. Pt will demo  & report improving rt UE manual dexterity with home activities (tying shoes). Pt will complete MROM placing test 30 seconds quicker than eval       3. Pt will demo improved rt hand fine motor dexterity by completing 9 hole peg test by 10 seconds       4. Increase rt hand tip pinch by 2# for fine motor ADLs. Long Term Goals Completed by 20 visits Current Status Goal Status   Using the UEFI Pt will increase total score by 10 points using rt UE. Pt will reports less difficulty by 1 level  using rt UE to do hobbies, meal prep/cut/peel food, vacuum sweep, get dressed, manipulate buttons, tie shoe laces, toss ball, clean, do housework. 3- Pt will increase UEFI total score by 10 points compared to today & report less difficulty with daily home activities. 2. Increase rt UE strength to 4/5 muscle grade (shoulder - flexors, abductors, IR, ER), & hand extensors to improve strength for housework, cleaning, ball toss. 3. Compared to eval increase rt hand manual dexterity- pt will complete MROM placing test 45 seconds quicker using rt hand. 4. Compared to eval pt will report improved rt hand fine motor skills with daily activities. Pt will complete 9 hole peg test 20 seconds quicker than eval.       5. Compared to eval increase rt  hand tip pinch by 4# to improve pinch for fine motor ADLs.          6. Pt will demo 75% accuracy with stereognosis activities using rt hand                               TREATMENT PLAN   REQUIRES OT FOLLOW-UP: Yes  Type: Outpatient  Plan  Plan Frequency: 2-3 x/week  Plan Weeks: 20 visits  Current Treatment Recommendations:

## 2023-05-23 ENCOUNTER — HOSPITAL ENCOUNTER (OUTPATIENT)
Dept: OCCUPATIONAL THERAPY | Age: 43
Setting detail: THERAPIES SERIES
Discharge: HOME | End: 2023-05-23
Payer: COMMERCIAL

## 2023-05-23 PROCEDURE — 97110 THERAPEUTIC EXERCISES: CPT

## 2023-05-23 ASSESSMENT — 9 HOLE PEG TEST: TESTTIME_SECONDS: 33

## 2023-05-23 ASSESSMENT — PAIN SCALES - GENERAL: PAINLEVEL_OUTOF10: 0

## 2023-05-23 NOTE — PROGRESS NOTES
5/5  R Forearm Pron: 5/5  R Forearm Sup: 5/5  R Wrist Flex: 5/5  R Wrist Ext: 5/5  R Hand General:  (flexion 5/5, extension 4/5)          Hand Dominance:     Left  Right     Strength    Trial 1: 120  Trial 2: 110  Trial 3: 110  Average: 113.33   Pinch Strength (if applicable)   Right Hand Strength - Lateral Pinch (lbs)  Trial 1: 28  Trial 2: 28  Trial 3: 27  Average: 27.67  Right Hand Strength - Johnson (lbs)  Trial 1: 27  Trial 2: 23  Trial 3: 24  Average: 24.67  Right Hand Strength - Tip  (lbs)  Trial 1: 18  Trial 2: 17  Trial 3: 18  Average: 17.67       Fine Motor Skills:   Fine Motor Skills  [place] Rate of Manipulation: MROM placing test: rt UE  1 minutes 45  seconds  Right 9 Hole Peg Test Time (secs): 33       23 0830   Today, do you or would you have any difficulty at all with: Any of your usual work, housework, or school activities: 4  (housework)   Your usual hobbies, re creational or sporting activities: 3   Lifting a bag of groceries to waist level: 4   Lifting a bag of groceries above your head: 4   Grooming your hair: 4   Pushing up on your hands (eg from bathtub or chair): 4   Preparing food (eg peeling, cutting): 4   Drivin   Vacuuming, sweeping or rakin   Dressin   Doing up buttons: 3  (little difficulty with some buttons (cuff buttons))   Using tools or appliances: 3  (using tools)   Opening doors: 4   Cleanin   Tying or lacing shoes: 3   Sleeping: 3   Laundering clothes (eg washing, ironing, folding): 4   Opening a jar: 4   Throwing a ball: 3   Carrying a small suitcase with your affected limb: 4   UEFI Total Score 74   UEFI Total Percentage 92.5 %   Upper Extremity Functional Scale -   Instruction to patient  - We are interested in knowing whether you are having any difficulty at all with the activities listed below because of your upper limb problem for which you are currently seeking attention.     Key:  0 = Extreme Difficulty or Unable to Perform Activity   1 = Quite

## 2023-06-01 RX ORDER — LISINOPRIL 40 MG/1
TABLET ORAL
Qty: 30 TABLET | Refills: 3 | OUTPATIENT
Start: 2023-06-01

## 2023-07-05 RX ORDER — LISINOPRIL 40 MG/1
TABLET ORAL
Qty: 30 TABLET | Refills: 3 | OUTPATIENT
Start: 2023-07-05

## 2023-07-18 ENCOUNTER — OFFICE VISIT (OUTPATIENT)
Dept: PHYSICAL MEDICINE AND REHAB | Age: 43
End: 2023-07-18
Payer: COMMERCIAL

## 2023-07-18 VITALS
BODY MASS INDEX: 30.07 KG/M2 | SYSTOLIC BLOOD PRESSURE: 191 MMHG | TEMPERATURE: 97.3 F | HEIGHT: 72 IN | HEART RATE: 68 BPM | DIASTOLIC BLOOD PRESSURE: 125 MMHG | WEIGHT: 222 LBS

## 2023-07-18 DIAGNOSIS — I69.151 HEMIPARESIS OF RIGHT DOMINANT SIDE AS LATE EFFECT OF NONTRAUMATIC INTRACEREBRAL HEMORRHAGE (HCC): Primary | ICD-10-CM

## 2023-07-18 PROCEDURE — 99213 OFFICE O/P EST LOW 20 MIN: CPT | Performed by: PHYSICAL MEDICINE & REHABILITATION

## 2023-07-18 RX ORDER — CARVEDILOL 25 MG/1
TABLET ORAL
COMMUNITY
Start: 2023-07-13

## 2023-07-18 RX ORDER — PANTOPRAZOLE SODIUM 40 MG/1
40 TABLET, DELAYED RELEASE ORAL DAILY
COMMUNITY
Start: 2023-06-23

## 2023-07-18 NOTE — PROGRESS NOTES
[address] PHYSICAL MEDICINE & REHABILITATION  [address]  [address] [address]  Dept: [phone]  Dept Fax: [phone]    Outpatient Followup Note    Ramiro Blanc, 43 y.o., male, presents for follow up c/o of Neurologic Problem (Stroke follow up)  . 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 He continues to note some difficulty with multi tasking. He also c/o some \"heaviness\" R arm and R leg which feels worse if he has slept on them wrong. In general his mobility and strength are improved. He is performing ADLs and ambulating without device. He saw his PCP recently and was returned to higher dose carvedilol as he had been taking inpatient. He and his wife report that home BP readings are 130s/90-100s. He is asymptomatic with elevated BP and denies dizziness, headache, blurred vision. 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 He was previously required to work swing shifts and climb ladders, perform work at elevated height or in confined spaces. Past Medical History:   Diagnosis Date    Cerebral artery occlusion with cerebral infarction [facility])     History of heartburn     takes omeprazole    Hypertension     Neoplasm of unspecified nature of bone, soft tissue, and skin 2009-present    lesions of right cheek x 2    Research study patient 01/26/2023    [id number]. Date of completion 1/28/23      Past Surgical History:   Procedure Laterality Date    APPENDECTOMY  in 20's    PRE-MALIGNANT / BENIGN SKIN LESION EXCISION Right 9/29/2014    Excision lesion of right cheek. Dr. Anny Soliz. No family history on file.   Social History     Socioeconomic History    Marital status:    Tobacco Use    Smoking status: Every Day     Packs/day: 1.00     Years: 10.00     Pack

## 2023-08-01 ENCOUNTER — TELEPHONE (OUTPATIENT)
Dept: PHYSICAL MEDICINE AND REHAB | Age: 43
End: 2023-08-01

## 2023-08-01 RX ORDER — CEPHALEXIN 500 MG/1
500 CAPSULE ORAL 2 TIMES DAILY
Qty: 14 CAPSULE | Refills: 0 | COMMUNITY
Start: 2023-07-26 | End: 2023-08-02

## 2023-08-01 NOTE — TELEPHONE ENCOUNTER
Called patient to check in to see how his BP readings have been. He states between 140/150s over 110 to high teens. He went to the urgent care 07.26.23  For abscess in eye, bp reading was 181/128 but was not addressed. I asked if he discussed readings with his primary, Dr. Shashank Sorensen, and he states that pcp does not seem concerned but pt is. He is not sure what to do from here since they are still reading high.     He is on carvedilol and lisinopril

## 2023-08-01 NOTE — TELEPHONE ENCOUNTER
I left a message on patient voicemail notifying him of what dr. Casey Sahu stated.     Also, should he have any questions, he can call me back

## 2023-09-19 ENCOUNTER — HOSPITAL ENCOUNTER (OUTPATIENT)
Dept: PHYSICAL THERAPY | Facility: CLINIC | Age: 43
Setting detail: THERAPIES SERIES
Discharge: HOME | End: 2023-09-19
Payer: COMMERCIAL

## 2023-09-19 PROCEDURE — 97750 PHYSICAL PERFORMANCE TEST: CPT

## 2023-09-25 ENCOUNTER — OFFICE VISIT (OUTPATIENT)
Dept: PHYSICAL MEDICINE AND REHAB | Age: 43
End: 2023-09-25
Payer: COMMERCIAL

## 2023-09-25 VITALS
HEIGHT: 72 IN | DIASTOLIC BLOOD PRESSURE: 124 MMHG | SYSTOLIC BLOOD PRESSURE: 172 MMHG | HEART RATE: 68 BPM | BODY MASS INDEX: 30.61 KG/M2 | WEIGHT: 226 LBS | TEMPERATURE: 97.7 F

## 2023-09-25 DIAGNOSIS — I61.3 PONTINE HEMORRHAGE (HCC): ICD-10-CM

## 2023-09-25 DIAGNOSIS — I69.151 HEMIPARESIS OF RIGHT DOMINANT SIDE AS LATE EFFECT OF NONTRAUMATIC INTRACEREBRAL HEMORRHAGE (HCC): Primary | ICD-10-CM

## 2023-09-25 PROCEDURE — 99212 OFFICE O/P EST SF 10 MIN: CPT | Performed by: PHYSICAL MEDICINE & REHABILITATION

## 2023-09-25 RX ORDER — NICOTINE 21 MG/24HR
1 PATCH, TRANSDERMAL 24 HOURS TRANSDERMAL EVERY 24 HOURS
COMMUNITY

## 2023-09-25 NOTE — PROGRESS NOTES
[address] PHYSICAL MEDICINE & REHABILITATION  [address]  [address] [address]  Dept: [phone]  Dept Fax: [phone]    Outpatient Followup Note    Marlee Chavez, 43 y.o., male, presents for follow up c/o of Cerebrovascular Accident  . 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 He is being seen in follow up today. He continues to have high BP readings in the office but reports lower results at home with SBP in the 130s and DBP in the 90s regularly. He has completed FCE which is reviewed with him today. He reports some continued intermittent sense of heaviness/weakness in his R arm or leg which can be worse if he is fatigued. Past Medical History:   Diagnosis Date    Cerebral artery occlusion with cerebral infarction [facility])     History of heartburn     takes omeprazole    Hypertension     Neoplasm of unspecified nature of bone, soft tissue, and skin 2009-present    lesions of right cheek x 2    Research study patient 01/26/2023    [id number]. Date of completion 1/28/23      Past Surgical History:   Procedure Laterality Date    APPENDECTOMY  in 20's    PRE-MALIGNANT / BENIGN SKIN LESION EXCISION Right 9/29/2014    Excision lesion of right cheek. Dr. Noble Bachelor. No family history on file. Social History     Socioeconomic History    Marital status:      Spouse name: None    Number of children: None    Years of education: None    Highest education level: None   Tobacco Use    Smoking status: Every Day     Packs/day: 1.00     Years: 10.00     Additional pack years: 0.00     Total pack years: 10.00     Types: Cigarettes     Last attempt to quit: 7/8/2013     Years since quitting: 10.2    Smokeless tobacco: Never   Substance and Sexual Activity    Alcohol use:  Yes     Alcohol/week: 24.0 standard drinks of alcohol     Types: 24 Cans of beer per week    Drug use: No    Sexual

## 2024-01-17 ENCOUNTER — OFFICE VISIT (OUTPATIENT)
Dept: PHYSICAL MEDICINE AND REHAB | Age: 44
End: 2024-01-17
Payer: COMMERCIAL

## 2024-01-17 VITALS
HEART RATE: 72 BPM | BODY MASS INDEX: 31.74 KG/M2 | SYSTOLIC BLOOD PRESSURE: 160 MMHG | DIASTOLIC BLOOD PRESSURE: 102 MMHG | TEMPERATURE: 98.2 F | WEIGHT: 234 LBS

## 2024-01-17 DIAGNOSIS — I69.151 HEMIPARESIS OF RIGHT DOMINANT SIDE AS LATE EFFECT OF NONTRAUMATIC INTRACEREBRAL HEMORRHAGE (HCC): Primary | ICD-10-CM

## 2024-01-17 DIAGNOSIS — M25.511 CHRONIC RIGHT SHOULDER PAIN: ICD-10-CM

## 2024-01-17 DIAGNOSIS — G89.29 CHRONIC RIGHT SHOULDER PAIN: ICD-10-CM

## 2024-01-17 PROCEDURE — 99213 OFFICE O/P EST LOW 20 MIN: CPT | Performed by: PHYSICAL MEDICINE & REHABILITATION

## 2024-01-17 RX ORDER — AMLODIPINE BESYLATE 5 MG/1
TABLET ORAL
COMMUNITY
Start: 2024-01-07

## 2024-01-17 NOTE — PROGRESS NOTES
[facility] PHYSICAL MEDICINE & REHABILITATION  [address] [address]  Dept: [phone]  Dept Fax: [phone]    Outpatient Followup Note    Jus Crain, 43 y.o., male, presents for follow up c/o of Neurologic Problem (Hx of stroke)  .     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 He has returned to work with restricted hours and reports that it is \"fast\" and he is fatigued after his shift, but he is tolerating work. He is tearful at times today when discussing his current limitations. These became more evident when he returned to work. He reports restless sleep which may also be contributing to daytime fatigue. He notes mild-moderate R shoulder discomfort/aching at night which he notes during the night. He does note some impairment in the \"smoothness\" of his RUE function. He has intact coordination and strength but notes some issues at times with his RUE function and, for example, carries his coffee in his L hand now.     Past Medical History:   Diagnosis Date    Cerebral artery occlusion with cerebral infarction (HCC)     History of heartburn     takes omeprazole    Hypertension     Neoplasm of unspecified nature of bone, soft tissue, and skin 2009-present    lesions of right cheek x 2    Research study patient 01/26/2023    [id number]. Date of completion 1/28/23      Past Surgical History:   Procedure Laterality Date    APPENDECTOMY  in 20's    PRE-MALIGNANT / BENIGN SKIN LESION EXCISION Right 9/29/2014    Excision lesion of right cheek. Dr. DENNIS Mascorro.     No family history on file.  Social History     Socioeconomic History    Marital status:      Spouse name: None    Number of children: None    Years of education: None    Highest education level: None   Tobacco Use    Smoking status: Every Day     Current packs/day: 0.00     Average packs/day: 1 pack/day for 10.0 years

## 2024-04-18 ENCOUNTER — OFFICE VISIT (OUTPATIENT)
Dept: PHYSICAL MEDICINE AND REHAB | Age: 44
End: 2024-04-18
Payer: COMMERCIAL

## 2024-04-18 VITALS
TEMPERATURE: 97.9 F | SYSTOLIC BLOOD PRESSURE: 125 MMHG | HEART RATE: 80 BPM | DIASTOLIC BLOOD PRESSURE: 89 MMHG | BODY MASS INDEX: 32.14 KG/M2 | WEIGHT: 237 LBS

## 2024-04-18 DIAGNOSIS — G47.30 SLEEP APNEA, UNSPECIFIED TYPE: Primary | ICD-10-CM

## 2024-04-18 DIAGNOSIS — I69.151 HEMIPARESIS OF RIGHT DOMINANT SIDE AS LATE EFFECT OF NONTRAUMATIC INTRACEREBRAL HEMORRHAGE (HCC): ICD-10-CM

## 2024-04-18 PROCEDURE — 99213 OFFICE O/P EST LOW 20 MIN: CPT | Performed by: PHYSICAL MEDICINE & REHABILITATION

## 2024-04-18 NOTE — PROGRESS NOTES
recognition Dragon dictation software.  Although everyeffort was made to ensure the accuracy of this automated transcription, some errorsin transcription may have occurred.

## 2024-05-08 ENCOUNTER — HOSPITAL ENCOUNTER (OUTPATIENT)
Dept: SLEEP CENTER | Age: 44
Discharge: HOME | End: 2024-05-10
Attending: PHYSICAL MEDICINE & REHABILITATION
Payer: COMMERCIAL

## 2024-05-08 DIAGNOSIS — G47.30 SLEEP APNEA, UNSPECIFIED TYPE: ICD-10-CM

## 2024-05-08 PROCEDURE — G0399 HOME SLEEP TEST/TYPE 3 PORTA: HCPCS

## 2024-06-02 LAB — STATUS: NORMAL

## 2024-06-06 DIAGNOSIS — G47.30 SLEEP APNEA, UNSPECIFIED TYPE: Primary | ICD-10-CM

## 2024-06-10 NOTE — PROGRESS NOTES
Spoke with the patient on 6/7/24 and he wants to talk to his wife first and will let us know where he wants this done at.

## 2024-06-21 ENCOUNTER — HOSPITAL ENCOUNTER (OUTPATIENT)
Dept: SLEEP CENTER | Age: 44
End: 2024-06-21
Attending: PHYSICAL MEDICINE & REHABILITATION
Payer: COMMERCIAL

## 2024-06-21 DIAGNOSIS — G47.30 SLEEP APNEA, UNSPECIFIED TYPE: ICD-10-CM

## 2024-06-21 PROCEDURE — 95811 POLYSOM 6/>YRS CPAP 4/> PARM: CPT

## 2024-06-21 ASSESSMENT — SLEEP AND FATIGUE QUESTIONNAIRES
HOW LIKELY ARE YOU TO NOD OFF OR FALL ASLEEP WHILE SITTING INACTIVE IN A PUBLIC PLACE: WOULD NEVER DOZE
HOW LIKELY ARE YOU TO NOD OFF OR FALL ASLEEP WHILE LYING DOWN TO REST IN THE AFTERNOON WHEN CIRCUMSTANCES PERMIT: HIGH CHANCE OF DOZING
HOW LIKELY ARE YOU TO NOD OFF OR FALL ASLEEP WHILE SITTING QUIETLY AFTER LUNCH WITHOUT ALCOHOL: WOULD NEVER DOZE
HOW LIKELY ARE YOU TO NOD OFF OR FALL ASLEEP WHEN YOU ARE A PASSENGER IN A CAR FOR AN HOUR WITHOUT A BREAK: WOULD NEVER DOZE
HOW LIKELY ARE YOU TO NOD OFF OR FALL ASLEEP WHILE SITTING AND TALKING TO SOMEONE: WOULD NEVER DOZE
HOW LIKELY ARE YOU TO NOD OFF OR FALL ASLEEP IN A CAR, WHILE STOPPED FOR A FEW MINUTES IN TRAFFIC: WOULD NEVER DOZE
HOW LIKELY ARE YOU TO NOD OFF OR FALL ASLEEP WHILE WATCHING TV: SLIGHT CHANCE OF DOZING
HOW LIKELY ARE YOU TO NOD OFF OR FALL ASLEEP WHILE SITTING AND READING: WOULD NEVER DOZE
ESS TOTAL SCORE: 4

## 2024-06-22 VITALS
OXYGEN SATURATION: 95 % | BODY MASS INDEX: 31.15 KG/M2 | HEART RATE: 60 BPM | WEIGHT: 230 LBS | RESPIRATION RATE: 16 BRPM | HEIGHT: 72 IN

## 2024-06-26 ENCOUNTER — OFFICE VISIT (OUTPATIENT)
Dept: PHYSICAL MEDICINE AND REHAB | Age: 44
End: 2024-06-26
Payer: COMMERCIAL

## 2024-06-26 VITALS
BODY MASS INDEX: 33.04 KG/M2 | HEART RATE: 80 BPM | TEMPERATURE: 98.7 F | WEIGHT: 243.6 LBS | SYSTOLIC BLOOD PRESSURE: 140 MMHG | DIASTOLIC BLOOD PRESSURE: 100 MMHG

## 2024-06-26 DIAGNOSIS — I69.151 HEMIPARESIS OF RIGHT DOMINANT SIDE AS LATE EFFECT OF NONTRAUMATIC INTRACEREBRAL HEMORRHAGE (HCC): Primary | ICD-10-CM

## 2024-06-26 DIAGNOSIS — G47.30 SLEEP APNEA, UNSPECIFIED TYPE: ICD-10-CM

## 2024-06-26 PROCEDURE — 99212 OFFICE O/P EST SF 10 MIN: CPT | Performed by: PHYSICAL MEDICINE & REHABILITATION

## 2024-06-26 RX ORDER — CALCIUM CARBONATE 1000 MG/1
TABLET, CHEWABLE ORAL
COMMUNITY
Start: 2024-04-29

## 2024-06-26 NOTE — PROGRESS NOTES
Current Outpatient Medications   Medication Sig Dispense Refill    TUMS ULTRA 1000 1000 MG CHEW CHEW AND SWALLOW 2 tablets by mouth IN THE MORNING and IN THE EVENING with meals      Cholecalciferol 50 MCG (2000 UT) TABS Take 1 tablet by mouth daily      amLODIPine (NORVASC) 5 MG tablet       pantoprazole (PROTONIX) 40 MG tablet Take 1 tablet by mouth daily      carvedilol (COREG) 25 MG tablet       Handicap Placard MISC by Does not apply route Duration: 6 months / Dx: Hemorrhagic Stroke    Unable to walk > 200 feet without assistive device 1 each 0    lisinopril (PRINIVIL;ZESTRIL) 40 MG tablet Take 1 tablet by mouth daily 30 tablet 3    nicotine ([employer] NICOTINE) 21 MG/24HR Place 1 patch onto the skin every 24 hours (Patient not taking: Reported on 1/17/2024)       No current facility-administered medications for this visit.     No Known Allergies    Subjective:      Review of Systems  Constitutional: Negative for fever, chills and unexpected weight change.   HENT: Negative for trouble swallowing.    Respiratory: Negative for cough and shortness of breath.    Cardiovascular: Negative for chest pain.   Endocrine: Negative for polyuria.   Genitourinary: Negative for dysuria, urgency, frequency, incontinence and difficulty urinating.   Gastrointestinal: Negative for constipation or diarrhea.   Musculoskeletal: Negative for arthralgias.   Neurological: Negative for headaches, numbness, or tingling.   Psychiatric: Negative for depressed mood or anxiety.       Objective:     Physical Exam  BP (!) 140/100   Pulse 80   Temp 98.7 °F (37.1 °C)   Wt 110.5 kg (243 lb 9.6 oz)   BMI 33.04 kg/m²   Constitutional: He appears well-developed and well-nourished. In no distress.  HEENT: NCAT, PERRL, EOMI. Mucous membranes pink and moist.  Pulmonary/Chest: Respirations WNL and unlabored.   MSK: Functional ROM all extremities.  Strength 5/5 key muscles all extremities  Neurological: He is alert and oriented to person, place, and

## 2024-06-28 PROBLEM — G47.30 SLEEP APNEA: Status: ACTIVE | Noted: 2024-06-28

## 2024-06-28 LAB — STATUS: NORMAL

## 2024-07-02 NOTE — PROGRESS NOTES
I am trying to print this order and it does not print correctly.  It was sent to print but never came over.  Not sure what happened.    Please try to reprint?

## 2024-07-09 NOTE — PROGRESS NOTES
This order was cancelled since the sleep lab/ Ordered the CPAP machine and was sent to [facility] by them. If anything else is needed Dr. Kong will take at.

## 2024-07-29 NOTE — PROGRESS NOTES
Pt calling to review HST results.. Order to be sent to [facility]. Call transferred for pt to schedule f/u.   Speech Language Pathology  Facility/Department: [address] NEURO ICU  Initial Speech/Language/Cognitive Assessment    NAME: Jermaine Mcleod  : 1980   MRN: [id number]  ADMISSION DATE: 2023  ADMITTING DIAGNOSIS: has Neoplasm of unspecified nature of bone, soft tissue, and skin; Hemangioma of skin; Pontine hemorrhage ([address] and Intracranial hemorrhage ([address] on their problem list.    Date of Eval: 2023   Evaluating Therapist: Keara Kilgore    RECENT RESULTS  CT OF HEAD/MRI:      Impression   Focal mild narrowing of the right P1 PCA. Otherwise no significant stenosis,   aneurysm, or arterial injury in the CTA of the head and neck. Pontine intraparenchymal hemorrhage is redemonstrated. Primary Complaint: The patient is a 43 y.o. male presented with dizziness, right-sided facial droop, right-sided weakness and right-sided numbness. Patient felt well when he went to bed last night, last known well 10 PM.  Medical history notable for hypertension, takes Coreg for this. Patient does not know his dose. EMS was called when patient woke up with these symptoms this morning. Systolic blood pressure was 200s on arrival to the ED. Stroke alert was called on arrival, CT of the head demonstrated 13 x 8 mm intraparenchymal hemorrhage of the posterior ramses. CTA of the head and neck showed focal mild narrowing of the right P1 PCA, no other significant stenosis, aneurysm, or arterial injury. Cardene was started for blood pressure control with goal blood pressure under 160. Patient's metabolic work-up unremarkable overall. Patient remains alert, oriented, able to provide history. He does describe double vision, sensation changes on the right and weakness. He was unable to walk when this happened. No other medical history. No known allergies. Patient does smoke.     Pain:  Pain Assessment  Pain Assessment: None - Denies Pain    Vision/ Hearing  Vision  Vision: Within Functional Limits  Hearing  Hearing: Within functional limits    Assessment:  Pt presents with mild cognitive deficits characterized by difficulties with orientation and verbal reasoning. Pt. Presents with mild dysarthria, moderate O/M deficits at this time. ST to follow up and provide treatment to address noted deficits. Education provided. Recommendations:  Recommendations  Requires SLP Intervention: Yes  Further therapy recommended at discharge. Frequency: 3-5x/ week     Plan:   Speech Therapy Prognosis  Prognosis: Good    Goals:  Short Term Goals  Goal 1: Pt will recall orientation with 90% accuracy. Goal 2: Pt will complete higher level reasoning tasks with 90% accuracy. Goal 3: Pt will complete OMEX for facial weakness 10-20 x per session. Goal 4: Pt will implement strategies to improve speech intelligibility. Patient/family involved in developing goals and treatment plan: Yes    Subjective:   Previous level of function and limitations: Independent     Social/Functional History  Lives With: Alone  Active : Yes  Occupation: Full time employment  Vision  Vision: Within Functional Limits  Hearing  Hearing: Within functional limits     Objective:  Oral Motor   Labial: No impairment  Lingual: No impairment    Motor Speech  Apraxic Characteristics: None  Dysarthric Characteristics: Blended word boundaries; Imprecise  Intelligibility: Impaired  Overall Impairment Severity: Mild-moderate  Auditory Comprehension  Comprehension: Within Functional Limits  Expression  Primary Mode of Expression: Verbal  Pragmatics/Social Functioning  Pragmatics: Within functional limits    Cognition:      Orientation  Overall Orientation Status: Impaired  Orientation Level: Disoriented to time;Oriented to time;Oriented to place;Oriented to person  Attention  Attention: Within Functional Limits  Memory  Memory: Within Functional Limits  Problem Solving  Problem Solving: Exceptions to [facility]  Verbal Reasoning Skills: Mild (antonyms 2/3, word associations WFL, inductive reasoning WFL, similarities/differences, deductive reasoning WFL. )  Sequencing: WFL  Abstract Reasoning  Abstract Reasoning: Within Functional Limits  Safety/Judgment  Safety/Judgment: Within Functional Limits    Prognosis:  Speech Therapy Prognosis  Prognosis: Fair    Education:  Education provided: Yes  Pt and wife verbalized understanding of plan of care: Yes       Therapy Time:   Individual Concurrent Group Co-treatment   Time In  2:25         Time Out 2:40         Minutes  15              Electronically signed by Completed by: Gloria Lao  Clinician    Cosigned By: Deedee Li S.CCC/SLP

## 2024-09-23 ENCOUNTER — HOSPITAL ENCOUNTER (OUTPATIENT)
Age: 44
Setting detail: SPECIMEN
Discharge: HOME | End: 2024-09-23

## 2024-09-23 DIAGNOSIS — E55.9 VITAMIN D INSUFFICIENCY: ICD-10-CM

## 2024-09-23 DIAGNOSIS — Z13.9 SCREENING FOR CONDITION: ICD-10-CM

## 2024-09-23 DIAGNOSIS — Z13.1 SCREENING FOR DIABETES MELLITUS: ICD-10-CM

## 2024-09-23 DIAGNOSIS — Z13.220 SCREENING FOR HYPERLIPIDEMIA: ICD-10-CM

## 2024-09-23 PROBLEM — I10 ESSENTIAL HYPERTENSION: Status: ACTIVE | Noted: 2023-06-23

## 2024-09-23 PROBLEM — I69.959 HEMIPLEGIA OF NONDOMINANT SIDE AS LATE EFFECT OF CEREBROVASCULAR DISEASE (HCC): Status: ACTIVE | Noted: 2023-06-23

## 2024-09-23 LAB
25(OH)D3 SERPL-MCNC: 37.3 NG/ML (ref 30–100)
ALBUMIN SERPL-MCNC: 4.7 G/DL (ref 3.5–5.2)
ALBUMIN/GLOB SERPL: 2 {RATIO} (ref 1–2.5)
ALP SERPL-CCNC: 81 U/L (ref 40–129)
ALT SERPL-CCNC: 30 U/L (ref 10–50)
ANION GAP SERPL CALCULATED.3IONS-SCNC: 13 MMOL/L (ref 9–16)
AST SERPL-CCNC: 28 U/L (ref 10–50)
BASOPHILS # BLD: 0.07 K/UL (ref 0–0.2)
BASOPHILS NFR BLD: 1 % (ref 0–2)
BILIRUB SERPL-MCNC: 0.5 MG/DL (ref 0–1.2)
BUN SERPL-MCNC: 9 MG/DL (ref 6–20)
CALCIUM SERPL-MCNC: 9.5 MG/DL (ref 8.6–10.4)
CHLORIDE SERPL-SCNC: 103 MMOL/L (ref 98–107)
CHOLEST SERPL-MCNC: 319 MG/DL (ref 0–199)
CHOLESTEROL/HDL RATIO: 6
CO2 SERPL-SCNC: 23 MMOL/L (ref 20–31)
CREAT SERPL-MCNC: 0.8 MG/DL (ref 0.7–1.2)
EOSINOPHIL # BLD: 0.09 K/UL (ref 0–0.44)
EOSINOPHILS RELATIVE PERCENT: 1 % (ref 1–4)
ERYTHROCYTE [DISTWIDTH] IN BLOOD BY AUTOMATED COUNT: 13.2 % (ref 11.8–14.4)
EST. AVERAGE GLUCOSE BLD GHB EST-MCNC: 108 MG/DL
GFR, ESTIMATED: >90 ML/MIN/1.73M2
GLUCOSE SERPL-MCNC: 87 MG/DL (ref 74–99)
HBA1C MFR BLD: 5.4 % (ref 4–6)
HCT VFR BLD AUTO: 52.1 % (ref 40.7–50.3)
HDLC SERPL-MCNC: 54 MG/DL
HGB BLD-MCNC: 17.3 G/DL (ref 13–17)
IMM GRANULOCYTES # BLD AUTO: 0.03 K/UL (ref 0–0.3)
IMM GRANULOCYTES NFR BLD: 0 %
LDLC SERPL CALC-MCNC: 233 MG/DL (ref 0–100)
LYMPHOCYTES NFR BLD: 2.06 K/UL (ref 1.1–3.7)
LYMPHOCYTES RELATIVE PERCENT: 26 % (ref 24–43)
MCH RBC QN AUTO: 30.5 PG (ref 25.2–33.5)
MCHC RBC AUTO-ENTMCNC: 33.2 G/DL (ref 28.4–34.8)
MCV RBC AUTO: 91.9 FL (ref 82.6–102.9)
MONOCYTES NFR BLD: 0.58 K/UL (ref 0.1–1.2)
MONOCYTES NFR BLD: 7 % (ref 3–12)
NEUTROPHILS NFR BLD: 65 % (ref 36–65)
NEUTS SEG NFR BLD: 5.19 K/UL (ref 1.5–8.1)
NRBC BLD-RTO: 0 PER 100 WBC
PLATELET # BLD AUTO: 289 K/UL (ref 138–453)
PMV BLD AUTO: 10.3 FL (ref 8.1–13.5)
POTASSIUM SERPL-SCNC: 4.7 MMOL/L (ref 3.7–5.3)
PROT SERPL-MCNC: 7.7 G/DL (ref 6.6–8.7)
RBC # BLD AUTO: 5.67 M/UL (ref 4.21–5.77)
SODIUM SERPL-SCNC: 139 MMOL/L (ref 136–145)
TRIGL SERPL-MCNC: 160 MG/DL (ref 0–149)
VLDLC SERPL CALC-MCNC: 32 MG/DL
WBC OTHER # BLD: 8 K/UL (ref 3.5–11.3)

## 2025-01-22 ENCOUNTER — OFFICE VISIT (OUTPATIENT)
Dept: PHYSICAL MEDICINE AND REHAB | Age: 45
End: 2025-01-22
Payer: COMMERCIAL

## 2025-01-22 VITALS
DIASTOLIC BLOOD PRESSURE: 88 MMHG | WEIGHT: 226.2 LBS | BODY MASS INDEX: 30.68 KG/M2 | HEART RATE: 82 BPM | TEMPERATURE: 98.1 F | SYSTOLIC BLOOD PRESSURE: 118 MMHG

## 2025-01-22 DIAGNOSIS — I69.151 HEMIPARESIS OF RIGHT DOMINANT SIDE AS LATE EFFECT OF NONTRAUMATIC INTRACEREBRAL HEMORRHAGE (HCC): Primary | ICD-10-CM

## 2025-01-22 PROCEDURE — G8417 CALC BMI ABV UP PARAM F/U: HCPCS | Performed by: PHYSICAL MEDICINE & REHABILITATION

## 2025-01-22 PROCEDURE — G8427 DOCREV CUR MEDS BY ELIG CLIN: HCPCS | Performed by: PHYSICAL MEDICINE & REHABILITATION

## 2025-01-22 PROCEDURE — 4004F PT TOBACCO SCREEN RCVD TLK: CPT | Performed by: PHYSICAL MEDICINE & REHABILITATION

## 2025-01-22 PROCEDURE — 99212 OFFICE O/P EST SF 10 MIN: CPT | Performed by: PHYSICAL MEDICINE & REHABILITATION

## 2025-01-22 PROCEDURE — 3079F DIAST BP 80-89 MM HG: CPT | Performed by: PHYSICAL MEDICINE & REHABILITATION

## 2025-01-22 PROCEDURE — 3074F SYST BP LT 130 MM HG: CPT | Performed by: PHYSICAL MEDICINE & REHABILITATION

## 2025-01-22 RX ORDER — LISINOPRIL AND HYDROCHLOROTHIAZIDE 12.5; 2 MG/1; MG/1
2 TABLET ORAL DAILY
COMMUNITY
Start: 2025-01-13

## 2025-01-22 NOTE — PROGRESS NOTES
[facility] PHYSICAL MEDICINE & REHABILITATION  [address] [address]  Dept: [phone]  Dept Fax: [phone]    Outpatient Followup Note    Jus Crain, 44 y.o., male, presents for follow up c/o of Neurologic Problem (Patient presents today of a follow up post CVA. Returned to work full time June 2024.)  .     HPI:     History of Present Illness  The patient presents for evaluation of his return to work after a stroke.    He reports a satisfactory adjustment to his work schedule, which he resumed in early July 2024. His sleep pattern varies, with 4 to 5 hours of sleep when he has worked night shift and up to 8 hours when he's on day shift. He does not experience any significant fatigue or difficulty in completing tasks, noting that his current state is comparable to his pre-stroke condition. He feels rested upon waking after a full night's sleep and does not experience excessive daytime fatigue. He also reports no headaches or issues with focus or attention.      Past Medical History:   Diagnosis Date    Cerebral artery occlusion with cerebral infarction (HCC)     History of heartburn     takes omeprazole    Hypertension     Neoplasm of unspecified nature of bone, soft tissue, and skin 2009-present    lesions of right cheek x 2    Research study patient 01/26/2023    [id number]. Date of completion 1/28/23      Past Surgical History:   Procedure Laterality Date    APPENDECTOMY  in 20's    PRE-MALIGNANT / BENIGN SKIN LESION EXCISION Right 9/29/2014    Excision lesion of right cheek. Dr. DENNIS Mascorro.     No family history on file.  Social History     Socioeconomic History    Marital status:    Tobacco Use    Smoking status: Every Day     Current packs/day: 0.00     Average packs/day: 1 pack/day for 10.0 years (10.0 ttl pk-yrs)     Types: Cigarettes     Start date: 7/8/2003     Last attempt to quit: 7/8/2013     Years since